# Patient Record
Sex: MALE | Race: WHITE | Employment: FULL TIME | URBAN - METROPOLITAN AREA
[De-identification: names, ages, dates, MRNs, and addresses within clinical notes are randomized per-mention and may not be internally consistent; named-entity substitution may affect disease eponyms.]

---

## 2017-01-04 ENCOUNTER — TELEPHONE (OUTPATIENT)
Dept: FAMILY MEDICINE CLINIC | Age: 52
End: 2017-01-04

## 2017-01-05 RX ORDER — ALOGLIPTIN AND METFORMIN HYDROCHLORIDE 12.5; 1 MG/1; MG/1
TABLET, FILM COATED ORAL
Qty: 60 TABLET | Refills: 11 | Status: SHIPPED | OUTPATIENT
Start: 2017-01-05 | End: 2017-02-06 | Stop reason: SDUPTHER

## 2017-02-01 DIAGNOSIS — E78.5 DYSLIPIDEMIA: ICD-10-CM

## 2017-02-01 RX ORDER — ATORVASTATIN CALCIUM 40 MG/1
40 TABLET, FILM COATED ORAL NIGHTLY
Qty: 30 TABLET | Refills: 11 | Status: SHIPPED | OUTPATIENT
Start: 2017-02-01 | End: 2018-02-12 | Stop reason: SDUPTHER

## 2017-02-02 ENCOUNTER — TELEPHONE (OUTPATIENT)
Dept: FAMILY MEDICINE CLINIC | Age: 52
End: 2017-02-02

## 2017-02-06 ENCOUNTER — TELEPHONE (OUTPATIENT)
Dept: FAMILY MEDICINE CLINIC | Age: 52
End: 2017-02-06

## 2017-02-06 RX ORDER — ALOGLIPTIN AND METFORMIN HYDROCHLORIDE 12.5; 1 MG/1; MG/1
TABLET, FILM COATED ORAL
Qty: 60 TABLET | Refills: 11 | Status: SHIPPED | OUTPATIENT
Start: 2017-02-06 | End: 2018-02-12 | Stop reason: SDUPTHER

## 2017-03-01 DIAGNOSIS — I10 ESSENTIAL HYPERTENSION: ICD-10-CM

## 2017-03-01 RX ORDER — LISINOPRIL AND HYDROCHLOROTHIAZIDE 20; 12.5 MG/1; MG/1
1 TABLET ORAL 2 TIMES DAILY
Qty: 60 TABLET | Refills: 11 | Status: SHIPPED | OUTPATIENT
Start: 2017-03-01 | End: 2018-02-16 | Stop reason: SDUPTHER

## 2017-03-15 ENCOUNTER — TELEPHONE (OUTPATIENT)
Dept: FAMILY MEDICINE CLINIC | Age: 52
End: 2017-03-15

## 2017-03-22 ENCOUNTER — OFFICE VISIT (OUTPATIENT)
Dept: FAMILY MEDICINE CLINIC | Age: 52
End: 2017-03-22

## 2017-03-22 VITALS
BODY MASS INDEX: 42.06 KG/M2 | HEART RATE: 75 BPM | DIASTOLIC BLOOD PRESSURE: 68 MMHG | RESPIRATION RATE: 14 BRPM | SYSTOLIC BLOOD PRESSURE: 130 MMHG | WEIGHT: 293.8 LBS | OXYGEN SATURATION: 97 % | HEIGHT: 70 IN

## 2017-03-22 DIAGNOSIS — E78.5 DYSLIPIDEMIA: ICD-10-CM

## 2017-03-22 DIAGNOSIS — Z72.0 TOBACCO ABUSE: ICD-10-CM

## 2017-03-22 DIAGNOSIS — Z99.89 OBSTRUCTIVE SLEEP APNEA ON CPAP: ICD-10-CM

## 2017-03-22 DIAGNOSIS — E66.01 MORBID OBESITY, UNSPECIFIED OBESITY TYPE (HCC): ICD-10-CM

## 2017-03-22 DIAGNOSIS — E11.29 CONTROLLED TYPE 2 DIABETES MELLITUS WITH MICROALBUMINURIA, UNSPECIFIED LONG TERM INSULIN USE STATUS: Primary | ICD-10-CM

## 2017-03-22 DIAGNOSIS — G47.00 INSOMNIA, UNSPECIFIED TYPE: ICD-10-CM

## 2017-03-22 DIAGNOSIS — Z23 NEED FOR TDAP VACCINATION: ICD-10-CM

## 2017-03-22 DIAGNOSIS — G47.33 OBSTRUCTIVE SLEEP APNEA ON CPAP: ICD-10-CM

## 2017-03-22 DIAGNOSIS — M51.36 DDD (DEGENERATIVE DISC DISEASE), LUMBAR: ICD-10-CM

## 2017-03-22 DIAGNOSIS — G25.81 RESTLESS LEG SYNDROME: ICD-10-CM

## 2017-03-22 DIAGNOSIS — R80.9 CONTROLLED TYPE 2 DIABETES MELLITUS WITH MICROALBUMINURIA, UNSPECIFIED LONG TERM INSULIN USE STATUS: Primary | ICD-10-CM

## 2017-03-22 DIAGNOSIS — Z85.46 HISTORY OF PROSTATE CANCER: ICD-10-CM

## 2017-03-22 DIAGNOSIS — Z23 NEED FOR 23-POLYVALENT PNEUMOCOCCAL POLYSACCHARIDE VACCINE: ICD-10-CM

## 2017-03-22 PROCEDURE — 90715 TDAP VACCINE 7 YRS/> IM: CPT | Performed by: FAMILY MEDICINE

## 2017-03-22 PROCEDURE — 99214 OFFICE O/P EST MOD 30 MIN: CPT | Performed by: FAMILY MEDICINE

## 2017-03-22 PROCEDURE — 90471 IMMUNIZATION ADMIN: CPT | Performed by: FAMILY MEDICINE

## 2017-03-22 PROCEDURE — 90732 PPSV23 VACC 2 YRS+ SUBQ/IM: CPT | Performed by: FAMILY MEDICINE

## 2017-03-22 PROCEDURE — 90472 IMMUNIZATION ADMIN EACH ADD: CPT | Performed by: FAMILY MEDICINE

## 2017-03-22 ASSESSMENT — ENCOUNTER SYMPTOMS
RESPIRATORY NEGATIVE: 1
BACK PAIN: 1
GASTROINTESTINAL NEGATIVE: 1

## 2017-05-30 RX ORDER — BUSPIRONE HYDROCHLORIDE 15 MG/1
TABLET ORAL
Qty: 60 TABLET | Refills: 11 | Status: SHIPPED | OUTPATIENT
Start: 2017-05-30 | End: 2018-05-23 | Stop reason: SDUPTHER

## 2017-09-09 ENCOUNTER — HOSPITAL ENCOUNTER (OUTPATIENT)
Age: 52
Discharge: HOME | End: 2017-09-09
Payer: MEDICAID

## 2017-09-09 DIAGNOSIS — R80.9 CONTROLLED TYPE 2 DIABETES MELLITUS WITH MICROALBUMINURIA, UNSPECIFIED LONG TERM INSULIN USE STATUS: ICD-10-CM

## 2017-09-09 DIAGNOSIS — E11.29 CONTROLLED TYPE 2 DIABETES MELLITUS WITH MICROALBUMINURIA, UNSPECIFIED LONG TERM INSULIN USE STATUS: ICD-10-CM

## 2017-09-09 PROCEDURE — 36415 COLL VENOUS BLD VENIPUNCTURE: CPT

## 2017-09-09 PROCEDURE — 83036 HEMOGLOBIN GLYCOSYLATED A1C: CPT

## 2017-09-10 LAB
AVERAGE GLUCOSE: 165 MG/DL (ref 70–126)
HBA1C MFR BLD: 7.5 % (ref 4.4–6.4)

## 2017-09-11 ENCOUNTER — TELEPHONE (OUTPATIENT)
Dept: UROLOGY | Age: 52
End: 2017-09-11

## 2017-09-11 DIAGNOSIS — C61 MALIGNANT NEOPLASM OF PROSTATE (HCC): Primary | ICD-10-CM

## 2017-09-20 ENCOUNTER — OFFICE VISIT (OUTPATIENT)
Dept: FAMILY MEDICINE CLINIC | Age: 52
End: 2017-09-20
Payer: MEDICAID

## 2017-09-20 VITALS
BODY MASS INDEX: 43.32 KG/M2 | OXYGEN SATURATION: 98 % | SYSTOLIC BLOOD PRESSURE: 130 MMHG | DIASTOLIC BLOOD PRESSURE: 72 MMHG | HEART RATE: 105 BPM | RESPIRATION RATE: 14 BRPM | HEIGHT: 70 IN | WEIGHT: 302.6 LBS

## 2017-09-20 DIAGNOSIS — Z85.46 HISTORY OF PROSTATE CANCER: ICD-10-CM

## 2017-09-20 DIAGNOSIS — Z99.89 OBSTRUCTIVE SLEEP APNEA ON CPAP: ICD-10-CM

## 2017-09-20 DIAGNOSIS — Z11.4 SCREENING FOR HIV (HUMAN IMMUNODEFICIENCY VIRUS): ICD-10-CM

## 2017-09-20 DIAGNOSIS — Z11.59 ENCOUNTER FOR HEPATITIS C SCREENING TEST FOR LOW RISK PATIENT: ICD-10-CM

## 2017-09-20 DIAGNOSIS — E78.5 DYSLIPIDEMIA: ICD-10-CM

## 2017-09-20 DIAGNOSIS — G47.33 OBSTRUCTIVE SLEEP APNEA ON CPAP: ICD-10-CM

## 2017-09-20 DIAGNOSIS — G25.81 RESTLESS LEG SYNDROME: ICD-10-CM

## 2017-09-20 DIAGNOSIS — Z23 NEED FOR INFLUENZA VACCINATION: ICD-10-CM

## 2017-09-20 DIAGNOSIS — G47.00 INSOMNIA, UNSPECIFIED TYPE: ICD-10-CM

## 2017-09-20 DIAGNOSIS — M51.36 DDD (DEGENERATIVE DISC DISEASE), LUMBAR: ICD-10-CM

## 2017-09-20 PROCEDURE — 90471 IMMUNIZATION ADMIN: CPT | Performed by: FAMILY MEDICINE

## 2017-09-20 PROCEDURE — 90688 IIV4 VACCINE SPLT 0.5 ML IM: CPT | Performed by: FAMILY MEDICINE

## 2017-09-20 PROCEDURE — 99214 OFFICE O/P EST MOD 30 MIN: CPT | Performed by: FAMILY MEDICINE

## 2017-09-20 RX ORDER — TAMSULOSIN HYDROCHLORIDE 0.4 MG/1
0.4 CAPSULE ORAL DAILY
Qty: 90 CAPSULE | Refills: 3 | Status: SHIPPED | OUTPATIENT
Start: 2017-09-20 | End: 2018-10-09 | Stop reason: SDUPTHER

## 2017-09-20 RX ORDER — AMITRIPTYLINE HYDROCHLORIDE 100 MG/1
TABLET, FILM COATED ORAL
Qty: 90 TABLET | Refills: 3 | Status: SHIPPED | OUTPATIENT
Start: 2017-09-20 | End: 2018-10-09 | Stop reason: SDUPTHER

## 2017-09-20 RX ORDER — PRAMIPEXOLE DIHYDROCHLORIDE 0.25 MG/1
0.25 TABLET ORAL NIGHTLY
Qty: 90 TABLET | Refills: 3 | Status: SHIPPED | OUTPATIENT
Start: 2017-09-20 | End: 2018-10-09 | Stop reason: SDUPTHER

## 2017-09-20 RX ORDER — PRAMIPEXOLE DIHYDROCHLORIDE 0.25 MG/1
0.5 TABLET ORAL NIGHTLY
Qty: 30 TABLET | Refills: 5 | Status: CANCELLED | OUTPATIENT
Start: 2017-09-20

## 2017-09-20 ASSESSMENT — ENCOUNTER SYMPTOMS
GASTROINTESTINAL NEGATIVE: 1
RESPIRATORY NEGATIVE: 1

## 2017-09-29 ENCOUNTER — HOSPITAL ENCOUNTER (OUTPATIENT)
Age: 52
Discharge: HOME | End: 2017-09-29
Payer: MEDICAID

## 2017-09-29 DIAGNOSIS — C61 MALIGNANT NEOPLASM OF PROSTATE (HCC): ICD-10-CM

## 2017-09-29 LAB — PROSTATE SPECIFIC ANTIGEN: 0.07 NG/ML (ref 0–1)

## 2017-09-29 PROCEDURE — 36415 COLL VENOUS BLD VENIPUNCTURE: CPT

## 2017-09-29 PROCEDURE — 84153 ASSAY OF PSA TOTAL: CPT

## 2017-10-03 ENCOUNTER — OFFICE VISIT (OUTPATIENT)
Dept: UROLOGY | Age: 52
End: 2017-10-03
Payer: MEDICAID

## 2017-10-03 VITALS
WEIGHT: 301 LBS | BODY MASS INDEX: 43.09 KG/M2 | HEIGHT: 70 IN | DIASTOLIC BLOOD PRESSURE: 92 MMHG | SYSTOLIC BLOOD PRESSURE: 172 MMHG

## 2017-10-03 DIAGNOSIS — C61 PROSTATE CANCER (HCC): Primary | ICD-10-CM

## 2017-10-03 LAB
BILIRUBIN URINE: ABNORMAL
BLOOD URINE, POC: ABNORMAL
CHARACTER, URINE: CLEAR
COLOR, URINE: ABNORMAL
GLUCOSE URINE: 250 MG/DL
KETONES, URINE: ABNORMAL
LEUKOCYTE CLUMPS, URINE: NEGATIVE
NITRITE, URINE: NEGATIVE
PH, URINE: 5.5
PROTEIN, URINE: >= 300 MG/DL
SPECIFIC GRAVITY, URINE: >= 1.03 (ref 1–1.03)
UROBILINOGEN, URINE: 1 EU/DL

## 2017-10-03 PROCEDURE — 81003 URINALYSIS AUTO W/O SCOPE: CPT | Performed by: UROLOGY

## 2017-10-03 PROCEDURE — 99213 OFFICE O/P EST LOW 20 MIN: CPT | Performed by: UROLOGY

## 2017-10-03 NOTE — MR AVS SNAPSHOT
Name Address Phone [facility] [address]  [address] [place] [phone]      You Were Seen for:         Comments    Prostate cancer [facility])   [[id number]]         Vital Signs     Blood Pressure Height Weight Body Mass Index Smoking Status       172/92 5' 10\" (1.778 m) 301 lb (136.5 kg) 43.19 kg/m2 Current Every Day Smoker       Additional Information about your Body Mass Index (BMI)           Your BMI as listed above is considered obese (30 or more). BMI is an estimate of body fat, calculated from your height and weight. The higher your BMI, the greater your risk of heart disease, high blood pressure, type 2 diabetes, stroke, gallstones, arthritis, sleep apnea, and certain cancers. BMI is not perfect. It may overestimate body fat in athletes and people who are more muscular. Even a small weight loss (between 5 and 10 percent of your current weight) by decreasing your calorie intake and becoming more physically active will help lower your risk of developing or worsening diseases associated with obesity. Learn more at: [employer]'s[employer]uk          Instructions         Stopping Smoking: Care Instructions  Your Care Instructions  Cigarette smokers crave the nicotine in cigarettes. Giving it up is much harder than simply changing a habit. Your body has to stop craving the nicotine. It is hard to quit, but you can do it. There are many tools that people use to quit smoking. You may find that combining tools works best for you. There are several steps to quitting. First you get ready to quit. Then you get support to help you. After that, you learn new skills and behaviors to become a nonsmoker. For many people, a necessary step is getting and using medicine. Your doctor will help you set up the plan that best meets your needs. You may want to attend a smoking cessation program to help you quit smoking. ¨ Change your daily routine. Take a different route to work or eat a meal in a different place. · Cut down on stress. Calm yourself or release tension by doing an activity you enjoy, such as reading a book, taking a hot bath, or gardening. · Talk to your doctor or pharmacist about nicotine replacement therapy, which replaces the nicotine in your body. You still get nicotine but you do not use tobacco. Nicotine replacement products help you slowly reduce the amount of nicotine you need. These products come in several forms, many of them available over-the-counter:  ¨ Nicotine patches  ¨ Nicotine gum and lozenges  ¨ Nicotine inhaler  · Ask your doctor about bupropion (Wellbutrin) or varenicline (Chantix), which are prescription medicines. They do not contain nicotine. They help you by reducing withdrawal symptoms, such as stress and anxiety. · Some people find hypnosis, acupuncture, and massage helpful for ending the smoking habit. · Eat a healthy diet and get regular exercise. Having healthy habits will help your body move past its craving for nicotine. · Be prepared to keep trying. Most people are not successful the first few times they try to quit. Do not get mad at yourself if you smoke again. Make a list of things you learned and think about when you want to try again, such as next week, next month, or next year. Where can you learn more? Go to https://[email or web address].Playchemy. org and sign in to your Everest account. Enter F561 in the [facility] box to learn more about \"Stopping Smoking: Care Instructions. \"     If you do not have an account, please click on the \"Sign Up Now\" link. Current as of: March 20, 2017  Content Version: 11.3  © 7191-6490 [employer]. Care instructions adapted under license by [facility] ([facility]).  If you have questions about a medical condition or this instruction, always ask your healthcare professional. [employer] disclaims any warranty or liability for your use of this information. Medications and Orders      Your Current Medications Are              amitriptyline (ELAVIL) 100 MG tablet take 1 tablet by mouth NIGHTLY    tamsulosin (FLOMAX) 0.4 MG capsule Take 1 capsule by mouth daily    pramipexole (MIRAPEX) 0.25 MG tablet Take 1 tablet by mouth nightly    busPIRone (BUSPAR) 15 MG tablet take 1 tablet by mouth twice a day    linagliptin-metformin (JENTADUETO) 2.5-1000 MG TABS Take 1 tab BID    lisinopril-hydrochlorothiazide (PRINZIDE;ZESTORETIC) 20-12.5 MG per tablet Take 1 tablet by mouth 2 times daily    atorvastatin (LIPITOR) 40 MG tablet Take 1 tablet by mouth nightly    gabapentin (NEURONTIN) 300 MG capsule Take 800 mg by mouth 3 times daily     cyclobenzaprine (FLEXERIL) 10 MG tablet Take 10 mg by mouth 3 times daily. Multiple Vitamin (MULTIVITAMIN PO) Take  by mouth daily. aspirin 81 MG EC tablet Take 81 mg by mouth daily.       alogliptin-metformin (KAZANO) 12.5-1000 MG TABS Take 1 tab BID      Allergies              Pcn [Penicillins] Hives      We Ordered/Performed the following           POCT Urinalysis No Micro (Auto)          Result Summary for POCT Urinalysis No Micro (Auto)      Result Information       Status          Abnormal Final result (Collected: 10/3/2017  3:30 PM)           10/3/2017  3:25 PM      Component Results     Component Value Ref Range & Units Status    Glucose, Ur 250 (A) NEGATIVE mg/dl Final    Bilirubin Urine Small  Final    Ketones, Urine Trace NEGATIVE Final    Specific Gravity, Urine >= 1.030 1.002 - 1.03 Final    Blood, UA POC Trace-intact NEGATIVE Final    pH, Urine 5.50 5.0 - 9.0 Final    Protein, Urine >= 300 (A) NEGATIVE mg/dl Final    Urobilinogen, Urine 1.00 0.0 - 1.0 eu/dl Final    Nitrite, Urine Negative NEGATIVE Final    Leukocyte Clumps, Urine Negative NEGATIVE Final    Color, Urine Dark yellow YELLOW-STR Final Character, Urine Clear CLR-SL.ERIC Final    Performed at [address] [place] [address],[address] Office under CLIA # [id number]               Additional Information        Basic Information     Date Of Birth Sex Race Ethnicity Preferred Language    1965 Male White Non-/Non  English      Problem List as of 10/3/2017  Date Reviewed: 3/22/2017                Obstructive sleep apnea on CPAP    Carpal tunnel syndrome of right wrist    Controlled type 2 diabetes mellitus with microalbuminuria (HCC)    Restless leg syndrome    Morbid obesity (HCC)    Carpal tunnel syndrome of left wrist    Cubital tunnel syndrome on left    Insomnia    Onychomycosis    DM type 2 (diabetes mellitus, type 2) ([facility] [address]    Dyslipidemia    DDD (degenerative disc disease), lumbar    Tobacco abuse      Your Goals as of 10/3/2017 at 3:37 PM              9/9/17    3/18/17    9/6/16       Exercise    Exercise 3x per week (30 min per time)              Result Component    HEMOGLOBIN A1C < 7.0   7.5  6.8  6.9      Immunizations as of 10/3/2017     Name Date    Influenza, Suellyn Morning, 3 Years and older, IM 9/20/2017    Pneumococcal Polysaccharide ([id number]) 3/22/2017    Tdap (Boostrix, Adacel) 3/22/2017      Preventive Care        Date Due    Hepatitis C screening is recommended for all adults regardless of risk factors born between [facility] at least once (lifetime) who have never been tested. 1965    HIV screening is recommended for all people regardless of risk factors  aged 15-65 years at least once (lifetime) who have never been HIV tested. 2/27/1978    Eye Exam By An Eye Doctor 9/9/2016    Cholesterol Screening 3/18/2018    Hemoglobin A1C (Test For Long-Term Glucose Control) 9/9/2018    Diabetic Foot Exam 9/20/2018    Colonoscopy 4/20/2025    Tetanus Combination Vaccine (2 - Td) 3/22/2027            MyChart Signup           Our records indicate that you have an active Enventumhart account.     You can view your After Visit Summary by going to

## 2017-10-03 NOTE — PATIENT INSTRUCTIONS
smoking. · Consider signing up for a smoking cessation program, such as the American Lung Association's Freedom from Smoking program.  · Set a quit date. Pick your date carefully so that it is not right in the middle of a big deadline or stressful time. Once you quit, do not even take a puff. Get rid of all ashtrays and lighters after your last cigarette. Clean your house and your clothes so that they do not smell of smoke. · Learn how to be a nonsmoker. Think about ways you can avoid those things that make you reach for a cigarette. ¨ Avoid situations that put you at greatest risk for smoking. For some people, it is hard to have a drink with friends without smoking. For others, they might skip a coffee break with coworkers who smoke. ¨ Change your daily routine. Take a different route to work or eat a meal in a different place. · Cut down on stress. Calm yourself or release tension by doing an activity you enjoy, such as reading a book, taking a hot bath, or gardening. · Talk to your doctor or pharmacist about nicotine replacement therapy, which replaces the nicotine in your body. You still get nicotine but you do not use tobacco. Nicotine replacement products help you slowly reduce the amount of nicotine you need. These products come in several forms, many of them available over-the-counter:  ¨ Nicotine patches  ¨ Nicotine gum and lozenges  ¨ Nicotine inhaler  · Ask your doctor about bupropion (Wellbutrin) or varenicline (Chantix), which are prescription medicines. They do not contain nicotine. They help you by reducing withdrawal symptoms, such as stress and anxiety. · Some people find hypnosis, acupuncture, and massage helpful for ending the smoking habit. · Eat a healthy diet and get regular exercise. Having healthy habits will help your body move past its craving for nicotine. · Be prepared to keep trying. Most people are not successful the first few times they try to quit.  Do not get mad at yourself if you smoke again. Make a list of things you learned and think about when you want to try again, such as next week, next month, or next year. Where can you learn more? Go to https://Studio S[employer]peDarudar.BuzzCity. org and sign in to your "[employer] account. Enter H900 in the [facility] box to learn more about \"Stopping Smoking: Care Instructions. \"     If you do not have an account, please click on the \"Sign Up Now\" link. Current as of: March 20, 2017  Content Version: 11.3  © 2926-7182 [employer]. Care instructions adapted under license by [facility] ([facility]). If you have questions about a medical condition or this instruction, always ask your healthcare professional. [address] any warranty or liability for your use of this information.

## 2017-11-29 ENCOUNTER — OFFICE VISIT (OUTPATIENT)
Dept: PULMONOLOGY | Age: 52
End: 2017-11-29
Payer: MEDICAID

## 2017-11-29 VITALS
HEIGHT: 70 IN | DIASTOLIC BLOOD PRESSURE: 82 MMHG | BODY MASS INDEX: 44.09 KG/M2 | WEIGHT: 308 LBS | OXYGEN SATURATION: 95 % | SYSTOLIC BLOOD PRESSURE: 132 MMHG | HEART RATE: 85 BPM

## 2017-11-29 DIAGNOSIS — G47.10 HYPERSOMNIA: ICD-10-CM

## 2017-11-29 DIAGNOSIS — Z99.89 OBSTRUCTIVE SLEEP APNEA ON CPAP: Primary | ICD-10-CM

## 2017-11-29 DIAGNOSIS — G47.09 OTHER INSOMNIA: ICD-10-CM

## 2017-11-29 DIAGNOSIS — G47.26 SHIFT WORK SLEEP DISORDER: ICD-10-CM

## 2017-11-29 DIAGNOSIS — G47.33 OBSTRUCTIVE SLEEP APNEA ON CPAP: Primary | ICD-10-CM

## 2017-11-29 PROCEDURE — 99213 OFFICE O/P EST LOW 20 MIN: CPT | Performed by: PHYSICIAN ASSISTANT

## 2017-11-29 PROCEDURE — 4004F PT TOBACCO SCREEN RCVD TLK: CPT | Performed by: PHYSICIAN ASSISTANT

## 2017-11-29 PROCEDURE — 3017F COLORECTAL CA SCREEN DOC REV: CPT | Performed by: PHYSICIAN ASSISTANT

## 2017-11-29 PROCEDURE — G8417 CALC BMI ABV UP PARAM F/U: HCPCS | Performed by: PHYSICIAN ASSISTANT

## 2017-11-29 PROCEDURE — G8427 DOCREV CUR MEDS BY ELIG CLIN: HCPCS | Performed by: PHYSICIAN ASSISTANT

## 2017-11-29 PROCEDURE — G8484 FLU IMMUNIZE NO ADMIN: HCPCS | Performed by: PHYSICIAN ASSISTANT

## 2017-11-29 NOTE — PATIENT INSTRUCTIONS
Tylenol Pm and Melatonin for sleep       Patient Education        Stopping Smoking: Care Instructions  Your Care Instructions  Cigarette smokers crave the nicotine in cigarettes. Giving it up is much harder than simply changing a habit. Your body has to stop craving the nicotine. It is hard to quit, but you can do it. There are many tools that people use to quit smoking. You may find that combining tools works best for you. There are several steps to quitting. First you get ready to quit. Then you get support to help you. After that, you learn new skills and behaviors to become a nonsmoker. For many people, a necessary step is getting and using medicine. Your doctor will help you set up the plan that best meets your needs. You may want to attend a smoking cessation program to help you quit smoking. When you choose a program, look for one that has proven success. Ask your doctor for ideas. You will greatly increase your chances of success if you take medicine as well as get counseling or join a cessation program.  Some of the changes you feel when you first quit tobacco are uncomfortable. Your body will miss the nicotine at first, and you may feel short-tempered and grumpy. You may have trouble sleeping or concentrating. Medicine can help you deal with these symptoms. You may struggle with changing your smoking habits and rituals. The last step is the tricky one: Be prepared for the smoking urge to continue for a time. This is a lot to deal with, but keep at it. You will feel better. Follow-up care is a key part of your treatment and safety. Be sure to make and go to all appointments, and call your doctor if you are having problems. Its also a good idea to know your test results and keep a list of the medicines you take. How can you care for yourself at home? · Ask your family, friends, and coworkers for support. You have a better chance of quitting if you have help and support.   · Join a support group, such as Nicotine Anonymous, for people who are trying to quit smoking. · Consider signing up for a smoking cessation program, such as the American Lung Association's Freedom from Smoking program.  · Set a quit date. Pick your date carefully so that it is not right in the middle of a big deadline or stressful time. Once you quit, do not even take a puff. Get rid of all ashtrays and lighters after your last cigarette. Clean your house and your clothes so that they do not smell of smoke. · Learn how to be a nonsmoker. Think about ways you can avoid those things that make you reach for a cigarette. ¨ Avoid situations that put you at greatest risk for smoking. For some people, it is hard to have a drink with friends without smoking. For others, they might skip a coffee break with coworkers who smoke. ¨ Change your daily routine. Take a different route to work or eat a meal in a different place. · Cut down on stress. Calm yourself or release tension by doing an activity you enjoy, such as reading a book, taking a hot bath, or gardening. · Talk to your doctor or pharmacist about nicotine replacement therapy, which replaces the nicotine in your body. You still get nicotine but you do not use tobacco. Nicotine replacement products help you slowly reduce the amount of nicotine you need. These products come in several forms, many of them available over-the-counter:  ¨ Nicotine patches  ¨ Nicotine gum and lozenges  ¨ Nicotine inhaler  · Ask your doctor about bupropion (Wellbutrin) or varenicline (Chantix), which are prescription medicines. They do not contain nicotine. They help you by reducing withdrawal symptoms, such as stress and anxiety. · Some people find hypnosis, acupuncture, and massage helpful for ending the smoking habit. · Eat a healthy diet and get regular exercise. Having healthy habits will help your body move past its craving for nicotine. · Be prepared to keep trying.  Most people are not successful the first

## 2017-11-29 NOTE — PROGRESS NOTES
Multiple Vitamin (MULTIVITAMIN PO) Take  by mouth daily.  aspirin 81 MG EC tablet Take 81 mg by mouth daily. No current facility-administered medications for this visit. Family History   Problem Relation Age of Onset    Thyroid Disease Mother     Heart Disease Father     Diabetes Father     Cancer Maternal Grandmother      what kind    Heart Disease Brother         Review of Systems -   General ROS: gained 6 lbs over 3 months  ENT ROS: negative for - nasal congestion, oral lesions or sore throat  Hematological and Lymphatic ROS: negative  Endocrine ROS: negative  Respiratory ROS: no cough, shortness of breath, or wheezing  Cardiovascular ROS: no chest pain   Gastrointestinal ROS: no abdominal pain, change in bowel habits, or black or bloody stools  Musculoskeletal ROS: negative  Neurological ROS: negative    Physical Exam:    BMI:  Body mass index is 44.19 kg/m². Wt Readings from Last 3 Encounters:   11/29/17 (!) 308 lb (139.7 kg)   10/03/17 (!) 301 lb (136.5 kg)   09/20/17 (!) 302 lb 9.6 oz (137.3 kg)     Vitals: /82   Pulse 85   Ht 5' 10\" (1.778 m)   Wt (!) 308 lb (139.7 kg)   SpO2 95% Comment: room air at rest  BMI 44.19 kg/m²       General appearance: alert and oriented to person, place and time, well-developed and well-nourished, in no acute distress  Nose: Nares normal. Septum midline. Mucosa normal. No drainage or sinus tenderness. Oropharynx:  negative  Lungs: clear to auscultation bilaterally  Heart: regular rate and rhythm, S1, S2 normal, no murmur, click, rub or gallop  Extremities: extremities normal, atraumatic, no cyanosis or edema  Neurologic: Mental status: Alert, oriented, thought content appropriate      ASSESSMENT/DIAGNOSIS    1. Obstructive sleep apnea on CPAP     2. Shift work sleep disorder     3. Hypersomnia     4. Other insomnia              Plan   Do you need any equipment today?  Yes update supplies  - Will try Benadryl and Melatonin for sleep  - if

## 2018-02-12 DIAGNOSIS — E78.5 DYSLIPIDEMIA: ICD-10-CM

## 2018-02-12 RX ORDER — ALOGLIPTIN AND METFORMIN HYDROCHLORIDE 12.5; 1 MG/1; MG/1
TABLET, FILM COATED ORAL
Qty: 60 TABLET | Refills: 11 | Status: SHIPPED | OUTPATIENT
Start: 2018-02-12 | End: 2022-01-17

## 2018-02-12 RX ORDER — ATORVASTATIN CALCIUM 40 MG/1
40 TABLET, FILM COATED ORAL NIGHTLY
Qty: 30 TABLET | Refills: 11 | Status: SHIPPED | OUTPATIENT
Start: 2018-02-12 | End: 2022-01-17

## 2018-02-16 DIAGNOSIS — I10 ESSENTIAL HYPERTENSION: ICD-10-CM

## 2018-02-19 RX ORDER — LISINOPRIL AND HYDROCHLOROTHIAZIDE 20; 12.5 MG/1; MG/1
TABLET ORAL
Qty: 60 TABLET | Refills: 11 | Status: SHIPPED | OUTPATIENT
Start: 2018-02-19 | End: 2022-01-17

## 2018-02-20 ENCOUNTER — HOSPITAL ENCOUNTER (OUTPATIENT)
Age: 53
Discharge: HOME | End: 2018-02-20
Attending: INTERNAL MEDICINE
Payer: MEDICAID

## 2018-02-20 PROCEDURE — 87507 IADNA-DNA/RNA PROBE TQ 12-25: CPT

## 2018-02-21 LAB
ADENOVIRUS F 40 41 PCR: NOT DETECTED
ASTROVIRUS PCR: NOT DETECTED
CAMPYLOBACTER PCR: NOT DETECTED
CLOSTRIDIUM DIFFICILE, PCR: NOT DETECTED
CRYPTOSPORIDIUM PCR: NOT DETECTED
CYCLOSPORA CAYETANENSIS PCR: NOT DETECTED
E COLI 0157 PCR: NORMAL
E COLI ENTEROAGGREGATIVE PCR: NOT DETECTED
E COLI ENTEROPATHOGENIC PCR: NOT DETECTED
E COLI ENTEROTOXIGENIC PCR: NOT DETECTED
E COLI SHIGA LIKE TOXIN PCR: NOT DETECTED
E COLI SHIGELLA/ENTEROINVASIVE PCR: NOT DETECTED
E HISTOLYTICA GI FILM ARRAY: NOT DETECTED
GIARDIA LAMBLIA PCR: NOT DETECTED
NOROVIRUS GI GII PCR: NOT DETECTED
PLESIOMONAS SHIGELLOIDES PCR: NOT DETECTED
ROTAVIRUS A PCR: NOT DETECTED
SALMONELLA PCR: NOT DETECTED
SAPOVIRUS PCR: NOT DETECTED
VIBRIO CHOLERAE PCR: NOT DETECTED
VIBRIO PCR: NOT DETECTED
YERSINIA ENTEROCOLITICA PCR: NOT DETECTED

## 2018-02-28 ENCOUNTER — OFFICE VISIT (OUTPATIENT)
Dept: PULMONOLOGY | Age: 53
End: 2018-02-28
Payer: MEDICAID

## 2018-02-28 VITALS
SYSTOLIC BLOOD PRESSURE: 118 MMHG | OXYGEN SATURATION: 94 % | HEART RATE: 75 BPM | BODY MASS INDEX: 41.95 KG/M2 | WEIGHT: 293 LBS | DIASTOLIC BLOOD PRESSURE: 76 MMHG | HEIGHT: 70 IN

## 2018-02-28 DIAGNOSIS — G25.81 RESTLESS LEG SYNDROME: ICD-10-CM

## 2018-02-28 DIAGNOSIS — Z72.0 TOBACCO ABUSE: ICD-10-CM

## 2018-02-28 DIAGNOSIS — G47.09 OTHER INSOMNIA: ICD-10-CM

## 2018-02-28 DIAGNOSIS — G47.33 OBSTRUCTIVE SLEEP APNEA ON CPAP: Primary | ICD-10-CM

## 2018-02-28 DIAGNOSIS — Z99.89 OBSTRUCTIVE SLEEP APNEA ON CPAP: Primary | ICD-10-CM

## 2018-02-28 PROCEDURE — G8417 CALC BMI ABV UP PARAM F/U: HCPCS | Performed by: PHYSICIAN ASSISTANT

## 2018-02-28 PROCEDURE — 3017F COLORECTAL CA SCREEN DOC REV: CPT | Performed by: PHYSICIAN ASSISTANT

## 2018-02-28 PROCEDURE — 4004F PT TOBACCO SCREEN RCVD TLK: CPT | Performed by: PHYSICIAN ASSISTANT

## 2018-02-28 PROCEDURE — G8484 FLU IMMUNIZE NO ADMIN: HCPCS | Performed by: PHYSICIAN ASSISTANT

## 2018-02-28 PROCEDURE — G8427 DOCREV CUR MEDS BY ELIG CLIN: HCPCS | Performed by: PHYSICIAN ASSISTANT

## 2018-02-28 PROCEDURE — 99213 OFFICE O/P EST LOW 20 MIN: CPT | Performed by: PHYSICIAN ASSISTANT

## 2018-02-28 PROCEDURE — 99999 PR TOBACCO USE CESSATION INTENSIVE >10 MINUTES: CPT | Performed by: PHYSICIAN ASSISTANT

## 2018-02-28 ASSESSMENT — ENCOUNTER SYMPTOMS
GASTROINTESTINAL NEGATIVE: 1
NAUSEA: 0
COUGH: 0
EYES NEGATIVE: 1
SPUTUM PRODUCTION: 0
SINUS PAIN: 0
SORE THROAT: 0
HEARTBURN: 0
ORTHOPNEA: 0
RESPIRATORY NEGATIVE: 1
SHORTNESS OF BREATH: 0
WHEEZING: 0

## 2018-02-28 NOTE — PROGRESS NOTES
complication, not stated as uncontrolled     Urgency of urination        Past Surgical History:   Procedure Laterality Date    CARPAL TUNNEL RELEASE  10/22/12    right    CARPAL TUNNEL RELEASE  11/26/2012    left    COLONOSCOPY  2/1/2015    CYST REMOVAL  T    RT SIDE UPPER BACK,     CYST REMOVAL  7-5-12    excision of right shoulder mass/cyst and of left axillary skin lesion-Dr. Nii Mallory    HAND SURGERY  1987    partial finger amputee rt hand    KNEE SURGERY  ? left knee scope    LEG SURGERY      LEG SURGERY  6/13/12    I&D rt leg abscess- Dr. Khanh Palacios  12-    TRUS/BX    PROSTATE SURGERY  06-    BRACHYTHERAPY OF THE PROSTATE    SKIN TAG REMOVAL  T    LT ARMPIT       Social History   Substance Use Topics    Smoking status: Current Every Day Smoker     Packs/day: 1.00     Years: 40.00     Types: Cigarettes     Start date: 6/29/1975    Smokeless tobacco: Never Used    Alcohol use No       Allergies   Allergen Reactions    Pcn [Penicillins] Hives       Current Outpatient Prescriptions   Medication Sig Dispense Refill    lisinopril-hydrochlorothiazide (PRINZIDE;ZESTORETIC) 20-12.5 MG per tablet take 1 tablet by mouth twice a day 60 tablet 11    atorvastatin (LIPITOR) 40 MG tablet take 1 tablet by mouth NIGHTLY 30 tablet 11    amitriptyline (ELAVIL) 100 MG tablet take 1 tablet by mouth NIGHTLY 90 tablet 3    tamsulosin (FLOMAX) 0.4 MG capsule Take 1 capsule by mouth daily 90 capsule 3    pramipexole (MIRAPEX) 0.25 MG tablet Take 1 tablet by mouth nightly 90 tablet 3    busPIRone (BUSPAR) 15 MG tablet take 1 tablet by mouth twice a day 60 tablet 11    linagliptin-metformin (JENTADUETO) 2.5-1000 MG TABS Take 1 tab BID 60 tablet 11    gabapentin (NEURONTIN) 300 MG capsule Take 800 mg by mouth 3 times daily       cyclobenzaprine (FLEXERIL) 10 MG tablet Take 10 mg by mouth 3 times daily.  Multiple Vitamin (MULTIVITAMIN PO) Take  by mouth daily.        

## 2018-03-16 ENCOUNTER — HOSPITAL ENCOUNTER (OUTPATIENT)
Age: 53
Discharge: HOME | End: 2018-03-16
Payer: MEDICAID

## 2018-03-16 DIAGNOSIS — Z11.4 SCREENING FOR HIV (HUMAN IMMUNODEFICIENCY VIRUS): ICD-10-CM

## 2018-03-16 DIAGNOSIS — Z11.59 ENCOUNTER FOR HEPATITIS C SCREENING TEST FOR LOW RISK PATIENT: ICD-10-CM

## 2018-03-16 DIAGNOSIS — E78.5 DYSLIPIDEMIA: ICD-10-CM

## 2018-03-16 DIAGNOSIS — C61 PROSTATE CANCER (HCC): ICD-10-CM

## 2018-03-16 LAB
ALBUMIN SERPL-MCNC: 4.4 G/DL (ref 3.5–5.1)
ALP BLD-CCNC: 67 U/L (ref 38–126)
ALT SERPL-CCNC: 25 U/L (ref 11–66)
ANION GAP SERPL CALCULATED.3IONS-SCNC: 16 MEQ/L (ref 8–16)
AST SERPL-CCNC: 21 U/L (ref 5–40)
AVERAGE GLUCOSE: 171 MG/DL (ref 70–126)
BILIRUB SERPL-MCNC: 0.5 MG/DL (ref 0.3–1.2)
BUN BLDV-MCNC: 11 MG/DL (ref 7–22)
CALCIUM SERPL-MCNC: 9.7 MG/DL (ref 8.5–10.5)
CHLORIDE BLD-SCNC: 94 MEQ/L (ref 98–111)
CHOLESTEROL, TOTAL: 147 MG/DL (ref 100–199)
CO2: 24 MEQ/L (ref 23–33)
CREAT SERPL-MCNC: 0.8 MG/DL (ref 0.4–1.2)
CREATININE, URINE: 67.5 MG/DL
GFR SERPL CREATININE-BSD FRML MDRD: > 90 ML/MIN/1.73M2
GLUCOSE BLD-MCNC: 133 MG/DL (ref 70–108)
HBA1C MFR BLD: 7.7 % (ref 4.4–6.4)
HDLC SERPL-MCNC: 30 MG/DL
LDL CHOLESTEROL CALCULATED: 80 MG/DL
MICROALBUMIN UR-MCNC: 54.57 MG/DL
MICROALBUMIN/CREAT UR-RTO: 808 MG/G (ref 0–30)
POTASSIUM SERPL-SCNC: 3.5 MEQ/L (ref 3.5–5.2)
SODIUM BLD-SCNC: 134 MEQ/L (ref 135–145)
TOTAL PROTEIN: 7.3 G/DL (ref 6.1–8)
TRIGL SERPL-MCNC: 187 MG/DL (ref 0–199)

## 2018-03-16 PROCEDURE — 82043 UR ALBUMIN QUANTITATIVE: CPT

## 2018-03-16 PROCEDURE — 80061 LIPID PANEL: CPT

## 2018-03-16 PROCEDURE — 86803 HEPATITIS C AB TEST: CPT

## 2018-03-16 PROCEDURE — 83036 HEMOGLOBIN GLYCOSYLATED A1C: CPT

## 2018-03-16 PROCEDURE — 87389 HIV-1 AG W/HIV-1&-2 AB AG IA: CPT

## 2018-03-16 PROCEDURE — 36415 COLL VENOUS BLD VENIPUNCTURE: CPT

## 2018-03-16 PROCEDURE — 80053 COMPREHEN METABOLIC PANEL: CPT

## 2018-03-16 PROCEDURE — 84153 ASSAY OF PSA TOTAL: CPT

## 2018-03-17 LAB
HEPATITIS C ANTIBODY: NEGATIVE
PROSTATE SPECIFIC ANTIGEN: 0.09 NG/ML (ref 0–1)

## 2018-03-18 LAB — HIV-2 AB: NEGATIVE

## 2018-03-21 ENCOUNTER — OFFICE VISIT (OUTPATIENT)
Dept: FAMILY MEDICINE CLINIC | Age: 53
End: 2018-03-21
Payer: MEDICAID

## 2018-03-21 VITALS
DIASTOLIC BLOOD PRESSURE: 86 MMHG | WEIGHT: 291.8 LBS | BODY MASS INDEX: 41.78 KG/M2 | RESPIRATION RATE: 16 BRPM | OXYGEN SATURATION: 98 % | SYSTOLIC BLOOD PRESSURE: 128 MMHG | HEIGHT: 70 IN | HEART RATE: 76 BPM

## 2018-03-21 DIAGNOSIS — Z99.89 OBSTRUCTIVE SLEEP APNEA ON CPAP: ICD-10-CM

## 2018-03-21 DIAGNOSIS — Z72.0 TOBACCO ABUSE: ICD-10-CM

## 2018-03-21 DIAGNOSIS — E66.01 MORBID OBESITY (HCC): ICD-10-CM

## 2018-03-21 DIAGNOSIS — D17.0 LIPOMA OF NECK: ICD-10-CM

## 2018-03-21 DIAGNOSIS — F32.A DEPRESSION, UNSPECIFIED DEPRESSION TYPE: ICD-10-CM

## 2018-03-21 DIAGNOSIS — G47.33 OBSTRUCTIVE SLEEP APNEA ON CPAP: ICD-10-CM

## 2018-03-21 DIAGNOSIS — H93.19 TINNITUS, UNSPECIFIED LATERALITY: ICD-10-CM

## 2018-03-21 DIAGNOSIS — H91.93 BILATERAL HEARING LOSS, UNSPECIFIED HEARING LOSS TYPE: ICD-10-CM

## 2018-03-21 DIAGNOSIS — E78.5 DYSLIPIDEMIA: ICD-10-CM

## 2018-03-21 DIAGNOSIS — G25.81 RESTLESS LEG SYNDROME: ICD-10-CM

## 2018-03-21 DIAGNOSIS — Z13.31 POSITIVE DEPRESSION SCREENING: ICD-10-CM

## 2018-03-21 DIAGNOSIS — G47.00 INSOMNIA, UNSPECIFIED TYPE: ICD-10-CM

## 2018-03-21 DIAGNOSIS — Z85.46 HISTORY OF PROSTATE CANCER: ICD-10-CM

## 2018-03-21 PROCEDURE — G8431 POS CLIN DEPRES SCRN F/U DOC: HCPCS | Performed by: FAMILY MEDICINE

## 2018-03-21 PROCEDURE — G8417 CALC BMI ABV UP PARAM F/U: HCPCS | Performed by: FAMILY MEDICINE

## 2018-03-21 PROCEDURE — 3045F PR MOST RECENT HEMOGLOBIN A1C LEVEL 7.0-9.0%: CPT | Performed by: FAMILY MEDICINE

## 2018-03-21 PROCEDURE — 4004F PT TOBACCO SCREEN RCVD TLK: CPT | Performed by: FAMILY MEDICINE

## 2018-03-21 PROCEDURE — G8482 FLU IMMUNIZE ORDER/ADMIN: HCPCS | Performed by: FAMILY MEDICINE

## 2018-03-21 PROCEDURE — G8427 DOCREV CUR MEDS BY ELIG CLIN: HCPCS | Performed by: FAMILY MEDICINE

## 2018-03-21 PROCEDURE — 3017F COLORECTAL CA SCREEN DOC REV: CPT | Performed by: FAMILY MEDICINE

## 2018-03-21 PROCEDURE — G0444 DEPRESSION SCREEN ANNUAL: HCPCS | Performed by: FAMILY MEDICINE

## 2018-03-21 PROCEDURE — 99215 OFFICE O/P EST HI 40 MIN: CPT | Performed by: FAMILY MEDICINE

## 2018-03-21 RX ORDER — BUPROPION HYDROCHLORIDE 150 MG/1
150 TABLET ORAL EVERY MORNING
Qty: 30 TABLET | Refills: 1 | Status: SHIPPED | OUTPATIENT
Start: 2018-03-21 | End: 2018-04-18 | Stop reason: SDUPTHER

## 2018-03-21 ASSESSMENT — PATIENT HEALTH QUESTIONNAIRE - PHQ9
7. TROUBLE CONCENTRATING ON THINGS, SUCH AS READING THE NEWSPAPER OR WATCHING TELEVISION: 0
2. FEELING DOWN, DEPRESSED OR HOPELESS: 3
9. THOUGHTS THAT YOU WOULD BE BETTER OFF DEAD, OR OF HURTING YOURSELF: 1
8. MOVING OR SPEAKING SO SLOWLY THAT OTHER PEOPLE COULD HAVE NOTICED. OR THE OPPOSITE, BEING SO FIGETY OR RESTLESS THAT YOU HAVE BEEN MOVING AROUND A LOT MORE THAN USUAL: 0
3. TROUBLE FALLING OR STAYING ASLEEP: 0
SUM OF ALL RESPONSES TO PHQ9 QUESTIONS 1 & 2: 6
SUM OF ALL RESPONSES TO PHQ QUESTIONS 1-9: 16
5. POOR APPETITE OR OVEREATING: 3
6. FEELING BAD ABOUT YOURSELF - OR THAT YOU ARE A FAILURE OR HAVE LET YOURSELF OR YOUR FAMILY DOWN: 3
10. IF YOU CHECKED OFF ANY PROBLEMS, HOW DIFFICULT HAVE THESE PROBLEMS MADE IT FOR YOU TO DO YOUR WORK, TAKE CARE OF THINGS AT HOME, OR GET ALONG WITH OTHER PEOPLE: 1
4. FEELING TIRED OR HAVING LITTLE ENERGY: 3
1. LITTLE INTEREST OR PLEASURE IN DOING THINGS: 3

## 2018-03-21 NOTE — PROGRESS NOTES
Subjective:      Patient ID: Riana Chan is a 52 y.o. male. HPI:    Chief Complaint   Patient presents with    6 Month Follow-Up     DM2    Mass     throat    Tinnitus     bilateral     6 month eval.  Doing ok overall. Weight is down from previous visit, down 17 lbs from November. Eating much better. Wt Readings from Last 3 Encounters:   03/21/18 291 lb 12.8 oz (132.4 kg)   02/28/18 293 lb (132.9 kg)   11/29/17 (!) 308 lb (139.7 kg)     BPs well controlled. BP Readings from Last 3 Encounters:   03/21/18 128/86   02/28/18 118/76   11/29/17 132/82     Sugars continue to climb. 6.9 to 7.5 to now 7.7. Pt attributes this to overeating and only eating once daily. Lab Results   Component Value Date    LABA1C 7.7 (H) 03/16/2018     Pt continues to fight depression. Buspar is helping with anxiety but not helping with the anxiety. Denies suicidal thoughts. Hx of ASHLEE, compliant with CPAP. Multiple acute concerns also today. First is a mass above his sternum and lower neck region. Soft, non-tender. Has been there for years. No change. Also c/o hearing loss bilateral, r>l.  asociated tinnitus.       Patient Active Problem List   Diagnosis    Adenocarcinoma of Prostate, GS 7, stage T1c.    Onychomycosis    DM type 2 (diabetes mellitus, type 2) ([address]    Dyslipidemia    DDD (degenerative disc disease), lumbar    Tobacco abuse    Insomnia    Carpal tunnel syndrome of right wrist    Carpal tunnel syndrome of left wrist    Cubital tunnel syndrome on left    Morbid obesity ([address]    Obstructive sleep apnea on CPAP    Restless leg syndrome    Controlled type 2 diabetes mellitus with microalbuminuria ([address]     Past Surgical History:   Procedure Laterality Date    CARPAL TUNNEL RELEASE  10/22/12    right    CARPAL TUNNEL RELEASE  11/26/2012    left    COLONOSCOPY  2/1/2015    CYST REMOVAL  T    RT SIDE UPPER BACK,     CYST REMOVAL  7-5-12    excision of right shoulder mass/cyst and of with a Documented Follow-up Plan            Plan:      -  Chronic medical problems stable  -  Continue current medications  -  Follow up with specialists as scheduled  -  Labs reviewed, A1C elevated. Declines additional meds at this time, wishes to try weight loss first  -  Additional orders above  -  RTO 1 month for re-eval depression        On the basis of positive PHQ-9 screening (PHQ-9 Total Score: 16), the following plan was implemented: medication prescribed: Wellbutrin- 150 mg daily- patient will call for any significant medication side effects or worsening symptoms of depression. Patient will follow-up in 1 month(s) with PCP.

## 2018-03-22 RX ORDER — BUPROPION HYDROCHLORIDE 150 MG/1
TABLET ORAL
Qty: 60 TABLET | Refills: 0 | Status: CANCELLED | OUTPATIENT
Start: 2018-03-22 | End: 2018-09-18

## 2018-03-22 ASSESSMENT — ENCOUNTER SYMPTOMS
RESPIRATORY NEGATIVE: 1
GASTROINTESTINAL NEGATIVE: 1

## 2018-04-05 ENCOUNTER — HOSPITAL ENCOUNTER (OUTPATIENT)
Dept: AUDIOLOGY | Age: 53
Discharge: HOME | End: 2018-04-05
Payer: MEDICAID

## 2018-04-05 PROCEDURE — 92567 TYMPANOMETRY: CPT | Performed by: AUDIOLOGIST

## 2018-04-05 PROCEDURE — 92557 COMPREHENSIVE HEARING TEST: CPT | Performed by: AUDIOLOGIST

## 2018-04-18 ENCOUNTER — OFFICE VISIT (OUTPATIENT)
Dept: FAMILY MEDICINE CLINIC | Age: 53
End: 2018-04-18
Payer: MEDICAID

## 2018-04-18 VITALS
HEIGHT: 70 IN | WEIGHT: 300.4 LBS | SYSTOLIC BLOOD PRESSURE: 138 MMHG | DIASTOLIC BLOOD PRESSURE: 78 MMHG | BODY MASS INDEX: 43.01 KG/M2 | RESPIRATION RATE: 14 BRPM | OXYGEN SATURATION: 96 % | HEART RATE: 76 BPM

## 2018-04-18 DIAGNOSIS — F32.A DEPRESSION, UNSPECIFIED DEPRESSION TYPE: ICD-10-CM

## 2018-04-18 DIAGNOSIS — H69.81 ETD (EUSTACHIAN TUBE DYSFUNCTION), RIGHT: ICD-10-CM

## 2018-04-18 DIAGNOSIS — I10 ESSENTIAL HYPERTENSION: ICD-10-CM

## 2018-04-18 DIAGNOSIS — F41.9 ANXIETY: ICD-10-CM

## 2018-04-18 DIAGNOSIS — J34.2 DNS (DEVIATED NASAL SEPTUM): ICD-10-CM

## 2018-04-18 PROCEDURE — G8427 DOCREV CUR MEDS BY ELIG CLIN: HCPCS | Performed by: FAMILY MEDICINE

## 2018-04-18 PROCEDURE — 4004F PT TOBACCO SCREEN RCVD TLK: CPT | Performed by: FAMILY MEDICINE

## 2018-04-18 PROCEDURE — G8417 CALC BMI ABV UP PARAM F/U: HCPCS | Performed by: FAMILY MEDICINE

## 2018-04-18 PROCEDURE — 99213 OFFICE O/P EST LOW 20 MIN: CPT | Performed by: FAMILY MEDICINE

## 2018-04-18 PROCEDURE — 3017F COLORECTAL CA SCREEN DOC REV: CPT | Performed by: FAMILY MEDICINE

## 2018-04-18 RX ORDER — BUPROPION HYDROCHLORIDE 150 MG/1
150 TABLET ORAL EVERY MORNING
Qty: 30 TABLET | Refills: 11 | Status: SHIPPED | OUTPATIENT
Start: 2018-04-18 | End: 2022-01-17

## 2018-04-18 ASSESSMENT — ENCOUNTER SYMPTOMS
GASTROINTESTINAL NEGATIVE: 1
RESPIRATORY NEGATIVE: 1

## 2018-05-24 RX ORDER — BUSPIRONE HYDROCHLORIDE 15 MG/1
TABLET ORAL
Qty: 60 TABLET | Refills: 11 | Status: SHIPPED | OUTPATIENT
Start: 2018-05-24 | End: 2022-01-17

## 2018-09-04 ENCOUNTER — TELEPHONE (OUTPATIENT)
Dept: FAMILY MEDICINE CLINIC | Age: 53
End: 2018-09-04

## 2018-09-28 ENCOUNTER — TELEPHONE (OUTPATIENT)
Dept: UROLOGY | Age: 53
End: 2018-09-28

## 2018-09-28 NOTE — TELEPHONE ENCOUNTER
Patient is scheduled on 10/03/18 for a one year follow up. He was to have his psa rechecked in one year but he had it done 03/16/18. Does he need another psa before the appt. Please advise,. Thank you.

## 2018-10-09 DIAGNOSIS — G25.81 RESTLESS LEG SYNDROME: ICD-10-CM

## 2018-10-09 DIAGNOSIS — G47.00 INSOMNIA, UNSPECIFIED TYPE: ICD-10-CM

## 2018-10-09 RX ORDER — PRAMIPEXOLE DIHYDROCHLORIDE 0.25 MG/1
0.25 TABLET ORAL NIGHTLY
Qty: 90 TABLET | Refills: 3 | Status: SHIPPED | OUTPATIENT
Start: 2018-10-09 | End: 2022-01-17

## 2018-10-09 RX ORDER — AMITRIPTYLINE HYDROCHLORIDE 100 MG/1
TABLET, FILM COATED ORAL
Qty: 90 TABLET | Refills: 3 | Status: SHIPPED | OUTPATIENT
Start: 2018-10-09 | End: 2022-01-17

## 2018-10-09 RX ORDER — TAMSULOSIN HYDROCHLORIDE 0.4 MG/1
CAPSULE ORAL
Qty: 90 CAPSULE | Refills: 3 | Status: SHIPPED | OUTPATIENT
Start: 2018-10-09 | End: 2022-01-17

## 2022-01-01 ENCOUNTER — HOSPITAL ENCOUNTER (INPATIENT)
Age: 57
LOS: 12 days | Discharge: HOME | DRG: 617 | End: 2022-01-13
Attending: NURSE PRACTITIONER | Admitting: HOSPITALIST
Payer: COMMERCIAL

## 2022-01-01 ENCOUNTER — ANESTHESIA (OUTPATIENT)
Dept: OPERATING ROOM | Age: 57
DRG: 617 | End: 2022-01-01
Payer: COMMERCIAL

## 2022-01-01 ENCOUNTER — APPOINTMENT (OUTPATIENT)
Dept: GENERAL RADIOLOGY | Age: 57
DRG: 617 | End: 2022-01-01
Payer: COMMERCIAL

## 2022-01-01 ENCOUNTER — APPOINTMENT (OUTPATIENT)
Dept: INTERVENTIONAL RADIOLOGY/VASCULAR | Age: 57
DRG: 617 | End: 2022-01-01
Payer: COMMERCIAL

## 2022-01-01 ENCOUNTER — ANESTHESIA EVENT (OUTPATIENT)
Dept: OPERATING ROOM | Age: 57
DRG: 617 | End: 2022-01-01
Payer: COMMERCIAL

## 2022-01-01 VITALS — SYSTOLIC BLOOD PRESSURE: 141 MMHG | OXYGEN SATURATION: 95 % | DIASTOLIC BLOOD PRESSURE: 65 MMHG

## 2022-01-01 DIAGNOSIS — L97.529 DIABETIC ULCER OF TOE OF LEFT FOOT ASSOCIATED WITH TYPE 2 DIABETES MELLITUS, UNSPECIFIED ULCER STAGE (HCC): Primary | ICD-10-CM

## 2022-01-01 DIAGNOSIS — E11.621 DIABETIC ULCER OF TOE OF LEFT FOOT ASSOCIATED WITH TYPE 2 DIABETES MELLITUS, UNSPECIFIED ULCER STAGE (HCC): Primary | ICD-10-CM

## 2022-01-01 LAB
ALBUMIN SERPL-MCNC: 3.8 G/DL (ref 3.5–5.1)
ALP BLD-CCNC: 97 U/L (ref 38–126)
ALT SERPL-CCNC: 22 U/L (ref 11–66)
ANION GAP SERPL CALCULATED.3IONS-SCNC: 13 MEQ/L (ref 8–16)
AST SERPL-CCNC: 20 U/L (ref 5–40)
AVERAGE GLUCOSE: 264 MG/DL (ref 70–126)
BASOPHILS # BLD: 0.4 %
BASOPHILS ABSOLUTE: 0 THOU/MM3 (ref 0–0.1)
BILIRUB SERPL-MCNC: 0.7 MG/DL (ref 0.3–1.2)
BUN BLDV-MCNC: 15 MG/DL (ref 7–22)
CALCIUM SERPL-MCNC: 9 MG/DL (ref 8.5–10.5)
CHLORIDE BLD-SCNC: 98 MEQ/L (ref 98–111)
CO2: 24 MEQ/L (ref 23–33)
CREAT SERPL-MCNC: 0.9 MG/DL (ref 0.4–1.2)
EOSINOPHIL # BLD: 1.6 %
EOSINOPHILS ABSOLUTE: 0.2 THOU/MM3 (ref 0–0.4)
ERYTHROCYTE [DISTWIDTH] IN BLOOD BY AUTOMATED COUNT: 13.5 % (ref 11.5–14.5)
ERYTHROCYTE [DISTWIDTH] IN BLOOD BY AUTOMATED COUNT: 43.4 FL (ref 35–45)
GFR SERPL CREATININE-BSD FRML MDRD: 86 ML/MIN/1.73M2
GLUCOSE BLD-MCNC: 244 MG/DL (ref 70–108)
GLUCOSE BLD-MCNC: 297 MG/DL (ref 70–108)
GLUCOSE BLD-MCNC: 333 MG/DL (ref 70–108)
HBA1C MFR BLD: 10.8 % (ref 4.4–6.4)
HCT VFR BLD CALC: 45.2 % (ref 42–52)
HEMOGLOBIN: 15.5 GM/DL (ref 14–18)
IMMATURE GRANS (ABS): 0.05 THOU/MM3 (ref 0–0.07)
IMMATURE GRANULOCYTES: 0.4 %
LACTIC ACID, SEPSIS: 1 MMOL/L (ref 0.5–1.9)
LYMPHOCYTES # BLD: 16.1 %
LYMPHOCYTES ABSOLUTE: 1.8 THOU/MM3 (ref 1–4.8)
MCH RBC QN AUTO: 30.1 PG (ref 26–33)
MCHC RBC AUTO-ENTMCNC: 34.3 GM/DL (ref 32.2–35.5)
MCV RBC AUTO: 87.8 FL (ref 80–94)
MONOCYTES # BLD: 7.6 %
MONOCYTES ABSOLUTE: 0.9 THOU/MM3 (ref 0.4–1.3)
NUCLEATED RED BLOOD CELLS: 0 /100 WBC
OSMOLALITY CALCULATION: 284 MOSMOL/KG (ref 275–300)
PLATELET # BLD: 166 THOU/MM3 (ref 130–400)
PMV BLD AUTO: 11.1 FL (ref 9.4–12.4)
POTASSIUM SERPL-SCNC: 3.8 MEQ/L (ref 3.5–5.2)
PROCALCITONIN: 0.06 NG/ML (ref 0.01–0.09)
RBC # BLD: 5.15 MILL/MM3 (ref 4.7–6.1)
SEG NEUTROPHILS: 73.9 %
SEGMENTED NEUTROPHILS ABSOLUTE COUNT: 8.3 THOU/MM3 (ref 1.8–7.7)
SODIUM BLD-SCNC: 135 MEQ/L (ref 135–145)
TOTAL PROTEIN: 6.7 G/DL (ref 6.1–8)
WBC # BLD: 11.2 THOU/MM3 (ref 4.8–10.8)

## 2022-01-01 PROCEDURE — 82948 REAGENT STRIP/BLOOD GLUCOSE: CPT

## 2022-01-01 PROCEDURE — 87801 DETECT AGNT MULT DNA AMPLI: CPT

## 2022-01-01 PROCEDURE — 3700000000 HC ANESTHESIA ATTENDED CARE: Performed by: PODIATRIST

## 2022-01-01 PROCEDURE — 93971 EXTREMITY STUDY: CPT

## 2022-01-01 PROCEDURE — 2580000003 HC RX 258

## 2022-01-01 PROCEDURE — 6360000002 HC RX W HCPCS: Performed by: NURSE PRACTITIONER

## 2022-01-01 PROCEDURE — 6360000002 HC RX W HCPCS

## 2022-01-01 PROCEDURE — 3600000003 HC SURGERY LEVEL 3 BASE: Performed by: PODIATRIST

## 2022-01-01 PROCEDURE — 88305 TISSUE EXAM BY PATHOLOGIST: CPT

## 2022-01-01 PROCEDURE — 83036 HEMOGLOBIN GLYCOSYLATED A1C: CPT

## 2022-01-01 PROCEDURE — 6370000000 HC RX 637 (ALT 250 FOR IP)

## 2022-01-01 PROCEDURE — 6360000002 HC RX W HCPCS: Performed by: NURSE ANESTHETIST, CERTIFIED REGISTERED

## 2022-01-01 PROCEDURE — 84145 PROCALCITONIN (PCT): CPT

## 2022-01-01 PROCEDURE — 88311 DECALCIFY TISSUE: CPT

## 2022-01-01 PROCEDURE — 87075 CULTR BACTERIA EXCEPT BLOOD: CPT

## 2022-01-01 PROCEDURE — 87077 CULTURE AEROBIC IDENTIFY: CPT

## 2022-01-01 PROCEDURE — 93926 LOWER EXTREMITY STUDY: CPT

## 2022-01-01 PROCEDURE — 1200000000 HC SEMI PRIVATE

## 2022-01-01 PROCEDURE — 2580000003 HC RX 258: Performed by: HOSPITALIST

## 2022-01-01 PROCEDURE — 36415 COLL VENOUS BLD VENIPUNCTURE: CPT

## 2022-01-01 PROCEDURE — 73630 X-RAY EXAM OF FOOT: CPT

## 2022-01-01 PROCEDURE — 83605 ASSAY OF LACTIC ACID: CPT

## 2022-01-01 PROCEDURE — 99285 EMERGENCY DEPT VISIT HI MDM: CPT

## 2022-01-01 PROCEDURE — 87070 CULTURE OTHR SPECIMN AEROBIC: CPT

## 2022-01-01 PROCEDURE — 2709999900 HC NON-CHARGEABLE SUPPLY: Performed by: PODIATRIST

## 2022-01-01 PROCEDURE — 047L3ZZ DILATION OF LEFT FEMORAL ARTERY, PERCUTANEOUS APPROACH: ICD-10-PCS | Performed by: RADIOLOGY

## 2022-01-01 PROCEDURE — 87205 SMEAR GRAM STAIN: CPT

## 2022-01-01 PROCEDURE — 85025 COMPLETE CBC W/AUTO DIFF WBC: CPT

## 2022-01-01 PROCEDURE — 0Y6Q0Z0 DETACHMENT AT LEFT 1ST TOE, COMPLETE, OPEN APPROACH: ICD-10-PCS | Performed by: PODIATRIST

## 2022-01-01 PROCEDURE — 2500000003 HC RX 250 WO HCPCS: Performed by: PODIATRIST

## 2022-01-01 PROCEDURE — 96374 THER/PROPH/DIAG INJ IV PUSH: CPT

## 2022-01-01 PROCEDURE — 99223 1ST HOSP IP/OBS HIGH 75: CPT

## 2022-01-01 PROCEDURE — 6360000002 HC RX W HCPCS: Performed by: PHYSICIAN ASSISTANT

## 2022-01-01 PROCEDURE — 3600000013 HC SURGERY LEVEL 3 ADDTL 15MIN: Performed by: PODIATRIST

## 2022-01-01 PROCEDURE — 87040 BLOOD CULTURE FOR BACTERIA: CPT

## 2022-01-01 PROCEDURE — 87147 CULTURE TYPE IMMUNOLOGIC: CPT

## 2022-01-01 PROCEDURE — 80053 COMPREHEN METABOLIC PANEL: CPT

## 2022-01-01 PROCEDURE — 3700000001 HC ADD 15 MINUTES (ANESTHESIA): Performed by: PODIATRIST

## 2022-01-01 RX ORDER — NICOTINE POLACRILEX 4 MG
15 LOZENGE BUCCAL PRN
Status: DISCONTINUED | OUTPATIENT
Start: 2022-01-01 | End: 2022-01-13 | Stop reason: HOSPADM

## 2022-01-01 RX ORDER — ONDANSETRON 4 MG/1
4 TABLET, ORALLY DISINTEGRATING ORAL EVERY 8 HOURS PRN
Status: DISCONTINUED | OUTPATIENT
Start: 2022-01-01 | End: 2022-01-01

## 2022-01-01 RX ORDER — ONDANSETRON 2 MG/ML
4 INJECTION INTRAMUSCULAR; INTRAVENOUS EVERY 6 HOURS PRN
Status: DISCONTINUED | OUTPATIENT
Start: 2022-01-01 | End: 2022-01-01

## 2022-01-01 RX ORDER — SODIUM CHLORIDE 0.9 % (FLUSH) 0.9 %
5-40 SYRINGE (ML) INJECTION EVERY 12 HOURS SCHEDULED
Status: DISCONTINUED | OUTPATIENT
Start: 2022-01-01 | End: 2022-01-01 | Stop reason: SDUPTHER

## 2022-01-01 RX ORDER — GABAPENTIN 100 MG/1
100 CAPSULE ORAL 3 TIMES DAILY
Status: DISCONTINUED | OUTPATIENT
Start: 2022-01-01 | End: 2022-01-08 | Stop reason: DRUGHIGH

## 2022-01-01 RX ORDER — MORPHINE SULFATE 2 MG/ML
2 INJECTION, SOLUTION INTRAMUSCULAR; INTRAVENOUS
Status: DISCONTINUED | OUTPATIENT
Start: 2022-01-01 | End: 2022-01-01 | Stop reason: SDUPTHER

## 2022-01-01 RX ORDER — OXYCODONE HYDROCHLORIDE 5 MG/1
5 TABLET ORAL EVERY 4 HOURS PRN
Status: DISCONTINUED | OUTPATIENT
Start: 2022-01-01 | End: 2022-01-07

## 2022-01-01 RX ORDER — AMITRIPTYLINE HYDROCHLORIDE 100 MG/1
100 TABLET, FILM COATED ORAL NIGHTLY
Status: DISCONTINUED | OUTPATIENT
Start: 2022-01-01 | End: 2022-01-13 | Stop reason: HOSPADM

## 2022-01-01 RX ORDER — PRAMIPEXOLE DIHYDROCHLORIDE 0.25 MG/1
0.25 TABLET ORAL NIGHTLY
Status: DISCONTINUED | OUTPATIENT
Start: 2022-01-01 | End: 2022-01-13 | Stop reason: HOSPADM

## 2022-01-01 RX ORDER — POLYETHYLENE GLYCOL 3350 17 G/17G
17 POWDER, FOR SOLUTION ORAL DAILY PRN
Status: DISCONTINUED | OUTPATIENT
Start: 2022-01-01 | End: 2022-01-01

## 2022-01-01 RX ORDER — HYDROCHLOROTHIAZIDE 25 MG/1
12.5 TABLET ORAL DAILY
Status: DISCONTINUED | OUTPATIENT
Start: 2022-01-01 | End: 2022-01-01

## 2022-01-01 RX ORDER — POLYETHYLENE GLYCOL 3350 17 G/17G
17 POWDER, FOR SOLUTION ORAL DAILY PRN
Status: DISCONTINUED | OUTPATIENT
Start: 2022-01-01 | End: 2022-01-13 | Stop reason: HOSPADM

## 2022-01-01 RX ORDER — OXYCODONE HYDROCHLORIDE AND ACETAMINOPHEN 5; 325 MG/1; MG/1
1 TABLET ORAL EVERY 4 HOURS PRN
Status: DISCONTINUED | OUTPATIENT
Start: 2022-01-01 | End: 2022-01-01

## 2022-01-01 RX ORDER — SODIUM CHLORIDE 9 MG/ML
INJECTION, SOLUTION INTRAVENOUS CONTINUOUS
Status: DISCONTINUED | OUTPATIENT
Start: 2022-01-01 | End: 2022-01-02

## 2022-01-01 RX ORDER — POTASSIUM CHLORIDE 7.45 MG/ML
10 INJECTION INTRAVENOUS PRN
Status: DISCONTINUED | OUTPATIENT
Start: 2022-01-01 | End: 2022-01-13 | Stop reason: HOSPADM

## 2022-01-01 RX ORDER — ONDANSETRON 2 MG/ML
4 INJECTION INTRAMUSCULAR; INTRAVENOUS EVERY 6 HOURS PRN
Status: DISCONTINUED | OUTPATIENT
Start: 2022-01-01 | End: 2022-01-13 | Stop reason: HOSPADM

## 2022-01-01 RX ORDER — SODIUM CHLORIDE 0.9 % (FLUSH) 0.9 %
5-40 SYRINGE (ML) INJECTION EVERY 12 HOURS SCHEDULED
Status: DISCONTINUED | OUTPATIENT
Start: 2022-01-01 | End: 2022-01-01

## 2022-01-01 RX ORDER — SODIUM CHLORIDE 0.9 % (FLUSH) 0.9 %
5-40 SYRINGE (ML) INJECTION PRN
Status: DISCONTINUED | OUTPATIENT
Start: 2022-01-01 | End: 2022-01-07

## 2022-01-01 RX ORDER — ONDANSETRON 4 MG/1
4 TABLET, ORALLY DISINTEGRATING ORAL EVERY 8 HOURS PRN
Status: DISCONTINUED | OUTPATIENT
Start: 2022-01-01 | End: 2022-01-01 | Stop reason: SDUPTHER

## 2022-01-01 RX ORDER — BUSPIRONE HYDROCHLORIDE 7.5 MG/1
15 TABLET ORAL 2 TIMES DAILY
Status: DISCONTINUED | OUTPATIENT
Start: 2022-01-01 | End: 2022-01-01

## 2022-01-01 RX ORDER — ONDANSETRON 2 MG/ML
4 INJECTION INTRAMUSCULAR; INTRAVENOUS EVERY 6 HOURS PRN
Status: DISCONTINUED | OUTPATIENT
Start: 2022-01-01 | End: 2022-01-01 | Stop reason: SDUPTHER

## 2022-01-01 RX ORDER — MAGNESIUM SULFATE IN WATER 40 MG/ML
2000 INJECTION, SOLUTION INTRAVENOUS PRN
Status: DISCONTINUED | OUTPATIENT
Start: 2022-01-01 | End: 2022-01-13 | Stop reason: HOSPADM

## 2022-01-01 RX ORDER — LISINOPRIL 20 MG/1
20 TABLET ORAL DAILY
Status: DISCONTINUED | OUTPATIENT
Start: 2022-01-01 | End: 2022-01-03

## 2022-01-01 RX ORDER — POTASSIUM CHLORIDE 20 MEQ/1
40 TABLET, EXTENDED RELEASE ORAL PRN
Status: DISCONTINUED | OUTPATIENT
Start: 2022-01-01 | End: 2022-01-13 | Stop reason: HOSPADM

## 2022-01-01 RX ORDER — SODIUM CHLORIDE 9 MG/ML
INJECTION, SOLUTION INTRAVENOUS CONTINUOUS
Status: ACTIVE | OUTPATIENT
Start: 2022-01-01 | End: 2022-01-01

## 2022-01-01 RX ORDER — NICOTINE 21 MG/24HR
1 PATCH, TRANSDERMAL 24 HOURS TRANSDERMAL DAILY
Status: DISCONTINUED | OUTPATIENT
Start: 2022-01-01 | End: 2022-01-13 | Stop reason: HOSPADM

## 2022-01-01 RX ORDER — ACETAMINOPHEN 650 MG/1
650 SUPPOSITORY RECTAL EVERY 6 HOURS PRN
Status: DISCONTINUED | OUTPATIENT
Start: 2022-01-01 | End: 2022-01-01

## 2022-01-01 RX ORDER — BUPROPION HYDROCHLORIDE 150 MG/1
150 TABLET ORAL EVERY MORNING
Status: DISCONTINUED | OUTPATIENT
Start: 2022-01-01 | End: 2022-01-01

## 2022-01-01 RX ORDER — SODIUM CHLORIDE 0.9 % (FLUSH) 0.9 %
5-40 SYRINGE (ML) INJECTION EVERY 12 HOURS SCHEDULED
Status: DISCONTINUED | OUTPATIENT
Start: 2022-01-01 | End: 2022-01-07

## 2022-01-01 RX ORDER — SODIUM CHLORIDE 0.9 % (FLUSH) 0.9 %
5-40 SYRINGE (ML) INJECTION PRN
Status: DISCONTINUED | OUTPATIENT
Start: 2022-01-01 | End: 2022-01-01

## 2022-01-01 RX ORDER — PROPOFOL 10 MG/ML
INJECTION, EMULSION INTRAVENOUS PRN
Status: DISCONTINUED | OUTPATIENT
Start: 2022-01-01 | End: 2022-01-01 | Stop reason: SDUPTHER

## 2022-01-01 RX ORDER — SODIUM CHLORIDE 9 MG/ML
25 INJECTION, SOLUTION INTRAVENOUS PRN
Status: DISCONTINUED | OUTPATIENT
Start: 2022-01-01 | End: 2022-01-07

## 2022-01-01 RX ORDER — GABAPENTIN 400 MG/1
800 CAPSULE ORAL 3 TIMES DAILY
Status: DISCONTINUED | OUTPATIENT
Start: 2022-01-01 | End: 2022-01-01

## 2022-01-01 RX ORDER — OXYCODONE HYDROCHLORIDE 5 MG/1
10 TABLET ORAL EVERY 4 HOURS PRN
Status: DISCONTINUED | OUTPATIENT
Start: 2022-01-01 | End: 2022-01-07

## 2022-01-01 RX ORDER — ONDANSETRON 4 MG/1
4 TABLET, ORALLY DISINTEGRATING ORAL EVERY 8 HOURS PRN
Status: DISCONTINUED | OUTPATIENT
Start: 2022-01-01 | End: 2022-01-13 | Stop reason: HOSPADM

## 2022-01-01 RX ORDER — ACETAMINOPHEN 325 MG/1
650 TABLET ORAL EVERY 6 HOURS PRN
Status: DISCONTINUED | OUTPATIENT
Start: 2022-01-01 | End: 2022-01-01

## 2022-01-01 RX ORDER — MORPHINE SULFATE 4 MG/ML
4 INJECTION, SOLUTION INTRAMUSCULAR; INTRAVENOUS
Status: DISCONTINUED | OUTPATIENT
Start: 2022-01-01 | End: 2022-01-03

## 2022-01-01 RX ORDER — BUPIVACAINE HYDROCHLORIDE 5 MG/ML
INJECTION, SOLUTION EPIDURAL; INTRACAUDAL PRN
Status: DISCONTINUED | OUTPATIENT
Start: 2022-01-01 | End: 2022-01-01 | Stop reason: ALTCHOICE

## 2022-01-01 RX ORDER — DEXTROSE MONOHYDRATE 50 MG/ML
100 INJECTION, SOLUTION INTRAVENOUS PRN
Status: DISCONTINUED | OUTPATIENT
Start: 2022-01-01 | End: 2022-01-13 | Stop reason: HOSPADM

## 2022-01-01 RX ORDER — MORPHINE SULFATE 2 MG/ML
2 INJECTION, SOLUTION INTRAMUSCULAR; INTRAVENOUS
Status: DISCONTINUED | OUTPATIENT
Start: 2022-01-01 | End: 2022-01-03

## 2022-01-01 RX ORDER — LISINOPRIL AND HYDROCHLOROTHIAZIDE 20; 12.5 MG/1; MG/1
1 TABLET ORAL 2 TIMES DAILY
Status: DISCONTINUED | OUTPATIENT
Start: 2022-01-01 | End: 2022-01-01 | Stop reason: CLARIF

## 2022-01-01 RX ORDER — TAMSULOSIN HYDROCHLORIDE 0.4 MG/1
0.4 CAPSULE ORAL DAILY
Status: DISCONTINUED | OUTPATIENT
Start: 2022-01-01 | End: 2022-01-01

## 2022-01-01 RX ORDER — CYCLOBENZAPRINE HCL 10 MG
10 TABLET ORAL 3 TIMES DAILY PRN
Status: DISCONTINUED | OUTPATIENT
Start: 2022-01-01 | End: 2022-01-13 | Stop reason: HOSPADM

## 2022-01-01 RX ORDER — SODIUM CHLORIDE 0.9 % (FLUSH) 0.9 %
5-40 SYRINGE (ML) INJECTION PRN
Status: DISCONTINUED | OUTPATIENT
Start: 2022-01-01 | End: 2022-01-01 | Stop reason: SDUPTHER

## 2022-01-01 RX ORDER — DEXTROSE MONOHYDRATE 25 G/50ML
12.5 INJECTION, SOLUTION INTRAVENOUS PRN
Status: DISCONTINUED | OUTPATIENT
Start: 2022-01-01 | End: 2022-01-13 | Stop reason: HOSPADM

## 2022-01-01 RX ORDER — LIDOCAINE HYDROCHLORIDE 10 MG/ML
INJECTION, SOLUTION INFILTRATION; PERINEURAL PRN
Status: DISCONTINUED | OUTPATIENT
Start: 2022-01-01 | End: 2022-01-01 | Stop reason: ALTCHOICE

## 2022-01-01 RX ORDER — ACETAMINOPHEN 325 MG/1
650 TABLET ORAL EVERY 6 HOURS PRN
Status: DISCONTINUED | OUTPATIENT
Start: 2022-01-01 | End: 2022-01-13 | Stop reason: HOSPADM

## 2022-01-01 RX ORDER — ATORVASTATIN CALCIUM 40 MG/1
40 TABLET, FILM COATED ORAL NIGHTLY
Status: DISCONTINUED | OUTPATIENT
Start: 2022-01-01 | End: 2022-01-13 | Stop reason: HOSPADM

## 2022-01-01 RX ORDER — ACETAMINOPHEN 650 MG/1
650 SUPPOSITORY RECTAL EVERY 6 HOURS PRN
Status: DISCONTINUED | OUTPATIENT
Start: 2022-01-01 | End: 2022-01-13 | Stop reason: HOSPADM

## 2022-01-01 RX ORDER — MORPHINE SULFATE 4 MG/ML
4 INJECTION, SOLUTION INTRAMUSCULAR; INTRAVENOUS
Status: DISCONTINUED | OUTPATIENT
Start: 2022-01-01 | End: 2022-01-01 | Stop reason: SDUPTHER

## 2022-01-01 RX ORDER — MORPHINE SULFATE 4 MG/ML
4 INJECTION, SOLUTION INTRAMUSCULAR; INTRAVENOUS EVERY 30 MIN PRN
Status: COMPLETED | OUTPATIENT
Start: 2022-01-01 | End: 2022-01-01

## 2022-01-01 RX ORDER — SODIUM CHLORIDE 9 MG/ML
25 INJECTION, SOLUTION INTRAVENOUS PRN
Status: DISCONTINUED | OUTPATIENT
Start: 2022-01-01 | End: 2022-01-01 | Stop reason: SDUPTHER

## 2022-01-01 RX ORDER — FENTANYL CITRATE 50 UG/ML
INJECTION, SOLUTION INTRAMUSCULAR; INTRAVENOUS PRN
Status: DISCONTINUED | OUTPATIENT
Start: 2022-01-01 | End: 2022-01-01 | Stop reason: SDUPTHER

## 2022-01-01 RX ORDER — SODIUM CHLORIDE 9 MG/ML
25 INJECTION, SOLUTION INTRAVENOUS PRN
Status: DISCONTINUED | OUTPATIENT
Start: 2022-01-01 | End: 2022-01-01

## 2022-01-01 RX ORDER — MIDAZOLAM HYDROCHLORIDE 1 MG/ML
INJECTION INTRAMUSCULAR; INTRAVENOUS PRN
Status: DISCONTINUED | OUTPATIENT
Start: 2022-01-01 | End: 2022-01-01 | Stop reason: SDUPTHER

## 2022-01-01 RX ADMIN — ATORVASTATIN CALCIUM 40 MG: 40 TABLET, FILM COATED ORAL at 22:03

## 2022-01-01 RX ADMIN — SODIUM CHLORIDE, PRESERVATIVE FREE 10 ML: 5 INJECTION INTRAVENOUS at 10:17

## 2022-01-01 RX ADMIN — INSULIN LISPRO 4 UNITS: 100 INJECTION, SOLUTION INTRAVENOUS; SUBCUTANEOUS at 16:56

## 2022-01-01 RX ADMIN — MORPHINE SULFATE 4 MG: 4 INJECTION INTRAVENOUS at 03:28

## 2022-01-01 RX ADMIN — PROPOFOL 40 MG: 10 INJECTION, EMULSION INTRAVENOUS at 12:35

## 2022-01-01 RX ADMIN — MORPHINE SULFATE 4 MG: 4 INJECTION, SOLUTION INTRAMUSCULAR; INTRAVENOUS at 11:23

## 2022-01-01 RX ADMIN — SODIUM CHLORIDE: 9 INJECTION, SOLUTION INTRAVENOUS at 11:33

## 2022-01-01 RX ADMIN — GABAPENTIN 100 MG: 100 CAPSULE ORAL at 22:03

## 2022-01-01 RX ADMIN — FENTANYL CITRATE 50 MCG: 50 INJECTION, SOLUTION INTRAMUSCULAR; INTRAVENOUS at 12:33

## 2022-01-01 RX ADMIN — MORPHINE SULFATE 4 MG: 4 INJECTION, SOLUTION INTRAMUSCULAR; INTRAVENOUS at 23:16

## 2022-01-01 RX ADMIN — SODIUM CHLORIDE: 9 INJECTION, SOLUTION INTRAVENOUS at 20:40

## 2022-01-01 RX ADMIN — MIDAZOLAM 2 MG: 1 INJECTION INTRAMUSCULAR; INTRAVENOUS at 12:31

## 2022-01-01 RX ADMIN — AMITRIPTYLINE HYDROCHLORIDE 100 MG: 100 TABLET, FILM COATED ORAL at 22:03

## 2022-01-01 RX ADMIN — HYDROCHLOROTHIAZIDE 12.5 MG: 25 TABLET ORAL at 11:23

## 2022-01-01 RX ADMIN — FENTANYL CITRATE 50 MCG: 50 INJECTION, SOLUTION INTRAMUSCULAR; INTRAVENOUS at 12:34

## 2022-01-01 RX ADMIN — VANCOMYCIN HYDROCHLORIDE 1250 MG: 5 INJECTION, POWDER, LYOPHILIZED, FOR SOLUTION INTRAVENOUS at 20:44

## 2022-01-01 RX ADMIN — LISINOPRIL 20 MG: 20 TABLET ORAL at 11:23

## 2022-01-01 RX ADMIN — VANCOMYCIN HYDROCHLORIDE 2000 MG: 1 INJECTION, POWDER, LYOPHILIZED, FOR SOLUTION INTRAVENOUS at 11:32

## 2022-01-01 RX ADMIN — HYDROMORPHONE HYDROCHLORIDE 0.5 MG: 1 INJECTION, SOLUTION INTRAMUSCULAR; INTRAVENOUS; SUBCUTANEOUS at 06:33

## 2022-01-01 RX ADMIN — PROPOFOL 40 MG: 10 INJECTION, EMULSION INTRAVENOUS at 12:37

## 2022-01-01 RX ADMIN — CEFEPIME 1000 MG: 1 INJECTION, POWDER, FOR SOLUTION INTRAMUSCULAR; INTRAVENOUS at 10:21

## 2022-01-01 RX ADMIN — CEFEPIME 1000 MG: 1 INJECTION, POWDER, FOR SOLUTION INTRAMUSCULAR; INTRAVENOUS at 18:02

## 2022-01-01 RX ADMIN — PRAMIPEXOLE DIHYDROCHLORIDE 0.25 MG: 0.25 TABLET ORAL at 22:03

## 2022-01-01 ASSESSMENT — PULMONARY FUNCTION TESTS
PIF_VALUE: 0
PIF_VALUE: 1
PIF_VALUE: 0
PIF_VALUE: 0

## 2022-01-01 ASSESSMENT — ENCOUNTER SYMPTOMS
VOMITING: 0
SORE THROAT: 0
SHORTNESS OF BREATH: 0
COLOR CHANGE: 1
BACK PAIN: 0
SHORTNESS OF BREATH: 1
EYES NEGATIVE: 1
COUGH: 0
NAUSEA: 0
DIARRHEA: 0

## 2022-01-01 ASSESSMENT — PAIN DESCRIPTION - DESCRIPTORS: DESCRIPTORS: ACHING;CONSTANT

## 2022-01-01 ASSESSMENT — PAIN DESCRIPTION - PROGRESSION: CLINICAL_PROGRESSION: GRADUALLY WORSENING

## 2022-01-01 ASSESSMENT — PAIN SCALES - GENERAL
PAINLEVEL_OUTOF10: 8
PAINLEVEL_OUTOF10: 9
PAINLEVEL_OUTOF10: 8
PAINLEVEL_OUTOF10: 4
PAINLEVEL_OUTOF10: 6
PAINLEVEL_OUTOF10: 9
PAINLEVEL_OUTOF10: 9

## 2022-01-01 ASSESSMENT — PAIN DESCRIPTION - FREQUENCY: FREQUENCY: CONTINUOUS

## 2022-01-01 ASSESSMENT — PAIN - FUNCTIONAL ASSESSMENT: PAIN_FUNCTIONAL_ASSESSMENT: PREVENTS OR INTERFERES SOME ACTIVE ACTIVITIES AND ADLS

## 2022-01-01 ASSESSMENT — PAIN DESCRIPTION - ONSET: ONSET: AWAKENED FROM SLEEP

## 2022-01-01 ASSESSMENT — PAIN DESCRIPTION - PAIN TYPE
TYPE: SURGICAL PAIN
TYPE: ACUTE PAIN

## 2022-01-01 ASSESSMENT — LIFESTYLE VARIABLES: SMOKING_STATUS: 1

## 2022-01-01 ASSESSMENT — PAIN DESCRIPTION - ORIENTATION
ORIENTATION: LEFT
ORIENTATION: LEFT

## 2022-01-01 ASSESSMENT — PAIN DESCRIPTION - LOCATION
LOCATION: FOOT
LOCATION: TOE (COMMENT WHICH ONE)

## 2022-01-01 NOTE — BRIEF OP NOTE
Brief Postoperative Note      Patient: Bebe Campo  YOB: 1965  MRN: [id number]    Date of Procedure: 1/1/2022    Pre-Op Diagnosis: Toe wound; osteomyelitis     Post-Op Diagnosis: Same       Procedure(s):  LEFT 1ST TOE AMPUTATION    Surgeon(s):  Parish Don DPM    Assistant:  Resident: Ebenezer Guidry DPM    Anesthesia: General    Estimated Blood Loss (mL): Minimal    Complications: None    Hemostasis: ankle tourniquet at 200mmHg    Injectables: 30 cc of 1:1 ratio of 1% Lidocaine with epi and 0.5% Marcaine plain    Materials: 2-0 Prolene, 3-0 Prolene    Specimens:   ID Type Source Tests Collected by Time Destination   1 : Left Toe A&A culture  Swab Toe CULTURE, ANAEROBIC AND AEROBIC Jenn Godwin DPM 1/1/2022 1259    A : Left Great Toe  Tissue Toe SURGICAL PATHOLOGY Jenn Godwin DPM 1/1/2022 1256        Implants:  * No implants in log *      Drains: * No LDAs found *    Findings: NONE    Electronically signed by Ebenezer Guidry DPM on 1/1/2022 at 1:32 PM

## 2022-01-01 NOTE — ED NOTES
ED nurse-to-nurse bedside report    Chief Complaint   Patient presents with    Foot Pain      LOC: alert and orientated to name, place, date  Vital signs   Vitals:    01/01/22 0424 01/01/22 0425 01/01/22 0528 01/01/22 0631   BP:  (!) 155/75 (!) 145/73 (!) 173/72   Pulse:  67 65 68   Resp: 17 18 17 17   Temp:       TempSrc:       SpO2: 98% 98% 95% 97%   Weight:       Height:          Pain:    Pain Interventions: see MAR  Pain Goal: 0  Oxygen: No    Current needs required room air    Telemetry: No  LDAs:   Peripheral IV 01/01/22 Right Forearm (Active)     Continuous Infusions:   Mobility: Requires assistance * 1  Carrillo Fall Risk Score: No flowsheet data found.   Fall Interventions: side rails raised x2, call light is within reach  Report given to: Migue Gold RN  01/01/22 [id number]

## 2022-01-01 NOTE — ED NOTES
Pt appears to be resting comfortably on cot. Pt medicated at this time. Wife at bedside. No distress noted. Call light in reach.       Rodrick Ramos RN  01/01/22 [id number]

## 2022-01-01 NOTE — ED TRIAGE NOTES
Pt to er. Pt states 2 months ago he dropped a can on his left great toe. States he now has a hole in the bottom of it and the toe is turning black. Pt states also has had some drainage from it.

## 2022-01-01 NOTE — ED NOTES
RN medicated pt per [facility]. Pt is resting in cot with wife at bedside. Pt states he would like a mask, RN provided pt with a mask. Pt voices no concern or need at this time. Call light is within reach. Will continue to monitor.       Cyndi Vaughan RN  01/01/22 [id number]

## 2022-01-01 NOTE — ED NOTES
Pt is resting in cot with wife at bedside. Pt would like some ice chips at this time. RN provided pt with some ice chips. Pt voices no other concern or need at this time. Call light is within reach. Will continue to monitor.       Boston Rossi RN  01/01/22 1636

## 2022-01-01 NOTE — H&P
[address] [address]  History and Physical Update    Pt Name: Chelsy Villafuerte  MRN: [id number]  YOB: 1965  Date of evaluation: 1/1/2022    [x] I have examined the patient and reviewed the H&P/Consult and there are no changes to the patient or plans.     [] I have examined the patient and reviewed the H&P/Consult and have noted the following changes:        Radha Woodall DPM DPM  Electronically signed 1/1/2022 at 12:14 PM

## 2022-01-01 NOTE — ED NOTES
Pharmacy contacted at this time for medication verification of medications due at [address], RN  01/01/22 9398

## 2022-01-01 NOTE — H&P
History & Physical        Patient:  Nanda Pantoja  YOB: 1965    MRN: [id number]     Acct: [de-identified]    PCP: Mateo Vaca DO    Date of Admission: 1/1/2022    Date of Service: Pt seen/examined on 01/01/22  and Admitted to Inpatient with expected LOS greater than two midnights due to medical therapy. ASSESSMENT/PLAN:    1. Left great toe diabetic ulcer secondary to T2DM: uncontrolled diabetic. Reports not taking home medications in over a year. Glucose 333. HbgA1C results pending. Will start medium dose SSI. Podiatry consulted and on case; plan for OR today for amputation of left great toe.   2. Obstructive sleep apnea: Patient wears CPAP every night and has brought home CPAP to hospital to use. Order to wear at night with home settings. 3. Hyperlipidemia: Will start Lipitor 40 mg  4. Essential HTN;: uncontrolled. Patient has had multiple hypertensive episodes while in the ED. Reports he has not taken his home BP medications in over a year d/t not having PCP and lack of insurance. Will start lisinopril. Will reevaluate need for HTZ tomorrow. 5. Tobacco abuse: discussed risks of smoking, educated on resources available for cessation. Reports he does not wish to quit at this time. Nicotine patch ordered. 6. Restless leg syndrome: Mirapex  7. Morbid obesity: BMI 40.18; discussed and educated on  lifestyle modifications. Pre-Op Evaluation: Per the Revised Cardiac Index Score, the patient has 0 points, placing him at 3.9% 30 day risk of death, MI, or cardiac arrest. Patient is low risk for surgery. Chief Complaint:  Foot pain      History Of Present Illness:    60 y.o. male with PMH of  HTN, hyperlipidemia, T2DM, morbid obesity, tobacco abuse, ASHLEE, RLS, prostate cancer (in remission) who presented to [address] [place] [address] with with a chief complaint of foot pain. Patient reports that approximately 2 months ago he dropped a can of sausage gravy on his left great toe.  States left great toe collected and pending. Podiatry was consulted and evaluated patient while in the ER. Per podiatry notes, plan for OR today 1/1/22 for amputation of left great toe. At this time patient will be admitted to hospitalist services for further medical management. Past Medical History:          Diagnosis Date    Back pain     HERNIATED DISKS    Carpal tunnel syndrome of right wrist 10/22/2012    Hyperlipidemia     Hypertension     Hypogonadism male     Nocturia     Obesity     Osteoarthritis     Prostate cancer ([facility] [address] 12-    GLEASONS 3+4=7 ON RT/3+3=6 ON LT/T1C    Restless leg syndrome 8/2/2016    Shortness of breath     Sleep apnea 10-    HAS C-PAP MACHINE    Tobacco abuse     Tobacco user     Type II or unspecified type diabetes mellitus without mention of complication, not stated as uncontrolled     Urgency of urination        Past Surgical History:          Procedure Laterality Date    CARPAL TUNNEL RELEASE  10/22/12    right    CARPAL TUNNEL RELEASE  11/26/2012    left    COLONOSCOPY  2/1/2015    CYST REMOVAL  T    RT SIDE UPPER BACK,     CYST REMOVAL  7-5-12    excision of right shoulder mass/cyst and of left axillary skin lesion-Dr. Mariam Hnedricks    HAND SURGERY  1987    partial finger amputee rt hand    KNEE SURGERY  ? left knee scope    LEG SURGERY      LEG SURGERY  6/13/12    I&D rt leg abscess- Dr. Nhung Ray  12-    TRUS/BX    PROSTATE SURGERY  06-    BRACHYTHERAPY OF THE PROSTATE    SKIN TAG REMOVAL  T    LT ARMPIT       Medications Prior to Admission:      Prior to Admission medications    Medication Sig Start Date End Date Taking? Authorizing Provider   tamsulosin (FLOMAX) 0.4 MG capsule take 1 capsule by mouth once daily 10/9/18   Suad Lockett DO   pramipexole (MIRAPEX) 0.25 MG tablet take 1 tablet by mouth NIGHTLY.  10/9/18   Suad Lockett DO   amitriptyline (ELAVIL) 100 MG tablet take 1 tablet by mouth NIGHTLY. 10/9/18   Sonu Northwood, DO   busPIRone (BUSPAR) 15 MG tablet take 1 tablet by mouth twice a day 5/24/18   Sonu Northwood, DO   buPROPion (WELLBUTRIN XL) 150 MG extended release tablet Take 1 tablet by mouth every morning 4/18/18   Sonu Northwood, DO   lisinopril-hydrochlorothiazide AWAD [facility]) 20-12.5 MG per tablet take 1 tablet by mouth twice a day 2/19/18   Sonu Northwood, DO   atorvastatin (LIPITOR) 40 MG tablet take 1 tablet by mouth NIGHTLY 2/12/18   Sonu Northwood, DO   alogliptin-metformin 12.5-1000 MG TABS TAKE 1 TABLET BY MOUTH TWICE A DAY 2/12/18   Sonu Northwood, DO   gabapentin (NEURONTIN) 300 MG capsule Take 800 mg by mouth 3 times daily     Spencer Taylor MD   cyclobenzaprine (FLEXERIL) 10 MG tablet Take 10 mg by mouth 3 times daily. Historical Provider, MD   Multiple Vitamin (MULTIVITAMIN PO) Take  by mouth daily. Historical Provider, MD   aspirin 81 MG EC tablet Take 81 mg by mouth daily. Historical Provider, MD       Allergies:  Pcn [penicillins]    Social History:   reports that he has been smoking cigarettes. He started smoking about 46 years ago. He has a 40.00 pack-year smoking history. He has never used smokeless tobacco. He reports that he does not drink alcohol and does not use drugs. Family History:      Positive as follows:        Problem Relation Age of Onset    Thyroid Disease Mother     Heart Disease Father     Diabetes Father     Cancer Maternal Grandmother         what kind    Heart Disease Brother        REVIEW OF SYSTEMS:     Constitutional: ROS: negative for fever, chills, fatigue. Positive for weakness d/t toe pain.   Head: no headache, no head injury, no migraine   Eyes ROS: denies blurred/double vision  Ears ROS: no hearing difficulty, no tinnitus  Mouth and Throat ROS: no ulceration, dysphagia, dental caries  Psychological ROS: no depression, no anxiety, no panic attacks, denies suicide/homicide ideation  Endocrine ROS: denies polyuria, polydypsia, no heat or cold intolerance  Respiratory ROS: no cough, shortness of breath, or wheezing  Cardiovascular ROS: no chest pain or dyspnea on exertion  Gastrointestinal ROS: no abdominal pain, change in bowel habits, or black or bloody stools  Genito-Urinary ROS: denies dysuria, frequency, urgency; denies hematuria  Musculoskeletal ROS: positive for - gait disturbance, pain in left 1st toe and swelling in left leg. Neurological ROS: no syncope, no seizures, no numbness or tingling of hands, + numbness and tingling of feet, no paresis  Dermatology: no skin rash, no eczema  Endocrine: no polyuria, polydypsia, no heat/cold intolerance  Hematology: denies bruising easily, denies bleeding problems, denies clotting disorders    PHYSICAL EXAM:    BP (!) 173/72   Pulse 68   Temp 99.1 °F (37.3 °C) (Oral)   Resp 17   Ht 5' 10\" (1.778 m)   Wt 280 lb (127 kg)   SpO2 97%   BMI 40.18 kg/m²     General appearance:  No apparent distress, appears stated age and cooperative. HEENT:  Normal cephalic, atraumatic without obvious deformity. Pupils equal, round, and reactive to light. Conjunctivae/corneas clear. Neck: Supple, with full range of motion. No jugular venous distention. Trachea midline. Respiratory:  Normal respiratory effort. Clear to auscultation, bilaterally without Rales/Wheezes/Rhonchi. Cardiovascular:  Regular rate and rhythm with normal S1/S2 without murmurs, rubs or gallops. Abdomen: Soft, non-tender, non-distended with normal bowel sounds. Musculoskeletal:  No clubbing, cyanosis. Mild leg swelling of LLE with associated erythema. Left great toe ulcer was wrapped in gauze dressing per podiatry. KENNETH wound itself. Full range of motion without deformity. Skin: Skin color, texture, turgor normal.    Neurologic:  Neurovascularly intact without any focal sensory/motor deficits.  Cranial nerves: II-XII intact, grossly non-focal.  Psychiatric:  Alert and oriented, thought content appropriate  Capillary Refill: Brisk,< 3 seconds   Peripheral Pulses: +2 radial pulses, equal bilaterally. Unable to feel pedal pulses bilaterally. Labs:     Recent Labs     01/01/22 0250   WBC 11.2*   HGB 15.5   HCT 45.2        Recent Labs     01/01/22 0250      K 3.8   CL 98   CO2 24   BUN 15   CREATININE 0.9   CALCIUM 9.0     Recent Labs     01/01/22 0250   AST 20   ALT 22   BILITOT 0.7   ALKPHOS 97     Procalcitonin:  Recent Labs     01/01/22 0250   PROCAL 0.06     Results as of 1/1/2022 09:31   1/1/2022 03:25   Lactic Acid, Sepsis 1.0       Radiology:     CXR: I have reviewed the CXR with the following interpretation:     XR FOOT LEFT (MIN 3 VIEWS)    Result Date: 1/1/2022  4 views left foot. Comparison: None. Findings: There is soft tissue swelling and ulceration involving the first digit. There is associated cortical destruction of the medial aspect of the first distal phalanx, concerning for osteomyelitis. No radiopaque foreign body is identified. No acute fracture or dislocation is seen. There are large superior and inferior calcaneal spurs. Mild degenerative changes are present at the first metatarsal phalangeal joint. Impression: 1. Soft tissue swelling with evidence of soft tissue ulceration involving the first digit. There is associated cortical destruction of the first distal phalanx, most consistent with osteomyelitis. This document has been electronically signed by: Vicenta Pruett MD on 01/01/2022 06:31 AM        Thank you Mateo Vaca DO for the opportunity to be involved in this patient's care.     Electronically signed by GARCIA Guerra CNP on 1/1/2022 at 7:10 AM

## 2022-01-01 NOTE — ED PROVIDER NOTES
[facility]  eMERGENCY dEPARTMENT eNCOUnter     Pt Name: Brendolyn Councilman  MRN: [id number]  Erik 1965  Date of evaluation: 1/1/22        Mid-level provider Note:    I have personally performed and/or participated in the history, exam and medical decision making and agree with all pertinent clinical information as noted by the previous provider. I have also reviewed and agree with the past medical, family and social history unless otherwise noted. I have personally performed a face to face diagnostic evaluation on this patient. I have reviewed the previous provider's findings and agree. Evaluation: Patient signed out to me via [facility], awaiting podiatry recommendations. Podiatry came and evaluated the patient, recommend admission, will take to surgery at 10 AM.  This is discussed with the patient he is agreeable with this plan of care. He request additional pain medicine was given half milligram of Dilaudid. Discussed the case with Dr. Christin Ramirez who accepts patient for admission. 1. Diabetic ulcer of toe of left foot associated with type 2 diabetes mellitus, unspecified ulcer stage ([facility] [address]          DISPOSITION/PLAN  PATIENT REFERRED TO:  No follow-up provider specified.   DISCHARGE MEDICATIONS:  New Prescriptions    No medications on file         [place] [address], APRN - CNP  01/01/22 2641

## 2022-01-01 NOTE — H&P
Department of Podiatric Surgery  History and Physical        Patient Starla Munoz RECORD NUMBER: [id number]  AGE: 60 y.o. GENDER: male  : 1965  EPISODE DATE:  2022    CHIEF COMPLAINT:  <principal problem not specified>    Reason for Admission: Toe wound    History Obtained From:  patient      HISTORY OF PRESENT ILLNESS:                The patient is a 60 y.o. male with significant past medical history of hyperlipidemia, hypertension, diabetes and obesity  who is being seen at bedside on behalf of Dr. Stefania Reed. Patient states that 2 months ago he dropped a can on his toe. He relates that it turned the toe purple. Within the last 2 weeks, the patient noticed a wound to his left great toe. The patient relates pain to his left great toe and has noticed a more difficult time walking. The patient states that he walks a lot for his job and wears rubber boots that are a size 12, which are too big for him since he normally wears a 10. The patient also states that his tennis shoes are old and he has noticed his toe rubbing on the piece of plastic. The patient has used triple antibiotic cream to his toe. The patient is diabetic, but unaware of his blood glucose since he doesn't go to the doctor regularly. Patient states that the last time he ate was at 7pm last night. The patient denies any N/V/F/C, chest pain or SOB. The patient denies any other pedal complaints.       Past Medical History:    Past Medical History:   Diagnosis Date    Back pain     HERNIATED DISKS    Carpal tunnel syndrome of right wrist 10/22/2012    Hyperlipidemia     Hypertension     Hypogonadism male     Nocturia     Obesity     Osteoarthritis     [facility]    GLEASONS 3+4=7 ON RT/3+3=6 ON LT/T1C    Restless leg syndrome 2016    Shortness of breath     Sleep apnea 10-    HAS C-PAP MACHINE    Tobacco abuse     Tobacco user     Type II or unspecified type diabetes mellitus without mention of complication, not stated as uncontrolled     Urgency of urination         Past Surgical History:    Past Surgical History:   Procedure Laterality Date    CARPAL TUNNEL RELEASE  10/22/12    right    CARPAL TUNNEL RELEASE  11/26/2012    left    COLONOSCOPY  2/1/2015    CYST REMOVAL  T    RT SIDE UPPER BACK,     CYST REMOVAL  7-5-12    excision of right shoulder mass/cyst and of left axillary skin lesion-Dr. Sheila Alas    HAND SURGERY  1987    partial finger amputee rt hand    KNEE SURGERY  ? left knee scope    LEG SURGERY      LEG SURGERY  6/13/12    I&D rt leg abscess- Dr. Antelmo Steele  12-    TRUS/BX    PROSTATE SURGERY  06-    BRACHYTHERAPY OF THE PROSTATE    SKIN TAG REMOVAL  T    LT ARMPIT        Current Medications:    No current facility-administered medications for this encounter. Allergies:  Pcn [penicillins]    Social History:    Social History     Socioeconomic History    Marital status: Single     Spouse name: None    Number of children: None    Years of education: None    Highest education level: None   Occupational History    Occupation: unemployed at present time     Employer: [employer]   Tobacco Use    Smoking status: Current Every Day Smoker     Packs/day: 1.00     Years: 40.00     Pack years: 40.00     Types: Cigarettes     Start date: 6/29/1975    Smokeless tobacco: Never Used   Substance and Sexual Activity    Alcohol use: No     Alcohol/week: 0.0 standard drinks    Drug use: No    Sexual activity: Yes   Other Topics Concern    None   Social History Narrative    None     Social Determinants of Health     Financial Resource Strain:     Difficulty of Paying Living Expenses: Not on file   Food Insecurity:     Worried About Running Out of Food in the Last Year: Not on file    Farhad of Food in the Last Year: Not on file   Transportation Needs:     Lack of Transportation (Medical):  Not on file    Lack of Transportation tracks proximally and there is undermining. There is a fibrous base and hyperkeratotic rim. There is purulence draining from wound. There is erythema noted to the sharona-wound that extends just proximal to the left 1st metatarsal head. There is edema and erythema noted to LLE with increased warmth to left great toe. Neurovascular: Light touch sensation diminished bilaterally. Musculoskeletal: Muscle strength 4/5 for all plantarflexors, dorsiflexors, inverters and everters examined. Decreased ROM noted left AJ. Pain with palpation of left great toe. IMAGING    No results found. LABS:   Recent Labs     01/01/22  0250   WBC 11.2*   HGB 15.5   HCT 45.2           Recent Labs     01/01/22  0250      K 3.8   CL 98   CO2 24   BUN 15   CREATININE 0.9        Recent Labs     01/01/22  0250   PROT 6.7      No results for input(s): CKTOTAL, CKMB, CKMBINDEX, TROPONINI in the last 72 hours.     Treatment:   Orders Placed This Encounter   Procedures    Culture, Blood 1     Standing Status:   Standing     Number of Occurrences:   1    Culture, Blood 2     Standing Status:   Standing     Number of Occurrences:   1    Culture, Anaerobic and Aerobic     Standing Status:   Standing     Number of Occurrences:   1    XR FOOT LEFT (MIN 3 VIEWS)     Standing Status:   Standing     Number of Occurrences:   1     Order Specific Question:   Reason for exam:     Answer:   pain, swelling    VL DUP LOWER EXTREMITY VENOUS LEFT     Standing Status:   Standing     Number of Occurrences:   1     Order Specific Question:   Reason for exam:     Answer:   pain, swelling    Comprehensive Metabolic Panel     Standing Status:   Standing     Number of Occurrences:   1    CBC Auto Differential     Standing Status:   Standing     Number of Occurrences:   1    Procalcitonin     Standing Status:   Standing     Number of Occurrences:   1    Lactate, Sepsis     Standing Status:   Standing     Number of Occurrences:   2    Anion Gap     Standing Status:   Standing     Number of Occurrences:   1    Osmolality     Standing Status:   Standing     Number of Occurrences:   1    Glomerular Filtration Rate, Estimated     Standing Status:   Standing     Number of Occurrences:   1       Assessment and Plan  Principle  1. Osteomyelitis; left great toe    -Patient initially examined and evaluated  -WBC 11.2; Afebrile  - Culture taken and order placed.   -Lactate 1.0  -X-rays taken and reviewed; impression above  -Pharmacy to dose IV Vancomycin and Cefepime   -Applied Iodoform packing, betadine wet to dry, gauze, kerlix, ACE dressings  -NWB to LLE. Able to heel WB for transfers and bathroom privileges.    -Patient to have L great toe amputation at 10:30am today, 1/1/22  -All pre-op orders placed. - Podiatry will continue to follow patient      DISPO: Patient to have L great toe amputation at 10:30am today, 1/1/22. Pending WBC trend and antibiotic response.        Vidal Sexton [id number]  Foot and Ankle Surgical Resident  1/1/2022 6:21 AM

## 2022-01-01 NOTE — ED PROVIDER NOTES
[address] [facility] [address]    EMERGENCY MEDICINE     Pt Name: Dixie Velázquez  MRN: [id number]  [place] 1965  Date of evaluation: 1/1/2022  Provider: Nik Beyer PA-C    CHIEF COMPLAINT       Chief Complaint   Patient presents with    Foot Pain       HISTORY OF PRESENT ILLNESS    Dixie Velázquez is a pleasant 60 y.o. male who presents to the emergency department for left foot pain. Patient states 2 months ago he dropped a can with left big toe which caused pain at the time but never got it evaluated. He states the pain improved but then 1 week ago he noticed a sore on the bottom of his left big toe. Throughout the past week the big toe has gotten progressively more painful, more swollen. He states the wound on the bottom of the big toe has gotten bigger and has started to seep in the past few days. He is type II diabetic and states that it is typically controlled with his medication, he is not on insulin. He has no history of numbness and tingling to the bilateral feet but has noticed recently has been toe feels numb. He states he has discoloration to the big toe. He states his entire left foot and lower leg is now swollen as well. He has no complaints of fevers or chills. No complaints of chest pain, tachycardia, abdominal pain. Triage notes and Nursing notes were reviewed by myself. Any discrepancies are addressed above.     PAST MEDICAL HISTORY     Past Medical History:   Diagnosis Date    Back pain     HERNIATED DISKS    Carpal tunnel syndrome of right wrist 10/22/2012    Hyperlipidemia     Hypertension     Hypogonadism male     Nocturia     Obesity     Osteoarthritis     Prostate [facility] ([facility] 12-    GLEASONS 3+4=7 ON RT/3+3=6 ON LT/T1C    Restless leg syndrome 8/2/2016    Shortness of breath     Sleep apnea 10-    HAS C-PAP MACHINE    Tobacco abuse     Tobacco user     Type II or unspecified type diabetes mellitus without mention of complication, not stated as uncontrolled     Urgency of urination        SURGICAL HISTORY       Past Surgical History:   Procedure Laterality Date    CARPAL TUNNEL RELEASE  10/22/12    right    CARPAL TUNNEL RELEASE  11/26/2012    left    COLONOSCOPY  2/1/2015    CYST REMOVAL  T    RT SIDE UPPER BACK,     CYST REMOVAL  7-5-12    excision of right shoulder mass/cyst and of left axillary skin lesion-Dr. Ronaldo Potts    HAND SURGERY  1987    partial finger amputee rt hand    KNEE SURGERY  ? left knee scope    LEG SURGERY      LEG SURGERY  6/13/12    I&D rt leg abscess- Dr. Everardo Cabrera  12-    TRUS/BX    PROSTATE SURGERY  06-    BRACHYTHERAPY OF THE PROSTATE    SKIN TAG REMOVAL  T    LT ARMPIT       CURRENT MEDICATIONS       Current Discharge Medication List      CONTINUE these medications which have NOT CHANGED    Details   tamsulosin (FLOMAX) 0.4 MG capsule take 1 capsule by mouth once daily  Qty: 90 capsule, Refills: 3      pramipexole (MIRAPEX) 0.25 MG tablet take 1 tablet by mouth NIGHTLY. Qty: 90 tablet, Refills: 3    Associated Diagnoses: Restless leg syndrome      amitriptyline (ELAVIL) 100 MG tablet take 1 tablet by mouth NIGHTLY.   Qty: 90 tablet, Refills: 3    Associated Diagnoses: Insomnia, unspecified type      busPIRone (BUSPAR) 15 MG tablet take 1 tablet by mouth twice a day  Qty: 60 tablet, Refills: 11      buPROPion (WELLBUTRIN XL) 150 MG extended release tablet Take 1 tablet by mouth every morning  Qty: 30 tablet, Refills: 11      lisinopril-hydrochlorothiazide (PRINZIDE;ZESTORETIC) 20-12.5 MG per tablet take 1 tablet by mouth twice a day  Qty: 60 tablet, Refills: 11    Associated Diagnoses: Essential hypertension      atorvastatin (LIPITOR) 40 MG tablet take 1 tablet by mouth NIGHTLY  Qty: 30 tablet, Refills: 11    Associated Diagnoses: Dyslipidemia      alogliptin-metformin 12.5-1000 MG TABS TAKE 1 TABLET BY MOUTH TWICE A DAY  Qty: 60 tablet, Refills: 11      gabapentin (NEURONTIN) 300 MG capsule Take 800 mg by mouth 3 times daily       cyclobenzaprine (FLEXERIL) 10 MG tablet Take 10 mg by mouth 3 times daily. Multiple Vitamin (MULTIVITAMIN PO) Take  by mouth daily. aspirin 81 MG EC tablet Take 81 mg by mouth daily. ALLERGIES     Pcn [penicillins]    FAMILY HISTORY       Family History   Problem Relation Age of Onset    Thyroid Disease Mother     Heart Disease Father     Diabetes Father     Cancer Maternal Grandmother         what kind    Heart Disease Brother         SOCIAL HISTORY       Social History     Socioeconomic History    Marital status: Single     Spouse name: None    Number of children: None    Years of education: None    Highest education level: None   Occupational History    Occupation: unemployed at present time     Employer: [employer]   Tobacco Use    Smoking status: Current Every Day Smoker     Packs/day: 1.00     Years: 40.00     Pack years: 40.00     Types: Cigarettes     Start date: 6/29/1975    Smokeless tobacco: Never Used   Substance and Sexual Activity    Alcohol use: No     Alcohol/week: 0.0 standard drinks    Drug use: No    Sexual activity: Yes   Other Topics Concern    None   Social History Narrative    None     Social Determinants of Health     Financial Resource Strain:     Difficulty of Paying Living Expenses: Not on file   Food Insecurity:     Worried About Running Out of Food in the Last Year: Not on file    Farhad of Food in the Last Year: Not on file   Transportation Needs:     Lack of Transportation (Medical): Not on file    Lack of Transportation (Non-Medical):  Not on file   Physical Activity:     Days of Exercise per Week: Not on file    Minutes of Exercise per Session: Not on file   Stress:     Feeling of Stress : Not on file   Social Connections:     Frequency of Communication with Friends and Family: Not on file    Frequency of Social Gatherings with Friends and Family: Not on file    Attends Protestant Services: Not on file    Active Member of Clubs or Organizations: Not on file    Attends Club or Organization Meetings: Not on file    Marital Status: Not on file   Intimate Partner Violence:     Fear of Current or Ex-Partner: Not on file    Emotionally Abused: Not on file    Physically Abused: Not on file    Sexually Abused: Not on file   Housing Stability:     Unable to Pay for Housing in the Last Year: Not on file    Number of [place] in the Last Year: Not on file    Unstable Housing in the Last Year: Not on file       REVIEW OF SYSTEMS     Review of Systems   Constitutional: Positive for activity change. Negative for appetite change, chills, diaphoresis, fatigue, fever and unexpected weight change. HENT: Negative for congestion, ear pain and sore throat. Eyes: Negative. Respiratory: Negative for cough and shortness of breath. Cardiovascular: Negative for chest pain and palpitations. Gastrointestinal: Negative for diarrhea, nausea and vomiting. Genitourinary: Negative for dysuria and urgency. Musculoskeletal: Positive for arthralgias, gait problem, joint swelling and myalgias. Negative for back pain, neck pain and neck stiffness. Skin: Positive for color change and wound. Negative for pallor and rash. Neurological: Negative for headaches. Psychiatric/Behavioral: Negative. All other systems reviewed and are negative. Except as noted above the remainder of the review of systems was reviewed and is. PHYSICAL EXAM     INITIAL VITALS:  height is 5' 10\" (1.778 m) and weight is 280 lb (127 kg). His oral temperature is 97.8 °F (36.6 °C). His blood pressure is 164/84 (abnormal) and his pulse is 63. His respiration is 18 and oxygen saturation is 97%. Physical Exam  Vitals and nursing note reviewed. Exam conducted with a chaperone present. Constitutional:       General: He is not in acute distress. Appearance: Normal appearance. He is normal weight.  He is not ill-appearing, toxic-appearing or diaphoretic. HENT:      Head: Normocephalic and atraumatic. Right Ear: External ear normal.      Left Ear: External ear normal.      Nose: Nose normal.      Mouth/Throat:      Mouth: Mucous membranes are moist.   Eyes:      Pupils: Pupils are equal, round, and reactive to light. Cardiovascular:      Rate and Rhythm: Normal rate and regular rhythm. Pulses: Normal pulses. Pulmonary:      Effort: Pulmonary effort is normal. No respiratory distress. Abdominal:      General: There is no distension. Palpations: Abdomen is soft. Tenderness: There is no abdominal tenderness. Musculoskeletal:         General: Normal range of motion. Cervical back: Normal range of motion and neck supple. Right lower leg: Normal.      Left lower leg: Swelling and tenderness present. No deformity, lacerations or bony tenderness. 2+ Pitting Edema present. Right foot: Normal.      Comments: Left foot demonstrates mild to moderate edema from the foot to the lower leg just below the knee. Noticeable erythema to the big toe as well as to the distal foot. Decreased sensation to the big toe from MTP joint to distal aspect. Sensation intact distally to light touch throughout the foot elsewhere. 2+ palpable dorsalis pedis pulse. Brisk capillary refill to toes two through five. Discoloration noted to the big toe to the MTP joint. Ulcerated wound at the plantar surface distal medial big toe quarter sized with serosanguineous drainage noted. Skin:     General: Skin is warm and dry. Neurological:      Mental Status: He is alert and oriented to person, place, and time. Psychiatric:         Mood and Affect: Mood normal.         Behavior: Behavior normal.         Thought Content:  Thought content normal.         DIFFERENTIAL DIAGNOSIS:   Differential diagnoses are discussed    DIAGNOSTIC RESULTS     EKG: All EKG's are interpreted by the Emergency Department Physician who either signs or Co-signsthis chart in the absence of a cardiologist.    None      RADIOLOGY: non-plain film images(s) such as CT, Ultrasound and MRI are read by the radiologist.    VL DUP LOWER EXTREMITY ARTERIES LEFT   Final Result   1. High-grade stenosis in the mid left superficial femoral artery. 2. Abnormal left GINO suggesting a hemodynamically significant stenosis. **This report has been created using voice recognition software. It may contain minor errors which are inherent in voice recognition technology. **      Final report electronically signed by Dr. Mateusz Liu on 1/1/2022 9:40 AM      VL DUP LOWER EXTREMITY VENOUS LEFT   Final Result   No evidence of a DVT. **This report has been created using voice recognition software. It may contain minor errors which are inherent in voice recognition technology. **      Final report electronically signed by Dr. Mateusz Liu on 1/1/2022 9:27 AM      XR FOOT LEFT (MIN 3 VIEWS)   Final Result   Impression:   1. Soft tissue swelling with evidence of soft tissue ulceration involving    the first digit. There is associated cortical destruction of the first    distal phalanx, most consistent with osteomyelitis.       This document has been electronically signed by: Shannan Last MD on    01/01/2022 06:31 AM          LABS:   Labs Reviewed   COMPREHENSIVE METABOLIC PANEL - Abnormal; Notable for the following components:       Result Value    Glucose 333 (*)     All other components within normal limits   CBC WITH AUTO DIFFERENTIAL - Abnormal; Notable for the following components:    WBC 11.2 (*)     Segs Absolute 8.3 (*)     All other components within normal limits   GLOMERULAR FILTRATION RATE, ESTIMATED - Abnormal; Notable for the following components:    Est, Glom Filt Rate 86 (*)     All other components within normal limits   HEMOGLOBIN A1C - Abnormal; Notable for the following components:    Hemoglobin A1C 10.8 (*)     AVERAGE GLUCOSE 264 (*)     All other components within normal limits   POCT GLUCOSE - Abnormal; Notable for the following components:    POC Glucose 244 (*)     All other components within normal limits   POCT GLUCOSE - Abnormal; Notable for the following components:    POC Glucose 297 (*)     All other components within normal limits   CULTURE, BLOOD 1    Narrative:     Source: blood-Adult-suboptimal <5.5oz./set volume       Site: Peripheral Vein            Current Antibiotics: none   CULTURE, BLOOD 2    Narrative:     Source: blood-Adult-suboptimal <5.5oz./set volume       Site: Peripheral Vein            Current Antibiotics: none   CULTURE, ANAEROBIC AND AEROBIC    Narrative:     Source: ulcer       Site: swab left foot          Current Antibiotics: none   CULTURE, ANAEROBIC AND AEROBIC    Narrative:     Source: toe       Site: swab left          Current Antibiotics: not stated   PROCALCITONIN   LACTATE, SEPSIS   ANION GAP   OSMOLALITY   SURGICAL PATHOLOGY   COMPREHENSIVE METABOLIC PANEL W/ REFLEX TO MG FOR LOW K   CBC WITH AUTO DIFFERENTIAL   VANCOMYCIN, RANDOM   POCT GLUCOSE   POCT GLUCOSE       EMERGENCY DEPARTMENT COURSE:   Vitals:    Vitals:    01/01/22 1400 01/01/22 1529 01/01/22 2036 01/01/22 2332   BP: 129/69 135/63 (!) 156/81 (!) 164/84   Pulse: 64 60 62 63   Resp: 18 18 18 18   Temp:  97.9 °F (36.6 °C) 97.9 °F (36.6 °C) 97.8 °F (36.6 °C)   TempSrc:  Oral Oral Oral   SpO2: 95% 95% 97% 97%   Weight:       Height:         4:13 AM EST: The patient was seen and evaluated. MDM:  Patient is 43-year-old male presents with at least 1 week onset of left big toe pain, swelling that is getting progressively worse along with noted big toe ulcer that is started to seep in the past few days. Patient has history of type 2 diabetes. He has no complaints of fevers or chills at this point. Initial blood work of CBC, CMP, lactate, procalcitonin, blood cultures were done due to possible foot infection and osteomyelitis. White blood cell slightly elevated at 11.2. Glucose 333. Podiatry consulted. While awaiting podiatry recommendation transfer of care was to Clemencia Bustos NP.    CRITICAL CARE:   None    CONSULTS:  None    PROCEDURES:  None    FINAL IMPRESSION      1. Diabetic ulcer of toe of left foot associated with type 2 diabetes mellitus, unspecified ulcer stage ([facility] [address]          DISPOSITION/PLAN   Admission    PATIENT REFERRED TO:  No follow-up provider specified.     DISCHARGEMEDICATIONS:  Current Discharge Medication List              (Please note that portions of this note were completed with a voice recognition program.  Efforts were made to edit the dictations but occasionallywords are mis-transcribed.)      Cipriano Herrera PA-C(electronically signed)  Physician Associate, Emergency Department       Cipriano Herrera PA-C  01/02/22 0127

## 2022-01-01 NOTE — ANESTHESIA PRE PROCEDURE
Department of Anesthesiology  Preprocedure Note       Name:  Klaudia Lindo   Age:  60 y.o.  :  1965                                          MRN:  [id number]         Date:  2022      Surgeon: Jackie Medina):  Parish Eisenberg DPM    Procedure: Procedure(s):  LEFT 1ST TOE AMPUTATION    Medications prior to admission:   Prior to Admission medications    Medication Sig Start Date End Date Taking? Authorizing Provider   tamsulosin (FLOMAX) 0.4 MG capsule take 1 capsule by mouth once daily 10/9/18   Luli Etienne, DO   pramipexole (MIRAPEX) 0.25 MG tablet take 1 tablet by mouth NIGHTLY. 10/9/18   Luli Etienne, DO   amitriptyline (ELAVIL) 100 MG tablet take 1 tablet by mouth NIGHTLY. 10/9/18   Luli Etienne, DO   busPIRone (BUSPAR) 15 MG tablet take 1 tablet by mouth twice a day 18   Luli Etienne, DO   buPROPion (WELLBUTRIN XL) 150 MG extended release tablet Take 1 tablet by mouth every morning 18   Luli Etienne, DO   lisinopril-hydrochlorothiazide [facility]) 20-12.5 MG per tablet take 1 tablet by mouth twice a day 18   Luli Etienne, DO   atorvastatin (LIPITOR) 40 MG tablet take 1 tablet by mouth NIGHTLY 18   Luli Etienne, DO   alogliptin-metformin 12.5-1000 MG TABS TAKE 1 TABLET BY MOUTH TWICE A DAY 18   Luli Etienne, DO   gabapentin (NEURONTIN) 300 MG capsule Take 800 mg by mouth 3 times daily     Jose Douglas MD   cyclobenzaprine (FLEXERIL) 10 MG tablet Take 10 mg by mouth 3 times daily. Historical Provider, MD   Multiple Vitamin (MULTIVITAMIN PO) Take  by mouth daily. Historical Provider, MD   aspirin 81 MG EC tablet Take 81 mg by mouth daily.       Historical Provider, MD       Current medications:    Current Facility-Administered Medications   Medication Dose Route Frequency Provider Last Rate Last Admin    amitriptyline (ELAVIL) tablet 100 mg  100 mg Oral Nightly Kay Moran MD        atorvastatin (LIPITOR) tablet 40 mg  40 mg Oral Nightly Dennie Naegeli, MD        [Held by provider] buPROPion (WELLBUTRIN XL) extended release tablet 150 mg  150 mg Oral QAM Dennie Naegeli, MD        [Held by provider] busPIRone (BUSPAR) tablet 15 mg  15 mg Oral BID Dennie Naegeli, MD        cyclobenzaprine (FLEXERIL) tablet 10 mg  10 mg Oral TID PRN Dennie Naegeli, MD        pramipexole (MIRAPEX) tablet 0.25 mg  0.25 mg Oral Nightly Dennie Naegeli, MD        sodium chloride flush 0.9 % injection 5-40 mL  5-40 mL IntraVENous 2 times per day Dennie Naegeli, MD   10 mL at 01/01/22 1017    0.9 % sodium chloride infusion  25 mL IntraVENous PRN Dennie Naegeli, MD        [Held by provider] enoxaparin (LOVENOX) injection 40 mg  40 mg SubCUTAneous Daily Dennie Naegeli, MD        ondansetron (ZOFRAN-ODT) disintegrating tablet 4 mg  4 mg Oral Q8H PRN Dennie Naegeli, MD        Or    ondansetron (ZOFRAN) injection 4 mg  4 mg IntraVENous Q6H PRN Dennie Naegeli, MD        potassium chloride (KLOR-CON M) extended release tablet 40 mEq  40 mEq Oral PRN Dennie Naegeli, MD        Or    potassium bicarb-citric acid (EFFER-K) effervescent tablet 40 mEq  40 mEq Oral PRN Dennie Naegeli, MD        Or    potassium chloride 10 mEq/100 mL IVPB (Peripheral Line)  10 mEq IntraVENous PRN Dennie Naegeli, MD        magnesium sulfate 2000 mg in 50 mL IVPB premix  2,000 mg IntraVENous PRN Dennie Naegeli, MD        0.9 % sodium chloride infusion   IntraVENous Continuous Dennie Naegeli,  mL/hr at 01/01/22 1133 New Bag at 01/01/22 1133    cefepime (MAXIPIME) 1000 mg IVPB minibag  1,000 mg IntraVENous Q8H Vidal Sexton DPM   Stopped at 01/01/22 1051    vancomycin (VANCOCIN) intermittent dosing (placeholder)   Other RX Placeholder Vidal Sexton DPM   Given at 01/01/22 1133    vancomycin (VANCOCIN) 1250 mg in dextrose 5 % 250 mL IVPB  1,250 mg IntraVENous Q12H Vidal Sexton DPM        Followed by  vancomycin (VANCOCIN) 2,000 mg in dextrose 5 % 500 mL IVPB  2,000 mg IntraVENous Once Vidal Sexton  mL/hr at 01/01/22 1132 2,000 mg at 01/01/22 1132    sodium chloride flush 0.9 % injection 5-40 mL  5-40 mL IntraVENous PRN Fransico Cheese, APRN - CNP        polyethylene glycol (GLYCOLAX) packet 17 g  17 g Oral Daily PRN Fransico Cheese, APRN - CNP        acetaminophen (TYLENOL) tablet 650 mg  650 mg Oral Q6H PRN Fransico Cheese, APRN - CNP        Or    acetaminophen (TYLENOL) suppository 650 mg  650 mg Rectal Q6H PRN Fransico Cheese, APRN - CNP        nicotine (NICODERM CQ) 14 MG/24HR 1 patch  1 patch TransDERmal Daily Fransico Cheese, APRN - CNP   1 patch at 01/01/22 1123    lisinopril (PRINIVIL;ZESTRIL) tablet 20 mg  20 mg Oral Daily Fransico Cheese, APRN - CNP   20 mg at 01/01/22 1123    And    hydroCHLOROthiazide (HYDRODIURIL) tablet 12.5 mg  12.5 mg Oral Daily Fransico Cheese, APRN - CNP   12.5 mg at 01/01/22 1123    gabapentin (NEURONTIN) capsule 100 mg  100 mg Oral TID Tracy Akila, APRN - CNP        morphine (PF) injection 2 mg  2 mg IntraVENous Q2H PRN Tracy Akila, APRN - CNP        Or    morphine injection 4 mg  4 mg IntraVENous Q2H PRN Tracy Akila, APRN - CNP   4 mg at 01/01/22 1123     Current Outpatient Medications   Medication Sig Dispense Refill    tamsulosin (FLOMAX) 0.4 MG capsule take 1 capsule by mouth once daily 90 capsule 3    pramipexole (MIRAPEX) 0.25 MG tablet take 1 tablet by mouth NIGHTLY. 90 tablet 3    amitriptyline (ELAVIL) 100 MG tablet take 1 tablet by mouth NIGHTLY.  90 tablet 3    busPIRone (BUSPAR) 15 MG tablet take 1 tablet by mouth twice a day 60 tablet 11    buPROPion (WELLBUTRIN XL) 150 MG extended release tablet Take 1 tablet by mouth every morning 30 tablet 11    lisinopril-hydrochlorothiazide (PRINZIDE;ZESTORETIC) 20-12.5 MG per tablet take 1 tablet by mouth twice a day 60 tablet 11    atorvastatin (LIPITOR) 40 MG tablet take 1 tablet by mouth NIGHTLY 30 tablet 11    alogliptin-metformin 12.5-1000 MG TABS TAKE 1 TABLET BY MOUTH TWICE A DAY 60 tablet 11    gabapentin (NEURONTIN) 300 MG capsule Take 800 mg by mouth 3 times daily       cyclobenzaprine (FLEXERIL) 10 MG tablet Take 10 mg by mouth 3 times daily.  Multiple Vitamin (MULTIVITAMIN PO) Take  by mouth daily.  aspirin 81 MG EC tablet Take 81 mg by mouth daily. Allergies:     Allergies   Allergen Reactions    Pcn [Penicillins] Hives       Problem List:    Patient Active Problem List   Diagnosis Code    Adenocarcinoma of Prostate, GS 7, stage T1c. C61    Onychomycosis B35.1    DM type 2 (diabetes mellitus, type 2) ([facility] [address] E11.9    Dyslipidemia E78.5    DDD (degenerative disc disease), lumbar M51.36    Tobacco abuse Z72.0    Insomnia G47.00    Carpal tunnel syndrome of right wrist G56.01    Carpal tunnel syndrome of left wrist G56.02    Cubital tunnel syndrome on left G56.22    Morbid obesity (HCC) E66.01    Obstructive sleep apnea on CPAP G47.33, Z99.89    Restless leg syndrome G25.81    Controlled type 2 diabetes mellitus with microalbuminuria ([facility]) E11.29, R80.9    Diabetic ulcer of left foot due to type 2 diabetes mellitus ([facility] [address] E11.621, L97.529       Past Medical History:        Diagnosis Date    Back pain     HERNIATED DISKS    Carpal tunnel syndrome of right wrist 10/22/2012    Hyperlipidemia     Hypertension     Hypogonadism male     Nocturia     Obesity     Osteoarthritis     Prostate cancer ([facility] [address] 12-    GLEASONS 3+4=7 ON RT/3+3=6 ON LT/T1C    Restless leg syndrome 8/2/2016    Shortness of breath     Sleep apnea 10-    HAS C-PAP MACHINE    Tobacco abuse     Tobacco user     Type II or unspecified type diabetes mellitus without mention of complication, not stated as uncontrolled     Urgency of urination        Past Surgical History:        Procedure Laterality Date    CARPAL TUNNEL RELEASE  10/22/12    right  CARPAL TUNNEL RELEASE  11/26/2012    left    COLONOSCOPY  2/1/2015    CYST REMOVAL  T    RT SIDE UPPER BACK,     CYST REMOVAL  7-5-12    excision of right shoulder mass/cyst and of left axillary skin lesion-Dr. Deborah Bae    HAND SURGERY  1987    partial finger amputee rt hand    KNEE SURGERY  ? left knee scope    LEG SURGERY      LEG SURGERY  6/13/12    I&D rt leg abscess- Dr. Aidan Barrera  12-    TRUS/BX    PROSTATE SURGERY  06-    BRACHYTHERAPY OF THE PROSTATE    SKIN TAG REMOVAL  T    LT ARMPIT       Social History:    Social History     Tobacco Use    Smoking status: Current Every Day Smoker     Packs/day: 1.00     Years: 40.00     Pack years: 40.00     Types: Cigarettes     Start date: 6/29/1975    Smokeless tobacco: Never Used   Substance Use Topics    Alcohol use: No     Alcohol/week: 0.0 standard drinks                                Ready to quit: Not Answered  Counseling given: Not Answered      Vital Signs (Current):   Vitals:    01/01/22 0528 01/01/22 0631 01/01/22 1123 01/01/22 1130   BP: (!) 145/73 (!) 173/72 (!) 158/86 (!) 158/86   Pulse: 65 68 65    Resp: 17 17 18    Temp:       TempSrc:       SpO2: 95% 97% 98% 97%   Weight:       Height:                                                  BP Readings from Last 3 Encounters:   01/01/22 (!) 158/86   04/18/18 138/78   03/21/18 128/86       NPO Status:                                                                                 BMI:   Wt Readings from Last 3 Encounters:   01/01/22 280 lb (127 kg)   04/18/18 (!) 300 lb 6.4 oz (136.3 kg)   03/21/18 291 lb 12.8 oz (132.4 kg)     Body mass index is 40.18 kg/m².     CBC:   Lab Results   Component Value Date    WBC 11.2 01/01/2022    RBC 5.15 01/01/2022    RBC 4.50 08/29/2011    HGB 15.5 01/01/2022    HCT 45.2 01/01/2022    HCT 41.0 08/29/2011    MCV 87.8 01/01/2022    RDW 15.4 09/23/2014     01/01/2022       CMP:   Lab Results   Component Value Date  01/01/2022    K 3.8 01/01/2022    CL 98 01/01/2022    CO2 24 01/01/2022    BUN 15 01/01/2022    CREATININE 0.9 01/01/2022    LABGLOM 86 01/01/2022    GLUCOSE 333 01/01/2022    GLUCOSE 91 05/02/2012    PROT 6.7 01/01/2022    CALCIUM 9.0 01/01/2022    BILITOT 0.7 01/01/2022    ALKPHOS 97 01/01/2022    AST 20 01/01/2022    ALT 22 01/01/2022       POC Tests: No results for input(s): POCGLU, POCNA, POCK, POCCL, POCBUN, POCHEMO, POCHCT in the last 72 hours. Coags: No results found for: PROTIME, INR, APTT    HCG (If Applicable): No results found for: PREGTESTUR, PREGSERUM, HCG, HCGQUANT     ABGs: No results found for: PHART, PO2ART, DRM0JBK, KJN7PJT, BEART, V2CCMECJ     Type & Screen (If Applicable):  No results found for: LABABO, LABRH    Drug/Infectious Status (If Applicable):  Lab Results   Component Value Date    HEPCAB Negative 03/16/2018       COVID-19 Screening (If Applicable): No results found for: COVID19        Anesthesia Evaluation  Patient summary reviewed and Nursing notes reviewed no history of anesthetic complications:   Airway: Mallampati: III  TM distance: >3 FB   Neck ROM: full  Mouth opening: > = 3 FB Dental:          Pulmonary:   (+) shortness of breath: chronic and no interval change,  sleep apnea: on CPAP,  decreased breath sounds,  current smoker          Patient did not smoke on day of surgery. Cardiovascular:  Exercise tolerance: good (>4 METS),   (+) hypertension:,                   Neuro/Psych:   (+) neuromuscular disease:,             GI/Hepatic/Renal:   (+) morbid obesity          Endo/Other:    (+) DiabetesType II DM, poorly controlled, , .          Pt had no PAT visit       Abdominal:   (+) obese,     Abdomen: soft. Vascular: negative vascular ROS. Other Findings:             Anesthesia Plan      MAC     ASA 3       Induction: intravenous. Anesthetic plan and risks discussed with patient and spouse.       Plan discussed with Ysitie 30, DO   1/1/2022

## 2022-01-01 NOTE — PROGRESS NOTES
Pt admitted to 6e57 from  accompanied by girlfriend.   Pt oriented to room, unit and plan ofcare  Left foot dressing dry/intact

## 2022-01-01 NOTE — OP NOTE
Operative Note      Patient: Adry Finley  YOB: 1965  MRN: [id number]    Date of Procedure: 1/1/2022    Pre-Op Diagnosis: Toe wound; osteomyelitis     Post-Op Diagnosis: Same       Procedure(s):  LEFT 1ST TOE AMPUTATION    Surgeon(s):  Parish Chang DPM    Assistant:   Resident: Seb Thibodeaux DPM    Anesthesia: General    Estimated Blood Loss (mL): Minimal    Complications: None    Hemostasis: ankle tourniquet at 200mmHg     Injectables: 30 cc of 1:1 ratio of 1% Lidocaine with epi and 0.5% Marcaine plain     Materials: 2-0 Prolene, 3-0 Prolene    Specimens:   ID Type Source Tests Collected by Time Destination   1 : Left Toe A&A culture  Swab Toe CULTURE, ANAEROBIC AND AEROBIC Rohitpaulette Wang DPM 1/1/2022 1259    A : Left Great Toe  Tissue Toe SURGICAL PATHOLOGY RohitCARLOS Maradiaga 1/1/2022 1256        Implants:  * No implants in log *      Drains: * No LDAs found *    Findings: None     Detailed Description of Procedure: Indications: Patient is a 60 y.o. male with a chief complaint of toe wound who is being seen by Dr. Isidoro Guerrero and being treated for toe wound, osteomyelitis. At this time all conservative options have been exhausted and surgical intervention is necessary. The procedure has been explained to the patient and they understand the risks, benefits and possible complications including recurrence, need for further surgery, loss of toe, loss of life. No promises have been made as to surgical outcome. Procedure: The patient was transported from the pre-op holding to the operating room and placed in a supine position. A pneumatic ankle tourniquet was applied to the left ankle. A pre-operative injection of 30cc of 1:1 ratio of 1% Lidocaine with epi and 0.5% Marcaine plain was injected into the left foot in a pozo block block. The left foot was then prepped and draped in the normal aspetic manner.   The lelft foot was then exsanguinated with an esmark and the tourniquet was inflated to 200 mmHg. Attention was then directed to the left first digit. A skin marker was used to create a elliptical type incision line. Forceps were then applied to the digit to check for proper anesthesia. A towel clamp was then used to grasp the distal aspect of the toe. A full thickness incision was made down to the level of bone. The metatarso-phalangeal joint was visualized, and all ligamentous and capsular structures were released. The dis-articulated digit was then sent to Pathology and Microbiology for Aerobic and Anaerobic culture and sensitivity. The incision site was then inspected for viable healthy tissue. All non-viable soft tissue was removed using a rongeur. The surgical site was then thoroughly irrigated using Irresept. The pneumatic ankle tourniquet was then deflated and an immediate hyperemic response was noted to all 4 digits of the left foot. All bleeders were cauterized using electrocautery. The incision was flushed with copious amounts of Irresept. The skin was closed using 2-0 Prolene and 3-0 Prolene. The incision was dressed with adaptic, 4x4's, kerlix, stockingette and ACE wrap. The patient was transported to the PACU with VSS and NVS intact to all 4 digits of the left foot. No complications were noted throughout the procedure. The patient is to be discharged per PACU protocol. The patient is to follow-up with Dr. Daniel Lunsford in the office.     PATRICIA Gan DPM  Foot and Ankle Surgical Resident  1/1/2022  1:43 PM                Electronically signed by Vidal Sexton DPM on 1/1/2022 at 1:39 PM

## 2022-01-01 NOTE — ED NOTES
RN medicated pt per [facility]. Pt is resting in cot with respirations even and unlabored. RN updated pt and family on POC. Pt voices no concern or need at this time. Call light is within reach. Will continue to monitor.       [address]  01/01/22 8262

## 2022-01-01 NOTE — PROGRESS NOTES
[address] [facility] Pharmacokinetic Monitoring Service - Vancomycin     Deborah Mariee is a 60 y.o. male starting on vancomycin therapy for OM of left foot. Pharmacy consulted by Dr. Harris Epley DPARCELIA for monitoring and adjustment. Target Concentration: Goal AUC/MAGNOLIA 400-600 mg*hr/L    Additional Antimicrobials: cefepime    Pertinent Laboratory Values: Wt Readings from Last 1 Encounters:   01/01/22 280 lb (127 kg)     Temp Readings from Last 1 Encounters:   01/01/22 99.1 °F (37.3 °C) (Oral)     Estimated Creatinine Clearance: 120 mL/min (based on SCr of 0.9 mg/dL). Recent Labs     01/01/22  0250   CREATININE 0.9   WBC 11.2*       Pertinent Cultures:  Culture Date Source Results   1/1/22 BC1    1/1/22 BC2    1/1/22 Left foot ulcer    MRSA Nasal Swab: N/A. Non-respiratory infection. Plan:  1. Dosing recommendations based on Bayesian software  2. Start vancomycin 2000mg x 1, then vancomycin 1250 mg q12h  a. Anticipated AUC of ~ 540 and trough concentration of 15.8 at steady state  3. Renal labs as indicated   4. Vancomycin concentration ordered for 1/2/22 @ 0600   5.  Pharmacy will continue to monitor patient and adjust therapy as indicated    Thank you for the consult,  Brie Smith [facility]  1/1/2022 7:07 AM

## 2022-01-01 NOTE — ED NOTES
ED to inpatient nurses report    Chief Complaint   Patient presents with    Foot Pain      Present to ED from home  LOC: alert and orientated to name, place, date  Vital signs   Vitals:    01/01/22 0528 01/01/22 0631 01/01/22 1123 01/01/22 1130   BP: (!) 145/73 (!) 173/72 (!) 158/86 (!) 158/86   Pulse: 65 68 65    Resp: 17 17 18    Temp:       TempSrc:       SpO2: 95% 97% 98% 97%   Weight:       Height:          Oxygen Baseline 97%    Current needs required RA Bipap/Cpap No  LDAs:   Peripheral IV 01/01/22 Right Forearm (Active)     Mobility: Independent  Pending ED orders: NA  Present condition: stable      Electronically signed by Hamida Hi RN on 1/1/2022 at 1:15 PM       Hamida Hi RN  01/01/22 6005

## 2022-01-02 LAB
ACINETOBACTER BAUMANNII FILM ARRAY: NOT DETECTED
ALBUMIN SERPL-MCNC: 3.5 G/DL (ref 3.5–5.1)
ALP BLD-CCNC: 79 U/L (ref 38–126)
ALT SERPL-CCNC: 17 U/L (ref 11–66)
ANION GAP SERPL CALCULATED.3IONS-SCNC: 9 MEQ/L (ref 8–16)
AST SERPL-CCNC: 13 U/L (ref 5–40)
BASOPHILS # BLD: 0.5 %
BASOPHILS ABSOLUTE: 0 THOU/MM3 (ref 0–0.1)
BILIRUB SERPL-MCNC: 0.8 MG/DL (ref 0.3–1.2)
BOTTLE TYPE: ABNORMAL
BUN BLDV-MCNC: 12 MG/DL (ref 7–22)
CALCIUM SERPL-MCNC: 8.5 MG/DL (ref 8.5–10.5)
CANDIDA ALBICANS FILM ARRAY: NOT DETECTED
CANDIDA GLABRATA FILM ARRAY: NOT DETECTED
CANDIDA KRUSEI FILM ARRAY: NOT DETECTED
CANDIDA PARAPSILOSIS FILM ARRAY: NOT DETECTED
CANDIDA TROPICALIS FILM ARRAY: NOT DETECTED
CARBAPENEM RESITANT FILM ARRAY: ABNORMAL
CHLORIDE BLD-SCNC: 99 MEQ/L (ref 98–111)
CO2: 25 MEQ/L (ref 23–33)
CREAT SERPL-MCNC: 0.8 MG/DL (ref 0.4–1.2)
ENTERBACTER CLOACAE FILM ARRAY: NOT DETECTED
ENTERBACTERIACEAE FILM ARRAY: NOT DETECTED
ENTEROCOCCUS FILM ARRAY: NOT DETECTED
EOSINOPHIL # BLD: 1.9 %
EOSINOPHILS ABSOLUTE: 0.2 THOU/MM3 (ref 0–0.4)
ERYTHROCYTE [DISTWIDTH] IN BLOOD BY AUTOMATED COUNT: 13.6 % (ref 11.5–14.5)
ERYTHROCYTE [DISTWIDTH] IN BLOOD BY AUTOMATED COUNT: 44.4 FL (ref 35–45)
ESCHERICHIA COLI FILM ARRAY: NOT DETECTED
GFR SERPL CREATININE-BSD FRML MDRD: > 90 ML/MIN/1.73M2
GLUCOSE BLD-MCNC: 183 MG/DL (ref 70–108)
GLUCOSE BLD-MCNC: 206 MG/DL (ref 70–108)
GLUCOSE BLD-MCNC: 211 MG/DL (ref 70–108)
GLUCOSE BLD-MCNC: 220 MG/DL (ref 70–108)
GLUCOSE BLD-MCNC: 238 MG/DL (ref 70–108)
HAEMOPHILUS INFLUENZA FILM ARRAY: NOT DETECTED
HCT VFR BLD CALC: 44.2 % (ref 42–52)
HEMOGLOBIN: 14.5 GM/DL (ref 14–18)
IMMATURE GRANS (ABS): 0.03 THOU/MM3 (ref 0–0.07)
IMMATURE GRANULOCYTES: 0.3 %
KLEBSIELLA OXYTOCA FILM ARRAY: NOT DETECTED
KLEBSIELLA PNEUMONIAE FILM ARRAY: NOT DETECTED
LISTERIA MONOCYTOGENES FILM ARRAY: NOT DETECTED
LYMPHOCYTES # BLD: 20.9 %
LYMPHOCYTES ABSOLUTE: 1.9 THOU/MM3 (ref 1–4.8)
MCH RBC QN AUTO: 29.4 PG (ref 26–33)
MCHC RBC AUTO-ENTMCNC: 32.8 GM/DL (ref 32.2–35.5)
MCV RBC AUTO: 89.7 FL (ref 80–94)
METHICILLIN RESISTANT FILM ARRAY: NOT DETECTED
MONOCYTES # BLD: 8 %
MONOCYTES ABSOLUTE: 0.7 THOU/MM3 (ref 0.4–1.3)
NEISSERIA MENIGITIDIS FILM ARRAY: NOT DETECTED
NUCLEATED RED BLOOD CELLS: 0 /100 WBC
PLATELET # BLD: 153 THOU/MM3 (ref 130–400)
PMV BLD AUTO: 11.2 FL (ref 9.4–12.4)
POTASSIUM REFLEX MAGNESIUM: 3.7 MEQ/L (ref 3.5–5.2)
PROTEUS FILM ARRAY: NOT DETECTED
PSEUDOMONAS AERUGINOSA FILM ARRAY: NOT DETECTED
RBC # BLD: 4.93 MILL/MM3 (ref 4.7–6.1)
SEG NEUTROPHILS: 68.4 %
SEGMENTED NEUTROPHILS ABSOLUTE COUNT: 6.1 THOU/MM3 (ref 1.8–7.7)
SERRATIA MARCESCENS FILM ARRAY: NOT DETECTED
SODIUM BLD-SCNC: 133 MEQ/L (ref 135–145)
SOURCE OF BLOOD CULTURE: ABNORMAL
STAPH AUREUS FILM ARRAY: NOT DETECTED
STAPHYLOCOCCUS FILM ARRAY: DETECTED
STREP AGALACTIAE FILM ARRAY: NOT DETECTED
STREP PNEUMONIAE FILM ARRAY: NOT DETECTED
STREP PYOCGENES FILM ARRAY: NOT DETECTED
STREPTOCOCCUS FILM ARRAY: NOT DETECTED
TOTAL PROTEIN: 5.7 G/DL (ref 6.1–8)
VANCOMYCIN RANDOM: 7.9 UG/ML (ref 0.1–39.9)
VANCOMYCIN RESISTANT FILM ARRAY: ABNORMAL
WBC # BLD: 8.9 THOU/MM3 (ref 4.8–10.8)

## 2022-01-02 PROCEDURE — 6370000000 HC RX 637 (ALT 250 FOR IP)

## 2022-01-02 PROCEDURE — 6360000002 HC RX W HCPCS: Performed by: PHARMACIST

## 2022-01-02 PROCEDURE — 6360000002 HC RX W HCPCS

## 2022-01-02 PROCEDURE — 2580000003 HC RX 258

## 2022-01-02 PROCEDURE — 85025 COMPLETE CBC W/AUTO DIFF WBC: CPT

## 2022-01-02 PROCEDURE — 2580000003 HC RX 258: Performed by: PHARMACIST

## 2022-01-02 PROCEDURE — 36415 COLL VENOUS BLD VENIPUNCTURE: CPT

## 2022-01-02 PROCEDURE — 99232 SBSQ HOSP IP/OBS MODERATE 35: CPT

## 2022-01-02 PROCEDURE — 80202 ASSAY OF VANCOMYCIN: CPT

## 2022-01-02 PROCEDURE — 1200000000 HC SEMI PRIVATE

## 2022-01-02 PROCEDURE — 6370000000 HC RX 637 (ALT 250 FOR IP): Performed by: NURSE PRACTITIONER

## 2022-01-02 PROCEDURE — 82948 REAGENT STRIP/BLOOD GLUCOSE: CPT

## 2022-01-02 PROCEDURE — 80053 COMPREHEN METABOLIC PANEL: CPT

## 2022-01-02 RX ORDER — HYDRALAZINE HYDROCHLORIDE 25 MG/1
25 TABLET, FILM COATED ORAL EVERY 8 HOURS PRN
Status: DISCONTINUED | OUTPATIENT
Start: 2022-01-02 | End: 2022-01-12

## 2022-01-02 RX ORDER — HYDROCHLOROTHIAZIDE 25 MG/1
12.5 TABLET ORAL DAILY
Status: DISCONTINUED | OUTPATIENT
Start: 2022-01-02 | End: 2022-01-04

## 2022-01-02 RX ADMIN — CYCLOBENZAPRINE 10 MG: 10 TABLET, FILM COATED ORAL at 03:30

## 2022-01-02 RX ADMIN — OXYCODONE 10 MG: 5 TABLET ORAL at 01:01

## 2022-01-02 RX ADMIN — SODIUM CHLORIDE: 9 INJECTION, SOLUTION INTRAVENOUS at 07:55

## 2022-01-02 RX ADMIN — GABAPENTIN 100 MG: 100 CAPSULE ORAL at 07:58

## 2022-01-02 RX ADMIN — INSULIN LISPRO 4 UNITS: 100 INJECTION, SOLUTION INTRAVENOUS; SUBCUTANEOUS at 16:38

## 2022-01-02 RX ADMIN — VANCOMYCIN HYDROCHLORIDE 1750 MG: 5 INJECTION, POWDER, LYOPHILIZED, FOR SOLUTION INTRAVENOUS at 20:32

## 2022-01-02 RX ADMIN — ATORVASTATIN CALCIUM 40 MG: 40 TABLET, FILM COATED ORAL at 21:28

## 2022-01-02 RX ADMIN — INSULIN LISPRO 2 UNITS: 100 INJECTION, SOLUTION INTRAVENOUS; SUBCUTANEOUS at 08:03

## 2022-01-02 RX ADMIN — GABAPENTIN 100 MG: 100 CAPSULE ORAL at 21:28

## 2022-01-02 RX ADMIN — HYDROCHLOROTHIAZIDE 12.5 MG: 25 TABLET ORAL at 13:21

## 2022-01-02 RX ADMIN — VANCOMYCIN HYDROCHLORIDE 1250 MG: 5 INJECTION, POWDER, LYOPHILIZED, FOR SOLUTION INTRAVENOUS at 08:03

## 2022-01-02 RX ADMIN — CEFEPIME 1000 MG: 1 INJECTION, POWDER, FOR SOLUTION INTRAMUSCULAR; INTRAVENOUS at 00:58

## 2022-01-02 RX ADMIN — OXYCODONE 10 MG: 5 TABLET ORAL at 16:33

## 2022-01-02 RX ADMIN — MORPHINE SULFATE 4 MG: 4 INJECTION, SOLUTION INTRAMUSCULAR; INTRAVENOUS at 03:30

## 2022-01-02 RX ADMIN — AMITRIPTYLINE HYDROCHLORIDE 100 MG: 100 TABLET, FILM COATED ORAL at 21:28

## 2022-01-02 RX ADMIN — HYDRALAZINE HYDROCHLORIDE 25 MG: 25 TABLET, FILM COATED ORAL at 16:33

## 2022-01-02 RX ADMIN — CEFEPIME 1000 MG: 1 INJECTION, POWDER, FOR SOLUTION INTRAMUSCULAR; INTRAVENOUS at 09:50

## 2022-01-02 RX ADMIN — OXYCODONE 10 MG: 5 TABLET ORAL at 20:27

## 2022-01-02 RX ADMIN — OXYCODONE 10 MG: 5 TABLET ORAL at 07:55

## 2022-01-02 RX ADMIN — PRAMIPEXOLE DIHYDROCHLORIDE 0.25 MG: 0.25 TABLET ORAL at 21:28

## 2022-01-02 RX ADMIN — LISINOPRIL 20 MG: 20 TABLET ORAL at 07:57

## 2022-01-02 RX ADMIN — INSULIN LISPRO 4 UNITS: 100 INJECTION, SOLUTION INTRAVENOUS; SUBCUTANEOUS at 11:27

## 2022-01-02 RX ADMIN — OXYCODONE 10 MG: 5 TABLET ORAL at 12:04

## 2022-01-02 RX ADMIN — GABAPENTIN 100 MG: 100 CAPSULE ORAL at 13:22

## 2022-01-02 RX ADMIN — CEFEPIME 1000 MG: 1 INJECTION, POWDER, FOR SOLUTION INTRAMUSCULAR; INTRAVENOUS at 16:37

## 2022-01-02 ASSESSMENT — PAIN SCALES - GENERAL
PAINLEVEL_OUTOF10: 7
PAINLEVEL_OUTOF10: 5
PAINLEVEL_OUTOF10: 7
PAINLEVEL_OUTOF10: 5
PAINLEVEL_OUTOF10: 7
PAINLEVEL_OUTOF10: 4
PAINLEVEL_OUTOF10: 8
PAINLEVEL_OUTOF10: 8

## 2022-01-02 ASSESSMENT — PAIN DESCRIPTION - PROGRESSION: CLINICAL_PROGRESSION: NOT CHANGED

## 2022-01-02 ASSESSMENT — PAIN DESCRIPTION - LOCATION: LOCATION: FOOT

## 2022-01-02 ASSESSMENT — PAIN DESCRIPTION - PAIN TYPE: TYPE: SURGICAL PAIN

## 2022-01-02 ASSESSMENT — PAIN DESCRIPTION - FREQUENCY: FREQUENCY: CONTINUOUS

## 2022-01-02 ASSESSMENT — PAIN DESCRIPTION - ONSET: ONSET: ON-GOING

## 2022-01-02 ASSESSMENT — PAIN DESCRIPTION - DESCRIPTORS: DESCRIPTORS: ACHING

## 2022-01-02 ASSESSMENT — PAIN DESCRIPTION - ORIENTATION: ORIENTATION: LEFT

## 2022-01-02 ASSESSMENT — PAIN - FUNCTIONAL ASSESSMENT: PAIN_FUNCTIONAL_ASSESSMENT: ACTIVITIES ARE NOT PREVENTED

## 2022-01-02 NOTE — FLOWSHEET NOTE
01/02/22 4617   Provider Notification   Reason for Communication Evaluate   Provider Name Alan [place]   Provider Notification Advance Practice Clinician (CNS/NP/CNM/CRNA/PA)   Method of Communication Secure Message   Response No new orders   Notification Time 21       pt says he used to take lisinopril 20mg BID before his insurance changed and he couldn't afford it, but he said he runs high. He hasn't taken anything in a year.

## 2022-01-02 NOTE — PROGRESS NOTES
[address] [facility] Pharmacokinetic Monitoring Service - Vancomycin    Consulting Provider: Terell Salazar DPM   Indication: OM of left foot  Target Concentration: Goal AUC/MAGNOLIA 400-600 mg*hr/L  Day of Therapy: 2  Additional Antimicrobials: cefepime    Pertinent Laboratory Values: Wt Readings from Last 1 Encounters:   01/01/22 280 lb (127 kg)     Temp Readings from Last 1 Encounters:   01/02/22 97.5 °F (36.4 °C) (Oral)     Estimated Creatinine Clearance: 135 mL/min (based on SCr of 0.8 mg/dL). Recent Labs     01/01/22  0250 01/02/22  0718   CREATININE 0.9 0.8   WBC 11.2* 8.9       PPertinent Cultures:  Culture Date Source Results   1/1/22 BC1 ngtd    1/1/22 BC2 GPC clusters, biofire pending    1/1/22 Left foot ulcer  GS - GPC prs/chains  -GNB   1/1/22 Left toe swab GS - GPC singly/prs  GNB  GPB       Recent vancomycin administrations                     vancomycin (VANCOCIN) 1250 mg in dextrose 5 % 250 mL IVPB (mg) 1,250 mg New Bag 01/02/22 0803     1,250 mg New Bag 01/01/22 2044    vancomycin (VANCOCIN) intermittent dosing (placeholder) ()  Given 01/01/22 1133    vancomycin (VANCOCIN) 2,000 mg in dextrose 5 % 500 mL IVPB (mg) 2,000 mg New Bag 01/01/22 1132                    Assessment:  Date/Time Current Dose Concentration Timing of Concentration (h) AUC   1/2/22 @ 0719 1250mg q12h 7.9 10h 34 min.  Post dose 352   Note: Serum concentrations collected for AUC dosing may appear elevated if collected in close proximity to the dose administered, this is not necessarily an indication of toxicity    Plan:  Current dosing regimen is sub-therapeutic  Increase dose to 1750 mg q12h  with estimated AUC =493  Pharmacy will continue to monitor patient and adjust therapy as indicated    Thank you for the consult,  RITESH Mejias [facility]  1/2/2022 11:54 AM

## 2022-01-02 NOTE — PROGRESS NOTES
Hospitalist Progress Note    Patient:  Klaudia Lindo      Unit/Bed:6E-57/057-A    YOB: 1965    MRN: [id number]       Acct: [de-identified]     PCP: Luli Etienne DO    Date of Admission: 1/1/2022    Assessment/Plan:    1. Left great toe diabetic ulcer secondary to T2DM: uncontrolled diabetic. POD 1 S/p 1st toe amputation. Glucose 183 this morning. HbgA1C 10.8. Continue medium dose SSI. Carb controlled diet. Podiatry consulted and following; appreciate recs. Day 2 IV ABX; continue. 2. Obstructive sleep apnea: Continue to wear home CPAP at night with home settings. 3. Hyperlipidemia: Continue Lipitor 40 mg  4. Essential HTN: uncontrolled. Patient has had multiple hypertensive episodes; with intermittent normotensive pressures. Continue Lisinopril. Start HTZ 12.5.  5. Tobacco abuse: discussed risks of smoking, educated on resources available for cessation. Reports he does not wish to quit at this time. Nicotine patch. 6. Restless leg syndrome: Mirapex  7. Morbid obesity: BMI 40.18; discussed and educated on  lifestyle modifications. Disposition:    [x] Home       [] TCU       [] Rehab       [] Psych       [] SNF       [] [facility]       [] Other-    Chief Complaint: Foot pain    Hospital Course:     Patient POD 1 s/p left 1st toe amputation. on 1/1/2022. He is on day 2 of IV antibiotic treatment for toe wound, osteomyelitis. Through chart review, had minimal blood loss and no apparent complications while in OR yesterday. Patient is very well appearing upon presentation. He is afebrile and hemodynamically stable. Reports he has a good appetite and is eating and drinking good. Reports he is passing gas and has no difficulty with urination. He has had multiple hypertensive episodes not controlled by lisinopril. Will implement home dose HTZ. Patient reports pain is being well controlled with oral oxy and IV morphine. He has no other complaints at this time. Subjective (past 24 hours):     Patient reports that he has 4/10 pain in his left foot where is toe was amputated. States that he feels well at this time. Denies CP, SOB, cough, fever, abd pain/N/V/D, numbness/tingling in LE bilaterally. Medications:  Reviewed    Infusion Medications    sodium chloride      dextrose      sodium chloride 125 mL/hr at 01/01/22 2040     Scheduled Medications    amitriptyline  100 mg Oral Nightly    atorvastatin  40 mg Oral Nightly    pramipexole  0.25 mg Oral Nightly    sodium chloride flush  5-40 mL IntraVENous 2 times per day    [Held by provider] enoxaparin  40 mg SubCUTAneous Daily    cefepime  1,000 mg IntraVENous Q8H    vancomycin (VANCOCIN) intermittent dosing (placeholder)   Other RX Placeholder    vancomycin  1,250 mg IntraVENous Q12H    nicotine  1 patch TransDERmal Daily    lisinopril  20 mg Oral Daily    gabapentin  100 mg Oral TID    insulin lispro  0-12 Units SubCUTAneous TID WC    insulin lispro  0-6 Units SubCUTAneous Nightly     PRN Meds: cyclobenzaprine, sodium chloride, ondansetron **OR** ondansetron, potassium chloride **OR** potassium alternative oral replacement **OR** potassium chloride, magnesium sulfate, sodium chloride flush, polyethylene glycol, acetaminophen **OR** acetaminophen, morphine **OR** morphine, glucose, dextrose, glucagon (rDNA), dextrose, oxyCODONE **OR** oxyCODONE      Intake/Output Summary (Last 24 hours) at 1/2/2022 0025  Last data filed at 1/2/2022 0341  Gross per 24 hour   Intake 960 ml   Output 800 ml   Net 160 ml       Diet:  ADULT DIET; Regular    Exam:  BP (!) 177/90   Pulse 66   Temp 97.9 °F (36.6 °C) (Oral)   Resp 18   Ht 5' 10\" (1.778 m)   Wt 280 lb (127 kg)   SpO2 98%   BMI 40.18 kg/m²     General appearance: No apparent distress, appears stated age and cooperative. HEENT: Pupils equal, round, and reactive to light. Conjunctivae/corneas clear. Neck: Supple, with full range of motion.  No jugular venous distention. Trachea midline. Respiratory:  Normal respiratory effort. Clear to auscultation, bilaterally without Rales/Wheezes/Rhonchi. Cardiovascular: Regular rate and rhythm with normal S1/S2 without murmurs, rubs or gallops. Abdomen: Soft, non-tender, non-distended with normal bowel sounds. Musculoskeletal: passive and active ROM x 4 extremities. Post-op dressing to left foot, no blood or drainage noted on outside of dressing. Left leg slightly swollen compared to left. Mild erythema of LLE noted. Skin: Skin color, texture, turgor normal.  No rashes or lesions. Neurologic:  Neurovascularly intact without any focal sensory/motor deficits. Cranial nerves: II-XII intact, grossly non-focal.  Psychiatric: Alert and oriented, thought content appropriate, normal insight  Capillary Refill: Brisk,< 3 seconds   Peripheral Pulses: +2 palpable, equal bilaterally       Labs:   Recent Labs     01/01/22  0250   WBC 11.2*   HGB 15.5   HCT 45.2        Recent Labs     01/01/22  0250      K 3.8   CL 98   CO2 24   BUN 15   CREATININE 0.9   CALCIUM 9.0     Recent Labs     01/01/22  0250   AST 20   ALT 22   BILITOT 0.7   ALKPHOS 97     No results for input(s): INR in the last 72 hours. No results for input(s): Daniela Castor in the last 72 hours. Microbiology:      -Blood cultures x's 2 showing gram positive cocci in clusters.  -Wound culture showing few segmented neutrophils. No epithelial cells observed. Moderate gram positive cocci in pairs and chains. Moderate gram negative bacilli. -Continue IV ABX. Urinalysis:      Lab Results   Component Value Date    NITRU Negative 10/03/2017    WBCUA 0 09/23/2014    BACTERIA NONE 09/23/2014    RBCUA 0 09/23/2014    BLOODU Trace-intact 10/03/2017    BLOODU SMALL 09/23/2014    SPECGRAV 1.015 08/29/2011    GLUCOSEU 250 10/03/2017       Radiology:  XR FOOT LEFT (MIN 3 VIEWS)    Result Date: 1/1/2022  4 views left foot. Comparison: None. Findings:  There is soft tissue swelling and ulceration involving the first digit. There is associated cortical destruction of the medial aspect of the first distal phalanx, concerning for osteomyelitis. No radiopaque foreign body is identified. No acute fracture or dislocation is seen. There are large superior and inferior calcaneal spurs. Mild degenerative changes are present at the first metatarsal phalangeal joint. Impression: 1. Soft tissue swelling with evidence of soft tissue ulceration involving the first digit. There is associated cortical destruction of the first distal phalanx, most consistent with osteomyelitis. This document has been electronically signed by: Blake Brenner MD on 01/01/2022 06:31 AM     DUP LOWER EXTREMITY ARTERIES LEFT    Result Date: 1/1/2022  PROCEDURE:  DUP LOWER EXTREMITY ARTERIES LEFT CLINICAL INFORMATION: non-palpable pulses . Nonhealing wound left great toe. COMPARISON: No prior study. TECHNIQUE: Arterial doppler ultrasound was performed of the left lower extremity using gray scale, color flow and spectral doppler imaging. Velocities were also recorded. FINDINGS: LEFT ARTERY (PSV cm/sec) ? ??????????????????????? CFA ---------------> 121 PSV cm/sec PROF -------------> 100 PSV cm/sec SFA PROX ------->77 PSV cm/sec SFA MID ---------->>454 PSV cm/sec SFA DIST -------->58 PSV cm/sec POP A PROX --->62 PSV cm/sec POP A DIST ---->114 PSV cm/sec PTA --------------->42 PSV cm/sec PERONEAL ----->40 PSV cm/sec SEAN ----------------> 42 PSV cm/sec GINO RIGHT SEAN----->1.07 PTA----->1.07 GINO LEFT SEAN----->0.65 PTA----->0.56 There is triphasic waveform in the left common femoral artery. In the proximal left superficial femoral artery, the waveform becomes dampened. The velocity decreases. In the mid left superficial femoral artery, the velocity is markedly elevated. There is  significant spectral broadening. This is consistent with a high-grade stenosis in the mid superficial femoral artery.  Distal to this, there are dampened waveforms throughout. The waveform becomes biphasic. There is a significant reduction the peak systolic velocity in the distal superficial femoral artery. The popliteal, anterior tibial, posterior tibial and peroneal arteries are patent. All have dampened waveforms. These are biphasic. There are reduced ABIs on the left compared to the right. 1. High-grade stenosis in the mid left superficial femoral artery. 2. Abnormal left GINO suggesting a hemodynamically significant stenosis. **This report has been created using voice recognition software. It may contain minor errors which are inherent in voice recognition technology. ** Final report electronically signed by Dr. Cecelia Angel on 1/1/2022 9:40 AM    VL DUP LOWER EXTREMITY VENOUS LEFT    Result Date: 1/1/2022  PROCEDURE: VL DUP LOWER EXTREMITY VENOUS LEFT CLINICAL INFORMATION: pain, swelling. Nonhealing ulcer left great toe. COMPARISON: No prior study. TECHNIQUE: Venous doppler ultrasound was performed of the left lower extremity using gray scale, color flow and spectral doppler imaging. FINDINGS: There is normal color flow, spectral analysis and compressibility of the common femoral vein, femoral vein and popliteal vein on the left . There is mild thickening of the walls of popliteal vein. There is normal color flow and compressibility in the posterior tibial veins, anterior tibial veins and peroneal veins. There is normal color flow, spectral analysis and compressibility in the contralateral common femoral vein. The greater saphenous vein is patent. The gastrocnemius vein is patent. No evidence of a DVT. **This report has been created using voice recognition software. It may contain minor errors which are inherent in voice recognition technology. ** Final report electronically signed by Dr. Cecelia Angel on 1/1/2022 9:27 AM      DVT prophylaxis: [] Lovenox                                 [x] SCDs                                 [] SQ Heparin                                 [x] Encourage ambulation           [] Already on Anticoagulation   Holding anticoagulation per podiatry orders.      Code Status: Full Code      Tele:   [] yes             [x] no     Active Hospital Problems    Diagnosis Date Noted    Diabetic ulcer of left foot due to type 2 diabetes mellitus ([facility] [P05.386, L97.529] 01/01/2022       Electronically signed by GARCIA Miller CNP on 1/2/2022 at 7:02 AM

## 2022-01-02 NOTE — PROGRESS NOTES
Pharmacy Note  [employer] Result    Lorna Causey is a 60 y.o. male, with a positive blood culture result    [employer] message received from Mami, laboratory employee on 1/2/2022 at 12:24 PM    First Gram stain result: gram positive cocci in clusters    [employer] BCID result: Staphylococcus (NOT aureus) mecA NOT detected    [employer] BCID and gram stain congruent? Yes    Suspected source? Left foot ulcer    Patient chart has been reviewed for signs/symptoms of infection: Yes  BP (!) 165/81   Pulse 62   Temp 97.5 °F (36.4 °C) (Oral)   Resp 18   Ht 5' 10\" (1.778 m)   Wt 280 lb (127 kg)   SpO2 98%   BMI 40.18 kg/m²   Lab Results   Component Value Date    WBC 8.9 01/02/2022     Allergies reviewed  Pcn [penicillins]    Renal function reviewed  Estimated Creatinine Clearance: 135 mL/min (based on SCr of 0.8 mg/dL). Current antibiotic regimen: vancomycin, cefepime    Intervention needed: No    Individual contacted: Provider Hugo Desir    Recommendations: none - would continue vancomycin at this time as patient has OM    Recommendations accepted?  Yes    Ashley Schofield [facility]  1/2/2022 12:24 PM

## 2022-01-02 NOTE — FLOWSHEET NOTE
01/02/22 0636   Provider Notification   Reason for Communication Evaluate   Provider Name [facility]   Provider Notification Advance Practice Clinician (CNS/NP/CNM/CRNA/PA)   Method of Communication Secure Message   Response No new orders   Notification Time 0636      Pt /90

## 2022-01-03 LAB
BLOOD CULTURE, ROUTINE: ABNORMAL
BLOOD CULTURE, ROUTINE: ABNORMAL
GLUCOSE BLD-MCNC: 184 MG/DL (ref 70–108)
GLUCOSE BLD-MCNC: 189 MG/DL (ref 70–108)
GLUCOSE BLD-MCNC: 205 MG/DL (ref 70–108)
GLUCOSE BLD-MCNC: 238 MG/DL (ref 70–108)
GLUCOSE BLD-MCNC: 259 MG/DL (ref 70–108)
ORGANISM: ABNORMAL

## 2022-01-03 PROCEDURE — 99232 SBSQ HOSP IP/OBS MODERATE 35: CPT

## 2022-01-03 PROCEDURE — 6360000002 HC RX W HCPCS: Performed by: PHARMACIST

## 2022-01-03 PROCEDURE — 97535 SELF CARE MNGMENT TRAINING: CPT

## 2022-01-03 PROCEDURE — 6370000000 HC RX 637 (ALT 250 FOR IP)

## 2022-01-03 PROCEDURE — 1200000000 HC SEMI PRIVATE

## 2022-01-03 PROCEDURE — 6370000000 HC RX 637 (ALT 250 FOR IP): Performed by: NURSE PRACTITIONER

## 2022-01-03 PROCEDURE — 2580000003 HC RX 258

## 2022-01-03 PROCEDURE — 2580000003 HC RX 258: Performed by: PHARMACIST

## 2022-01-03 PROCEDURE — 6360000002 HC RX W HCPCS

## 2022-01-03 PROCEDURE — 6360000002 HC RX W HCPCS: Performed by: HOSPITALIST

## 2022-01-03 PROCEDURE — 97110 THERAPEUTIC EXERCISES: CPT

## 2022-01-03 PROCEDURE — 82948 REAGENT STRIP/BLOOD GLUCOSE: CPT

## 2022-01-03 PROCEDURE — 97166 OT EVAL MOD COMPLEX 45 MIN: CPT

## 2022-01-03 RX ORDER — LISINOPRIL 40 MG/1
40 TABLET ORAL DAILY
Status: DISCONTINUED | OUTPATIENT
Start: 2022-01-03 | End: 2022-01-13 | Stop reason: HOSPADM

## 2022-01-03 RX ADMIN — CEFEPIME 1000 MG: 1 INJECTION, POWDER, FOR SOLUTION INTRAMUSCULAR; INTRAVENOUS at 18:31

## 2022-01-03 RX ADMIN — SODIUM CHLORIDE, PRESERVATIVE FREE 10 ML: 5 INJECTION INTRAVENOUS at 19:47

## 2022-01-03 RX ADMIN — OXYCODONE 10 MG: 5 TABLET ORAL at 07:46

## 2022-01-03 RX ADMIN — OXYCODONE 5 MG: 5 TABLET ORAL at 12:14

## 2022-01-03 RX ADMIN — SODIUM CHLORIDE, PRESERVATIVE FREE 10 ML: 5 INJECTION INTRAVENOUS at 08:26

## 2022-01-03 RX ADMIN — LISINOPRIL 40 MG: 40 TABLET ORAL at 08:12

## 2022-01-03 RX ADMIN — PRAMIPEXOLE DIHYDROCHLORIDE 0.25 MG: 0.25 TABLET ORAL at 19:40

## 2022-01-03 RX ADMIN — CEFEPIME 1000 MG: 1 INJECTION, POWDER, FOR SOLUTION INTRAMUSCULAR; INTRAVENOUS at 01:14

## 2022-01-03 RX ADMIN — CEFEPIME 1000 MG: 1 INJECTION, POWDER, FOR SOLUTION INTRAMUSCULAR; INTRAVENOUS at 11:23

## 2022-01-03 RX ADMIN — AMITRIPTYLINE HYDROCHLORIDE 100 MG: 100 TABLET, FILM COATED ORAL at 19:40

## 2022-01-03 RX ADMIN — ENOXAPARIN SODIUM 40 MG: 100 INJECTION SUBCUTANEOUS at 08:12

## 2022-01-03 RX ADMIN — HYDROCHLOROTHIAZIDE 12.5 MG: 25 TABLET ORAL at 08:12

## 2022-01-03 RX ADMIN — GABAPENTIN 100 MG: 100 CAPSULE ORAL at 19:40

## 2022-01-03 RX ADMIN — GABAPENTIN 100 MG: 100 CAPSULE ORAL at 14:54

## 2022-01-03 RX ADMIN — GABAPENTIN 100 MG: 100 CAPSULE ORAL at 08:12

## 2022-01-03 RX ADMIN — OXYCODONE 10 MG: 5 TABLET ORAL at 18:24

## 2022-01-03 RX ADMIN — VANCOMYCIN HYDROCHLORIDE 1750 MG: 5 INJECTION, POWDER, LYOPHILIZED, FOR SOLUTION INTRAVENOUS at 08:19

## 2022-01-03 RX ADMIN — ATORVASTATIN CALCIUM 40 MG: 40 TABLET, FILM COATED ORAL at 19:40

## 2022-01-03 RX ADMIN — HYDRALAZINE HYDROCHLORIDE 25 MG: 25 TABLET, FILM COATED ORAL at 16:02

## 2022-01-03 RX ADMIN — VANCOMYCIN HYDROCHLORIDE 1750 MG: 5 INJECTION, POWDER, LYOPHILIZED, FOR SOLUTION INTRAVENOUS at 20:49

## 2022-01-03 ASSESSMENT — PAIN DESCRIPTION - PAIN TYPE
TYPE: SURGICAL PAIN

## 2022-01-03 ASSESSMENT — PAIN DESCRIPTION - ORIENTATION
ORIENTATION: LEFT

## 2022-01-03 ASSESSMENT — PAIN SCALES - GENERAL
PAINLEVEL_OUTOF10: 8
PAINLEVEL_OUTOF10: 4
PAINLEVEL_OUTOF10: 4
PAINLEVEL_OUTOF10: 8
PAINLEVEL_OUTOF10: 7
PAINLEVEL_OUTOF10: 6

## 2022-01-03 ASSESSMENT — PAIN DESCRIPTION - DESCRIPTORS
DESCRIPTORS: SHARP;SHOOTING

## 2022-01-03 ASSESSMENT — PAIN DESCRIPTION - LOCATION
LOCATION: FOOT

## 2022-01-03 NOTE — CARE COORDINATION
1/3/22, 7:10 AM EST  DISCHARGE PLANNING EVALUATION:    Leatha Monaco       Admitted: 1/1/2022/ MARC/John Fulton Sharonmarilou day: 2   Location: [address] [place] [address]A Reason for admit: Diabetic ulcer of left foot due to type 2 diabetes mellitus ([facility] [E11.621, L97.529]  Diabetic ulcer of toe of left foot associated with type 2 diabetes mellitus, unspecified ulcer stage ([facility] [V45.561, L97.529]   PMH:  has a past medical history of Back pain, Carpal tunnel syndrome of right wrist, Hyperlipidemia, Hypertension, Hypogonadism male, Nocturia, Obesity, Osteoarthritis, Prostate cancer ([facility] Restless leg syndrome, Shortness of breath, Sleep apnea, Tobacco abuse, Tobacco user, Type II or unspecified type diabetes mellitus without mention of complication, not stated as uncontrolled, and Urgency of urination. Procedure: 01/01/22  LEFT 1ST TOE AMPUTATION by podiatry  Barriers to Discharge:  POD #2. Podiatry, diabetic management, PT for mobility. Vancomycin IV, Cefepime, follow cultures. Afebrile, dressing changes. PCP: Dalia Gambino DO  Readmission Risk Score: 9.2 ( )%    Patient Goals/Plan/Treatment Preferences: Met with Gabe Toscano; he resides home with Naomi Jaime. [facility]; declined [facility] and feels podiatry will complete dressing changes. He prefers crutches to get around with and has PCP, verified insurance, and is unsure of date of discharge. Will follow. Transportation/Food Security/Housekeeping Addressed:  No issues identified.

## 2022-01-03 NOTE — PROGRESS NOTES
Hospitalist Progress Note    Patient:  Nima Moseley      Unit/Bed:6E-57/057-A    YOB: 1965    MRN: [id number]       Acct: [de-identified]     PCP: Ritika Pinzon DO    Date of Admission: 1/1/2022    Assessment/Plan:    1. Left great toe diabetic ulcer secondary to T2DM:    - uncontrolled diabetic. Glucose 205 this morning. HbgA1C 10.8. Switch to high dose SSI. Carb controlled diet. - POD 2 S/p 1st toe amputation. Podiatry consulted and following. NWB to LLE; able to heel WB for transfers and bathroom priveleges. Dressing change, Betadine moistened Adaptic to incision, gauze, Kerlix, Ace bandage. Pt to follow up with Dr. Marylene Laurence upon discharge. - See Microbiology for critical results: Day 3 IV ABX; continue. ID consulted. 2. Obstructive sleep apnea:    - Continue to wear home CPAP at night with home settings. 3. Hyperlipidemia:    - Continue Lipitor 40 mg    4. Essential HTN:   - uncontrolled. Patient has remained hypertensive with systolic BP range 018-025 mmHg. - Increase Lisinopril to 40 mg. Continue HTZ 12.5. Hydralazine PRN    5. Tobacco abuse:    - discussed risks of smoking   - educated on resources available for cessation. Reports he does not wish to quit at this time.    - Nicotine patch. 6. Restless leg syndrome:    - Continue Mirapex    7. Gait disturbance secondary to left great toe amputation:   - Heel WB per podiatry recs   - Patient reported difficulty with pivoting and balance   - PT/OT consulted   - SW consulted    8. Morbid obesity:    - BMI 40.18; discussed and educated on  lifestyle modifications. Disposition:    [x] Home       [] TCU       [] Rehab       [] Psych       [] SNF       [] [place]       [] Other-    Chief Complaint: Foot pain      Hospital Course:       1/2/2022: POD 1 s/p left 1st toe amputation. Day 2 of IV ABX tx.  Per podiatry notes, had minimal blood loss and no apparent complications while in OR yesterday. Patient is very well appearing upon presentation. He is afebrile and hemodynamically stable. Reports he has a good appetite and is eating and drinking good. Reports he is passing gas and has no difficulty with urination. He has had multiple hypertensive episodes not controlled by lisinopril. Will implement home dose HTZ. Patient reports pain is being well controlled with oral oxy and IV morphine. He has no other complaints at this time. 1/3/2022: Patient  Well appearing and resting comfortably in recliner. He's remained afebrile and is hemodynamically stable. No acute changes over night. Blood cultures and wound cultures noted below- see Micro. Podiatry recs: pt able to heel WB for transfers and bathroom privileges. PT/OT consulted d/t pt complaining of difficulty with transfers. Pt stable for discharge by podiatry with orders to follow up with Dr. Chantel Alcala as outpatient. Subjective (past 24 hours):     Reports that he has 3/10 pain in his left foot where is toe was amputated. Reported feeling unstable with walking and c/o balance issues when pivoting. Requesting crutches. States that he feels well at this time and feels urge to have BM, but has not had one yet. Denies CP, SOB, cough, fever, abd pain/N/V/D, numbness/tingling in LE bilaterally. Initial H&P:    60 y.o. male with PMH of  HTN, hyperlipidemia, T2DM, morbid obesity, tobacco abuse, ASHLEE, RLS, prostate cancer (in remission) who presented to [address] [place] [address] with a chief complaint of foot pain. Patient reports that approximately 2 months ago he dropped a can of sa[employer]den & Anais on his left great toe. States that following the incident he developed \"purple bruising\" of the left great toe. Stating approximately 2 weeks after the incident the purple bruising had gotten worse and started to experience an increased amount of pain.  States he also noticed a \"heavy calus\" formation on his left great toe in which he decided to pull the skin off and noticed and open wound on his toe. States  \"bloody-clear\" drainage started coming from the wound but states the drainage coming from his toe is now \"thick yellow. \" Endorses he believes wound on his toes was resulted from wearing size 12 rubber boots at work, which is not his normal shoe size. Also endorsed his tennis shoes are not well fitting and that a piece of plastic from his shoe has been rubbing his left toe. Currently rates pain 10/10 in the left great toe, that radiates up left leg and is aggravated by touch and movement. No alleviating factors. States he has had difficulty with walking d/t the pain. Reports associated left leg swelling and redness. Denies CP, SOB, cough, fever, chills, weakness.      While in the ED, x ray of foot performed and notable for soft tissue swelling with evidence of soft tissue ulceration involving the first digit. Associated cortical destruction of the first distal phalanx concerning for osteomyelitis. Arterial doppler notable for high-grade stenosis in  the mid left superficial femoral artery and abnormal left GINO concerning for significant stenosis. No DVT noted venous doppler. Lab work in the ED revealed leukocytosis of 11.2, PCT 0.06, lactic acid 1.0. Patient afebrile with stable vital signs. No concern for bacterial sepsis at this time. Cefepime and Vancomycin ordered. Glucose elevated at 333. Patient reports that he has not taken his home medications in over a year d/t lack of insurance and not having a PCP. HbgA1C ordered and pending. Creatinine 0.9 with GFR 86. Monitor. 2 peripheral blood cultures collected and pending. Wound culture of left great toe collected and pending. Podiatry was consulted and evaluated patient while in the ER. Per podiatry notes, plan for OR today 1/1/22 for amputation of left great toe. At this time patient will be admitted to hospitalist services for further medical management.           Medications:  Reviewed    Infusion Medications    sodium chloride      dextrose       Scheduled Medications    hydroCHLOROthiazide  12.5 mg Oral Daily    vancomycin  1,750 mg IntraVENous Q12H    amitriptyline  100 mg Oral Nightly    atorvastatin  40 mg Oral Nightly    pramipexole  0.25 mg Oral Nightly    sodium chloride flush  5-40 mL IntraVENous 2 times per day    enoxaparin  40 mg SubCUTAneous Daily    cefepime  1,000 mg IntraVENous Q8H    vancomycin (VANCOCIN) intermittent dosing (placeholder)   Other RX Placeholder    nicotine  1 patch TransDERmal Daily    lisinopril  20 mg Oral Daily    gabapentin  100 mg Oral TID    insulin lispro  0-12 Units SubCUTAneous TID WC    insulin lispro  0-6 Units SubCUTAneous Nightly     PRN Meds: hydrALAZINE, cyclobenzaprine, sodium chloride, ondansetron **OR** ondansetron, potassium chloride **OR** potassium alternative oral replacement **OR** potassium chloride, magnesium sulfate, sodium chloride flush, polyethylene glycol, acetaminophen **OR** acetaminophen, morphine **OR** morphine, glucose, dextrose, glucagon (rDNA), dextrose, oxyCODONE **OR** oxyCODONE      Intake/Output Summary (Last 24 hours) at 1/3/2022 0659  Last data filed at 1/3/2022 0606  Gross per 24 hour   Intake 840 ml   Output 2100 ml   Net -1260 ml       Diet:  ADULT DIET; Regular; 4 carb choices (60 gm/meal)    Exam:  BP (!) 166/84   Pulse 68   Temp 97.8 °F (36.6 °C) (Oral)   Resp 18   Ht 5' 10\" (1.778 m)   Wt 280 lb (127 kg)   SpO2 98%   BMI 40.18 kg/m²     General appearance: No apparent distress, appears stated age and cooperative. HEENT: Pupils equal, round, and reactive to light. Conjunctivae/corneas clear. Neck: Supple, with full range of motion. No jugular venous distention. Trachea midline. Respiratory:  Normal respiratory effort. Clear to auscultation, bilaterally without Rales/Wheezes/Rhonchi. Cardiovascular: Regular rate and rhythm with normal S1/S2 without murmurs, rubs or gallops.   Abdomen: Soft, non-tender, non-distended with normal bowel sounds. Musculoskeletal: passive and active ROM x 4 extremities. Dressing to left foot, no blood or drainage noted on outside of dressing. No swelling noted in LLE. Mild erythema of LLE noted. Skin: Skin color, texture, turgor normal.  No rashes or lesions. Neurologic:  Neurovascularly intact without any focal sensory/motor deficits. Cranial nerves: II-XII intact, grossly non-focal.  Psychiatric: Alert and oriented, thought content appropriate, normal insight  Capillary Refill: Brisk,< 3 seconds   Peripheral Pulses: +2 palpable radially pulses equal bilaterally, +2 right pedal pulse. KENNETH left pedal pulse d/t dressing. Labs:   Recent Labs     01/01/22 0250 01/02/22 0718   WBC 11.2* 8.9   HGB 15.5 14.5   HCT 45.2 44.2    153     Recent Labs     01/01/22 0250 01/02/22 0718    133*   K 3.8 3.7   CL 98 99   CO2 24 25   BUN 15 12   CREATININE 0.9 0.8   CALCIUM 9.0 8.5     Recent Labs     01/01/22 0250 01/02/22  0718   AST 20 13   ALT 22 17   BILITOT 0.7 0.8   ALKPHOS 97 79       Microbiology:      -Blood cultures x's 2.   - showing gram positive cocci in clusters - organism staphylococcus; likely contaminated specimen. - Left toe Wound culture showing few segmented neutrophils. No epithelial cells observed. Moderate gram positive cocci in pairs and chains. Moderate gram negative bacilli. --- organism streptococcus anginosus; I&D consulted. - Critical finds as noted: Culture yielded moderate growth of Enterococcus species. The gram negative bacilli observed on the direct smear were not recovered. This may be due to antimicrobial suppression, a fastidious organism, or an anaerobic organism. Culture also yielded light growth of gram positive bacilli most consistent with Corynebacterium species. Direct gram stain indicated rare gram negative bacilli which did not grow on culture.  This could be inhibited growth due to antibiotic therapy, a fastidious organism or FINDINGS: LEFT ARTERY (PSV cm/sec) ? ??????????????????????? CFA ---------------> 121 PSV cm/sec PROF -------------> 100 PSV cm/sec SFA PROX ------->77 PSV cm/sec SFA MID ---------->>454 PSV cm/sec SFA DIST -------->58 PSV cm/sec POP A PROX --->62 PSV cm/sec POP A DIST ---->114 PSV cm/sec PTA --------------->42 PSV cm/sec PERONEAL ----->40 PSV cm/sec SEAN ----------------> 42 PSV cm/sec GINO RIGHT SAEN----->1.07 PTA----->1.07 GINO LEFT SEAN----->0.65 PTA----->0.56 There is triphasic waveform in the left common femoral artery. In the proximal left superficial femoral artery, the waveform becomes dampened. The velocity decreases. In the mid left superficial femoral artery, the velocity is markedly elevated. There is  significant spectral broadening. This is consistent with a high-grade stenosis in the mid superficial femoral artery. Distal to this, there are dampened waveforms throughout. The waveform becomes biphasic. There is a significant reduction the peak systolic velocity in the distal superficial femoral artery. The popliteal, anterior tibial, posterior tibial and peroneal arteries are patent. All have dampened waveforms. These are biphasic. There are reduced ABIs on the left compared to the right. 1. High-grade stenosis in the mid left superficial femoral artery. 2. Abnormal left GINO suggesting a hemodynamically significant stenosis. **This report has been created using voice recognition software. It may contain minor errors which are inherent in voice recognition technology. ** Final report electronically signed by Dr. Mateusz Liu on 1/1/2022 9:40 AM    VL DUP LOWER EXTREMITY VENOUS LEFT    Result Date: 1/1/2022  PROCEDURE: VL DUP LOWER EXTREMITY VENOUS LEFT CLINICAL INFORMATION: pain, swelling. Nonhealing ulcer left great toe. COMPARISON: No prior study. TECHNIQUE: Venous doppler ultrasound was performed of the left lower extremity using gray scale, color flow and spectral doppler imaging.  FINDINGS: There is normal color flow, spectral analysis and compressibility of the common femoral vein, femoral vein and popliteal vein on the left . There is mild thickening of the walls of popliteal vein. There is normal color flow and compressibility in the posterior tibial veins, anterior tibial veins and peroneal veins. There is normal color flow, spectral analysis and compressibility in the contralateral common femoral vein. The greater saphenous vein is patent. The gastrocnemius vein is patent. No evidence of a DVT. **This report has been created using voice recognition software. It may contain minor errors which are inherent in voice recognition technology. ** Final report electronically signed by Dr. Jeovanny Mckinley on 1/1/2022 9:27 AM      DVT prophylaxis: [x] Lovenox                                 [] SCDs                                 [] SQ Heparin                                 [x] Encourage ambulation           [] Already on Anticoagulation        Code Status: Full Code      Tele:   [] yes             [x] no     Active Hospital Problems    Diagnosis Date Noted    Diabetic ulcer of left foot due to type 2 diabetes mellitus ([facility] [X65.833, L97.529] 01/01/2022       Electronically signed by GARCIA Ackerman CNP on 1/3/2022 at 6:59 AM

## 2022-01-03 NOTE — CONSULTS
CONSULTATION NOTE :ID       Patient - Rafia Conley,  Age - 60 y.o.    - 1965      Room Number - 7E-62/200-A   MRN -  [id number]   [facility]  Date of Admission -  2022  2:39 AM  Patient's PCP: Jose F Jim DO     Requesting Physician: GARCIA Jung *    REASON FOR CONSULTATION   Left foot gangrene toe   CHIEF COMPLAINT   Traumatic wound left foot    HISTORY OF PRESENT ILLNESS       This is a very pleasant 60 y.o. male who was admitted to the hospital with a chief complaints of worsening pain and black discoloration of the left big toe following an injury. He is diabetic has long hx of smoking. He had amputation of the toe however the edge is turning dark. He reports of pain on walking on the left leg. he is diabetic, his arterial doppler shows high grade stenosis. He is on iv antibiotic.  He denies any fever or chills    PAST MEDICAL  HISTORY       Past Medical History:   Diagnosis Date    Back pain     HERNIATED DISKS    Carpal tunnel syndrome of right wrist 10/22/2012    Hyperlipidemia     Hypertension     Hypogonadism male     Nocturia     Obesity     Osteoarthritis     Prostate cancer ([facility] [address] 2009    GLEASONS 3+4=7 ON RT/3+3=6 ON LT/T1C    Restless leg syndrome 2016    Shortness of breath     Sleep apnea 10-    HAS C-PAP MACHINE    Tobacco abuse     Tobacco user     Type II or unspecified type diabetes mellitus without mention of complication, not stated as uncontrolled     Urgency of urination        PAST SURGICAL HISTORY     Past Surgical History:   Procedure Laterality Date    CARPAL TUNNEL RELEASE  10/22/12    right    CARPAL TUNNEL RELEASE  2012    left    COLONOSCOPY  2015    CYST REMOVAL  T    RT SIDE UPPER BACK,     CYST REMOVAL  12    excision of right shoulder mass/cyst and of left axillary skin lesion-Dr. Isidro Man    HAND SURGERY      partial finger amputee rt hand    KNEE SURGERY  ? left knee scope    LEG SURGERY      LEG SURGERY  6/13/12    I&D rt leg abscess- Dr. López Cure  12-    TRUS/BX    PROSTATE SURGERY  06-    BRACHYTHERAPY OF THE PROSTATE    SKIN TAG REMOVAL  T    LT ARMPIT    TOE AMPUTATION Left 1/1/2022    LEFT 1ST TOE AMPUTATION performed by Rosa Walsh DPM at [address]:       Scheduled Meds:   lisinopril  40 mg Oral Daily    insulin lispro  0-18 Units SubCUTAneous TID WC    insulin lispro  0-9 Units SubCUTAneous Nightly    hydroCHLOROthiazide  12.5 mg Oral Daily    vancomycin  1,750 mg IntraVENous Q12H    amitriptyline  100 mg Oral Nightly    atorvastatin  40 mg Oral Nightly    pramipexole  0.25 mg Oral Nightly    sodium chloride flush  5-40 mL IntraVENous 2 times per day    enoxaparin  40 mg SubCUTAneous Daily    cefepime  1,000 mg IntraVENous Q8H    vancomycin (VANCOCIN) intermittent dosing (placeholder)   Other RX Placeholder    nicotine  1 patch TransDERmal Daily    gabapentin  100 mg Oral TID     Continuous Infusions:   sodium chloride      dextrose       PRN Meds:hydrALAZINE, cyclobenzaprine, sodium chloride, ondansetron **OR** ondansetron, potassium chloride **OR** potassium alternative oral replacement **OR** potassium chloride, magnesium sulfate, sodium chloride flush, polyethylene glycol, acetaminophen **OR** acetaminophen, glucose, dextrose, glucagon (rDNA), dextrose, oxyCODONE **OR** oxyCODONE  Allergies:   ALLERGIES:    Pcn [penicillins]        SOCIAL HISTORY:     TOBACCO:   reports that he has been smoking cigarettes. He started smoking about 46 years ago. He has a 40.00 pack-year smoking history. He has never used smokeless tobacco.     ETOH:   reports no history of alcohol use.   Patient currently lives with family        FAMILY HISTORY:         Problem Relation Age of Onset    Thyroid Disease Mother     Heart Disease Father     Diabetes Father     Cancer Maternal Grandmother         what kind    Heart Disease Brother        REVIEW OF SYSTEMS:     Constitutional: no fever, no night sweats, no fatigue, no weight loss. Head: no head ache , no head injury, no migranes. Eye: no eye discharge, blurring of vision, no double vision,no eye pain. Ears: no hearing difficulty, no tinnitus  Mouth/throat: no ulceration, dental caries , dysphagia, no hoarseness and voice change  Respiratory: no cough no chest pain,no shortness of breath,no wheezing  CVS: no palpitation, no chest pain,   GI: no abdominal pain, no nausea , no vomiting, no constipation,no diarrhea. MELISSA: no dysuria, frequency and urgency, no hematuria, no kidney stones  Musculoskeletal:as noted in HPI. Endocrine: no polyuria, polydipsia, no cold or heat intolerance  Hematology: no anemia, no easy brusing or bleeding, no hx of clotting disorder  Dermatology: no skin rash, no skin lesions, no pruritis,  Neurological:no headaches,no dizziness, no seizure, no numbness. Psychiatry: no depression, no anxiety,no panic attacks, no suicide ideation    PHYSICAL EXAM:     BP (!) 161/78   Pulse 74   Temp 98.2 °F (36.8 °C) (Oral)   Resp 16   Ht 5' 10\" (1.778 m)   Wt 280 lb (127 kg)   SpO2 97%   BMI 40.18 kg/m²   General apperance:  Awake, alert, not in distress. HEENT: pink conjunctiva, unicteric sclera, moist oral mucosa. Chest:  Bilateral air entry  Cardiovascular:  RRR ,S1S2, no murmur or gallop. Abdomen:  Soft, non tender to palpation. Extremities: he has a sutured wound on the left hallux. The edge is ischemic, there is dusky discoloration, weak pulse on palpation. The right foot is normal with good pulse  Skin:  Warm and dry.   CNS:oriented        LABS:     CBC:   Recent Labs     01/01/22  0250 01/02/22  0718   WBC 11.2* 8.9   HGB 15.5 14.5    153     BMP:    Recent Labs     01/01/22  0250 01/02/22  0718    133*   K 3.8 3.7   CL 98 99   CO2 24 25   BUN 15 12   CREATININE 0.9 0.8   GLUCOSE 333* 211* Calcium:  Recent Labs     01/02/22  0718   CALCIUM 8.5    Glucose:  Recent Labs     01/03/22  0721 01/03/22  1044 01/03/22  1604   POCGLU 238* 259* 189*     HgbA1C:   Recent Labs     01/01/22  0220   LABA1C 10.8*     INR: No results for input(s): INR in the last 72 hours. Hepatic:   Recent Labs     01/02/22  0718   ALKPHOS 79   ALT 17   AST 13   PROT 5.7*   BILITOT 0.8   LABALBU 3.5       Micro:   Lab Results   Component Value Date    BC No growth-preliminary  01/01/2022    BC  01/01/2022     possible contamination; clinical correlation required        Problem list of patient      Patient Active Problem List   Diagnosis Code    Adenocarcinoma of Prostate, GS 7, stage T1c. C61    Onychomycosis B35.1    DM type 2 (diabetes mellitus, type 2) ([facility] [address] E11.9    Dyslipidemia E78.5    DDD (degenerative disc disease), lumbar M51.36    Tobacco abuse Z72.0    Insomnia G47.00    Carpal tunnel syndrome of right wrist G56.01    Carpal tunnel syndrome of left wrist G56.02    Cubital tunnel syndrome on left G56.22    Morbid obesity (HCC) E66.01    Obstructive sleep apnea on CPAP G47.33, Z99.89    Restless leg syndrome G25.81    Controlled type 2 diabetes mellitus with microalbuminuria (HCC) E11.29, R80.9    Diabetic ulcer of left foot due to type 2 diabetes mellitus (HCC) E11.621, L97.529           Impression and Recommendation:   Left hallux traumatic wound with necrosis: had amputation. The stump is ischemic related to PVD  Will ask IR to do arteriogram for further treatment  Continue current antibiotic     Thank you GARCIA Mitchell * for allowing me to participate in this patient's care.     Max Padron MD, MD,FACP 1/3/2022 5:26 PM

## 2022-01-03 NOTE — PROGRESS NOTES
Melyssa Valadez 58  INPATIENT OCCUPATIONAL THERAPY  [facility] 6E  EVALUATION    Time:   Time In:   Time Out: 0748  Timed Code Treatment Minutes: 25 Minutes  Minutes: 55          Date: 1/3/2022  Patient Name: Jodee King,   Gender: male      MRN: [id number]  : 1965  (60 y.o.)  Referring Practitioner: GARCIA Cruz- CNP  Diagnosis: diabetic ulcer of te left foot due to DM II  Additional Pertinent Hx: 60 y.o. male with PMH of  HTN, hyperlipidemia, T2DM, morbid obesity, tobacco abuse, ASHLEE, RLS, prostate cancer (in remission) who presented to [address] [place] [address] with a chief complaint of foot pain. Patient reports that approximately 2 months ago he dropped a can of sausage gravy on his left great toe. States that following the incident he developed \"purple bruising\" of the left great toe. PT underwent a left great toe amputation on 22    Restrictions/Precautions:  Restrictions/Precautions: Weight Bearing  Left Lower Extremity Weight Bearing: Non Weight Bearing  Partial Weight Bearing Percentage Or Pounds: may heel wt bear for transfers and bathroom privilages, post op shoe on for comfort. Subjective  Chart Reviewed: Yes,History and Physical,Orders  Patient assessed for rehabilitation services?: Yes  Family / Caregiver Present: No    Subjective: cooperative. reports hoping his girlfriend can assist at home. Pt reports he hopes to use crutches. Reviewed safety with RW vs crutches.     Pain:  Pain Assessment  Patient Currently in Pain: Yes  Pain Level: 8    Vitals: Vitals not assessed per clinical judgement, see nursing flowsheet    Social/Functional History:  Lives With: Significant other  Type of Home: House  Home Layout: One level  Home Access: Level entry   Bathroom Shower/Tub: Walk-in shower  Bathroom Toilet: Standard  Bathroom Accessibility: Accessible    Receives Help From: Family  ADL Assistance: Independent  Homemaking Assistance: Independent  Homemaking Responsibilities: Yes  Ambulation Assistance: Independent  Transfer Assistance: Independent    Active : Yes  Occupation: Full time employment  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 She may be able to assist if needed. Pt reports his home it too small for a w/c for mobility. VISION:WFL    HEARING:  WFL    COGNITION: WFL    RANGE OF MOTION:  Bilateral Upper Extremity:  WNL    STRENGTH:  Bilateral Upper Extremity:  min decreased for push up to standing    SENSATION:   WFL    ADL:   Feeding: Independent. \   Grooming: with set-up.  seated on the commode  Lower Extremity Dressing: Moderate Assistance. assist for L post op shoe. min A for shorts. CGA for standing balance to pull  Toileting: Contact Guard Assistance. Toilet Transfer: Contact Guard Assistance. STS. recommend a BSC for safer transfers to ease for NWB Status. BALANCE:  Sitting Balance:  Independent. \     BED MOBILITY:  Not Tested    TRANSFERS:  Sit to Stand:  Contact Guard Assistance. from recliner   Stand to Sit: [address] min cues for safe hand placement to guide down to sit    FUNCTIONAL MOBILITY:  Assistive Device: Rolling Walker  Assist Level:  Contact Guard Assistance. Distance: To and from bathroom  Demonstrated safe tech for  Mobility with the RW. Extensive instruction on options for mobility devices, Std walker , RW, crutches. Discusses safety concerns with the crutches with pt's decreased balance . WIll monitor for additional needs. Pt requires min cues for safe tech and maintaining heel down wt bearing 2    Exercise:  NA    Activity Tolerance:  Patient tolerance of  treatment: good. Assessment:  Assessment: Pt presents with a decline in functioning due to recent hospitalizatio and toe amputation.  He would benefit from additional skilled OT services to address increasing safety with ADLs and functional mobiltiy within WT bearing perimeters. Performance deficits / Impairments: Decreased functional mobility ,Decreased safe awareness,Decreased balance,Decreased ADL status,Decreased endurance,Decreased strength  Prognosis: Good  REQUIRES OT FOLLOW UP: Yes  Decision Making: Medium Complexity    Treatment Initiated: Treatment and education initiated within context of evaluation. Evaluation time included review of current medical information, gathering information related to past medical, social and functional history, completion of standardized testing, formal and informal observation of tasks, assessment of data and development of plan of care and goals. Treatment time included skilled education and facilitation of tasks to increase safety and independence with ADL's for improved functional independence and quality of life. Discharge Recommendations:  Continue to assess pending progress,Home with Home health OT    Patient Education:       Equipment Recommendations:  Equipment Needed: Yes  Mobility Devices: [employer]  Other: reacher and Bedside commode    Plan:  Times per week: 5xs  Current Treatment Recommendations: Strengthening,Endurance Training,Patient/Caregiver Education & Training,Equipment Evaluation, Education, & procurement,Self-Care / Gricelda Diazin Management Training,Functional Mobility Training,Safety Education & Training. See long-term goal time frame for expected duration of plan of care. If no long-term goals established, a short length of stay is anticipated.     Goals:  Patient goals : I want to be able to go home with my girlfriend's help  Short term goals  Time Frame for Short term goals: by discharge  Short term goal 1: Pt will complete ADL routine with no > min A and min cues for NWB L LE status, (Heel wt bearing for BRP)  Short term goal 2: Pt will ambulate to / from the BR with S and RW with no > 1 cue for PWB/ heel wt bearing only L LE to increase safety for toileting routine  Short term goal 3: Pt will complete B UE medium resistence strenthening ex x 12 reps to increase UE strength needed for transfers  Short term goal 4: Pt will tolerate standing x 1 min with S and no cues for NWB/ Heel wt bearing status for ease of toileting tasks  Long term goals  Time Frame for Long term goals : not set due to est short LOS         Following session, patient left in safe position with all fall risk precautions in place.

## 2022-01-03 NOTE — PROGRESS NOTES
Podiatric Progress Note  Ronel Prajapati  Subjective :   1/3/2022  Patient seen bedside today on behalf of Dr. Arvind Thakkar. Patient appeared pleasant, was oriented to person, place and time and in no acute distress. Patient is 2 days  S/p left 1st toe amputation. Patient states that he has no questions right now but that he typically gets questions after the visit. Patient denies any N/V/F/C/SOB or CP. Patient has no further pedal concerns at this point in time.      Current Medications:    Current Facility-Administered Medications: lisinopril (PRINIVIL;ZESTRIL) tablet 40 mg, 40 mg, Oral, Daily **AND** [DISCONTINUED] hydroCHLOROthiazide (HYDRODIURIL) tablet 12.5 mg, 12.5 mg, Oral, Daily  insulin lispro (HUMALOG) injection vial 0-18 Units, 0-18 Units, SubCUTAneous, TID WC  insulin lispro (HUMALOG) injection vial 0-9 Units, 0-9 Units, SubCUTAneous, Nightly  hydroCHLOROthiazide (HYDRODIURIL) tablet 12.5 mg, 12.5 mg, Oral, Daily  [COMPLETED] vancomycin (VANCOCIN) 2,000 mg in dextrose 5 % 500 mL IVPB, 2,000 mg, IntraVENous, Once **FOLLOWED BY** vancomycin (VANCOCIN) 1,750 mg in dextrose 5 % 500 mL IVPB, 1,750 mg, IntraVENous, Q12H  hydrALAZINE (APRESOLINE) tablet 25 mg, 25 mg, Oral, Q8H PRN  amitriptyline (ELAVIL) tablet 100 mg, 100 mg, Oral, Nightly  atorvastatin (LIPITOR) tablet 40 mg, 40 mg, Oral, Nightly  cyclobenzaprine (FLEXERIL) tablet 10 mg, 10 mg, Oral, TID PRN  pramipexole (MIRAPEX) tablet 0.25 mg, 0.25 mg, Oral, Nightly  sodium chloride flush 0.9 % injection 5-40 mL, 5-40 mL, IntraVENous, 2 times per day  0.9 % sodium chloride infusion, 25 mL, IntraVENous, PRN  enoxaparin (LOVENOX) injection 40 mg, 40 mg, SubCUTAneous, Daily  ondansetron (ZOFRAN-ODT) disintegrating tablet 4 mg, 4 mg, Oral, Q8H PRN **OR** ondansetron (ZOFRAN) injection 4 mg, 4 mg, IntraVENous, Q6H PRN  potassium chloride (KLOR-CON M) extended release tablet 40 mEq, 40 mEq, Oral, PRN **OR** potassium bicarb-citric acid (EFFER-K) effervescent tablet 40 mEq, 40 mEq, Oral, PRN **OR** potassium chloride 10 mEq/100 mL IVPB (Peripheral Line), 10 mEq, IntraVENous, PRN  magnesium sulfate 2000 mg in 50 mL IVPB premix, 2,000 mg, IntraVENous, PRN  cefepime (MAXIPIME) 1000 mg IVPB minibag, 1,000 mg, IntraVENous, Q8H  vancomycin (VANCOCIN) intermittent dosing (placeholder), , Other, RX Placeholder  sodium chloride flush 0.9 % injection 5-40 mL, 5-40 mL, IntraVENous, PRN  polyethylene glycol (GLYCOLAX) packet 17 g, 17 g, Oral, Daily PRN  acetaminophen (TYLENOL) tablet 650 mg, 650 mg, Oral, Q6H PRN **OR** acetaminophen (TYLENOL) suppository 650 mg, 650 mg, Rectal, Q6H PRN  nicotine (NICODERM CQ) 14 MG/24HR 1 patch, 1 patch, TransDERmal, Daily  gabapentin (NEURONTIN) capsule 100 mg, 100 mg, Oral, TID  morphine (PF) injection 2 mg, 2 mg, IntraVENous, Q2H PRN **OR** morphine injection 4 mg, 4 mg, IntraVENous, Q2H PRN  glucose (GLUTOSE) 40 % oral gel 15 g, 15 g, Oral, PRN  dextrose 50 % IV solution, 12.5 g, IntraVENous, PRN  glucagon (rDNA) injection 1 mg, 1 mg, IntraMUSCular, PRN  dextrose 5 % solution, 100 mL/hr, IntraVENous, PRN  oxyCODONE (ROXICODONE) immediate release tablet 5 mg, 5 mg, Oral, Q4H PRN **OR** oxyCODONE (ROXICODONE) immediate release tablet 10 mg, 10 mg, Oral, Q4H PRN    Objective     BP (!) 166/84   Pulse 68   Temp 97.8 °F (36.6 °C) (Oral)   Resp 18   Ht 5' 10\" (1.778 m)   Wt 280 lb (127 kg)   SpO2 98%   BMI 40.18 kg/m²      I/O:    Intake/Output Summary (Last 24 hours) at 1/3/2022 0703  Last data filed at 1/3/2022 0606  Gross per 24 hour   Intake 840 ml   Output 2100 ml   Net -1260 ml              Wt Readings from Last 3 Encounters:   01/01/22 280 lb (127 kg)   04/18/18 (!) 300 lb 6.4 oz (136.3 kg)   03/21/18 291 lb 12.8 oz (132.4 kg)       LABS:    Recent Labs     01/01/22  0250 01/02/22  0718   WBC 11.2* 8.9   HGB 15.5 14.5   HCT 45.2 44.2    153        Recent Labs     01/02/22  0718   *   K 3.7   CL 99   CO2 25   BUN 12   CREATININE 0.8        Recent Labs     01/01/22  0250 01/02/22  0718   PROT 6.7 5.7*      No results for input(s): CKTOTAL, CKMB, CKMBINDEX, TROPONINI in the last 72 hours. Exam:   Dressing intact and well maintained. No apparent strike through noted. Vascular: Dorsalis pedis and posterior tibial pulses are palpable to the RLE. Dorsalis pedis and posterior tibial pulses are non-palpable to the LLE secondary to edema. Skin temperature is warm to warm from proximal tibial tuberosity to distal digits. Increased warmth to the left great toe. CFT brisk to exposed digits. Edema noted to the LLE.      Dermatologic:  Patient has a surgical incision to the distal aspect of the left foot at stump site of the left hallux. Skin edges are well approximated, stitches are holding position, no areas of dehiscence. Mild amount of sanguinous drainage present. There is some duskiness on the dorsal aspect of the flap. Blanchable erythema present in periincisional area. Edema present consistent with postoperative course.     Neurovascular: Light touch sensation diminished bilaterally.      Musculoskeletal: Muscle strength 4/5 for all plantarflexors, dorsiflexors, inverters and everters examined. Decreased ROM noted left AJ.  S/p amputation left hallux.     Left    Left      ASSESSMENT  Principle  1) Osteomyelitis, left hallux  2) S/p left hallux amputation at metatarsophalangeal joint level    Chronic  Patient Active Problem List   Diagnosis    Adenocarcinoma of Prostate, GS 7, stage T1c.    Onychomycosis    DM type 2 (diabetes mellitus, type 2) ([address]    Dyslipidemia    DDD (degenerative disc disease), lumbar    Tobacco abuse    Insomnia    Carpal tunnel syndrome of right wrist    Carpal tunnel syndrome of left wrist    Cubital tunnel syndrome on left    Morbid obesity ([address]    Obstructive sleep apnea on CPAP    Restless leg syndrome    Controlled type 2 diabetes mellitus with microalbuminuria ([address]    Diabetic ulcer of left foot due to type 2 diabetes mellitus ([facility]       PLAN:   - Pt. is a 60 y.o. male   -Patient examined and evaluated  -WBC 8.9, Afebrile  -Tissue culture results yield streptococcus anginosus and rare gram negative bacilli that did not grow on culture. -Blood cultures positive for gram positive cocci in clusters, molecular panel demonstrates positive findings for staphylococcus   -X-rays taken and reviewed; impression above  -Continue vancomycin and cefepime per primary, pharmacy to dose, would recommend ID consult  -Dressing change, Betadine moistened Adaptic to incision, gauze, Kerlix, Ace bandage  -NWB to LLE. Able to heel WB for transfers and bathroom privileges.    -No further surgical intervention planned  -Follow-up with Dr. Horace Suarez outpatient upon discharge  - Podiatry will continue to follow patient    DISPO: Patient is stable for discharge per podiatry, will continue to follow during his admission. Follow-up outpatient with Dr. Horace Suarez upon discharge    Oskana Bowens D.P.M.   Podiatric Surgical Resident  1/3/2022   7:03 AM

## 2022-01-04 LAB
AEROBIC CULTURE: ABNORMAL
ANAEROBIC CULTURE: ABNORMAL
ANAEROBIC CULTURE: ABNORMAL
GLUCOSE BLD-MCNC: 158 MG/DL (ref 70–108)
GLUCOSE BLD-MCNC: 186 MG/DL (ref 70–108)
GLUCOSE BLD-MCNC: 187 MG/DL (ref 70–108)
GLUCOSE BLD-MCNC: 225 MG/DL (ref 70–108)
GRAM STAIN RESULT: ABNORMAL
GRAM STAIN RESULT: ABNORMAL
ORGANISM: ABNORMAL
VANCOMYCIN RANDOM: 14 UG/ML (ref 0.1–39.9)

## 2022-01-04 PROCEDURE — 2580000003 HC RX 258

## 2022-01-04 PROCEDURE — 97530 THERAPEUTIC ACTIVITIES: CPT

## 2022-01-04 PROCEDURE — 99232 SBSQ HOSP IP/OBS MODERATE 35: CPT

## 2022-01-04 PROCEDURE — 2580000003 HC RX 258: Performed by: PHARMACIST

## 2022-01-04 PROCEDURE — 80202 ASSAY OF VANCOMYCIN: CPT

## 2022-01-04 PROCEDURE — 6370000000 HC RX 637 (ALT 250 FOR IP)

## 2022-01-04 PROCEDURE — 97116 GAIT TRAINING THERAPY: CPT

## 2022-01-04 PROCEDURE — 1200000000 HC SEMI PRIVATE

## 2022-01-04 PROCEDURE — 6360000002 HC RX W HCPCS

## 2022-01-04 PROCEDURE — 82948 REAGENT STRIP/BLOOD GLUCOSE: CPT

## 2022-01-04 PROCEDURE — 36415 COLL VENOUS BLD VENIPUNCTURE: CPT

## 2022-01-04 PROCEDURE — 6370000000 HC RX 637 (ALT 250 FOR IP): Performed by: NURSE PRACTITIONER

## 2022-01-04 PROCEDURE — 6360000002 HC RX W HCPCS: Performed by: PHARMACIST

## 2022-01-04 PROCEDURE — 97163 PT EVAL HIGH COMPLEX 45 MIN: CPT

## 2022-01-04 RX ORDER — HYDROCHLOROTHIAZIDE 25 MG/1
25 TABLET ORAL DAILY
Status: DISCONTINUED | OUTPATIENT
Start: 2022-01-05 | End: 2022-01-13 | Stop reason: HOSPADM

## 2022-01-04 RX ORDER — FENTANYL CITRATE 50 UG/ML
50 INJECTION, SOLUTION INTRAMUSCULAR; INTRAVENOUS ONCE
Status: COMPLETED | OUTPATIENT
Start: 2022-01-05 | End: 2022-01-05

## 2022-01-04 RX ORDER — MIDAZOLAM HYDROCHLORIDE 1 MG/ML
1 INJECTION INTRAMUSCULAR; INTRAVENOUS ONCE
Status: COMPLETED | OUTPATIENT
Start: 2022-01-05 | End: 2022-01-05

## 2022-01-04 RX ORDER — SODIUM CHLORIDE 450 MG/100ML
INJECTION, SOLUTION INTRAVENOUS CONTINUOUS
Status: DISCONTINUED | OUTPATIENT
Start: 2022-01-05 | End: 2022-01-07

## 2022-01-04 RX ADMIN — ATORVASTATIN CALCIUM 40 MG: 40 TABLET, FILM COATED ORAL at 20:54

## 2022-01-04 RX ADMIN — CYCLOBENZAPRINE 10 MG: 10 TABLET, FILM COATED ORAL at 07:57

## 2022-01-04 RX ADMIN — CEFEPIME 1000 MG: 1 INJECTION, POWDER, FOR SOLUTION INTRAMUSCULAR; INTRAVENOUS at 03:26

## 2022-01-04 RX ADMIN — VANCOMYCIN HYDROCHLORIDE 1750 MG: 5 INJECTION, POWDER, LYOPHILIZED, FOR SOLUTION INTRAVENOUS at 21:22

## 2022-01-04 RX ADMIN — HYDRALAZINE HYDROCHLORIDE 25 MG: 25 TABLET, FILM COATED ORAL at 03:32

## 2022-01-04 RX ADMIN — GABAPENTIN 100 MG: 100 CAPSULE ORAL at 13:58

## 2022-01-04 RX ADMIN — HYDROCHLOROTHIAZIDE 12.5 MG: 25 TABLET ORAL at 07:55

## 2022-01-04 RX ADMIN — NITROGLYCERIN 0.5 INCH: 20 OINTMENT TOPICAL at 18:31

## 2022-01-04 RX ADMIN — OXYCODONE 10 MG: 5 TABLET ORAL at 07:53

## 2022-01-04 RX ADMIN — AMITRIPTYLINE HYDROCHLORIDE 100 MG: 100 TABLET, FILM COATED ORAL at 20:54

## 2022-01-04 RX ADMIN — PRAMIPEXOLE DIHYDROCHLORIDE 0.25 MG: 0.25 TABLET ORAL at 20:54

## 2022-01-04 RX ADMIN — CEFEPIME 1000 MG: 1 INJECTION, POWDER, FOR SOLUTION INTRAMUSCULAR; INTRAVENOUS at 20:49

## 2022-01-04 RX ADMIN — VANCOMYCIN HYDROCHLORIDE 1750 MG: 5 INJECTION, POWDER, LYOPHILIZED, FOR SOLUTION INTRAVENOUS at 08:06

## 2022-01-04 RX ADMIN — CEFEPIME 1000 MG: 1 INJECTION, POWDER, FOR SOLUTION INTRAMUSCULAR; INTRAVENOUS at 13:00

## 2022-01-04 RX ADMIN — HYDRALAZINE HYDROCHLORIDE 25 MG: 25 TABLET, FILM COATED ORAL at 16:02

## 2022-01-04 RX ADMIN — LISINOPRIL 40 MG: 40 TABLET ORAL at 07:54

## 2022-01-04 RX ADMIN — GABAPENTIN 100 MG: 100 CAPSULE ORAL at 07:54

## 2022-01-04 RX ADMIN — GABAPENTIN 100 MG: 100 CAPSULE ORAL at 20:54

## 2022-01-04 RX ADMIN — OXYCODONE 10 MG: 5 TABLET ORAL at 20:54

## 2022-01-04 RX ADMIN — SODIUM CHLORIDE, PRESERVATIVE FREE 10 ML: 5 INJECTION INTRAVENOUS at 08:02

## 2022-01-04 RX ADMIN — OXYCODONE 10 MG: 5 TABLET ORAL at 13:58

## 2022-01-04 RX ADMIN — SODIUM CHLORIDE, PRESERVATIVE FREE 10 ML: 5 INJECTION INTRAVENOUS at 20:47

## 2022-01-04 RX ADMIN — SODIUM CHLORIDE 25 ML: 9 INJECTION, SOLUTION INTRAVENOUS at 20:48

## 2022-01-04 RX ADMIN — INSULIN HUMAN 25 UNITS: 100 INJECTION, SUSPENSION SUBCUTANEOUS at 15:59

## 2022-01-04 ASSESSMENT — PAIN DESCRIPTION - ORIENTATION
ORIENTATION: LEFT
ORIENTATION: LEFT

## 2022-01-04 ASSESSMENT — PAIN DESCRIPTION - LOCATION
LOCATION: LEG
LOCATION: LEG

## 2022-01-04 ASSESSMENT — PAIN DESCRIPTION - ONSET: ONSET: ON-GOING

## 2022-01-04 ASSESSMENT — PAIN SCALES - GENERAL
PAINLEVEL_OUTOF10: 9
PAINLEVEL_OUTOF10: 3
PAINLEVEL_OUTOF10: 8
PAINLEVEL_OUTOF10: 8
PAINLEVEL_OUTOF10: 7
PAINLEVEL_OUTOF10: 2

## 2022-01-04 ASSESSMENT — PAIN DESCRIPTION - PAIN TYPE
TYPE: SURGICAL PAIN
TYPE: SURGICAL PAIN

## 2022-01-04 ASSESSMENT — PAIN DESCRIPTION - DESCRIPTORS: DESCRIPTORS: SHARP;SHOOTING

## 2022-01-04 ASSESSMENT — PAIN DESCRIPTION - FREQUENCY: FREQUENCY: CONTINUOUS

## 2022-01-04 NOTE — PROGRESS NOTES
Progress note: Infectious diseases    Patient - Mohan Oliver,  Age - 60 y.o.    - 1965      Room Number - 6E-57/057-A   MRN -  [id number]   Acct # - [de-identified]  Date of Admission -  2022  2:39 AM    SUBJECTIVE:   No new issues  IR to do procedure at am  OBJECTIVE   VITALS    height is 5' 10\" (1.778 m) and weight is 280 lb (127 kg). His oral temperature is 97.9 °F (36.6 °C). His blood pressure is 135/64 and his pulse is 67. His respiration is 20 and oxygen saturation is 97%.        Wt Readings from Last 3 Encounters:   22 280 lb (127 kg)   18 (!) 300 lb 6.4 oz (136.3 kg)   18 291 lb 12.8 oz (132.4 kg)       I/O (24 Hours)    Intake/Output Summary (Last 24 hours) at 2022 0931  Last data filed at 2022 0741  Gross per 24 hour   Intake 7336.77 ml   Output 3100 ml   Net 4236.77 ml       General Appearance  Awake, alert, oriented,  not  In acute distress  HEENT - normocephalic, atraumatic, pink conjunctiva,  anicteric sclera  Neck - Supple, no mass  Lungs -  Bilateral   air entry, no rhonchi, no wheeze  Cardiovascular - Heart sounds are normal.    Abdomen - soft, not distended, nontender,   Neurologic -oriented  Skin - No bruising or bleeding  Extremities - dressed left foot wound    MEDICATIONS:      lisinopril  40 mg Oral Daily    insulin lispro  0-18 Units SubCUTAneous TID     insulin lispro  0-9 Units SubCUTAneous Nightly    hydroCHLOROthiazide  12.5 mg Oral Daily    vancomycin  1,750 mg IntraVENous Q12H    amitriptyline  100 mg Oral Nightly    atorvastatin  40 mg Oral Nightly    pramipexole  0.25 mg Oral Nightly    sodium chloride flush  5-40 mL IntraVENous 2 times per day    enoxaparin  40 mg SubCUTAneous Daily    cefepime  1,000 mg IntraVENous Q8H    vancomycin (VANCOCIN) intermittent dosing (placeholder)   Other RX Placeholder    nicotine  1 patch TransDERmal Daily    gabapentin  100 mg Oral TID      sodium chloride      dextrose       hydrALAZINE, cyclobenzaprine, sodium chloride, ondansetron **OR** ondansetron, potassium chloride **OR** potassium alternative oral replacement **OR** potassium chloride, magnesium sulfate, sodium chloride flush, polyethylene glycol, acetaminophen **OR** acetaminophen, glucose, dextrose, glucagon (rDNA), dextrose, oxyCODONE **OR** oxyCODONE      LABS:     CBC:   Recent Labs     01/02/22  0718   WBC 8.9   HGB 14.5        BMP:    Recent Labs     01/02/22  0718   *   K 3.7   CL 99   CO2 25   BUN 12   CREATININE 0.8   GLUCOSE 211*     Calcium:  Recent Labs     01/02/22  0718   CALCIUM 8.5      Recent Labs     01/03/22  1604 01/03/22  1939 01/04/22  0738   POCGLU 189* 184* 186*     HgbA1C: No results for input(s): LABA1C in the last 72 hours. INR: No results for input(s): INR in the last 72 hours. Hepatic:   Recent Labs     01/02/22  0718   ALKPHOS 79   ALT 17   AST 13   PROT 5.7*   BILITOT 0.8   LABALBU 3.5           Problem list of patient:     Patient Active Problem List   Diagnosis Code    Adenocarcinoma of Prostate, GS 7, stage T1c. C61    Onychomycosis B35.1    DM type 2 (diabetes mellitus, type 2) (HCC) E11.9    Dyslipidemia E78.5    DDD (degenerative disc disease), lumbar M51.36    Tobacco abuse Z72.0    Insomnia G47.00    Carpal tunnel syndrome of right wrist G56.01    Carpal tunnel syndrome of left wrist G56.02    Cubital tunnel syndrome on left G56.22    Morbid obesity (HCC) E66.01    Obstructive sleep apnea on CPAP G47.33, Z99.89    Restless leg syndrome G25.81    Controlled type 2 diabetes mellitus with microalbuminuria (HCC) E11.29, R80.9    Diabetic ulcer of left foot due to type 2 diabetes mellitus (HCC) E11.621, B37.326         ASSESSMENT/PLAN   Left foot necrotic wound s/p amputation. PVD: will have intervention tomorrow  DM with neuropathy  Continue current treatment.         Norm Suarez MD, MD, FACP 1/4/2022 9:31 AM

## 2022-01-04 NOTE — PROGRESS NOTES
[address] [facility] Pharmacokinetic Monitoring Service - Vancomycin    Consulting Provider: Flora Devlin DPM   Indication: OM L- foot  Target Concentration: Goal AUC/MAGNOLIA 400-600 mg*hr/L  Day of Therapy: 4  Additional Antimicrobials: cefepime    Pertinent Laboratory Values: Wt Readings from Last 1 Encounters:   01/01/22 280 lb (127 kg)     Temp Readings from Last 1 Encounters:   01/04/22 98.1 °F (36.7 °C) (Oral)     Estimated Creatinine Clearance: 135 mL/min (based on SCr of 0.8 mg/dL). Recent Labs     01/02/22  0718   CREATININE 0.8   WBC 8.9     Procalcitonin: 0.06 (01/01/22)    Cultures/Sensitivites  Date Source Result   1/1/22 Blood 1 NGTD   1/1/22 Blood 2 Coagulase negative Staphylococcus (possible contaminant)   1/1/22 Left foot ulcer Enterococcus, GNB, Streptococcus anginosus   1/1/22 Left toe Corynebacterium, GNB, GPC, Step agalactiae   MRSA Nasal Swab: N/A. Non-respiratory infection.     Recent vancomycin administrations                     vancomycin (VANCOCIN) 1,750 mg in dextrose 5 % 500 mL IVPB ()  Restarted 01/03/22 2225     1,750 mg New Bag  2049     1,750 mg New Bag  0819     1,750 mg New Bag 01/02/22 2032    vancomycin (VANCOCIN) 1250 mg in dextrose 5 % 250 mL IVPB (mg) 1,250 mg New Bag 01/02/22 0803     1,250 mg New Bag 01/01/22 2044    vancomycin (VANCOCIN) intermittent dosing (placeholder) ()  Given 01/01/22 1133    vancomycin (VANCOCIN) 2,000 mg in dextrose 5 % 500 mL IVPB (mg) 2,000 mg New Bag 01/01/22 1132                    Assessment:  Date/Time Current Dose Concentration Timing of Concentration (h) AUC   01/04/22 0517 1750 mg IV q 12 h 14.0 ~9h post dose 535   Note: Serum concentrations collected for AUC dosing may appear elevated if collected in close proximity to the dose administered, this is not necessarily an indication of toxicity    Plan:  Current dosing regimen is therapeutic  Continue current dose  Repeat vancomycin concentration will be ordered for any change in renal function and per protocol.    Pharmacy will continue to monitor patient and adjust therapy as indicated    Thank you for the consult,  Steve Cragi, [address] [facility]  1/4/2022 7:13 AM

## 2022-01-04 NOTE — PLAN OF CARE
Problem: Discharge Planning:  Goal: Discharged to appropriate level of care  Description: Discharged to appropriate level of care  Outcome: Ongoing   See SW note 1/4/22

## 2022-01-04 NOTE — PROGRESS NOTES
Hospitalist Progress Note    Patient:  India Fall      Unit/Bed:6E-57/057-A    YOB: 1965    MRN: [id number]       Acct: [de-identified]     PCP: Bria Hill DO    Date of Admission: 1/1/2022    Assessment/Plan:    1. Left great toe diabetic ulcer secondary to T2DM:    - uncontrolled diabetic. Glucose 186  this morning. HbgA1C 10.8. Start NPH BID. Continue high dose SSI. Carb controlled diet. - S/p 1st left toe amputation on 1/1/2022. Podiatry consulted and following. NWB to LLE; able to heel WB for transfers and bathroom priveleges. Dressing change, Betadine moistened Adaptic to incision, gauze, Kerlix, Ace bandage. Pt to follow up with Dr. Stephanie Acosta upon discharge. - See Microbiology for critical results: IV Cefepime/Vancomycin (started 1/1/2022); continue per ID   - Arterial doppler notable for high grade stenosis in the mid left superficial femoral artery. Per ID: Plan for arteriogram with IR tomorrow morning. 2. Obstructive sleep apnea:    - Continue to wear home CPAP at night with home settings. 3. Hyperlipidemia:    - Continue Lipitor 40 mg    4. Essential HTN:   - uncontrolled. Patient has remained mostly hypertensive with some normotensive pressures. - Continue Lisinopril. Increase to HTZ 25. Hydralazine PRN    5. Tobacco abuse:    - discussed risks of smoking   - educated on resources available for cessation. Reports he does not wish to quit at this time.    - Nicotine patch. 6. Restless leg syndrome:    - Continue Mirapex    7. Gait disturbance secondary to left great toe amputation:   - Heel WB per podiatry recs   - Patient reported difficulty with pivoting and balance   - PT/OT consulted   - SW consulted    8. Morbid obesity:    - BMI 40.18; discussed and educated on  lifestyle modifications.          Disposition:    [x] Home       [] TCU       [] Rehab       [] Psych       [] SNF       [] [place]       [] Other-    Chief Complaint: Foot pain      Hospital Course:       1/2/2022: POD 1 s/p left 1st toe amputation. Day 2 of IV ABX tx. Per podiatry notes, had minimal blood loss and no apparent complications while in OR yesterday. Patient is very well appearing upon presentation. He is afebrile and hemodynamically stable. Reports he has a good appetite and is eating and drinking good. Reports he is passing gas and has no difficulty with urination. He has had multiple hypertensive episodes not controlled by lisinopril. Will implement home dose HTZ. Patient reports pain is being well controlled with oral oxy and IV morphine. He has no other complaints at this time. 1/3/2022: Patient  Well appearing and resting comfortably in recliner. He's remained afebrile and is hemodynamically stable. No acute changes over night. Blood cultures and wound cultures noted below- see Micro. Podiatry recs: pt able to heel WB for transfers and bathroom privileges. PT/OT consulted d/t pt complaining of difficulty with transfers. Pt stable for discharge by podiatry with orders to follow up with Dr. Dejuan Perrin as outpatient. 1/4/2022: Patient well appearing resting comfortably in recliner. He is afebrile and hemodynamically stable. Pt looks well and has no complaints other than pain in his left foot at the incision site. ID consulted d/t critical results on wound culture as noted below. Continue current IV ABX per ID. Vanc trough therapeutic per pharmacy. Arterial doppler notable for high grade stenosis in the mid left superficial femoral artery. Per ID: Plan for arteriogram with IR tomorrow morning. Per podiatry, change noted in dorsal skin of left foot during dressing change this morning; further surgical intervention may be needed. Subjective (past 24 hours):     Reports that he has 4/10, nonradiating, \"stinging\" pain in his left foot where is toe was amputated. Pain relieved by oral oxycodone.  Patient worked with PT/OT and is agreeable to home health with recommended equipment. Reports having BM last night. Denies CP, SOB, cough, fever, abd pain/N/V/D, numbness/tingling in LE bilaterally. Initial H&P:    60 y.o. male with PMH of  HTN, hyperlipidemia, T2DM, morbid obesity, tobacco abuse, ASHLEE, RLS, prostate cancer (in remission) who presented to [facility] with with a chief complaint of foot pain. Patient reports that approximately 2 months ago he dropped a can of sausage gravy on his left great toe. States that following the incident he developed \"purple bruising\" of the left great toe. Stating approximately 2 weeks after the incident the purple bruising had gotten worse and started to experience an increased amount of pain. States he also noticed a \"heavy calus\" formation on his left great toe in which he decided to pull the skin off and noticed and open wound on his toe. States  \"bloody-clear\" drainage started coming from the wound but states the drainage coming from his toe is now \"thick yellow. \" Endorses he believes wound on his toes was resulted from wearing size 12 rubber boots at work, which is not his normal shoe size. Also endorsed his tennis shoes are not well fitting and that a piece of plastic from his shoe has been rubbing his left toe. Currently rates pain 10/10 in the left great toe, that radiates up left leg and is aggravated by touch and movement. No alleviating factors. States he has had difficulty with walking d/t the pain. Reports associated left leg swelling and redness. Denies CP, SOB, cough, fever, chills, weakness.      While in the ED, x ray of foot performed and notable for soft tissue swelling with evidence of soft tissue ulceration involving the first digit. Associated cortical destruction of the first distal phalanx concerning for osteomyelitis. Arterial doppler notable for high-grade stenosis in  the mid left superficial femoral artery and abnormal left GINO concerning for significant stenosis.  No DVT noted acetaminophen, glucose, dextrose, glucagon (rDNA), dextrose, oxyCODONE **OR** oxyCODONE      Intake/Output Summary (Last 24 hours) at 1/4/2022 1031  Last data filed at 1/4/2022 0741  Gross per 24 hour   Intake 7336.77 ml   Output 3100 ml   Net 4236.77 ml       Diet:  ADULT DIET; Regular; 4 carb choices (60 gm/meal)    Exam:  /64   Pulse 67   Temp 97.9 °F (36.6 °C) (Oral)   Resp 20   Ht 5' 10\" (1.778 m)   Wt 280 lb (127 kg)   SpO2 97%   BMI 40.18 kg/m²     General appearance: No apparent distress, appears stated age and cooperative. HEENT: Pupils equal, round, and reactive to light. Conjunctivae/corneas clear. Neck: Supple, with full range of motion. No jugular venous distention. Trachea midline. Respiratory:  Normal respiratory effort. Clear to auscultation, bilaterally without Rales/Wheezes/Rhonchi. Cardiovascular: Regular rate and rhythm with normal S1/S2 without murmurs, rubs or gallops. Abdomen: Soft, non-tender, non-distended with normal bowel sounds. Musculoskeletal: passive and active ROM x 4 extremities. Gauze dressing to left foot, no blood or drainage noted on outside of dressing. No swelling noted in LLE. No erythema noted in proximal LLE. Skin: Skin color, texture, turgor normal.  No rashes or lesions. Surgical incision to left foot. Neurologic:  Neurovascularly intact without any focal sensory/motor deficits. Cranial nerves: II-XII intact, grossly non-focal.  Psychiatric: Alert and oriented, thought content appropriate, normal insight  Capillary Refill: Brisk,< 3 seconds   Peripheral Pulses: +2 palpable radially pulses equal bilaterally, +2 right pedal pulse. KENNETH left pedal pulse d/t dressing.        Labs:   Recent Labs     01/02/22 0718   WBC 8.9   HGB 14.5   HCT 44.2        Recent Labs     01/02/22 0718   *   K 3.7   CL 99   CO2 25   BUN 12   CREATININE 0.8   CALCIUM 8.5     Recent Labs     01/02/22 0718   AST 13   ALT 17   BILITOT 0.8   ALKPHOS 79 Microbiology:      -Blood cultures x's 2.   - showing gram positive cocci in clusters - organism staphylococcus; likely contaminated specimen. - Left toe Wound culture showing few segmented neutrophils. No epithelial cells observed. Moderate gram positive cocci in pairs and chains. Moderate gram negative bacilli. --- organism streptococcus anginosus; I&D consulted. - Critical finds as noted: Culture yielded moderate growth of Enterococcus species. The gram negative bacilli observed on the direct smear were not recovered. This may be due to antimicrobial suppression, a fastidious organism, or an anaerobic organism. Culture also yielded light growth of gram positive bacilli most consistent with Corynebacterium species. Direct gram stain indicated rare gram negative bacilli which did not grow on culture. This could be inhibited growth due to antibiotic therapy, a fastidious organism or an anaerobic organism. If a true mixed aerobic and anaerobic infection is suspected, then broad spectrum empiric antibiotic therapy is indicated and should include coverage for anaerobic organisms. Urinalysis:      Lab Results   Component Value Date    NITRU Negative 10/03/2017    WBCUA 0 09/23/2014    BACTERIA NONE 09/23/2014    RBCUA 0 09/23/2014    BLOODU Trace-intact 10/03/2017    BLOODU SMALL 09/23/2014    SPECGRAV 1.015 08/29/2011    GLUCOSEU 250 10/03/2017       Radiology:  XR FOOT LEFT (MIN 3 VIEWS)    Result Date: 1/1/2022  4 views left foot. Comparison: None. Findings: There is soft tissue swelling and ulceration involving the first digit. There is associated cortical destruction of the medial aspect of the first distal phalanx, concerning for osteomyelitis. No radiopaque foreign body is identified. No acute fracture or dislocation is seen. There are large superior and inferior calcaneal spurs. Mild degenerative changes are present at the first metatarsal phalangeal joint. Impression: 1.  Soft tissue swelling with evidence of soft tissue ulceration involving the first digit. There is associated cortical destruction of the first distal phalanx, most consistent with osteomyelitis. This document has been electronically signed by: Gretel Vitale MD on 01/01/2022 06:31 AM    RYAN GREGORY LOWER EXTREMITY ARTERIES LEFT    Result Date: 1/1/2022  PROCEDURE:  DUP LOWER EXTREMITY ARTERIES LEFT CLINICAL INFORMATION: non-palpable pulses . Nonhealing wound left great toe. COMPARISON: No prior study. TECHNIQUE: Arterial doppler ultrasound was performed of the left lower extremity using gray scale, color flow and spectral doppler imaging. Velocities were also recorded. FINDINGS: LEFT ARTERY (PSV cm/sec) ? ??????????????????????? CFA ---------------> 121 PSV cm/sec PROF -------------> 100 PSV cm/sec SFA PROX ------->77 PSV cm/sec SFA MID ---------->>454 PSV cm/sec SFA DIST -------->58 PSV cm/sec POP A PROX --->62 PSV cm/sec POP A DIST ---->114 PSV cm/sec PTA --------------->42 PSV cm/sec PERONEAL ----->40 PSV cm/sec SEAN ----------------> 42 PSV cm/sec GINO RIGHT SEAN----->1.07 PTA----->1.07 GINO LEFT SEAN----->0.65 PTA----->0.56 There is triphasic waveform in the left common femoral artery. In the proximal left superficial femoral artery, the waveform becomes dampened. The velocity decreases. In the mid left superficial femoral artery, the velocity is markedly elevated. There is  significant spectral broadening. This is consistent with a high-grade stenosis in the mid superficial femoral artery. Distal to this, there are dampened waveforms throughout. The waveform becomes biphasic. There is a significant reduction the peak systolic velocity in the distal superficial femoral artery. The popliteal, anterior tibial, posterior tibial and peroneal arteries are patent. All have dampened waveforms. These are biphasic. There are reduced ABIs on the left compared to the right.      1. High-grade stenosis in the mid left superficial femoral artery. 2. Abnormal left GINO suggesting a hemodynamically significant stenosis. **This report has been created using voice recognition software. It may contain minor errors which are inherent in voice recognition technology. ** Final report electronically signed by Dr. Sterling Myles on 1/1/2022 9:40 AM    VL DUP LOWER EXTREMITY VENOUS LEFT    Result Date: 1/1/2022  PROCEDURE: VL DUP LOWER EXTREMITY VENOUS LEFT CLINICAL INFORMATION: pain, swelling. Nonhealing ulcer left great toe. COMPARISON: No prior study. TECHNIQUE: Venous doppler ultrasound was performed of the left lower extremity using gray scale, color flow and spectral doppler imaging. FINDINGS: There is normal color flow, spectral analysis and compressibility of the common femoral vein, femoral vein and popliteal vein on the left . There is mild thickening of the walls of popliteal vein. There is normal color flow and compressibility in the posterior tibial veins, anterior tibial veins and peroneal veins. There is normal color flow, spectral analysis and compressibility in the contralateral common femoral vein. The greater saphenous vein is patent. The gastrocnemius vein is patent. No evidence of a DVT. **This report has been created using voice recognition software. It may contain minor errors which are inherent in voice recognition technology. ** Final report electronically signed by Dr. Sterling Myles on 1/1/2022 9:27 AM      DVT prophylaxis: [x] Lovenox                                 [] SCDs                                 [] SQ Heparin                                 [x] Encourage ambulation           [] Already on Anticoagulation        Code Status: Full Code        PT/OT Status:      Per OT note as documented 1/3/2022: \"Discharge Recommendations:  Continue to assess pending progress,Home with Home health OT.  Equipment Recommendations:  Equipment Needed: Yes  Mobility Devices: [employer]  Other: reacher and Bedside commode\"     Per PT note as documented 1/3/2022: \"Discharge Recommendations:  Discharge Recommendations: Continue to assess pending progress (anticipate  return home at discharge). Equipment Recommendations:   Other: cont to assess, pt may benefit from RW at discharge\"    Tele:   [] yes             [x] no     Active Hospital Problems    Diagnosis Date Noted    Diabetic ulcer of left foot due to type 2 [facility] [P42.923, L97.529] 01/01/2022       Electronically signed by GARCIA Olivas CNP on 1/4/2022 at 10:31 AM

## 2022-01-04 NOTE — PROGRESS NOTES
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 She may be able to assist if needed. Pt reports his home it too small for a w/c for mobility. OBJECTIVE:  Range of Motion:  Bilateral Lower Extremity: WFL    Strength:  Bilateral Lower Extremity: WFL    Balance:  Static Sitting Balance:  Modified Independent  Static Standing Balance: Stand By Assistance, to CGA with RW, initially stding balance pt unsteady  Dyn std balance with pt reaching to adjust shorts with LOB and Giselle to correct, maintaining heel to NWB LLE  Bed Mobility:  Not Tested, per pt has been sleeping in the chair    Transfers:  Sit to Stand: Contact Guard Assistance  Stand to Sit:Stand By Assistance    Ambulation:  Contact Guard Assistance  Distance: 2'x1, 16'x1  Surface: Level Tile  Device:Rolling Walker  Gait Deviations:  Cues for dec step length RLE for pt to maintain heel WB LLE, initially pt unsteady with 1LOB and Giselle to recover, with inc distance pt was steadier    Exercise:  Patient was guided in 1 set(s) 2-3 reps of exercise to both lower extremities. Ankle pumps, Glut sets, Quad sets, Heelslides and Hip abduction/adduction. Issued for HEP for pt to cont throughout the day. Exercises were completed for increased independence with functional mobility. Functional Outcome Measures: Completed  AM-PAC Inpatient Mobility without Stair Climbing Raw Score : 17  AM-PAC Inpatient without Stair Climbing T-Scale Score : 48.47    ASSESSMENT:  Activity Tolerance:  Patient tolerance of  treatment: fair. Treatment Initiated: Treatment and education initiated within context of evaluation.   Evaluation time included review of current medical information, gathering information related to past medical, social and functional history, completion of standardized testing, formal and informal observation of tasks, assessment of data and development of plan of care and goals. Treatment time included skilled education and facilitation of tasks to increase safety and independence with functional mobility for improved independence and quality of life. Assessment: Body structures, Functions, Activity limitations: Decreased functional mobility ,Decreased endurance,Decreased balance  Assessment: pt s/p surgery left foot with dec WB status, dec balance, use of walker and inc assist for safe mobility, min pain left foot, recommend cont PT to inc pt I with functional mobility  Prognosis: Good    REQUIRES PT FOLLOW UP: Yes    Discharge Recommendations:  Discharge Recommendations: Continue to assess pending progress (anticipate  return home at discharge)    Patient Education:  PT Education: [facility],Functional Mobility [facility] [employer]    Equipment Recommendations: Other: cont to assess, pt may benefit from RW at discharge    Plan:  Times per week: 6X O  Times per day: Daily  Specific instructions for Next Treatment: therex and mobility with limting distance amb with heel WB LLE    Goals:  Patient goals : go home  Short term goals  Time Frame for Short term goals: by discharge  Short term goal 1: bed mobility with MOD I to get in/out of bed  Short term goal 2: transfer with MOD I, maintaining heel WB, to get in/out of chairs  Short term goal 3: amb 25'x1 with LRAD and MOD I, maintaining heel WB, to walk safely in home  Long term goals  Time Frame for Long term goals : no LTGs set secondary to short ELOS    Following session, patient left in safe position with all fall risk precautions in place.

## 2022-01-04 NOTE — PROGRESS NOTES
Antonio [facility]  Occupational Therapy  Daily Note  Time:   Time In: 7861  Time Out: 1315  Timed Code Treatment Minutes: 17 Minutes  Minutes: 17          Date: 2022  Patient Name: Dixie Velázquez,   Gender: male      Room: 6E-57/057-A  MRN: [id number]  : 1965  (60 y.o.)  Referring Practitioner: EMILY Mar CNP  Diagnosis: diabetic ulcer of te left foot due to DM II  Additional Pertinent Hx: 60 y.o. male with PMH of  HTN, hyperlipidemia, T2DM, morbid obesity, tobacco abuse, ASHLEE, RLS, prostate cancer (in remission) who presented to [facility] with a chief complaint of foot pain. Patient reports that approximately 2 months ago he dropped a can of sausage gravy on his left great toe. States that following the incident he developed \"purple bruising\" of the left great toe. PT underwent a left great toe amputation on 22    Restrictions/Precautions:  Restrictions/Precautions: Weight Bearing  Left Lower Extremity Weight Bearing: Non Weight Bearing  Partial Weight Bearing Percentage Or Pounds: may heel wt bear for transfers and bathroom privilages, post op shoe on for comfort. SUBJECTIVE: RN approved OT session. Upon entering room pt seated up in recliner chair, agreeable to OT with encouragement. PAIN 5/10 LLE pain     Vitals: Vitals not assessed per clinical judgement, see nursing flowsheet    COGNITION: Decreased Insight    ADL:   LB dressing: MAX A to bryan off loading shoe. BALANCE:  Standing Balance: Contact Guard Assistance, Minimal Assistance. MIN A inital stand at RW, CGA after inital stand     BED MOBILITY:  Not Tested    TRANSFERS:  Sit to Stand:  Contact Guard Assistance. Standing from [address] chair  Stand to Sit: Stand By Assistance. Sitting into recliner chair     FUNCTIONAL MOBILITY:  Assistive Device: Rolling Walker  Assist Level:  Contact Guard Assistance and Minimal Assistance. Distance:  To and from bathroom  MIN A to initiate functional mobility due to decreased balance, wearing offloading shoe      ADDITIONAL ACTIVITIES:  Pt educated throughout session regarding importance of therapy to improve activity tolerance due to decreased endurance. ASSESSMENT:     Activity Tolerance:  Patient tolerance of  treatment: fair. Pt requiring encouragement for participation       Discharge Recommendations: Continue to assess pending progress  Equipment Recommendations: Equipment Needed: Yes  Mobility Devices: [employer]  Other: reacher and Bedside commode  Plan: Times per week: 5xs  Current Treatment Recommendations: Strengthening,Endurance Training,Patient/Caregiver Education & Training,Equipment Evaluation, Education, & procurement,Self-Care / ADL,Balance Training,Home Management Training,Functional Mobility Training,Safety Education & Training    Patient Education  Patient Education: Role of OT, Plan of Care and Energy Conservation    Goals  Short term goals  Time Frame for Short term goals: by discharge  Short term goal 1: Pt will complete ADL routine with no > min A and min cues for NWB L LE status, (Heel wt bearing for BRP)  Short term goal 2: Pt will ambulate to / from the BR with S and RW with no > 1 cue for PWB/ heel wt bearing only L LE to increase safety for toileting routine  Short term goal 3: Pt will complete B UE medium resistence strenthening ex x 12 reps to increase UE strength needed for transfers  Short term goal 4: Pt will tolerate standing x 1 min with S and no cues for NWB/ Heel wt bearing status for ease of toileting tasks  Long term goals  Time Frame for Long term goals : not set due to est short LOS    Following session, patient left in safe position with all fall risk precautions in place.

## 2022-01-04 NOTE — PROGRESS NOTES
Podiatric Progress Note  Dixie Velázquez  Subjective :   1/4/2022  Patient seen at bedside today on behalf of Dr. Dejuan Perrin. Patient was pleasant, was oriented to person, place, and time and in no acute distress. Patient is 3 days status post left first toe amputation. Patient states that he is having pain in his left foot at the amputation site, any sort of stinging pain. He is curious what time we will get his angiogram today. Patient also complains of some soreness in his right arm at the site of his previous IV line that was switched to his left hand. Patient denies any nausea, vomiting, fever, chills, chest pain, shortness of breath. No other pedal complaints. 1/3/2022  Patient seen bedside today on behalf of Dr. Dejuan Perrin. Patient appeared pleasant, was oriented to person, place and time and in no acute distress. Patient is 2 days  S/p left 1st toe amputation. Patient states that he has no questions right now but that he typically gets questions after the visit. Patient denies any N/V/F/C/SOB or CP. Patient has no further pedal concerns at this point in time.      Current Medications:    Current Facility-Administered Medications: lisinopril (PRINIVIL;ZESTRIL) tablet 40 mg, 40 mg, Oral, Daily **AND** [DISCONTINUED] hydroCHLOROthiazide (HYDRODIURIL) tablet 12.5 mg, 12.5 mg, Oral, Daily  insulin lispro (HUMALOG) injection vial 0-18 Units, 0-18 Units, SubCUTAneous, TID WC  insulin lispro (HUMALOG) injection vial 0-9 Units, 0-9 Units, SubCUTAneous, Nightly  hydroCHLOROthiazide (HYDRODIURIL) tablet 12.5 mg, 12.5 mg, Oral, Daily  [COMPLETED] vancomycin (VANCOCIN) 2,000 mg in dextrose 5 % 500 mL IVPB, 2,000 mg, IntraVENous, Once **FOLLOWED BY** vancomycin (VANCOCIN) 1,750 mg in dextrose 5 % 500 mL IVPB, 1,750 mg, IntraVENous, Q12H  hydrALAZINE (APRESOLINE) tablet 25 mg, 25 mg, Oral, Q8H PRN  amitriptyline (ELAVIL) tablet 100 mg, 100 mg, Oral, Nightly  atorvastatin (LIPITOR) tablet 40 mg, 40 mg, Oral, Nightly  cyclobenzaprine (FLEXERIL) tablet 10 mg, 10 mg, Oral, TID PRN  pramipexole (MIRAPEX) tablet 0.25 mg, 0.25 mg, Oral, Nightly  sodium chloride flush 0.9 % injection 5-40 mL, 5-40 mL, IntraVENous, 2 times per day  0.9 % sodium chloride infusion, 25 mL, IntraVENous, PRN  enoxaparin (LOVENOX) injection 40 mg, 40 mg, SubCUTAneous, Daily  ondansetron (ZOFRAN-ODT) disintegrating tablet 4 mg, 4 mg, Oral, Q8H PRN **OR** ondansetron (ZOFRAN) injection 4 mg, 4 mg, IntraVENous, Q6H PRN  potassium chloride (KLOR-CON M) extended release tablet 40 mEq, 40 mEq, Oral, PRN **OR** potassium bicarb-citric acid (EFFER-K) effervescent tablet 40 mEq, 40 mEq, Oral, PRN **OR** potassium chloride 10 mEq/100 mL IVPB (Peripheral Line), 10 mEq, IntraVENous, PRN  magnesium sulfate 2000 mg in 50 mL IVPB premix, 2,000 mg, IntraVENous, PRN  cefepime (MAXIPIME) 1000 mg IVPB minibag, 1,000 mg, IntraVENous, Q8H  vancomycin (VANCOCIN) intermittent dosing (placeholder), , Other, RX Placeholder  sodium chloride flush 0.9 % injection 5-40 mL, 5-40 mL, IntraVENous, PRN  polyethylene glycol (GLYCOLAX) packet 17 g, 17 g, Oral, Daily PRN  acetaminophen (TYLENOL) tablet 650 mg, 650 mg, Oral, Q6H PRN **OR** acetaminophen (TYLENOL) suppository 650 mg, 650 mg, Rectal, Q6H PRN  nicotine (NICODERM CQ) 14 MG/24HR 1 patch, 1 patch, TransDERmal, Daily  gabapentin (NEURONTIN) capsule 100 mg, 100 mg, Oral, TID  glucose (GLUTOSE) 40 % oral gel 15 g, 15 g, Oral, PRN  dextrose 50 % IV solution, 12.5 g, IntraVENous, PRN  glucagon (rDNA) injection 1 mg, 1 mg, IntraMUSCular, PRN  dextrose 5 % solution, 100 mL/hr, IntraVENous, PRN  oxyCODONE (ROXICODONE) immediate release tablet 5 mg, 5 mg, Oral, Q4H PRN **OR** oxyCODONE (ROXICODONE) immediate release tablet 10 mg, 10 mg, Oral, Q4H PRN    Objective     BP (!) 142/92   Pulse 66   Temp 98.1 °F (36.7 °C) (Oral)   Resp 16   Ht 5' 10\" (1.778 m)   Wt 280 lb (127 kg)   SpO2 98%   BMI 40.18 kg/m² I/O:    Intake/Output Summary (Last 24 hours) at 1/4/2022 0709  Last data filed at 1/4/2022 0430  Gross per 24 hour   Intake 7816.77 ml   Output 2450 ml   Net 5366.77 ml              Wt Readings from Last 3 Encounters:   01/01/22 280 lb (127 kg)   04/18/18 (!) 300 lb 6.4 oz (136.3 kg)   03/21/18 291 lb 12.8 oz (132.4 kg)       LABS:    Recent Labs     01/02/22  0718   WBC 8.9   HGB 14.5   HCT 44.2           Recent Labs     01/02/22  0718   *   K 3.7   CL 99   CO2 25   BUN 12   CREATININE 0.8        Recent Labs     01/02/22 0718   PROT 5.7*      No results for input(s): CKTOTAL, CKMB, CKMBINDEX, TROPONINI in the last 72 hours. Exam:   Dressing intact and well maintained. No apparent strike through noted. Vascular: Dorsalis pedis and posterior tibial pulses are palpable to the RLE. Dorsalis pedis and posterior tibial pulses are non-palpable to the LLE secondary to edema. Skin temperature is warm to warm from proximal tibial tuberosity to distal digits. Increased warmth to the left great toe. CFT brisk to exposed digits. Edema noted to the LLE.      Dermatologic:  Patient has a surgical incision to the distal aspect of the left foot at stump site of the left hallux. Skin edges are well approximated, stitches are holding position, no areas of dehiscence. Mild amount of sanguinous drainage present. Increasing duskiness and deep tissue injury to the dorsal aspect of the left foot extending proximally from the amputation site.     Neurovascular: Light touch sensation diminished bilaterally.      Musculoskeletal: Muscle strength 4/5 for all plantarflexors, dorsiflexors, inverters and everters examined. Decreased ROM noted left AJ. S/p amputation left hallux. Pain on palpation of amputation site.     Left    Left          Imaging  Narrative   PROCEDURE: VL DUP LOWER EXTREMITY ARTERIES LEFT     Impression   1. High-grade stenosis in the mid left superficial femoral artery.    2. Abnormal left GINO suggesting a hemodynamically significant stenosis.         Narrative   4 views left foot. Impression   Impression:   1. Soft tissue swelling with evidence of soft tissue ulceration involving    the first digit. There is associated cortical destruction of the first    distal phalanx, most consistent with osteomyelitis. ASSESSMENT  Principle  1) Osteomyelitis, left hallux  2) S/p left hallux amputation at metatarsophalangeal joint level    Chronic  Patient Active Problem List   Diagnosis    Adenocarcinoma of Prostate, GS 7, stage T1c.    Onychomycosis    DM type 2 (diabetes mellitus, type 2) ([facility] [address]    Dyslipidemia    DDD (degenerative disc disease), lumbar    Tobacco abuse    Insomnia    Carpal tunnel syndrome of right wrist    Carpal tunnel syndrome of left wrist    Cubital tunnel syndrome on left    Morbid obesity (HCC)    Obstructive sleep apnea on CPAP    Restless leg syndrome    Controlled type 2 diabetes mellitus with microalbuminuria ([facility] [address]    Diabetic ulcer of left foot due to type 2 diabetes mellitus (HCC)       PLAN:   - Pt. is a 60 y.o. male   -Patient examined and evaluated  -WBC 8.9, Afebrile  -Tissue culture results yield streptococcus anginosus, streptococcus agalactiae, and rare gram negative bacilli that did not grow on culture. -Blood cultures positive for gram positive cocci in clusters, molecular panel demonstrates positive findings for staphylococcus   -X-rays taken and reviewed; impression above  -Continue vancomycin and cefepime per infectious disease  -Arterial doppler reviewed, evidence of high grade stenosis in the mid left superficial femoral artery.   -Patient to undergo angiogram with interventional radiology hopefully later today  -Dressing changed, nitroglycerin cream to dorsal flap, gauze, Kerlix, Ace bandage  -Order placed for nitroglycerin cream to be applied BID  -NWB to LLE.  Able to heel WB for transfers and bathroom privileges.    -Discussed with patient that given the amount of change of the dorsal skin, he might be a need for further procedure involving removal of bone for adequate soft tissue closure.  -Will trend duskiness of flap in response to angiogram and nitroglycerin applications  -Follow-up with Dr. Chantel Alcala outpatient upon discharge  - Podiatry will continue to follow patient    DISPO: Pending angiogram with interventional radiology, clinical appearance of wound, resolution of duskiness on dorsal tissue. Gail Redmond D.P.M.   Podiatric Surgical Resident  1/4/2022   7:09 AM

## 2022-01-05 ENCOUNTER — APPOINTMENT (OUTPATIENT)
Dept: INTERVENTIONAL RADIOLOGY/VASCULAR | Age: 57
DRG: 617 | End: 2022-01-05
Payer: COMMERCIAL

## 2022-01-05 LAB
APTT: 29.7 SECONDS (ref 22–38)
CREAT SERPL-MCNC: 1 MG/DL (ref 0.4–1.2)
ERYTHROCYTE [DISTWIDTH] IN BLOOD BY AUTOMATED COUNT: 13.4 % (ref 11.5–14.5)
ERYTHROCYTE [DISTWIDTH] IN BLOOD BY AUTOMATED COUNT: 43.3 FL (ref 35–45)
GFR SERPL CREATININE-BSD FRML MDRD: 77 ML/MIN/1.73M2
GLUCOSE BLD-MCNC: 101 MG/DL (ref 70–108)
GLUCOSE BLD-MCNC: 154 MG/DL (ref 70–108)
GLUCOSE BLD-MCNC: 165 MG/DL (ref 70–108)
GLUCOSE BLD-MCNC: 187 MG/DL (ref 70–108)
HCT VFR BLD CALC: 44.3 % (ref 42–52)
HEMOGLOBIN: 14.6 GM/DL (ref 14–18)
INR BLD: 1.08 (ref 0.85–1.13)
MCH RBC QN AUTO: 29.1 PG (ref 26–33)
MCHC RBC AUTO-ENTMCNC: 33 GM/DL (ref 32.2–35.5)
MCV RBC AUTO: 88.2 FL (ref 80–94)
PLATELET # BLD: 187 THOU/MM3 (ref 130–400)
PMV BLD AUTO: 11.5 FL (ref 9.4–12.4)
RBC # BLD: 5.02 MILL/MM3 (ref 4.7–6.1)
WBC # BLD: 10.5 THOU/MM3 (ref 4.8–10.8)

## 2022-01-05 PROCEDURE — 97116 GAIT TRAINING THERAPY: CPT

## 2022-01-05 PROCEDURE — 85027 COMPLETE CBC AUTOMATED: CPT

## 2022-01-05 PROCEDURE — 82565 ASSAY OF CREATININE: CPT

## 2022-01-05 PROCEDURE — 6370000000 HC RX 637 (ALT 250 FOR IP)

## 2022-01-05 PROCEDURE — 6360000002 HC RX W HCPCS

## 2022-01-05 PROCEDURE — 2500000003 HC RX 250 WO HCPCS: Performed by: INTERNAL MEDICINE

## 2022-01-05 PROCEDURE — 2580000003 HC RX 258

## 2022-01-05 PROCEDURE — 75710 ARTERY X-RAYS ARM/LEG: CPT

## 2022-01-05 PROCEDURE — 85730 THROMBOPLASTIN TIME PARTIAL: CPT

## 2022-01-05 PROCEDURE — 99223 1ST HOSP IP/OBS HIGH 75: CPT | Performed by: PHYSICIAN ASSISTANT

## 2022-01-05 PROCEDURE — C1894 INTRO/SHEATH, NON-LASER: HCPCS

## 2022-01-05 PROCEDURE — 85610 PROTHROMBIN TIME: CPT

## 2022-01-05 PROCEDURE — 6370000000 HC RX 637 (ALT 250 FOR IP): Performed by: STUDENT IN AN ORGANIZED HEALTH CARE EDUCATION/TRAINING PROGRAM

## 2022-01-05 PROCEDURE — 36415 COLL VENOUS BLD VENIPUNCTURE: CPT

## 2022-01-05 PROCEDURE — 82948 REAGENT STRIP/BLOOD GLUCOSE: CPT

## 2022-01-05 PROCEDURE — 2580000003 HC RX 258: Performed by: PHARMACIST

## 2022-01-05 PROCEDURE — 75625 CONTRAST EXAM ABDOMINL AORTA: CPT

## 2022-01-05 PROCEDURE — 37224 HC PLASTY UNI FEMPOP: CPT

## 2022-01-05 PROCEDURE — 6360000004 HC RX CONTRAST MEDICATION: Performed by: RADIOLOGY

## 2022-01-05 PROCEDURE — 6370000000 HC RX 637 (ALT 250 FOR IP): Performed by: RADIOLOGY

## 2022-01-05 PROCEDURE — 6360000002 HC RX W HCPCS: Performed by: RADIOLOGY

## 2022-01-05 PROCEDURE — 6360000002 HC RX W HCPCS: Performed by: PHARMACIST

## 2022-01-05 PROCEDURE — 1200000000 HC SEMI PRIVATE

## 2022-01-05 PROCEDURE — 2580000003 HC RX 258: Performed by: RADIOLOGY

## 2022-01-05 RX ORDER — CLINDAMYCIN PHOSPHATE 300 MG/50ML
300 INJECTION INTRAVENOUS EVERY 8 HOURS
Status: DISCONTINUED | OUTPATIENT
Start: 2022-01-05 | End: 2022-01-13 | Stop reason: HOSPADM

## 2022-01-05 RX ORDER — FENTANYL CITRATE 50 UG/ML
50 INJECTION, SOLUTION INTRAMUSCULAR; INTRAVENOUS ONCE
Status: DISCONTINUED | OUTPATIENT
Start: 2022-01-05 | End: 2022-01-13 | Stop reason: HOSPADM

## 2022-01-05 RX ORDER — HEPARIN SODIUM 1000 [USP'U]/ML
2000 INJECTION, SOLUTION INTRAVENOUS; SUBCUTANEOUS ONCE
Status: COMPLETED | OUTPATIENT
Start: 2022-01-05 | End: 2022-01-05

## 2022-01-05 RX ORDER — IBUPROFEN 200 MG
TABLET ORAL ONCE
Status: COMPLETED | OUTPATIENT
Start: 2022-01-05 | End: 2022-01-05

## 2022-01-05 RX ORDER — MIDAZOLAM HYDROCHLORIDE 1 MG/ML
1 INJECTION INTRAMUSCULAR; INTRAVENOUS ONCE
Status: DISCONTINUED | OUTPATIENT
Start: 2022-01-05 | End: 2022-01-13 | Stop reason: HOSPADM

## 2022-01-05 RX ORDER — CLOPIDOGREL BISULFATE 75 MG/1
75 TABLET ORAL DAILY
Status: DISCONTINUED | OUTPATIENT
Start: 2022-01-06 | End: 2022-01-13 | Stop reason: HOSPADM

## 2022-01-05 RX ORDER — CLOPIDOGREL BISULFATE 75 MG/1
300 TABLET ORAL ONCE
Status: COMPLETED | OUTPATIENT
Start: 2022-01-05 | End: 2022-01-05

## 2022-01-05 RX ADMIN — IOPAMIDOL 80 ML: 612 INJECTION, SOLUTION INTRAVENOUS at 12:30

## 2022-01-05 RX ADMIN — BACITRACIN, NEOMYCIN, POLYMYXIN B 1 G: 400; 3.5; 5 OINTMENT TOPICAL at 12:27

## 2022-01-05 RX ADMIN — GABAPENTIN 100 MG: 100 CAPSULE ORAL at 21:39

## 2022-01-05 RX ADMIN — HEPARIN SODIUM 2000 UNITS: 1000 INJECTION, SOLUTION INTRAVENOUS; SUBCUTANEOUS at 11:58

## 2022-01-05 RX ADMIN — CEFEPIME 1000 MG: 1 INJECTION, POWDER, FOR SOLUTION INTRAMUSCULAR; INTRAVENOUS at 03:40

## 2022-01-05 RX ADMIN — CEFEPIME 1000 MG: 1 INJECTION, POWDER, FOR SOLUTION INTRAMUSCULAR; INTRAVENOUS at 18:31

## 2022-01-05 RX ADMIN — CLOPIDOGREL BISULFATE 300 MG: 300 TABLET, FILM COATED ORAL at 12:27

## 2022-01-05 RX ADMIN — PRAMIPEXOLE DIHYDROCHLORIDE 0.25 MG: 0.25 TABLET ORAL at 20:11

## 2022-01-05 RX ADMIN — ATORVASTATIN CALCIUM 40 MG: 40 TABLET, FILM COATED ORAL at 20:10

## 2022-01-05 RX ADMIN — OXYCODONE 5 MG: 5 TABLET ORAL at 15:28

## 2022-01-05 RX ADMIN — INSULIN HUMAN 25 UNITS: 100 INJECTION, SUSPENSION SUBCUTANEOUS at 14:12

## 2022-01-05 RX ADMIN — LISINOPRIL 40 MG: 40 TABLET ORAL at 08:23

## 2022-01-05 RX ADMIN — GABAPENTIN 100 MG: 100 CAPSULE ORAL at 14:12

## 2022-01-05 RX ADMIN — OXYCODONE 10 MG: 5 TABLET ORAL at 04:36

## 2022-01-05 RX ADMIN — HYDROCHLOROTHIAZIDE 25 MG: 25 TABLET ORAL at 08:23

## 2022-01-05 RX ADMIN — FENTANYL CITRATE 50 MCG: 50 INJECTION INTRAMUSCULAR; INTRAVENOUS at 11:47

## 2022-01-05 RX ADMIN — NITROGLYCERIN 0.5 INCH: 20 OINTMENT TOPICAL at 14:23

## 2022-01-05 RX ADMIN — MIDAZOLAM 1 MG: 1 INJECTION INTRAMUSCULAR; INTRAVENOUS at 11:47

## 2022-01-05 RX ADMIN — VANCOMYCIN HYDROCHLORIDE 1750 MG: 5 INJECTION, POWDER, LYOPHILIZED, FOR SOLUTION INTRAVENOUS at 09:11

## 2022-01-05 RX ADMIN — CLINDAMYCIN IN 5 PERCENT DEXTROSE 300 MG: 6 INJECTION, SOLUTION INTRAVENOUS at 18:35

## 2022-01-05 RX ADMIN — GABAPENTIN 100 MG: 100 CAPSULE ORAL at 08:22

## 2022-01-05 RX ADMIN — CEFEPIME 1000 MG: 1 INJECTION, POWDER, FOR SOLUTION INTRAMUSCULAR; INTRAVENOUS at 11:59

## 2022-01-05 RX ADMIN — AMITRIPTYLINE HYDROCHLORIDE 100 MG: 100 TABLET, FILM COATED ORAL at 20:10

## 2022-01-05 RX ADMIN — SODIUM CHLORIDE: 4.5 INJECTION, SOLUTION INTRAVENOUS at 05:01

## 2022-01-05 RX ADMIN — SODIUM CHLORIDE 25 ML: 9 INJECTION, SOLUTION INTRAVENOUS at 03:40

## 2022-01-05 RX ADMIN — OXYCODONE 5 MG: 5 TABLET ORAL at 20:20

## 2022-01-05 RX ADMIN — INSULIN HUMAN 25 UNITS: 100 INJECTION, SUSPENSION SUBCUTANEOUS at 08:16

## 2022-01-05 RX ADMIN — OXYCODONE 10 MG: 5 TABLET ORAL at 09:57

## 2022-01-05 ASSESSMENT — PAIN DESCRIPTION - ORIENTATION
ORIENTATION: LEFT
ORIENTATION: LEFT

## 2022-01-05 ASSESSMENT — PAIN SCALES - GENERAL
PAINLEVEL_OUTOF10: 5
PAINLEVEL_OUTOF10: 7
PAINLEVEL_OUTOF10: 4
PAINLEVEL_OUTOF10: 7
PAINLEVEL_OUTOF10: 2

## 2022-01-05 ASSESSMENT — PAIN DESCRIPTION - PAIN TYPE
TYPE: SURGICAL PAIN
TYPE: SURGICAL PAIN

## 2022-01-05 ASSESSMENT — PAIN DESCRIPTION - DESCRIPTORS
DESCRIPTORS: ACHING
DESCRIPTORS: ACHING

## 2022-01-05 ASSESSMENT — PAIN DESCRIPTION - FREQUENCY
FREQUENCY: CONTINUOUS
FREQUENCY: CONTINUOUS

## 2022-01-05 ASSESSMENT — PAIN DESCRIPTION - PROGRESSION: CLINICAL_PROGRESSION: GRADUALLY IMPROVING

## 2022-01-05 ASSESSMENT — PAIN DESCRIPTION - LOCATION
LOCATION: FOOT
LOCATION: FOOT

## 2022-01-05 ASSESSMENT — PAIN DESCRIPTION - ONSET: ONSET: ON-GOING

## 2022-01-05 ASSESSMENT — PAIN - FUNCTIONAL ASSESSMENT: PAIN_FUNCTIONAL_ASSESSMENT: PREVENTS OR INTERFERES SOME ACTIVE ACTIVITIES AND ADLS

## 2022-01-05 NOTE — CARE COORDINATION
[facility] day: 4     Procedure:  01/01/22  LEFT 1ST TOE AMPUTATION by podiatry   01/05 Run-off study with angioplasty left SFA by IR  Barriers to Discharge: NPO, consent for angioplasty. IV antibiotics, dressing care       Patient Goals/Plan/Treatment Preferences: plans home with girlfriend and new [facility].

## 2022-01-05 NOTE — PROGRESS NOTES
1112 Patient received in IR for procedure. .  1120 Dr. Nicola Chapa in; spoke to patient and assessment obtained. 1123 This procedure has been fully reviewed with the patient and written informed consent has been obtained. 1144 Patient prepped for procedure. 1149 Procedure started with Dr. Nicola Chapa. 1151 Access with use of sonosite. 1201 Angioplasty of the mid SFA with IN. PACT 6x40.   1217 Procedure completed; patient tolerated well. Angio seal device deployed to Right femoral artery. 1219 Bacitracin oint, gauze and op site to right femoral site; area soft to touch with no bleeding noted. 1224 Patient on bed; comfort ensured. Right femoral dressing remains dry and intact with area soft. 1233 Report called to [facility] on 6E.  1236 Patient taken to 6E via bed with family at bedside. Right femoral dressing remains dry and intact with area soft.

## 2022-01-05 NOTE — PROGRESS NOTES
Podiatric Progress Note  Jorge Rashaad  Subjective :   1/5/2022  Patient seen at bedside today alongside of Dr. Tyrell Lombardi. Patient was pleasant, oriented to person, place, and time and in no acute distress. Patient is 4 days status post left first toe amputation. Patient had his angiogram today that was successful. Discussed with patient that he is having interval worsening of the amputation site and that he is at high risk for more proximal amputation, whether partial first ray amputation or transmetatarsal amputation. Patient states that he is wanting to save as much of his foot as possible and would prefer to move forward with partial first ray amputation if it is deemed necessary. No other pedal complaints. 1/4/2022  Patient seen at bedside today on behalf of Dr. Tyrell Lombarid. Patient was pleasant, was oriented to person, place, and time and in no acute distress. Patient is 3 days status post left first toe amputation. Patient states that he is having pain in his left foot at the amputation site, any sort of stinging pain. He is curious what time we will get his angiogram today. Patient also complains of some soreness in his right arm at the site of his previous IV line that was switched to his left hand. Patient denies any nausea, vomiting, fever, chills, chest pain, shortness of breath. No other pedal complaints. 1/3/2022  Patient seen bedside today on behalf of Dr. Tyrell Lombardi. Patient appeared pleasant, was oriented to person, place and time and in no acute distress. Patient is 2 days  S/p left 1st toe amputation. Patient states that he has no questions right now but that he typically gets questions after the visit. Patient denies any N/V/F/C/SOB or CP. Patient has no further pedal concerns at this point in time.      Current Medications:    Current Facility-Administered Medications: midazolam (VERSED) injection 1 mg, 1 mg, IntraVENous, Once  fentaNYL (SUBLIMAZE) injection 50 mcg, 50 mcg, IntraVENous, Once  [START ON 1/6/2022] clopidogrel (PLAVIX) tablet 75 mg, 75 mg, Oral, Daily  0.45 % sodium chloride infusion, , IntraVENous, Continuous  hydroCHLOROthiazide (HYDRODIURIL) tablet 25 mg, 25 mg, Oral, Daily  insulin NPH (HUMULIN N;NOVOLIN N) injection vial 25 Units, 0.2 Units/kg, SubCUTAneous, BID- 8&2  nitroglycerin (NITRO-BID) 2 % ointment 0.5 inch, 0.5 inch, Topical, BID  lisinopril (PRINIVIL;ZESTRIL) tablet 40 mg, 40 mg, Oral, Daily **AND** [DISCONTINUED] hydroCHLOROthiazide (HYDRODIURIL) tablet 12.5 mg, 12.5 mg, Oral, Daily  insulin lispro (HUMALOG) injection vial 0-18 Units, 0-18 Units, SubCUTAneous, TID WC  insulin lispro (HUMALOG) injection vial 0-9 Units, 0-9 Units, SubCUTAneous, Nightly  [COMPLETED] vancomycin (VANCOCIN) 2,000 mg in dextrose 5 % 500 mL IVPB, 2,000 mg, IntraVENous, Once **FOLLOWED BY** vancomycin (VANCOCIN) 1,750 mg in dextrose 5 % 500 mL IVPB, 1,750 mg, IntraVENous, Q12H  hydrALAZINE (APRESOLINE) tablet 25 mg, 25 mg, Oral, Q8H PRN  amitriptyline (ELAVIL) tablet 100 mg, 100 mg, Oral, Nightly  atorvastatin (LIPITOR) tablet 40 mg, 40 mg, Oral, Nightly  cyclobenzaprine (FLEXERIL) tablet 10 mg, 10 mg, Oral, TID PRN  pramipexole (MIRAPEX) tablet 0.25 mg, 0.25 mg, Oral, Nightly  sodium chloride flush 0.9 % injection 5-40 mL, 5-40 mL, IntraVENous, 2 times per day  0.9 % sodium chloride infusion, 25 mL, IntraVENous, PRN  enoxaparin (LOVENOX) injection 40 mg, 40 mg, SubCUTAneous, Daily  ondansetron (ZOFRAN-ODT) disintegrating tablet 4 mg, 4 mg, Oral, Q8H PRN **OR** ondansetron (ZOFRAN) injection 4 mg, 4 mg, IntraVENous, Q6H PRN  potassium chloride (KLOR-CON M) extended release tablet 40 mEq, 40 mEq, Oral, PRN **OR** potassium bicarb-citric acid (EFFER-K) effervescent tablet 40 mEq, 40 mEq, Oral, PRN **OR** potassium chloride 10 mEq/100 mL IVPB (Peripheral Line), 10 mEq, IntraVENous, PRN  magnesium sulfate 2000 mg in 50 mL IVPB premix, 2,000 mg, IntraVENous, PRN  cefepime (MAXIPIME) 1000 mg IVPB minibag, 1,000 mg, IntraVENous, Q8H  vancomycin (VANCOCIN) intermittent dosing (placeholder), , Other, RX Placeholder  sodium chloride flush 0.9 % injection 5-40 mL, 5-40 mL, IntraVENous, PRN  polyethylene glycol (GLYCOLAX) packet 17 g, 17 g, Oral, Daily PRN  acetaminophen (TYLENOL) tablet 650 mg, 650 mg, Oral, Q6H PRN **OR** acetaminophen (TYLENOL) suppository 650 mg, 650 mg, Rectal, Q6H PRN  nicotine (NICODERM CQ) 14 MG/24HR 1 patch, 1 patch, TransDERmal, Daily  gabapentin (NEURONTIN) capsule 100 mg, 100 mg, Oral, TID  glucose (GLUTOSE) 40 % oral gel 15 g, 15 g, Oral, PRN  dextrose 50 % IV solution, 12.5 g, IntraVENous, PRN  glucagon (rDNA) injection 1 mg, 1 mg, IntraMUSCular, PRN  dextrose 5 % solution, 100 mL/hr, IntraVENous, PRN  oxyCODONE (ROXICODONE) immediate release tablet 5 mg, 5 mg, Oral, Q4H PRN **OR** oxyCODONE (ROXICODONE) immediate release tablet 10 mg, 10 mg, Oral, Q4H PRN    Objective     /76   Pulse 67   Temp 98.7 °F (37.1 °C) (Oral)   Resp 18   Ht 5' 10\" (1.778 m)   Wt 280 lb (127 kg)   SpO2 96%   BMI 40.18 kg/m²      I/O:    Intake/Output Summary (Last 24 hours) at 1/5/2022 1750  Last data filed at 1/5/2022 1526  Gross per 24 hour   Intake 2477.34 ml   Output 2050 ml   Net 427.34 ml              Wt Readings from Last 3 Encounters:   01/01/22 280 lb (127 kg)   04/18/18 (!) 300 lb 6.4 oz (136.3 kg)   03/21/18 291 lb 12.8 oz (132.4 kg)       LABS:    Recent Labs     01/05/22 0527   WBC 10.5   HGB 14.6   HCT 44.3           Recent Labs     01/05/22 0527   CREATININE 1.0        Recent Labs     01/05/22 0527   INR 1.08   APTT 29.7      No results for input(s): CKTOTAL, CKMB, CKMBINDEX, TROPONINI in the last 72 hours. Exam:   Dressing intact and well maintained. No apparent strike through noted. Vascular: Dorsalis pedis and posterior tibial pulses are palpable to the RLE.  Dorsalis pedis and posterior tibial pulses are non-palpable to the LLE secondary to edema. Skin temperature is warm to warm from proximal tibial tuberosity to distal digits. Increased warmth to the left great toe. CFT brisk to exposed digits. Edema noted to the LLE.      Dermatologic:  Patient has a surgical incision to the distal aspect of the left foot at stump site of the left hallux. Skin edges are well approximated, stitches are holding position, no areas of dehiscence. The dorsal tissues of the amputation site continue to become more dusky and appear nonviable, with increasing areas of nonviable tissue proximal on medial, proximal to the second metatarsophalangeal joint.     Neurovascular: Light touch sensation diminished bilaterally.      Musculoskeletal: Muscle strength 4/5 for all plantarflexors, dorsiflexors, inverters and everters examined. Decreased ROM noted left AJ. S/p amputation left hallux. Pain on palpation of amputation site.     Left            Imaging  Narrative   PROCEDURE: VL DUP LOWER EXTREMITY ARTERIES LEFT     Impression   1. High-grade stenosis in the mid left superficial femoral artery. 2. Abnormal left GINO suggesting a hemodynamically significant stenosis.         Narrative   4 views left foot. Impression   Impression:   1. Soft tissue swelling with evidence of soft tissue ulceration involving    the first digit. There is associated cortical destruction of the first    distal phalanx, most consistent with osteomyelitis.      ASSESSMENT  Principle  1) Osteomyelitis, left hallux  2) S/p left hallux amputation at metatarsophalangeal joint level    Chronic  Patient Active Problem List   Diagnosis    Adenocarcinoma of Prostate, GS 7, stage T1c.    Onychomycosis    DM type 2 (diabetes mellitus, type 2) (HCC)    Dyslipidemia    DDD (degenerative disc disease), lumbar    Tobacco abuse    Insomnia    Carpal tunnel syndrome of right wrist    Carpal tunnel syndrome of left wrist    Cubital tunnel syndrome on left    Morbid obesity ([address]    Obstructive sleep apnea on CPAP    Restless leg syndrome    Controlled type 2 diabetes mellitus with microalbuminuria ([facility] [address]    Diabetic ulcer of left foot due to type 2 diabetes mellitus (HCC)       PLAN:   - Pt. is a 60 y.o. male   -Patient examined and evaluated  -WBC 10.5, Afebrile  -Tissue culture results yield streptococcus anginosus, streptococcus agalactiae, and rare gram negative bacilli that did not grow on culture. -Blood cultures positive for gram positive cocci in clusters, molecular panel demonstrates positive findings for staphylococcus. Likely contaminant  -X-rays taken and reviewed; impression above  -Continue vancomycin and cefepime per infectious disease  -Arterial doppler reviewed, evidence of high grade stenosis in the mid left superficial femoral artery.   -Patient underwent angiogram by interventional radiology today with successful stenting of the superficial femoral artery, popliteal, trifurcation vessels all widely patent  -Dressing changed, nitroglycerin cream to dorsal flap, gauze, Kerlix, Ace bandage  -Nursing to change dressing once daily, podiatry will change the other time every day. -NWB to LLE. Able to heel WB for transfers and bathroom privileges.    -Discussed with patient that given the amount of change of the dorsal skin, he might be a need for further procedure involving removal of bone for adequate soft tissue closure.  -Will continue to trend duskiness of flap in response to angiogram and nitroglycerin applications  - Podiatry will continue to follow patient    DISPO: Pending response to angiogram and demarcation of tissue on the proximal aspect of the amputation site. Likely planning for surgery, partial first ray amputation versus transmetatarsal amputation, later this week, 1/7/2022    Oksana Bowens D.P.M.   Podiatric Surgical Resident  1/5/2022   5:50 PM

## 2022-01-05 NOTE — OP NOTE
Department of Radiology  Post Procedure Progress Note      Pre-Procedure Diagnosis:  peripheral vascular disease     Procedure Performed:  Run-off study with angioplasty left SFA    Anesthesia: local / versed and fentanyl    Findings: successful    Immediate Complications:  None    Estimated Blood Loss: minimal    SEE DICTATED PROCEDURE NOTE FOR COMPLETE DETAILS.     Candace Chapin MD   1/5/2022 12:19 PM

## 2022-01-05 NOTE — FLOWSHEET NOTE
Pt was in bed and alone at the time of the visit. He was dealing with diabetic ulcer of left foot due to type 2 diabetes mellitus. He said that one of his toes was caught off. He was hopeful but wanted prayer to heal and I prayed asking God to heal him. 01/05/22 1516   Encounter Summary   Services provided to: Patient   Referral/Consult From: [facility] Family members   Continue Visiting Yes  (1/5 )   Complexity of Encounter Moderate   Length of Encounter 15 minutes   Spiritual/M[employer]on   Type Spiritual support   Assessment Approachable;Calm   Intervention Prayer;Nurtured hope; Active listening;Empowerment;Sustaining presence/ Ministry of presence   Outcome Connection/belonging;Expressed gratitude;Encouraged; Hopeful;Receptive

## 2022-01-05 NOTE — H&P
[facility]  Sedation/Analgesia History & Physical    Pt Name: Thomas Clemens  MRN: [id number]  YOB: 1965  Provider Performing Procedure: April Root MD, MD  Primary Care Physician: Laurie Aquino, DO    PRE-PROCEDURE   DNR-CCA/DNR-CC []Yes [x]No  Brief History/Pre-Procedure Diagnosis: peripheral vascular disease           MEDICAL HISTORY  []CAD/Valve  []Liver Disease  []Lung Disease []Diabetes  []Hypertension []Renal Disease  []Additional information:       has a past medical history of Back pain, Carpal tunnel syndrome of right wrist, Hyperlipidemia, Hypertension, Hypogonadism male, Nocturia, Obesity, Osteoarthritis, Prostate cancer ([facility] [address] Restless leg syndrome, Shortness of breath, Sleep apnea, Tobacco abuse, Tobacco user, Type II or unspecified type diabetes mellitus without mention of complication, not stated as uncontrolled, and Urgency of urination. SURGICAL HISTORY   has a past surgical history that includes Hand surgery (1987); Leg Surgery; knee surgery (?); Prostate surgery (12-); Prostate surgery (06-); Leg Surgery (6/13/12); cyst removal (T); Skin tag removal (T); cyst removal (7-5-12); Carpal tunnel release (10/22/12); Carpal tunnel release (11/26/2012); Colonoscopy (2/1/2015); and Toe amputation (Left, 1/1/2022).   Additional information:       ALLERGIES   Allergies as of 01/01/2022 - Fully Reviewed 01/01/2022   Allergen Reaction Noted    Pcn [penicillins] Hives 08/25/2011     Additional information:       MEDICATIONS   Coumadin Use Last 5 Days [x]No []Yes  Antiplatelet drug therapy use last 5 days  [x]No []Yes  Other anticoagulant use last 5 days  [x]No []Yes    Current Facility-Administered Medications:     0.45 % sodium chloride infusion, , IntraVENous, Continuous, Nell Barclay MD, Last Rate: 20 mL/hr at 01/05/22 0501, New Bag at 01/05/22 0501    midazolam (VERSED) injection 1 mg, 1 mg, IntraVENous, Once, Nell Barclay MD    fentaNYL (SUBLIMAZE) injection 50 mcg, 50 mcg, IntraVENous, Once, Shelby Martinez MD    hydroCHLOROthiazide (HYDRODIURIL) tablet 25 mg, 25 mg, Oral, Daily, GARCIA Christian CNP, 25 mg at 01/05/22 0823    insulin NPH (HUMULIN N;NOVOLIN N) injection vial 25 Units, 0.2 Units/kg, SubCUTAneous, BID- 8&2, GARCIA Christian CNP, 25 Units at 01/05/22 0816    nitroglycerin (NITRO-BID) 2 % ointment 0.5 inch, 0.5 inch, Topical, BID, Marine Mcwilliams Sevcik, DPM, 0.5 inch at 01/04/22 1831    lisinopril (PRINIVIL;ZESTRIL) tablet 40 mg, 40 mg, Oral, Daily, 40 mg at 01/05/22 8606 **AND** [DISCONTINUED] hydroCHLOROthiazide (HYDRODIURIL) tablet 12.5 mg, 12.5 mg, Oral, Daily, GARCIA Christian CNP, 12.5 mg at 01/01/22 1123    insulin lispro (HUMALOG) injection vial 0-18 Units, 0-18 Units, SubCUTAneous, TID WC, GARCIA Christian CNP, 3 Units at 01/05/22 0815    insulin lispro (HUMALOG) injection vial 0-9 Units, 0-9 Units, SubCUTAneous, Nightly, GARCIA Christian CNP, 2 Units at 01/04/22 2051    [COMPLETED] vancomycin (VANCOCIN) 2,000 mg in dextrose 5 % 500 mL IVPB, 2,000 mg, IntraVENous, Once, Stopped at 01/01/22 1432 **FOLLOWED BY** vancomycin (VANCOCIN) 1,750 mg in dextrose 5 % 500 mL IVPB, 1,750 mg, IntraVENous, Q12H, RITESH Ivory STACY [facility], Last Rate: 250 mL/hr at 01/05/22 0911, 1,750 mg at 01/05/22 0911    hydrALAZINE (APRESOLINE) tablet 25 mg, 25 mg, Oral, Q8H PRN, GARCIA Regan CNP, 25 mg at 01/04/22 1602    amitriptyline (ELAVIL) tablet 100 mg, 100 mg, Oral, Nightly, Hawa Rosenberg DPM, 100 mg at 01/04/22 2054    atorvastatin (LIPITOR) tablet 40 mg, 40 mg, Oral, Nightly, Hawa Rosenberg DPM, 40 mg at 01/04/22 2054    cyclobenzaprine (FLEXERIL) tablet 10 mg, 10 mg, Oral, TID PRN, Hawa Rosenberg DPM, 10 mg at 01/04/22 0757    pramipexole (MIRAPEX) tablet 0.25 mg, 0.25 mg, Oral, Nightly, CARLOS RoqueM, 0.25 mg at 01/04/22 2054    sodium chloride flush 0.9 % injection 5-40 mL, 5-40 mL, IntraVENous, 2 times per day, Mark Gibbs DPM, 10 mL at 01/04/22 2047    0.9 % sodium chloride infusion, 25 mL, IntraVENous, PRN, Mark Gibbs DPM, Last Rate: 100 mL/hr at 01/05/22 0340, 25 mL at 01/05/22 0340    enoxaparin (LOVENOX) injection 40 mg, 40 mg, SubCUTAneous, Daily, Rosemarie Reeves MD, 40 mg at 01/03/22 0812    ondansetron (ZOFRAN-ODT) disintegrating tablet 4 mg, 4 mg, Oral, Q8H PRN **OR** ondansetron (ZOFRAN) injection 4 mg, 4 mg, IntraVENous, Q6H PRN, Mark Gibbs DPM    potassium chloride (KLOR-CON M) extended release tablet 40 mEq, 40 mEq, Oral, PRN **OR** potassium bicarb-citric acid (EFFER-K) effervescent tablet 40 mEq, 40 mEq, Oral, PRN **OR** potassium chloride 10 mEq/100 mL IVPB (Peripheral Line), 10 mEq, IntraVENous, PRN, Mark Gibbs DPM    magnesium sulfate 2000 mg in 50 mL IVPB premix, 2,000 mg, IntraVENous, PRN, Mark Gibbs DPM    cefepime (MAXIPIME) 1000 mg IVPB minibag, 1,000 mg, IntraVENous, Q8H, Mark Gibbs DPM, Stopped at 01/05/22 0084    vancomycin (VANCOCIN) intermittent dosing (placeholder), , Other, RX Placeholder, Mark Gibbs DPM, Given at 01/01/22 1133    sodium chloride flush 0.9 % injection 5-40 mL, 5-40 mL, IntraVENous, PRN, Mark Gibbs DPM    polyethylene glycol (GLYCOLAX) packet 17 g, 17 g, Oral, Daily PRN, Mark Gibbs DPM    acetaminophen (TYLENOL) tablet 650 mg, 650 mg, Oral, Q6H PRN **OR** acetaminophen (TYLENOL) suppository 650 mg, 650 mg, Rectal, Q6H PRN, Mark Gibbs DPM    nicotine (NICODERM CQ) 14 MG/24HR 1 patch, 1 patch, TransDERmal, Daily, Mark Gibbs, DPM, 1 patch at 01/05/22 0957    gabapentin (NEURONTIN) capsule 100 mg, 100 mg, Oral, TID, Mark Gibbs, DPM, 100 mg at 01/05/22 9978    glucose (GLUTOSE) 40 % oral gel 15 g, 15 g, Oral, PRN, Mark Gibbs, DPM    dextrose 50 % IV solution, 12.5 g, IntraVENous, PRN, Mark Gibbs, DPM    glucagon (rDNA) injection 1 mg, 1 mg, IntraMUSCular, PRN, Mark Gibbs, DPM   dextrose 5 % solution, 100 mL/hr, IntraVENous, PRN, Harvie Du, DPM    oxyCODONE (ROXICODONE) immediate release tablet 5 mg, 5 mg, Oral, Q4H PRN, 5 mg at 01/03/22 1214 **OR** oxyCODONE (ROXICODONE) immediate release tablet 10 mg, 10 mg, Oral, Q4H PRN, Harvie Du, DPM, 10 mg at 01/05/22 0957  Prior to Admission medications    Medication Sig Start Date End Date Taking? Authorizing Provider   tamsulosin (FLOMAX) 0.4 MG capsule take 1 capsule by mouth once daily 10/9/18   Luli Etienne, DO   pramipexole (MIRAPEX) 0.25 MG tablet take 1 tablet by mouth NIGHTLY. 10/9/18   Luli Etienne, DO   amitriptyline (ELAVIL) 100 MG tablet take 1 tablet by mouth NIGHTLY. 10/9/18   Luli Etienne, DO   busPIRone (BUSPAR) 15 MG tablet take 1 tablet by mouth twice a day 5/24/18   Luli Etienne, DO   buPROPion (WELLBUTRIN XL) 150 MG extended release tablet Take 1 tablet by mouth every morning 4/18/18   Luli Etienne, DO   lisinopril-hydrochlorothiazide AW[facility]) 20-12.5 MG per tablet take 1 tablet by mouth twice a day 2/19/18   Luli Etienne, DO   atorvastatin (LIPITOR) 40 MG tablet take 1 tablet by mouth NIGHTLY 2/12/18   Luli Etienne, DO   alogliptin-metformin 12.5-1000 MG TABS TAKE 1 TABLET BY MOUTH TWICE A DAY 2/12/18   Luli Etienne, DO   gabapentin (NEURONTIN) 300 MG capsule Take 800 mg by mouth 3 times daily     Jose Douglas MD   cyclobenzaprine (FLEXERIL) 10 MG tablet Take 10 mg by mouth 3 times daily. Historical Provider, MD   Multiple Vitamin (MULTIVITAMIN PO) Take  by mouth daily. Historical Provider, MD   aspirin 81 MG EC tablet Take 81 mg by mouth daily.       Historical Provider, MD     Additional information:       VITAL SIGNS   Vitals:    01/05/22 0800   BP: (!) 156/72   Pulse: 64   Resp: 18   Temp: 97.9 °F (36.6 °C)   SpO2: 97%       PHYSICAL:   Heart:  [x]Regular rate and rhythm  []Other:    Lungs:  [x]Clear []Other:    Abdomen: [x]Soft    []Other:    Mental Status: [x]Alert & Oriented  []Other:      PLANNED PROCEDURE   []Biospy [x]Arteriogram              []Drainage   []Mediport Insertion  []Fistulogram []IV access       []Vertebroplasty / Augmentation  []IVC filter []Dialysis catheter []Biliary drainage  []Other: []CAPD Catheter []Nephrostomy Tube / Stent  SEDATION  Planned agent:[x]Midazolam []Meperidine [x]Sublimaze []Dilaudid []Morphine     []Diazepam  []Other:     ASA Classification:  []1 [x]2 []3 []4 []5  Class 1: A normal healthy patient  Class 2: Pt with mild to moderate systemic disease  Class 3: Severe systemic disease or disturbance  Class 4: Severe systemic disorders that are already life threatening. Class 5: Moribund pt with little chances of survival, for more than 24 hours. Mallampati I Airway Classification:   []1 [x]2 []3 []4    [x]Pre-procedure diagnostic studies complete and results available. Comment:    [x]Previous sedation/anesthesia experiences assessed. Comment:    [x]The patient is an appropriate candidate to undergo the planned procedure sedation and anesthesia. (Refer to nursing sedation/analgesia documentation record)  [x]Formulation and discussion of sedation/procedure plan, risks, and expectations with patient and/or responsible adult completed. [x]Patient examined immediately prior to the procedure.  (Refer to nursing sedation/analgesia documentation record)    Vanice Moritz, MD, MD  Electronically signed 1/5/2022 at 11:23 AM

## 2022-01-05 NOTE — PROGRESS NOTES
Progress note: Infectious diseases    Patient - Nanda Pantoja,  Age - 60 y.o.    - 1965      Room Number - 7E-62/200-A   MRN -  [id number]   Acct # - [de-identified]  Date of Admission -  2022  2:39 AM    SUBJECTIVE:   He had angioplasty for high grade stenosis  OBJECTIVE   VITALS    height is 5' 10\" (1.778 m) and weight is 280 lb (127 kg). His oral temperature is 98.7 °F (37.1 °C). His blood pressure is 134/76 and his pulse is 67. His respiration is 18 and oxygen saturation is 96%.        Wt Readings from Last 3 Encounters:   22 280 lb (127 kg)   18 (!) 300 lb 6.4 oz (136.3 kg)   18 291 lb 12.8 oz (132.4 kg)       I/O (24 Hours)    Intake/Output Summary (Last 24 hours) at 2022 1809  Last data filed at 2022 1526  Gross per 24 hour   Intake 2477.34 ml   Output 2050 ml   Net 427.34 ml       General Appearance  Awake, alert, oriented,  not  In acute distress  HEENT - normocephalic, atraumatic, pink conjunctiva,  anicteric sclera  Neck - Supple, no mass  Lungs -  Bilateral   air entry, no rhonchi, no wheeze  Cardiovascular - Heart sounds are normal.    Abdomen - soft, not distended, nontender,   Neurologic -oriented  Skin - No bruising or bleeding  Extremities - dressed left foot wound  Ischemic stump  MEDICATIONS:      midazolam  1 mg IntraVENous Once    fentanNYL  50 mcg IntraVENous Once    [START ON 2022] clopidogrel  75 mg Oral Daily    hydroCHLOROthiazide  25 mg Oral Daily    insulin NPH  0.2 Units/kg SubCUTAneous BID- 8&2    nitroglycerin  0.5 inch Topical BID    lisinopril  40 mg Oral Daily    insulin lispro  0-18 Units SubCUTAneous TID     insulin lispro  0-9 Units SubCUTAneous Nightly    vancomycin  1,750 mg IntraVENous Q12H    amitriptyline  100 mg Oral Nightly    atorvastatin  40 mg Oral Nightly    pramipexole  0.25 mg Oral Nightly    sodium chloride flush  5-40 mL IntraVENous 2 times per day    enoxaparin  40 mg SubCUTAneous Daily    cefepime  1,000 mg IntraVENous Q8H    vancomycin (VANCOCIN) intermittent dosing (placeholder)   Other RX Placeholder    nicotine  1 patch TransDERmal Daily    gabapentin  100 mg Oral TID      sodium chloride 20 mL/hr at 01/05/22 0501    sodium chloride 25 mL (01/05/22 0340)    dextrose       hydrALAZINE, cyclobenzaprine, sodium chloride, ondansetron **OR** ondansetron, potassium chloride **OR** potassium alternative oral replacement **OR** potassium chloride, magnesium sulfate, sodium chloride flush, polyethylene glycol, acetaminophen **OR** acetaminophen, glucose, dextrose, glucagon (rDNA), dextrose, oxyCODONE **OR** oxyCODONE      LABS:     CBC:   Recent Labs     01/05/22 0527   WBC 10.5   HGB 14.6        BMP:    Recent Labs     01/05/22 0527   CREATININE 1.0     Calcium:  No results for input(s): CALCIUM in the last 72 hours. Recent Labs     01/05/22  0719 01/05/22  1301 01/05/22  1638   POCGLU 165* 154* 101     HgbA1C: No results for input(s): LABA1C in the last 72 hours. INR:   Recent Labs     01/05/22 0527   INR 1.08     Hepatic:   No results for input(s): ALKPHOS, ALT, AST, PROT, BILITOT, BILIDIR, LABALBU in the last 72 hours.         Problem list of patient:     Patient Active Problem List   Diagnosis Code    Adenocarcinoma of Prostate, GS 7, stage T1c. C61    Onychomycosis B35.1    DM type 2 (diabetes mellitus, type 2) ([facility] [address] E11.9    Dyslipidemia E78.5    DDD (degenerative disc disease), lumbar M51.36    Tobacco abuse Z72.0    Insomnia G47.00    Carpal tunnel syndrome of right wrist G56.01    Carpal tunnel syndrome of left wrist G56.02    Cubital tunnel syndrome on left G56.22    Morbid obesity (HCC) E66.01    Obstructive sleep apnea on CPAP G47.33, Z99.89    Restless leg syndrome G25.81    Controlled type 2 diabetes mellitus with microalbuminuria (HCC) E11.29, R80.9    Diabetic ulcer of left foot due to type 2 diabetes mellitus (HCC) E11.621, L97.529         ASSESSMENT/PLAN   Left foot necrotic wound s/p amputation. the stump became ischemic related to poor flow.   PVD: he had angioplasty by IR  DM with neuropathy  Swab cx report noted: growing mixed yumiko   Will stop vancomycin, add clindamycin       David Correa MD, MD, FACP 1/5/2022 6:09 PM

## 2022-01-05 NOTE — H&P
Formulation and discussion of sedation / procedure plans, risks, benefits, side effects and alternatives with patient and/or responsible adult completed.     Electronically signed by Tian Jones MD on 1/5/2022 at 11:23 AM

## 2022-01-05 NOTE — CARE COORDINATION
1/5/22, 11:38 AM EST    DISCHARGE PLANNING EVALUATION      Planning home at discharge with [facility]. Requesting walker, wheelchair, bedside commode.

## 2022-01-05 NOTE — PROGRESS NOTES
[facility]  INPATIENT PHYSICAL THERAPY  DAILY NOTE  [facility] - 6E-57/057-A      Time In: 9748  Time Out: 8199  Timed Code Treatment Minutes: 12 Minutes  Minutes: 16          Date: 2022  Patient Name: Adry Finley,  Gender:  male        MRN: [id number]  : 1965  (60 y.o.)     Referring Practitioner: Maryan Jett DPM  Diagnosis: diabetic ulcer of left foot due to type 2 DM  Additional Pertinent Hx: 60 y.o. male with PMH of  HTN, hyperlipidemia, T2DM, morbid obesity, tobacco abuse, ASHLEE, RLS, prostate cancer (in remission) who presented to [facility] with with a chief complaint of foot pain. Patient reports that approximately 2 months ago he dropped a can of sausage gravy on his left great toe. States that following the incident he developed \"purple bruising\" of the left great toe. PT underwent a left great toe amputation on 22     Prior Level of Function:  Lives With: Significant other  Type of Home: House  Home Layout: One level  Home Access: Stairs to enter with rails  Entrance Stairs - Number of Steps: 3 steps with 1 rail   Bathroom Shower/Tub: Walk-in shower  Bathroom Toilet: Standard  Bathroom Accessibility: Accessible    Receives Help From: Family  ADL Assistance: [address]: Independent  Homemaking Responsibilities: Yes  Ambulation Assistance: Independent  Transfer Assistance: Independent  Active : Yes  Additional Comments: Pt's girlfriend lives with him. She may be able to assist if needed. Pt reports his home it too small for a w/c for mobility. Restrictions/Precautions:  Restrictions/Precautions: Weight Bearing  Left Lower Extremity Weight Bearing: Non Weight Bearing  Partial Weight Bearing Percentage Or Pounds: may heel wt bear for transfers and bathroom privilages, post op shoe on for comfort.        SUBJECTIVE: nursing ok'd therapy reported that pt is frustrated due to change in his procedure today- pt up in chair he reported that he is waiting for his angiogram today and was willing to get into bed     PAIN: 5/10: at left foot     Vitals: Vitals not assessed per clinical judgement, see nursing flowsheet    OBJECTIVE:  Bed Mobility:  Sit to Supine: Modified Independent     Transfers:  Sit to Stand: Contact Guard Assistance  Stand to Sit:Contact Guard Assistance    Ambulation:  Contact Guard Assistance  Distance: 10x1  Surface: Level Tile  Device:Rolling Walker  Gait Deviations:  Slow no and a little unsteady- pt was able to maintain heel wbing   * we discussed discharge plans and pt reported that he does have steps to get into the house he will have 3 w/ one rail on one side and he uses the brick ledge on the opp side to get into the home- he reported that he will not be able to use of w/c in the home however will need a w/c for Drs appointments as he is to maintain PWB ing and currently only for bathroom privileges and transfers       Functional Outcome Measures: Completed  AM-PAC Inpatient Mobility without Stair Climbing Raw Score : 16  AM-PAC Inpatient without Stair Climbing T-Scale Score : 45.54    ASSESSMENT:  Assessment: Patient progressing toward established goals. and he did maintain his PWB this date however is still only allowed limited distances due to orders. Pt would benefit from cont skilled therapy prior to reutrn home   Activity Tolerance:  Patient tolerance of  treatment: fair. Equipment Recommendations: Other: due to being [address] for transfers and bathroom privileges only he will require RW for in the house and w/c for distances greater than household distance  Discharge Recommendations: Home with Assist as Needed pending clinical course   Plan: Times per week: 6X O  Times per day: Daily  Specific instructions for Next Treatment: therex and mobility with limting distance amb with heel WB LLE    Patient Education  Patient Education: Plan of Care    Goals:  Patient goals : go home  Short term goals  Time Frame for Short term goals: by discharge  Short term goal 1: bed mobility with MOD I to get in/out of bed  Short term goal 2: transfer with MOD I, maintaining heel WB, to get in/out of chairs  Short term goal 3: amb 25'x1 with LRAD and MOD I, maintaining heel WB, to walk safely in home  Long term goals  Time Frame for Long term goals : no LTGs set secondary to short ELOS    Following session, patient left in safe position with all fall risk precautions in place.

## 2022-01-05 NOTE — PROGRESS NOTES
Hospitalist Progress Note      Patient:  [facility]    Unit/Bed:6E-57/057-A  YOB: 1965  MRN: [id number]   Acct: [de-identified]   PCP: Herman Metcalf DO  Date of Admission: 1/1/2022    Assessment/Plan:    1. Left great toe diabetic ulcer secondary to T2DM:               - uncontrolled diabetic. Glucose 186  this morning. HbgA1C 10.8. Start NPH BID. Continue high dose SSI. Carb controlled diet. - S/p 1st left toe amputation on 1/1/2022. Podiatry consulted and following. NWB to LLE; able to heel WB for transfers and bathroom priveleges. Dressing change, Betadine moistened Adaptic to incision, gauze, Kerlix, Ace bandage. Pt to follow up with Dr. Cyndy Skinner upon discharge. - See Microbiology for critical results: IV Cefepime/Vancomycin (started 1/1/2022); continue per ID              - Arterial doppler notable for high grade stenosis in the mid left superficial femoral artery. S/p arteriogram with IR -> continue with Plavix. Will need f/u in 3 months.         2. Obstructive sleep apnea:               - Continue to wear home CPAP at night with home settings.     3. Hyperlipidemia:               - Continue Lipitor 40 mg     4. Essential HTN:   - uncontrolled. Patient has remained mostly hypertensive with some normotensive pressures. - Continue Lisinopril. Increase to HTZ 25. Hydralazine PRN     5. Tobacco abuse:               - discussed risks of smoking              - educated on resources available for cessation. Reports he does not wish to quit at this time.               - Nicotine patch.     6. Restless leg syndrome:               - Continue Mirapex     7. Gait disturbance secondary to left great toe amputation:              - Heel WB per podiatry recs              - Patient reported difficulty with pivoting and balance              - PT/OT consulted              - SW consulted     8.  Morbid obesity: - BMI 40.18; discussed and educated on  lifestyle modifications.              Chief Complaint: Foot pain    Initial H and P:-    \"54 y.o. male with PMH of  HTN, hyperlipidemia, T2DM, morbid obesity, tobacco abuse, ASHLEE, RLS, prostate cancer (in remission) who presented to [facility] with with a chief complaint of foot pain. Patient reports that approximately 2 months ago he dropped a can of sausage gravy on his left great toe. States that following the incident he developed \"purple bruising\" of the left great toe. Stating approximately 2 weeks after the incident the purple bruising had gotten worse and started to experience an increased amount of pain. States he also noticed a \"heavy calus\" formation on his left great toe in which he decided to pull the skin off and noticed and open wound on his toe. States  \"bloody-clear\" drainage started coming from the wound but states the drainage coming from his toe is now \"thick yellow. \" Endorses he believes wound on his toes was resulted from wearing size 12 rubber boots at work, which is not his normal shoe size. Also endorsed his tennis shoes are not well fitting and that a piece of plastic from his shoe has been rubbing his left toe. Currently rates pain 10/10 in the left great toe, that radiates up left leg and is aggravated by touch and movement. No alleviating factors. States he has had difficulty with walking d/t the pain. Reports associated left leg swelling and redness. Denies CP, SOB, cough, fever, chills, weakness.      While in the ED, x ray of foot performed and notable for soft tissue swelling with evidence of soft tissue ulceration involving the first digit. Associated cortical destruction of the first distal phalanx concerning for osteomyelitis. Arterial doppler notable for high-grade stenosis in  the mid left superficial femoral artery and abnormal left GINO concerning for significant stenosis. No DVT noted venous doppler.  Lab work in the ED revealed leukocytosis of 11.2, PCT 0.06, lactic acid 1.0. Patient afebrile with stable vital signs. No concern for bacterial sepsis at this time. Cefepime and Vancomycin ordered. Glucose elevated at 333. Patient reports that he has not taken his home medications in over a year d/t lack of insurance and not having a PCP. HbgA1C ordered and pending. Creatinine 0.9 with GFR 86. Monitor. 2 peripheral blood cultures collected and pending. Wound culture of left great toe collected and pending. Podiatry was consulted and evaluated patient while in the ER. Per podiatry notes, plan for OR today 1/1/22 for amputation of left great toe. At this time patient will be admitted to hospitalist services for further medical management. \"    Subjective (past 24 hours):   1/5: pt doing well, back from angiogram. Denies any fever/chills. Admits to SOB while laying down. No CP. Past medical history, family history, social history and allergies reviewed again and is unchanged since admission. ROS (All review of systems completed. Pertinent positives noted.  Otherwise All other systems reviewed and negative.)     Medications:  Reviewed    Infusion Medications    sodium chloride 20 mL/hr at 01/05/22 0501    sodium chloride 25 mL (01/05/22 0340)    dextrose       Scheduled Medications    midazolam  1 mg IntraVENous Once    fentanNYL  50 mcg IntraVENous Once    [START ON 1/6/2022] clopidogrel  75 mg Oral Daily    hydroCHLOROthiazide  25 mg Oral Daily    insulin NPH  0.2 Units/kg SubCUTAneous BID- 8&2    nitroglycerin  0.5 inch Topical BID    lisinopril  40 mg Oral Daily    insulin lispro  0-18 Units SubCUTAneous TID     insulin lispro  0-9 Units SubCUTAneous Nightly    vancomycin  1,750 mg IntraVENous Q12H    amitriptyline  100 mg Oral Nightly    atorvastatin  40 mg Oral Nightly    pramipexole  0.25 mg Oral Nightly    sodium chloride flush  5-40 mL IntraVENous 2 times per day    enoxaparin  40 mg SubCUTAneous Daily  cefepime  1,000 mg IntraVENous Q8H    vancomycin (VANCOCIN) intermittent dosing (placeholder)   Other RX Placeholder    nicotine  1 patch TransDERmal Daily    gabapentin  100 mg Oral TID     PRN Meds: hydrALAZINE, cyclobenzaprine, sodium chloride, ondansetron **OR** ondansetron, potassium chloride **OR** potassium alternative oral replacement **OR** potassium chloride, magnesium sulfate, sodium chloride flush, polyethylene glycol, acetaminophen **OR** acetaminophen, glucose, dextrose, glucagon (rDNA), dextrose, oxyCODONE **OR** oxyCODONE      Intake/Output Summary (Last 24 hours) at 1/5/2022 1735  Last data filed at 1/5/2022 1526  Gross per 24 hour   Intake 3077.34 ml   Output 2050 ml   Net 1027.34 ml       Diet:  ADULT DIET; Regular; 4 carb choices (60 gm/meal)    Exam:  /76   Pulse 67   Temp 98.7 °F (37.1 °C) (Oral)   Resp 18   Ht 5' 10\" (1.778 m)   Wt 280 lb (127 kg)   SpO2 96%   BMI 40.18 kg/m²   General appearance: obese, no apparent distress, appears stated age and cooperative. HEENT: Pupils equal, round, and reactive to light. Conjunctivae/corneas clear. Neck: Supple, with full range of motion. No jugular venous distention. Trachea midline. Respiratory:  Normal respiratory effort. Clear to auscultation, bilaterally without Rales/Wheezes/Rhonchi. Cardiovascular: Regular rate and rhythm with normal S1/S2 without murmurs, rubs or gallops. Abdomen: Soft, non-tender, non-distended with normal bowel sounds. Musculoskeletal: LLE wrapped not visualized  Skin: Skin color, texture, turgor normal.  No rashes or lesions. Neurologic:  Neurovascularly intact without any focal sensory/motor deficits.  Cranial nerves: II-XII intact, grossly non-focal.  Psychiatric: Alert and oriented, thought content appropriate, normal insight  Capillary Refill: Brisk,< 3 seconds   Peripheral Pulses: +2 palpable, equal bilaterally     Labs:   Recent Labs     01/05/22  0527   WBC 10.5   HGB 14.6   HCT 44.3    Recent Labs     01/05/22 0527   CREATININE 1.0     No results for input(s): AST, ALT, BILIDIR, BILITOT, ALKPHOS in the last 72 hours. Recent Labs     01/05/22 0527   INR 1.08     No results for input(s): Alejandro Sol in the last 72 hours. Microbiology:    Blood culture #1:   Lab Results   Component Value Date    BC No growth-preliminary  01/01/2022    BC  01/01/2022     possible contamination; clinical correlation required        Blood culture #2:No results found for: BLOODCULT2    Organism:  Lab Results   Component Value Date    ORG Streptococcus agalactiae - (Group B) 01/01/2022    ORG Streptococcus anginosus 01/01/2022    ORG mixed anaerobic growth 01/01/2022         Lab Results   Component Value Date    LABGRAM  01/01/2022     Few segmented neutrophils observed. Rare epithelial cells observed. Moderate gram positive cocci occurring singly and in pairs. Rare gram negative bacilli. Rare small gram positive bacilli. MRSA culture only:No results found for: [facility]    Urine culture: No results found for: LABURIN    Respiratory culture: No results found for: CULTRESP    Aerobic and Anaerobic :  Lab Results   Component Value Date    LABAERO  01/01/2022     Culture also yielded light growth of gram positive bacilli most consistent with Corynebacterium species. Direct gram stain indicated rare gram negative bacilli which did not grow on culture. This could be inhibited growth due to antibiotic therapy, a fastidious organism or an anaerobic organism. If a true mixed aerobic and anaerobic infection is suspected, then broad spectrum empiric antibiotic therapy is indicated and should include coverage for anaerobic organisms. LABAERO  01/01/2022     moderate growth Group B streptococci are suspectible to ampicillin, penicillin and cefazolin, but may be erythromycin and/or clindamycin resistant. Contact microbiology if erythromycin and/or clindamycin testing is necessary.      LABAERO  01/01/2022 moderate growth S. anginosus group are largely susceptible to beta-lactam agents; ceftriaxone is the preferred agent. If allergy or resistance precludes use of beta-lactam agents, vancomycin is an appropriate alternative agent. Lab Results   Component Value Date    LABANAE  01/01/2022     Culture yielded heavy mixed anaerobic growth, including multiple colony types of both gram negative bacilli and gram positive cocci. If a true mixed aerobic and anaerobic infection is suspected, then broad spectrum empiric antibiotic therapy is indicated and should include coverage for anaerobic organisms. Urinalysis:      Lab Results   Component Value Date    NITRU Negative 10/03/2017    WBCUA 0 09/23/2014    BACTERIA NONE 09/23/2014    RBCUA 0 09/23/2014    BLOODU Trace-intact 10/03/2017    BLOODU SMALL 09/23/2014    SPECGRAV 1.015 08/29/2011    GLUCOSEU 250 10/03/2017       Radiology:  IR ANGIOGRAM EXTREMITY LEFT   Final Result   1. Status post successful angioplasty of a severely stenotic lesion mid left SFA with an excellent result. .    2. Patient was started on Plavix regimen today and will be scheduled for a follow-up visit with groin check, sometime in the next few days, in addition to a routine follow-up visit in 3 months with arterial Doppler imaging at that time. .            **This report has been created using voice recognition software. It may contain minor errors which are inherent in voice recognition technology. **                        Final report electronically signed by Dr. Claryce Hodgkins on 1/5/2022 2:40 PM      VL DUP LOWER EXTREMITY ARTERIES LEFT   Final Result   1. High-grade stenosis in the mid left superficial femoral artery. 2. Abnormal left GINO suggesting a hemodynamically significant stenosis. **This report has been created using voice recognition software. It may contain minor errors which are inherent in voice recognition technology. **      Final report electronically signed by Dr. Vidal Robins on 1/1/2022 9:40 AM      VL DUP LOWER EXTREMITY VENOUS LEFT   Final Result   No evidence of a DVT. **This report has been created using voice recognition software. It may contain minor errors which are inherent in voice recognition technology. **      Final report electronically signed by Dr. Vidal Robins on 1/1/2022 9:27 AM      XR FOOT LEFT (MIN 3 VIEWS)   Final Result   Impression:   1. Soft tissue swelling with evidence of soft tissue ulceration involving    the first digit. There is associated cortical destruction of the first    distal phalanx, most consistent with osteomyelitis. This document has been electronically signed by: Vicenta Pruett MD on    01/01/2022 06:31 AM        XR FOOT LEFT (MIN 3 VIEWS)    Result Date: 1/1/2022  4 views left foot. Comparison: None. Findings: There is soft tissue swelling and ulceration involving the first digit. There is associated cortical destruction of the medial aspect of the first distal phalanx, concerning for osteomyelitis. No radiopaque foreign body is identified. No acute fracture or dislocation is seen. There are large superior and inferior calcaneal spurs. Mild degenerative changes are present at the first metatarsal phalangeal joint. Impression: 1. Soft tissue swelling with evidence of soft tissue ulceration involving the first digit. There is associated cortical destruction of the first distal phalanx, most consistent with osteomyelitis. This document has been electronically signed by: Vicenta Pruett MD on 01/01/2022 06:31 AM    VL DUP LOWER EXTREMITY ARTERIES LEFT    Result Date: 1/1/2022  PROCEDURE: VL DUP LOWER EXTREMITY ARTERIES LEFT CLINICAL INFORMATION: non-palpable pulses . Nonhealing wound left great toe. COMPARISON: No prior study. TECHNIQUE: Arterial doppler ultrasound was performed of the left lower extremity using gray scale, color flow and spectral doppler imaging. Velocities were also recorded. FINDINGS: LEFT ARTERY (PSV cm/sec) ? ??????????????????????? CFA ---------------> 121 PSV cm/sec PROF -------------> 100 PSV cm/sec SFA PROX ------->77 PSV cm/sec SFA MID ---------->>454 PSV cm/sec SFA DIST -------->58 PSV cm/sec POP A PROX --->62 PSV cm/sec POP A DIST ---->114 PSV cm/sec PTA --------------->42 PSV cm/sec PERONEAL ----->40 PSV cm/sec SEAN ----------------> 42 PSV cm/sec GINO RIGHT SEAN----->1.07 PTA----->1.07 GINO LEFT SEAN----->0.65 PTA----->0.56 There is triphasic waveform in the left common femoral artery. In the proximal left superficial femoral artery, the waveform becomes dampened. The velocity decreases. In the mid left superficial femoral artery, the velocity is markedly elevated. There is  significant spectral broadening. This is consistent with a high-grade stenosis in the mid superficial femoral artery. Distal to this, there are dampened waveforms throughout. The waveform becomes biphasic. There is a significant reduction the peak systolic velocity in the distal superficial femoral artery. The popliteal, anterior tibial, posterior tibial and peroneal arteries are patent. All have dampened waveforms. These are biphasic. There are reduced ABIs on the left compared to the right. 1. High-grade stenosis in the mid left superficial femoral artery. 2. Abnormal left GINO suggesting a hemodynamically significant stenosis. **This report has been created using voice recognition software. It may contain minor errors which are inherent in voice recognition technology. ** Final report electronically signed by Dr. Mateusz Liu on 1/1/2022 9:40 AM    VL DUP LOWER EXTREMITY VENOUS LEFT    Result Date: 1/1/2022  PROCEDURE: VL DUP LOWER EXTREMITY VENOUS LEFT CLINICAL INFORMATION: pain, swelling. Nonhealing ulcer left great toe. COMPARISON: No prior study. TECHNIQUE: Venous doppler ultrasound was performed of the left lower extremity using gray scale, color flow and spectral doppler imaging.  FINDINGS: There is normal color flow, spectral analysis and compressibility of the common femoral vein, femoral vein and popliteal vein on the left . There is mild thickening of the walls of popliteal vein. There is normal color flow and compressibility in the posterior tibial veins, anterior tibial veins and peroneal veins. There is normal color flow, spectral analysis and compressibility in the contralateral common femoral vein. The greater saphenous vein is patent. The gastrocnemius vein is patent. No evidence of a DVT. **This report has been created using voice recognition software. It may contain minor errors which are inherent in voice recognition technology. ** Final report electronically signed by Dr. Laura Puentes on 1/1/2022 9:27 AM      Electronically signed by Kemi Fontenot PA-C on 1/5/2022 at 5:35 PM

## 2022-01-05 NOTE — PROGRESS NOTES
[facility]  OCCUPATIONAL THERAPY MISSED TREATMENT NOTE  [facility] 6E  6E-57/057-A      Date: 2022  Patient Name: Richa Perez        CSN: [id number]   : 1965  (60 y.o.)  Gender: male   Referring Practitioner: EMILY Kwok CNP  Diagnosis: diabetic ulcer of te left foot due to DM II    REASON FOR MISSED TREATMENT: Patient Refused     RN reporting pt no longer on bedrest following angioplasty earlier today - okay'ed getting up to the chair and only minimal activity. Tech in room upon arrival and just assisted pt to chair. Pt declining UB exercises at this time due to feeling very tired and that he stays active/moving while in recliner. Will check back tomorrow  as able.

## 2022-01-06 LAB
ANION GAP SERPL CALCULATED.3IONS-SCNC: 12 MEQ/L (ref 8–16)
BLOOD CULTURE, ROUTINE: NORMAL
BUN BLDV-MCNC: 16 MG/DL (ref 7–22)
CALCIUM SERPL-MCNC: 9.1 MG/DL (ref 8.5–10.5)
CHLORIDE BLD-SCNC: 95 MEQ/L (ref 98–111)
CO2: 23 MEQ/L (ref 23–33)
CREAT SERPL-MCNC: 0.9 MG/DL (ref 0.4–1.2)
ERYTHROCYTE [DISTWIDTH] IN BLOOD BY AUTOMATED COUNT: 13.3 % (ref 11.5–14.5)
ERYTHROCYTE [DISTWIDTH] IN BLOOD BY AUTOMATED COUNT: 42.4 FL (ref 35–45)
GFR SERPL CREATININE-BSD FRML MDRD: 86 ML/MIN/1.73M2
GLUCOSE BLD-MCNC: 126 MG/DL (ref 70–108)
GLUCOSE BLD-MCNC: 144 MG/DL (ref 70–108)
GLUCOSE BLD-MCNC: 170 MG/DL (ref 70–108)
GLUCOSE BLD-MCNC: 187 MG/DL (ref 70–108)
GLUCOSE BLD-MCNC: 187 MG/DL (ref 70–108)
HCT VFR BLD CALC: 46.4 % (ref 42–52)
HEMOGLOBIN: 15.3 GM/DL (ref 14–18)
MCH RBC QN AUTO: 28.8 PG (ref 26–33)
MCHC RBC AUTO-ENTMCNC: 33 GM/DL (ref 32.2–35.5)
MCV RBC AUTO: 87.2 FL (ref 80–94)
PLATELET # BLD: 185 THOU/MM3 (ref 130–400)
PMV BLD AUTO: 11 FL (ref 9.4–12.4)
POTASSIUM SERPL-SCNC: 3.6 MEQ/L (ref 3.5–5.2)
RBC # BLD: 5.32 MILL/MM3 (ref 4.7–6.1)
REASON FOR REJECTION: NORMAL
REJECTED TEST: NORMAL
SODIUM BLD-SCNC: 130 MEQ/L (ref 135–145)
WBC # BLD: 10 THOU/MM3 (ref 4.8–10.8)

## 2022-01-06 PROCEDURE — 6370000000 HC RX 637 (ALT 250 FOR IP)

## 2022-01-06 PROCEDURE — 6360000002 HC RX W HCPCS

## 2022-01-06 PROCEDURE — 6370000000 HC RX 637 (ALT 250 FOR IP): Performed by: STUDENT IN AN ORGANIZED HEALTH CARE EDUCATION/TRAINING PROGRAM

## 2022-01-06 PROCEDURE — 82948 REAGENT STRIP/BLOOD GLUCOSE: CPT

## 2022-01-06 PROCEDURE — 2500000003 HC RX 250 WO HCPCS: Performed by: INTERNAL MEDICINE

## 2022-01-06 PROCEDURE — 97530 THERAPEUTIC ACTIVITIES: CPT

## 2022-01-06 PROCEDURE — 99233 SBSQ HOSP IP/OBS HIGH 50: CPT | Performed by: PHYSICIAN ASSISTANT

## 2022-01-06 PROCEDURE — 36415 COLL VENOUS BLD VENIPUNCTURE: CPT

## 2022-01-06 PROCEDURE — 2580000003 HC RX 258

## 2022-01-06 PROCEDURE — 97110 THERAPEUTIC EXERCISES: CPT

## 2022-01-06 PROCEDURE — 1200000000 HC SEMI PRIVATE

## 2022-01-06 PROCEDURE — 80048 BASIC METABOLIC PNL TOTAL CA: CPT

## 2022-01-06 PROCEDURE — 85027 COMPLETE CBC AUTOMATED: CPT

## 2022-01-06 RX ADMIN — CLINDAMYCIN IN 5 PERCENT DEXTROSE 300 MG: 6 INJECTION, SOLUTION INTRAVENOUS at 04:56

## 2022-01-06 RX ADMIN — CLINDAMYCIN IN 5 PERCENT DEXTROSE 300 MG: 6 INJECTION, SOLUTION INTRAVENOUS at 21:10

## 2022-01-06 RX ADMIN — INSULIN HUMAN 25 UNITS: 100 INJECTION, SUSPENSION SUBCUTANEOUS at 13:13

## 2022-01-06 RX ADMIN — CEFEPIME 1000 MG: 1 INJECTION, POWDER, FOR SOLUTION INTRAMUSCULAR; INTRAVENOUS at 11:04

## 2022-01-06 RX ADMIN — CLINDAMYCIN IN 5 PERCENT DEXTROSE 300 MG: 6 INJECTION, SOLUTION INTRAVENOUS at 13:11

## 2022-01-06 RX ADMIN — PRAMIPEXOLE DIHYDROCHLORIDE 0.25 MG: 0.25 TABLET ORAL at 20:26

## 2022-01-06 RX ADMIN — NITROGLYCERIN 0.5 INCH: 20 OINTMENT TOPICAL at 15:28

## 2022-01-06 RX ADMIN — GABAPENTIN 100 MG: 100 CAPSULE ORAL at 09:07

## 2022-01-06 RX ADMIN — CEFEPIME 1000 MG: 1 INJECTION, POWDER, FOR SOLUTION INTRAMUSCULAR; INTRAVENOUS at 20:33

## 2022-01-06 RX ADMIN — SODIUM CHLORIDE, PRESERVATIVE FREE 10 ML: 5 INJECTION INTRAVENOUS at 09:25

## 2022-01-06 RX ADMIN — OXYCODONE 10 MG: 5 TABLET ORAL at 09:06

## 2022-01-06 RX ADMIN — LISINOPRIL 40 MG: 40 TABLET ORAL at 09:08

## 2022-01-06 RX ADMIN — ATORVASTATIN CALCIUM 40 MG: 40 TABLET, FILM COATED ORAL at 20:26

## 2022-01-06 RX ADMIN — CEFEPIME 1000 MG: 1 INJECTION, POWDER, FOR SOLUTION INTRAMUSCULAR; INTRAVENOUS at 02:42

## 2022-01-06 RX ADMIN — SODIUM CHLORIDE, PRESERVATIVE FREE 10 ML: 5 INJECTION INTRAVENOUS at 20:30

## 2022-01-06 RX ADMIN — OXYCODONE 5 MG: 5 TABLET ORAL at 17:34

## 2022-01-06 RX ADMIN — GABAPENTIN 100 MG: 100 CAPSULE ORAL at 20:26

## 2022-01-06 RX ADMIN — NITROGLYCERIN 0.5 INCH: 20 OINTMENT TOPICAL at 03:03

## 2022-01-06 RX ADMIN — GABAPENTIN 100 MG: 100 CAPSULE ORAL at 14:39

## 2022-01-06 RX ADMIN — AMITRIPTYLINE HYDROCHLORIDE 100 MG: 100 TABLET, FILM COATED ORAL at 20:26

## 2022-01-06 RX ADMIN — HYDROCHLOROTHIAZIDE 25 MG: 25 TABLET ORAL at 09:08

## 2022-01-06 ASSESSMENT — PAIN DESCRIPTION - PAIN TYPE
TYPE: SURGICAL PAIN

## 2022-01-06 ASSESSMENT — PAIN SCALES - GENERAL
PAINLEVEL_OUTOF10: 7
PAINLEVEL_OUTOF10: 4
PAINLEVEL_OUTOF10: 2
PAINLEVEL_OUTOF10: 4
PAINLEVEL_OUTOF10: 4
PAINLEVEL_OUTOF10: 7
PAINLEVEL_OUTOF10: 2

## 2022-01-06 ASSESSMENT — PAIN DESCRIPTION - LOCATION
LOCATION: FOOT

## 2022-01-06 ASSESSMENT — PAIN DESCRIPTION - ORIENTATION
ORIENTATION: LEFT

## 2022-01-06 ASSESSMENT — PAIN DESCRIPTION - DESCRIPTORS
DESCRIPTORS: SHARP;THROBBING
DESCRIPTORS: ACHING;THROBBING
DESCRIPTORS: THROBBING
DESCRIPTORS: THROBBING

## 2022-01-06 NOTE — PROGRESS NOTES
Dressing on L. Greater toe completed. Nitroglycerin ointment applied. Patient tolerated dressing change well, stating pain is at a 4/10 right now.

## 2022-01-06 NOTE — PROGRESS NOTES
Hospitalist Progress Note      Patient:  Freddy Barksdale    Unit/Bed:6E-57/057-A  YOB: 1965  MRN: [id number]   Acct: [de-identified]   PCP: Andrei Reardon DO  Date of Admission: 1/1/2022    Assessment/Plan:    1. Left great toe diabetic ulcer secondary to T2DM:               - S/p 1st left toe amputation on 1/1/2022. Podiatry consulted and following. NWB to LLE; Tissue culture results yield streptococcus anginosus, streptococcus agalactiae, and rare gram negative bacilli that did not grow on culture -> stopped Vanc, add Clinda. Appreciate ID. Likely planning surgery later this week 1/7 for partial first ray amputation versus transmetatarsal amputation. PAD: Arterial doppler notable for high grade stenosis in the mid left superficial femoral artery. S/p arteriogram with IR -> continue with Plavix. Will need f/u in 3 months.         2. Obstructive sleep apnea:               - Continue to wear home CPAP at night with home settings.     3. Hyperlipidemia:               - Continue Lipitor 40 mg     4. Essential HTN:   - uncontrolled. Patient has remained mostly hypertensive with some normotensive pressures. - Continue Lisinopril. Increase to HTZ 25. Hydralazine PRN     5. Tobacco abuse:               - discussed risks of smoking              - educated on resources available for cessation. Reports he does not wish to quit at this time.               - Nicotine patch.     6. Restless leg syndrome:               - Continue Mirapex     7. Gait disturbance secondary to left great toe amputation:              - Heel WB per podiatry recs              - Patient reported difficulty with pivoting and balance              - PT/OT consulted              - SW consulted     8. Morbid obesity:               - BMI 40.18; discussed and educated on  lifestyle modifications.     9. Uncontrolled NIDM II: continue with SSI. Carb control. Monitor blood glucose with ACCU. 10.  Epistaxis: Likely secondary to therapy with Plavix and Lovenox, easily stemmed with pressure per patient. If continues will consult ENT. Hemoglobin stable.             Chief Complaint: Foot pain    Initial H and P:-    \"54 y.o. male with PMH of  HTN, hyperlipidemia, T2DM, morbid obesity, tobacco abuse, ASHLEE, RLS, prostate cancer (in remission) who presented to [facility] with a chief complaint of foot pain. Patient reports that approximately 2 months ago he dropped a can of sausage gravy on his left great toe. States that following the incident he developed \"purple bruising\" of the left great toe. Stating approximately 2 weeks after the incident the purple bruising had gotten worse and started to experience an increased amount of pain. States he also noticed a \"heavy calus\" formation on his left great toe in which he decided to pull the skin off and noticed and open wound on his toe. States  \"bloody-clear\" drainage started coming from the wound but states the drainage coming from his toe is now \"thick yellow. \" Endorses he believes wound on his toes was resulted from wearing size 12 rubber boots at work, which is not his normal shoe size. Also endorsed his tennis shoes are not well fitting and that a piece of plastic from his shoe has been rubbing his left toe. Currently rates pain 10/10 in the left great toe, that radiates up left leg and is aggravated by touch and movement. No alleviating factors. States he has had difficulty with walking d/t the pain. Reports associated left leg swelling and redness. Denies CP, SOB, cough, fever, chills, weakness.      While in the ED, x ray of foot performed and notable for soft tissue swelling with evidence of soft tissue ulceration involving the first digit. Associated cortical destruction of the first distal phalanx concerning for osteomyelitis.  Arterial doppler notable for high-grade stenosis in  the mid left superficial femoral artery and abnormal left GINO concerning for significant stenosis. No DVT noted venous doppler. Lab work in the ED revealed leukocytosis of 11.2, PCT 0.06, lactic acid 1.0. Patient afebrile with stable vital signs. No concern for bacterial sepsis at this time. Cefepime and Vancomycin ordered. Glucose elevated at 333. Patient reports that he has not taken his home medications in over a year d/t lack of insurance and not having a PCP. HbgA1C ordered and pending. Creatinine 0.9 with GFR 86. Monitor. 2 peripheral blood cultures collected and pending. Wound culture of left great toe collected and pending. Podiatry was consulted and evaluated patient while in the ER. Per podiatry notes, plan for OR today 1/1/22 for amputation of left great toe. At this time patient will be admitted to hospitalist services for further medical management. \"    1/5: pt doing well, back from angiogram. Denies any fever/chills. Admits to SOB while laying down. No CP. Subjective (past 24 hours):   1/6: Patient doing okay, sitting up in his chair. Possible amputation planned tomorrow with podiatry versus continue with ABX. Patient denies any fever/chills. He did have epistaxis overnight that lasted about 20 minutes      Past medical history, family history, social history and allergies reviewed again and is unchanged since admission. ROS (All review of systems completed. Pertinent positives noted.  Otherwise All other systems reviewed and negative.)     Medications:  Reviewed    Infusion Medications    sodium chloride 20 mL/hr at 01/05/22 0501    sodium chloride 25 mL (01/05/22 0340)    dextrose       Scheduled Medications    midazolam  1 mg IntraVENous Once    fentanNYL  50 mcg IntraVENous Once    clopidogrel  75 mg Oral Daily    clindamycin (CLEOCIN) IV  300 mg IntraVENous Q8H    hydroCHLOROthiazide  25 mg Oral Daily    insulin NPH  0.2 Units/kg SubCUTAneous BID- 8&2    nitroglycerin  0.5 inch Topical BID    lisinopril  40 mg Oral Daily    insulin lispro  0-18 Units SubCUTAneous TID     insulin lispro  0-9 Units SubCUTAneous Nightly    amitriptyline  100 mg Oral Nightly    atorvastatin  40 mg Oral Nightly    pramipexole  0.25 mg Oral Nightly    sodium chloride flush  5-40 mL IntraVENous 2 times per day    enoxaparin  40 mg SubCUTAneous Daily    cefepime  1,000 mg IntraVENous Q8H    nicotine  1 patch TransDERmal Daily    gabapentin  100 mg Oral TID     PRN Meds: hydrALAZINE, cyclobenzaprine, sodium chloride, ondansetron **OR** ondansetron, potassium chloride **OR** potassium alternative oral replacement **OR** potassium chloride, magnesium sulfate, sodium chloride flush, polyethylene glycol, acetaminophen **OR** acetaminophen, glucose, dextrose, glucagon (rDNA), dextrose, oxyCODONE **OR** oxyCODONE      Intake/Output Summary (Last 24 hours) at 1/6/2022 0901  Last data filed at 1/6/2022 2447  Gross per 24 hour   Intake 2590 ml   Output 2150 ml   Net 440 ml       Diet:  ADULT DIET; Regular; 4 carb choices (60 gm/meal)    Exam:  BP (!) 173/91   Pulse 62   Temp 97.5 °F (36.4 °C) (Oral)   Resp 20   Ht 5' 10\" (1.778 m)   Wt 280 lb (127 kg)   SpO2 98%   BMI 40.18 kg/m²   General appearance: obese, no apparent distress, appears stated age and cooperative. HEENT: Pupils equal, round, and reactive to light. Conjunctivae/corneas clear. Neck: Supple, with full range of motion. No jugular venous distention. Trachea midline. Respiratory:  Normal respiratory effort. Clear to auscultation, bilaterally without Rales/Wheezes/Rhonchi. Cardiovascular: Regular rate and rhythm with normal S1/S2 without murmurs, rubs or gallops. Abdomen: Soft, non-tender, non-distended with normal bowel sounds. Musculoskeletal: LLE wrapped not visualized  Skin: Skin color, texture, turgor normal.  No rashes or lesions. Neurologic:  Neurovascularly intact without any focal sensory/motor deficits.  Cranial nerves: II-XII intact, grossly non-focal.  Psychiatric: Alert and oriented, thought content appropriate, normal insight  Capillary Refill: Brisk,< 3 seconds   Peripheral Pulses: +2 palpable, equal bilaterally     Labs:   Recent Labs     01/05/22 0527 01/06/22  0604   WBC 10.5 10.0   HGB 14.6 15.3   HCT 44.3 46.4    185     Recent Labs     01/05/22 0527   CREATININE 1.0     No results for input(s): AST, ALT, BILIDIR, BILITOT, ALKPHOS in the last 72 hours. Recent Labs     01/05/22 0527   INR 1.08     No results for input(s): Ethelene Soulier in the last 72 hours. Microbiology:    Blood culture #1:   Lab Results   Component Value Date    BC No growth-preliminary  01/01/2022    BC  01/01/2022     possible contamination; clinical correlation required        Blood culture #2:No results found for: BLOODCULT2    Organism:  Lab Results   Component Value Date    ORG Streptococcus agalactiae - (Group B) 01/01/2022    ORG Streptococcus anginosus 01/01/2022    ORG mixed anaerobic growth 01/01/2022         Lab Results   Component Value Date    LABGRAM  01/01/2022     Few segmented neutrophils observed. Rare epithelial cells observed. Moderate gram positive cocci occurring singly and in pairs. Rare gram negative bacilli. Rare small gram positive bacilli. MRSA culture only:No results found for: [facility]    Urine culture: No results found for: LABURIN    Respiratory culture: No results found for: CULTRESP    Aerobic and Anaerobic :  Lab Results   Component Value Date    LABAERO  01/01/2022     Culture also yielded light growth of gram positive bacilli most consistent with Corynebacterium species. Direct gram stain indicated rare gram negative bacilli which did not grow on culture. This could be inhibited growth due to antibiotic therapy, a fastidious organism or an anaerobic organism.  If a true mixed aerobic and anaerobic infection is suspected, then broad spectrum empiric antibiotic therapy is indicated and should include coverage for anaerobic organisms. LABAERO  01/01/2022     moderate growth Group B streptococci are suspectible to ampicillin, penicillin and cefazolin, but may be erythromycin and/or clindamycin resistant. Contact microbiology if erythromycin and/or clindamycin testing is necessary. LABAERO  01/01/2022     moderate growth S. anginosus group are largely susceptible to beta-lactam agents; ceftriaxone is the preferred agent. If allergy or resistance precludes use of beta-lactam agents, vancomycin is an appropriate alternative agent. Lab Results   Component Value Date    LABANAE  01/01/2022     Culture yielded heavy mixed anaerobic growth, including multiple colony types of both gram negative bacilli and gram positive cocci. If a true mixed aerobic and anaerobic infection is suspected, then broad spectrum empiric antibiotic therapy is indicated and should include coverage for anaerobic organisms. Urinalysis:      Lab Results   Component Value Date    NITRU Negative 10/03/2017    WBCUA 0 09/23/2014    BACTERIA NONE 09/23/2014    RBCUA 0 09/23/2014    BLOODU Trace-intact 10/03/2017    BLOODU SMALL 09/23/2014    SPECGRAV 1.015 08/29/2011    GLUCOSEU 250 10/03/2017       Radiology:  IR ANGIOGRAM EXTREMITY LEFT   Final Result   1. Status post successful angioplasty of a severely stenotic lesion mid left SFA with an excellent result. .    2. Patient was started on Plavix regimen today and will be scheduled for a follow-up visit with groin check, sometime in the next few days, in addition to a routine follow-up visit in 3 months with arterial Doppler imaging at that time. .            **This report has been created using voice recognition software. It may contain minor errors which are inherent in voice recognition technology. **                        Final report electronically signed by Dr. Jeni Daugherty on 1/5/2022 2:40 PM      VL DUP LOWER EXTREMITY ARTERIES LEFT   Final Result   1.  High-grade stenosis in the mid EXTREMITY ARTERIES LEFT CLINICAL INFORMATION: non-palpable pulses . Nonhealing wound left great toe. COMPARISON: No prior study. TECHNIQUE: Arterial doppler ultrasound was performed of the left lower extremity using gray scale, color flow and spectral doppler imaging. Velocities were also recorded. FINDINGS: LEFT ARTERY (PSV cm/sec) ? ??????????????????????? CFA ---------------> 121 PSV cm/sec PROF -------------> 100 PSV cm/sec SFA PROX ------->77 PSV cm/sec SFA MID ---------->>454 PSV cm/sec SFA DIST -------->58 PSV cm/sec POP A PROX --->62 PSV cm/sec POP A DIST ---->114 PSV cm/sec PTA --------------->42 PSV cm/sec PERONEAL ----->40 PSV cm/sec SEAN ----------------> 42 PSV cm/sec GINO RIGHT SEAN----->1.07 PTA----->1.07 GINO LEFT SEAN----->0.65 PTA----->0.56 There is triphasic waveform in the left common femoral artery. In the proximal left superficial femoral artery, the waveform becomes dampened. The velocity decreases. In the mid left superficial femoral artery, the velocity is markedly elevated. There is  significant spectral broadening. This is consistent with a high-grade stenosis in the mid superficial femoral artery. Distal to this, there are dampened waveforms throughout. The waveform becomes biphasic. There is a significant reduction the peak systolic velocity in the distal superficial femoral artery. The popliteal, anterior tibial, posterior tibial and peroneal arteries are patent. All have dampened waveforms. These are biphasic. There are reduced ABIs on the left compared to the right. 1. High-grade stenosis in the mid left superficial femoral artery. 2. Abnormal left GINO suggesting a hemodynamically significant stenosis. **This report has been created using voice recognition software. It may contain minor errors which are inherent in voice recognition technology. ** Final report electronically signed by Dr. Li Saleh on 1/1/2022 9:40 AM    VL DUP LOWER EXTREMITY VENOUS LEFT    Result Date: 1/1/2022  PROCEDURE: VL DUP LOWER EXTREMITY VENOUS LEFT CLINICAL INFORMATION: pain, swelling. Nonhealing ulcer left great toe. COMPARISON: No prior study. TECHNIQUE: Venous doppler ultrasound was performed of the left lower extremity using gray scale, color flow and spectral doppler imaging. FINDINGS: There is normal color flow, spectral analysis and compressibility of the common femoral vein, femoral vein and popliteal vein on the left . There is mild thickening of the walls of popliteal vein. There is normal color flow and compressibility in the posterior tibial veins, anterior tibial veins and peroneal veins. There is normal color flow, spectral analysis and compressibility in the contralateral common femoral vein. The greater saphenous vein is patent. The gastrocnemius vein is patent. No evidence of a DVT. **This report has been created using voice recognition software. It may contain minor errors which are inherent in voice recognition technology. ** Final report electronically signed by Dr. Day Lopez on 1/1/2022 9:27 AM      Electronically signed by Eunice Fragoso PA-C on 1/6/2022 at 9:01 AM

## 2022-01-06 NOTE — PROGRESS NOTES
Progress note: Infectious diseases    Patient - Anastasia Caraballo,  Age - 60 y.o.    - 1965      Room Number - 6E-57/057-A   MRN -  [id number]   Acct # - [de-identified]  Date of Admission -  2022  2:39 AM    SUBJECTIVE:   He has no new complaints. OBJECTIVE   VITALS    height is 5' 10\" (1.778 m) and weight is 280 lb (127 kg). His oral temperature is 97.5 °F (36.4 °C). His blood pressure is 173/91 (abnormal) and his pulse is 62. His respiration is 20 and oxygen saturation is 98%. Wt Readings from Last 3 Encounters:   22 280 lb (127 kg)   18 (!) 300 lb 6.4 oz (136.3 kg)   18 291 lb 12.8 oz (132.4 kg)       I/O (24 Hours)    Intake/Output Summary (Last 24 hours) at 2022 0958  Last data filed at 2022 1320  Gross per 24 hour   Intake 2590 ml   Output 2150 ml   Net 440 ml       General Appearance  Awake, alert, oriented,  not  In acute distress  HEENT - normocephalic, atraumatic, pink conjunctiva,  anicteric sclera  Neck - Supple, no mass  Lungs -  Bilateral   air entry, no rhonchi, no wheeze  Cardiovascular - Heart sounds are normal.    Abdomen - soft, not distended, nontender,   Neurologic -oriented  Skin - No bruising or bleeding  Extremities - dressed left foot wound  Ischemic stump, no drainage  +edema, pulse palpable.   MEDICATIONS:      midazolam  1 mg IntraVENous Once    fentanNYL  50 mcg IntraVENous Once    clopidogrel  75 mg Oral Daily    clindamycin (CLEOCIN) IV  300 mg IntraVENous Q8H    hydroCHLOROthiazide  25 mg Oral Daily    insulin NPH  0.2 Units/kg SubCUTAneous BID- 8&2    nitroglycerin  0.5 inch Topical BID    lisinopril  40 mg Oral Daily    insulin lispro  0-18 Units SubCUTAneous TID WC    insulin lispro  0-9 Units SubCUTAneous Nightly    amitriptyline  100 mg Oral Nightly    atorvastatin  40 mg Oral Nightly    pramipexole  0.25 mg Oral Nightly    sodium chloride flush  5-40 mL IntraVENous 2 times per day    enoxaparin  40 mg SubCUTAneous Daily    cefepime  1,000 mg IntraVENous Q8H    nicotine  1 patch TransDERmal Daily    gabapentin  100 mg Oral TID      sodium chloride 20 mL/hr at 01/05/22 0501    sodium chloride 25 mL (01/05/22 0340)    dextrose       hydrALAZINE, cyclobenzaprine, sodium chloride, ondansetron **OR** ondansetron, potassium chloride **OR** potassium alternative oral replacement **OR** potassium chloride, magnesium sulfate, sodium chloride flush, polyethylene glycol, acetaminophen **OR** acetaminophen, glucose, dextrose, glucagon (rDNA), dextrose, oxyCODONE **OR** oxyCODONE      LABS:     CBC:   Recent Labs     01/05/22 0527 01/06/22  0604   WBC 10.5 10.0   HGB 14.6 15.3    185     BMP:    Recent Labs     01/05/22 0527 01/06/22  0759   NA  --  130*   K  --  3.6   CL  --  95*   CO2  --  23   BUN  --  16   CREATININE 1.0 0.9   GLUCOSE  --  126*     Calcium:  Recent Labs     01/06/22  0759   CALCIUM 9.1      Recent Labs     01/05/22  1638 01/05/22  2009 01/06/22  0737   POCGLU 101 187* 144*      Recent Labs     01/05/22  0527   INR 1.08           Problem list of patient:     Patient Active Problem List   Diagnosis Code    Adenocarcinoma of Prostate, GS 7, stage T1c. C61    Onychomycosis B35.1    DM type [facility] E11.9    Dyslipidemia E78.5    DDD (degenerative disc disease), lumbar M51.36    Tobacco abuse Z72.0    Insomnia G47.00    Carpal tunnel syndrome of right wrist G56.01    Carpal tunnel syndrome of left wrist G56.02    Cubital tunnel syndrome on left G56.22    Morbid obesity (HCC) E66.01    Obstructive sleep apnea on CPAP G47.33, Z99.89    Restless leg syndrome G25.81    Controlled type 2 diabetes mellitus with microalbuminuria (HCC) E11.29, R80.9    Diabetic ulcer of left foot due to type 2 diabetes mellitus (HCC) E11.621, E84.853         ASSESSMENT/PLAN   Left foot necrotic wound s/p amputation. the stump became ischemic related to poor flow. PVD: he had angioplasty by IR.  Now palpable pulse  DM with neuropathy  Advised against smoking    Lacie Siu MD, MD, FACP 1/6/2022 9:58 AM

## 2022-01-06 NOTE — PROGRESS NOTES
Podiatric Progress Note  Rafia Conley  Subjective :   1/6/22  Patient seen at bedside on behalf of Dr. Sujata Manrique. Patient was resting in bed with his CPAP on. He responded to questions by nodding yes and no. Patient is status post left first toe amputation. Patient has no complaints and understands the plan. 1/5/2022  Patient seen at bedside today alongside of Dr. Sujata Manrique. Patient was pleasant, oriented to person, place, and time and in no acute distress. Patient is 4 days status post left first toe amputation. Patient had his angiogram today that was successful. Discussed with patient that he is having interval worsening of the amputation site and that he is at high risk for more proximal amputation, whether partial first ray amputation or transmetatarsal amputation. Patient states that he is wanting to save as much of his foot as possible and would prefer to move forward with partial first ray amputation if it is deemed necessary. No other pedal complaints. 1/4/2022  Patient seen at bedside today on behalf of Dr. Sujata Manrique. Patient was pleasant, was oriented to person, place, and time and in no acute distress. Patient is 3 days status post left first toe amputation. Patient states that he is having pain in his left foot at the amputation site, any sort of stinging pain. He is curious what time we will get his angiogram today. Patient also complains of some soreness in his right arm at the site of his previous IV line that was switched to his left hand. Patient denies any nausea, vomiting, fever, chills, chest pain, shortness of breath. No other pedal complaints. 1/3/2022  Patient seen bedside today on behalf of Dr. Sujata Manrique. Patient appeared pleasant, was oriented to person, place and time and in no acute distress. Patient is 2 days  S/p left 1st toe amputation. Patient states that he has no questions right now but that he typically gets questions after the visit.   Patient denies any N/V/F/C/SOB or CP. Patient has no further pedal concerns at this point in time.      Current Medications:    Current Facility-Administered Medications: midazolam (VERSED) injection 1 mg, 1 mg, IntraVENous, Once  fentaNYL (SUBLIMAZE) injection 50 mcg, 50 mcg, IntraVENous, Once  clopidogrel (PLAVIX) tablet 75 mg, 75 mg, Oral, Daily  clindamycin (CLEOCIN) IVPB 300 mg, 300 mg, IntraVENous, Q8H  0.45 % sodium chloride infusion, , IntraVENous, Continuous  hydroCHLOROthiazide (HYDRODIURIL) tablet 25 mg, 25 mg, Oral, Daily  insulin NPH (HUMULIN N;NOVOLIN N) injection vial 25 Units, 0.2 Units/kg, SubCUTAneous, BID- 8&2  nitroglycerin (NITRO-BID) 2 % ointment 0.5 inch, 0.5 inch, Topical, BID  lisinopril (PRINIVIL;ZESTRIL) tablet 40 mg, 40 mg, Oral, Daily **AND** [DISCONTINUED] hydroCHLOROthiazide (HYDRODIURIL) tablet 12.5 mg, 12.5 mg, Oral, Daily  insulin lispro (HUMALOG) injection vial 0-18 Units, 0-18 Units, SubCUTAneous, TID WC  insulin lispro (HUMALOG) injection vial 0-9 Units, 0-9 Units, SubCUTAneous, Nightly  hydrALAZINE (APRESOLINE) tablet 25 mg, 25 mg, Oral, Q8H PRN  amitriptyline (ELAVIL) tablet 100 mg, 100 mg, Oral, Nightly  atorvastatin (LIPITOR) tablet 40 mg, 40 mg, Oral, Nightly  cyclobenzaprine (FLEXERIL) tablet 10 mg, 10 mg, Oral, TID PRN  pramipexole (MIRAPEX) tablet 0.25 mg, 0.25 mg, Oral, Nightly  sodium chloride flush 0.9 % injection 5-40 mL, 5-40 mL, IntraVENous, 2 times per day  0.9 % sodium chloride infusion, 25 mL, IntraVENous, PRN  enoxaparin (LOVENOX) injection 40 mg, 40 mg, SubCUTAneous, Daily  ondansetron (ZOFRAN-ODT) disintegrating tablet 4 mg, 4 mg, Oral, Q8H PRN **OR** ondansetron (ZOFRAN) injection 4 mg, 4 mg, IntraVENous, Q6H PRN  potassium chloride (KLOR-CON M) extended release tablet 40 mEq, 40 mEq, Oral, PRN **OR** potassium bicarb-citric acid (EFFER-K) effervescent tablet 40 mEq, 40 mEq, Oral, PRN **OR** potassium chloride 10 mEq/100 mL IVPB (Peripheral Line), 10 mEq, IntraVENous, are non-palpable to the LLE secondary to edema. Skin temperature is warm to warm from proximal tibial tuberosity to distal digits. Increased warmth to the left great toe. CFT brisk to exposed digits. Edema noted to the LLE.      Dermatologic:  Patient has a surgical incision to the distal aspect of the left foot at stump site of the left hallux. Skin edges are well approximated, stitches are holding position, no areas of dehiscence. The dorsal tissues of the amputation site continue to become more dusky and appear nonviable, with increasing areas of nonviable tissue proximal on medial, proximal to the second metatarsophalangeal joint.     Neurovascular: Light touch sensation diminished bilaterally.      Musculoskeletal: Muscle strength 4/5 for all plantarflexors, dorsiflexors, inverters and everters examined. Decreased ROM noted left AJ. S/p amputation left hallux. Pain on palpation of amputation site.     Left                  Imaging  Narrative   PROCEDURE: VL DUP LOWER EXTREMITY ARTERIES LEFT     Impression   1. High-grade stenosis in the mid left superficial femoral artery. 2. Abnormal left GINO suggesting a hemodynamically significant stenosis.         Narrative   4 views left foot. Impression   Impression:   1. Soft tissue swelling with evidence of soft tissue ulceration involving    the first digit. There is associated cortical destruction of the first    distal phalanx, most consistent with osteomyelitis.      ASSESSMENT  Principle  1) Osteomyelitis, left hallux  2) S/p left hallux amputation at metatarsophalangeal joint level    Chronic  Patient Active Problem List   Diagnosis    Adenocarcinoma of Prostate, GS 7, stage T1c.    Onychomycosis    DM type 2 (diabetes mellitus, type 2) (HCC)    Dyslipidemia    DDD (degenerative disc disease), lumbar    Tobacco abuse    Insomnia    Carpal tunnel syndrome of right wrist    Carpal tunnel syndrome of left wrist    Cubital tunnel syndrome on left    Morbid obesity ([address]    Obstructive sleep apnea on CPAP    Restless leg syndrome    Controlled type 2 diabetes mellitus with microalbuminuria (HCC)    Diabetic ulcer of left foot due to type 2 diabetes mellitus (HCC)       PLAN:   - Pt. is a 60 y.o. male   -Patient examined and evaluated  -WBC 10, Afebrile  -Tissue culture results yield streptococcus anginosus, streptococcus agalactiae, and rare gram negative bacilli that did not grow on culture. -Blood cultures positive for gram positive cocci in clusters, molecular panel demonstrates positive findings for staphylococcus. Likely contaminant  -X-rays taken and reviewed; impression above  -Continue vancomycin and cefepime per infectious disease  -Arterial doppler reviewed, evidence of high grade stenosis in the mid left superficial femoral artery.   -Patient underwent angiogram by interventional radiology with successful stenting of the superficial femoral artery, popliteal, trifurcation vessels all widely patent  -Dressing changed, nitroglycerin cream to dorsal flap, gauze, Kerlix, Ace bandage  -Nursing to change dressing once daily, podiatry will change the other time every day. -NWB to LLE. Able to heel WB for transfers and bathroom privileges.    -Discussed with patient that given the amount of change of the dorsal skin, he might be a need for further procedure involving removal of bone for adequate soft tissue closure.  -Will continue to trend duskiness of flap in response to angiogram and nitroglycerin applications  - Podiatry will continue to follow patient    DISPO: Pending response to angiogram and demarcation of tissue on the proximal aspect of the amputation site. planning for surgery, partial first ray amputation versus transmetatarsal amputation, 1/7/2022    Fidel Acevedo DPM    Podiatric Surgical Resident  1/6/2022   7:37 AM

## 2022-01-06 NOTE — PROGRESS NOTES
Patient with slight nose bleed. Patient and this RN able to control bleeding with tissues. Right groin site from angioplasty is soft and no signs of bleeding. Dressing is clean, dry, and intact.

## 2022-01-06 NOTE — CARE COORDINATION
[facility] day: 5     Procedure:   01/01/22  LEFT 1ST TOE AMPUTATION by podiatry   01/05 Run-off study with angioplasty left SFA by IR    Barriers to Discharge: podiatry and ID follows. IVF, Cefepime and Cleocin IV. Na 130, DM management. No fevers. Dressing care. Patient Goals/Plan/Treatment Preferences: plans home with GF and new HH. SW follows.

## 2022-01-06 NOTE — PROGRESS NOTES
[facility]  INPATIENT PHYSICAL THERAPY  DAILY NOTE  [facility] - [id number]      Time In: 2818  Time Out: 1438  Timed Code Treatment Minutes: 23 Minutes  Minutes: 23          Date: 2022  Patient Name: Sara Patrick,  Gender:  male        MRN: [id number]  : 1965  (60 y.o.)     Referring Practitioner: Krystyna Gramajo DPM  Diagnosis: diabetic ulcer of left foot due to type 2 DM  Additional Pertinent Hx: 60 y.o. male with PMH of  HTN, hyperlipidemia, T2DM, morbid obesity, tobacco abuse, ASHLEE, RLS, prostate cancer (in remission) who presented to [facility] with with a chief complaint of foot pain. Patient reports that approximately 2 months ago he dropped a can of sausage gravy on his left great toe. States that following the incident he developed \"purple bruising\" of the left great toe. PT underwent a left great toe amputation on 22     Prior Level of Function:  Lives With: Significant other  Type of Home: House  Home Layout: One level  Home Access: Stairs to enter with rails  Entrance Stairs - Number of Steps: 3 steps with 1 rail   Bathroom Shower/Tub: Walk-in shower  Bathroom Toilet: Standard  Bathroom Accessibility: Accessible    Receives Help From: Family  ADL Assistance: Antwon: Independent  Homemaking Responsibilities: Yes  Ambulation Assistance: Independent  Transfer Assistance: Independent  Active : Yes  Additional Comments: Pt's girlfriend lives with him. She may be able to assist if needed. Pt reports his home it too small for a w/c for mobility. Restrictions/Precautions:  Restrictions/Precautions: Weight Bearing  Left Lower Extremity Weight Bearing: Non Weight Bearing  Partial Weight Bearing Percentage Or Pounds: may heel wt bear for transfers and bathroom privilages, post op shoe on for comfort.        SUBJECTIVE: pt up in chair he voiced multiple frustrations about not knowing the plan if he is going to have more sx or not- and about other things- he reported that he has been getting up to the bathroom and still feels a little off when getting up we discussed putting a shoe on his right to help balance him out he was agreeable to try this tomorrow - cont to discuss discharge plans and needs at discharge      PAIN: 2/10: at left foot     Vitals: Vitals not assessed per clinical judgement, see nursing flowsheet    OBJECTIVE:  Bed Mobility:  Not Tested    Transfers:  Not Tested    Ambulation:  Pt declined at this time - however we discussed to try a shoe on his right foot next time to help with balance (he reported that he has a slipper here but doesn't have any shoes even at home at this time)     Exercise:  Patient was guided in 1 set(s) 10 reps of exercise to both lower extremities. Ankle pumps, Glut sets, Quad sets, Heelslides, Short arc quads, Hip abduction/adduction, Straight leg raises and encouraged to cont to complete ex during the day for increased circulation he demonstrated good understanding . Exercises were completed for increased independence with functional mobility. Functional Outcome Measures: Completed  -PAC Inpatient Mobility without Stair Climbing Raw Score : 16  -PAC Inpatient without Stair Climbing T-Scale Score : 45.54    ASSESSMENT:  Assessment: Patient progressing toward established goals. Activity Tolerance:  Patient tolerance of  treatment: good. Equipment Recommendations: Other: due to being [facility] for transfers and bathroom privileges only he will require RW for in the house and w/c for distances greater than household distance  Discharge Recommendations: will cont to assess pending clinical course- pt may need aditional sx and this may change his wbing status   Plan: Times per week: 6X O  Times per day: Daily  Specific instructions for Next Treatment: therex and mobility with limting distance amb with heel WB LLE    Patient Education  Patient Education: Plan of Care, Home Exercise Program    Goals:  Patient goals : go home  Short term goals  Time Frame for Short term goals: by discharge  Short term goal 1: bed mobility with MOD I to get in/out of bed  Short term goal 2: transfer with MOD I, maintaining heel WB, to get in/out of chairs  Short term goal 3: amb 25'x1 with LRAD and MOD I, maintaining heel WB, to walk safely in home  Long term goals  Time Frame for Long term goals : no LTGs set secondary to short ELOS    Following session, patient left in safe position with all fall risk precautions in place.

## 2022-01-07 ENCOUNTER — ANESTHESIA EVENT (OUTPATIENT)
Dept: OPERATING ROOM | Age: 57
DRG: 617 | End: 2022-01-07
Payer: COMMERCIAL

## 2022-01-07 ENCOUNTER — ANESTHESIA (OUTPATIENT)
Dept: OPERATING ROOM | Age: 57
DRG: 617 | End: 2022-01-07
Payer: COMMERCIAL

## 2022-01-07 VITALS — OXYGEN SATURATION: 98 % | DIASTOLIC BLOOD PRESSURE: 60 MMHG | SYSTOLIC BLOOD PRESSURE: 116 MMHG

## 2022-01-07 LAB
ANION GAP SERPL CALCULATED.3IONS-SCNC: 10 MEQ/L (ref 8–16)
BUN BLDV-MCNC: 18 MG/DL (ref 7–22)
CALCIUM SERPL-MCNC: 8.8 MG/DL (ref 8.5–10.5)
CHLORIDE BLD-SCNC: 99 MEQ/L (ref 98–111)
CO2: 22 MEQ/L (ref 23–33)
CREAT SERPL-MCNC: 0.9 MG/DL (ref 0.4–1.2)
ERYTHROCYTE [DISTWIDTH] IN BLOOD BY AUTOMATED COUNT: 13.3 % (ref 11.5–14.5)
ERYTHROCYTE [DISTWIDTH] IN BLOOD BY AUTOMATED COUNT: 43.4 FL (ref 35–45)
GFR SERPL CREATININE-BSD FRML MDRD: 86 ML/MIN/1.73M2
GLUCOSE BLD-MCNC: 137 MG/DL (ref 70–108)
GLUCOSE BLD-MCNC: 141 MG/DL (ref 70–108)
GLUCOSE BLD-MCNC: 159 MG/DL (ref 70–108)
GLUCOSE BLD-MCNC: 164 MG/DL (ref 70–108)
GLUCOSE BLD-MCNC: 167 MG/DL (ref 70–108)
HCT VFR BLD CALC: 45.2 % (ref 42–52)
HEMOGLOBIN: 14.9 GM/DL (ref 14–18)
MCH RBC QN AUTO: 29 PG (ref 26–33)
MCHC RBC AUTO-ENTMCNC: 33 GM/DL (ref 32.2–35.5)
MCV RBC AUTO: 88.1 FL (ref 80–94)
PLATELET # BLD: 183 THOU/MM3 (ref 130–400)
PMV BLD AUTO: 11.2 FL (ref 9.4–12.4)
POTASSIUM SERPL-SCNC: 3.9 MEQ/L (ref 3.5–5.2)
RBC # BLD: 5.13 MILL/MM3 (ref 4.7–6.1)
SODIUM BLD-SCNC: 131 MEQ/L (ref 135–145)
WBC # BLD: 9 THOU/MM3 (ref 4.8–10.8)

## 2022-01-07 PROCEDURE — 3700000000 HC ANESTHESIA ATTENDED CARE: Performed by: PODIATRIST

## 2022-01-07 PROCEDURE — 2709999900 HC NON-CHARGEABLE SUPPLY: Performed by: PODIATRIST

## 2022-01-07 PROCEDURE — 7100000000 HC PACU RECOVERY - FIRST 15 MIN: Performed by: PODIATRIST

## 2022-01-07 PROCEDURE — 0Y6N0ZC DETACHMENT AT LEFT FOOT, PARTIAL 3RD RAY, OPEN APPROACH: ICD-10-PCS | Performed by: STUDENT IN AN ORGANIZED HEALTH CARE EDUCATION/TRAINING PROGRAM

## 2022-01-07 PROCEDURE — 6370000000 HC RX 637 (ALT 250 FOR IP)

## 2022-01-07 PROCEDURE — 80048 BASIC METABOLIC PNL TOTAL CA: CPT

## 2022-01-07 PROCEDURE — 6360000002 HC RX W HCPCS: Performed by: NURSE ANESTHETIST, CERTIFIED REGISTERED

## 2022-01-07 PROCEDURE — 0Y6N0ZF DETACHMENT AT LEFT FOOT, PARTIAL 5TH RAY, OPEN APPROACH: ICD-10-PCS | Performed by: STUDENT IN AN ORGANIZED HEALTH CARE EDUCATION/TRAINING PROGRAM

## 2022-01-07 PROCEDURE — 2500000003 HC RX 250 WO HCPCS: Performed by: NURSE ANESTHETIST, CERTIFIED REGISTERED

## 2022-01-07 PROCEDURE — 7100000001 HC PACU RECOVERY - ADDTL 15 MIN: Performed by: PODIATRIST

## 2022-01-07 PROCEDURE — 6360000002 HC RX W HCPCS: Performed by: ANESTHESIOLOGY

## 2022-01-07 PROCEDURE — 3700000001 HC ADD 15 MINUTES (ANESTHESIA): Performed by: PODIATRIST

## 2022-01-07 PROCEDURE — 36415 COLL VENOUS BLD VENIPUNCTURE: CPT

## 2022-01-07 PROCEDURE — 2500000003 HC RX 250 WO HCPCS: Performed by: INTERNAL MEDICINE

## 2022-01-07 PROCEDURE — 0Y6N0ZD DETACHMENT AT LEFT FOOT, PARTIAL 4TH RAY, OPEN APPROACH: ICD-10-PCS | Performed by: STUDENT IN AN ORGANIZED HEALTH CARE EDUCATION/TRAINING PROGRAM

## 2022-01-07 PROCEDURE — 2580000003 HC RX 258: Performed by: NURSE ANESTHETIST, CERTIFIED REGISTERED

## 2022-01-07 PROCEDURE — 85027 COMPLETE CBC AUTOMATED: CPT

## 2022-01-07 PROCEDURE — 1200000000 HC SEMI PRIVATE

## 2022-01-07 PROCEDURE — 6370000000 HC RX 637 (ALT 250 FOR IP): Performed by: STUDENT IN AN ORGANIZED HEALTH CARE EDUCATION/TRAINING PROGRAM

## 2022-01-07 PROCEDURE — 2580000003 HC RX 258

## 2022-01-07 PROCEDURE — 3600000013 HC SURGERY LEVEL 3 ADDTL 15MIN: Performed by: PODIATRIST

## 2022-01-07 PROCEDURE — 0Y6N0ZB DETACHMENT AT LEFT FOOT, PARTIAL 2ND RAY, OPEN APPROACH: ICD-10-PCS | Performed by: STUDENT IN AN ORGANIZED HEALTH CARE EDUCATION/TRAINING PROGRAM

## 2022-01-07 PROCEDURE — 0L8P0ZZ DIVISION OF LEFT LOWER LEG TENDON, OPEN APPROACH: ICD-10-PCS | Performed by: PODIATRIST

## 2022-01-07 PROCEDURE — 99233 SBSQ HOSP IP/OBS HIGH 50: CPT | Performed by: PHYSICIAN ASSISTANT

## 2022-01-07 PROCEDURE — 2500000003 HC RX 250 WO HCPCS: Performed by: PODIATRIST

## 2022-01-07 PROCEDURE — 0Y6N0Z9 DETACHMENT AT LEFT FOOT, PARTIAL 1ST RAY, OPEN APPROACH: ICD-10-PCS | Performed by: STUDENT IN AN ORGANIZED HEALTH CARE EDUCATION/TRAINING PROGRAM

## 2022-01-07 PROCEDURE — 3600000003 HC SURGERY LEVEL 3 BASE: Performed by: PODIATRIST

## 2022-01-07 PROCEDURE — 6360000002 HC RX W HCPCS

## 2022-01-07 PROCEDURE — 82948 REAGENT STRIP/BLOOD GLUCOSE: CPT

## 2022-01-07 RX ORDER — ONDANSETRON 2 MG/ML
INJECTION INTRAMUSCULAR; INTRAVENOUS PRN
Status: DISCONTINUED | OUTPATIENT
Start: 2022-01-07 | End: 2022-01-07 | Stop reason: SDUPTHER

## 2022-01-07 RX ORDER — LABETALOL 20 MG/4 ML (5 MG/ML) INTRAVENOUS SYRINGE
5 4 TIMES DAILY PRN
Status: DISCONTINUED | OUTPATIENT
Start: 2022-01-07 | End: 2022-01-07 | Stop reason: HOSPADM

## 2022-01-07 RX ORDER — MEPERIDINE HYDROCHLORIDE 25 MG/ML
12.5 INJECTION INTRAMUSCULAR; INTRAVENOUS; SUBCUTANEOUS EVERY 5 MIN PRN
Status: DISCONTINUED | OUTPATIENT
Start: 2022-01-07 | End: 2022-01-07 | Stop reason: HOSPADM

## 2022-01-07 RX ORDER — HYDRALAZINE HYDROCHLORIDE 20 MG/ML
5 INJECTION INTRAMUSCULAR; INTRAVENOUS EVERY 10 MIN PRN
Status: DISCONTINUED | OUTPATIENT
Start: 2022-01-07 | End: 2022-01-07 | Stop reason: HOSPADM

## 2022-01-07 RX ORDER — OXYCODONE HYDROCHLORIDE 5 MG/1
5 TABLET ORAL EVERY 4 HOURS PRN
Status: DISCONTINUED | OUTPATIENT
Start: 2022-01-07 | End: 2022-01-13 | Stop reason: HOSPADM

## 2022-01-07 RX ORDER — ONDANSETRON 2 MG/ML
4 INJECTION INTRAMUSCULAR; INTRAVENOUS
Status: DISCONTINUED | OUTPATIENT
Start: 2022-01-07 | End: 2022-01-07 | Stop reason: HOSPADM

## 2022-01-07 RX ORDER — SODIUM CHLORIDE 0.9 % (FLUSH) 0.9 %
10 SYRINGE (ML) INJECTION EVERY 12 HOURS SCHEDULED
Status: DISCONTINUED | OUTPATIENT
Start: 2022-01-07 | End: 2022-01-13 | Stop reason: HOSPADM

## 2022-01-07 RX ORDER — SODIUM CHLORIDE 9 MG/ML
25 INJECTION, SOLUTION INTRAVENOUS PRN
Status: DISCONTINUED | OUTPATIENT
Start: 2022-01-07 | End: 2022-01-13 | Stop reason: HOSPADM

## 2022-01-07 RX ORDER — LIDOCAINE HYDROCHLORIDE 20 MG/ML
INJECTION, SOLUTION INFILTRATION; PERINEURAL PRN
Status: DISCONTINUED | OUTPATIENT
Start: 2022-01-07 | End: 2022-01-07 | Stop reason: SDUPTHER

## 2022-01-07 RX ORDER — OXYCODONE HYDROCHLORIDE 5 MG/1
10 TABLET ORAL EVERY 4 HOURS PRN
Status: DISCONTINUED | OUTPATIENT
Start: 2022-01-07 | End: 2022-01-13 | Stop reason: HOSPADM

## 2022-01-07 RX ORDER — FENTANYL CITRATE 50 UG/ML
INJECTION, SOLUTION INTRAMUSCULAR; INTRAVENOUS PRN
Status: DISCONTINUED | OUTPATIENT
Start: 2022-01-07 | End: 2022-01-07 | Stop reason: SDUPTHER

## 2022-01-07 RX ORDER — PROPOFOL 10 MG/ML
INJECTION, EMULSION INTRAVENOUS PRN
Status: DISCONTINUED | OUTPATIENT
Start: 2022-01-07 | End: 2022-01-07 | Stop reason: SDUPTHER

## 2022-01-07 RX ORDER — MORPHINE SULFATE 2 MG/ML
2 INJECTION, SOLUTION INTRAMUSCULAR; INTRAVENOUS EVERY 5 MIN PRN
Status: DISCONTINUED | OUTPATIENT
Start: 2022-01-07 | End: 2022-01-07 | Stop reason: HOSPADM

## 2022-01-07 RX ORDER — SODIUM CHLORIDE 9 MG/ML
INJECTION, SOLUTION INTRAVENOUS CONTINUOUS PRN
Status: DISCONTINUED | OUTPATIENT
Start: 2022-01-07 | End: 2022-01-07 | Stop reason: SDUPTHER

## 2022-01-07 RX ORDER — DIPHENHYDRAMINE HYDROCHLORIDE 50 MG/ML
12.5 INJECTION INTRAMUSCULAR; INTRAVENOUS
Status: DISCONTINUED | OUTPATIENT
Start: 2022-01-07 | End: 2022-01-07 | Stop reason: HOSPADM

## 2022-01-07 RX ORDER — SODIUM CHLORIDE 0.9 % (FLUSH) 0.9 %
10 SYRINGE (ML) INJECTION PRN
Status: DISCONTINUED | OUTPATIENT
Start: 2022-01-07 | End: 2022-01-13 | Stop reason: HOSPADM

## 2022-01-07 RX ORDER — FENTANYL CITRATE 50 UG/ML
50 INJECTION, SOLUTION INTRAMUSCULAR; INTRAVENOUS EVERY 5 MIN PRN
Status: COMPLETED | OUTPATIENT
Start: 2022-01-07 | End: 2022-01-07

## 2022-01-07 RX ADMIN — CYCLOBENZAPRINE 10 MG: 10 TABLET, FILM COATED ORAL at 09:06

## 2022-01-07 RX ADMIN — GABAPENTIN 100 MG: 100 CAPSULE ORAL at 19:52

## 2022-01-07 RX ADMIN — HYDROMORPHONE HYDROCHLORIDE 0.5 MG: 1 INJECTION, SOLUTION INTRAMUSCULAR; INTRAVENOUS; SUBCUTANEOUS at 18:14

## 2022-01-07 RX ADMIN — SODIUM CHLORIDE: 9 INJECTION, SOLUTION INTRAVENOUS at 15:58

## 2022-01-07 RX ADMIN — SODIUM CHLORIDE, PRESERVATIVE FREE 10 ML: 5 INJECTION INTRAVENOUS at 09:06

## 2022-01-07 RX ADMIN — ATORVASTATIN CALCIUM 40 MG: 40 TABLET, FILM COATED ORAL at 19:52

## 2022-01-07 RX ADMIN — HYDROCHLOROTHIAZIDE 25 MG: 25 TABLET ORAL at 09:06

## 2022-01-07 RX ADMIN — FENTANYL CITRATE 50 MCG: 50 INJECTION INTRAMUSCULAR; INTRAVENOUS at 17:44

## 2022-01-07 RX ADMIN — CEFEPIME 1000 MG: 1 INJECTION, POWDER, FOR SOLUTION INTRAMUSCULAR; INTRAVENOUS at 21:47

## 2022-01-07 RX ADMIN — OXYCODONE 10 MG: 5 TABLET ORAL at 09:06

## 2022-01-07 RX ADMIN — OXYCODONE 10 MG: 5 TABLET ORAL at 19:52

## 2022-01-07 RX ADMIN — HYDROMORPHONE HYDROCHLORIDE 0.5 MG: 1 INJECTION, SOLUTION INTRAMUSCULAR; INTRAVENOUS; SUBCUTANEOUS at 17:59

## 2022-01-07 RX ADMIN — FENTANYL CITRATE 100 MCG: 50 INJECTION, SOLUTION INTRAMUSCULAR; INTRAVENOUS at 15:50

## 2022-01-07 RX ADMIN — FENTANYL CITRATE 50 MCG: 50 INJECTION INTRAMUSCULAR; INTRAVENOUS at 18:09

## 2022-01-07 RX ADMIN — CLINDAMYCIN IN 5 PERCENT DEXTROSE 300 MG: 6 INJECTION, SOLUTION INTRAVENOUS at 20:33

## 2022-01-07 RX ADMIN — CLINDAMYCIN IN 5 PERCENT DEXTROSE 300 MG: 6 INJECTION, SOLUTION INTRAVENOUS at 04:34

## 2022-01-07 RX ADMIN — HYDROMORPHONE HYDROCHLORIDE 0.5 MG: 1 INJECTION, SOLUTION INTRAMUSCULAR; INTRAVENOUS; SUBCUTANEOUS at 17:54

## 2022-01-07 RX ADMIN — NITROGLYCERIN 0.5 INCH: 20 OINTMENT TOPICAL at 09:00

## 2022-01-07 RX ADMIN — ONDANSETRON HYDROCHLORIDE 4 MG: 4 INJECTION, SOLUTION INTRAMUSCULAR; INTRAVENOUS at 16:10

## 2022-01-07 RX ADMIN — CLINDAMYCIN IN 5 PERCENT DEXTROSE 300 MG: 6 INJECTION, SOLUTION INTRAVENOUS at 13:11

## 2022-01-07 RX ADMIN — PROPOFOL 200 MG: 10 INJECTION, EMULSION INTRAVENOUS at 16:01

## 2022-01-07 RX ADMIN — PRAMIPEXOLE DIHYDROCHLORIDE 0.25 MG: 0.25 TABLET ORAL at 19:53

## 2022-01-07 RX ADMIN — CEFEPIME 1000 MG: 1 INJECTION, POWDER, FOR SOLUTION INTRAMUSCULAR; INTRAVENOUS at 14:09

## 2022-01-07 RX ADMIN — FENTANYL CITRATE 50 MCG: 50 INJECTION INTRAMUSCULAR; INTRAVENOUS at 18:04

## 2022-01-07 RX ADMIN — HYDROMORPHONE HYDROCHLORIDE 0.5 MG: 1 INJECTION, SOLUTION INTRAMUSCULAR; INTRAVENOUS; SUBCUTANEOUS at 18:19

## 2022-01-07 RX ADMIN — GABAPENTIN 100 MG: 100 CAPSULE ORAL at 09:06

## 2022-01-07 RX ADMIN — CEFEPIME 1000 MG: 1 INJECTION, POWDER, FOR SOLUTION INTRAMUSCULAR; INTRAVENOUS at 05:07

## 2022-01-07 RX ADMIN — AMITRIPTYLINE HYDROCHLORIDE 100 MG: 100 TABLET, FILM COATED ORAL at 19:52

## 2022-01-07 RX ADMIN — LIDOCAINE HYDROCHLORIDE 100 MG: 20 INJECTION, SOLUTION INFILTRATION; PERINEURAL at 16:01

## 2022-01-07 RX ADMIN — FENTANYL CITRATE 50 MCG: 50 INJECTION INTRAMUSCULAR; INTRAVENOUS at 17:49

## 2022-01-07 RX ADMIN — LISINOPRIL 40 MG: 40 TABLET ORAL at 09:06

## 2022-01-07 ASSESSMENT — PULMONARY FUNCTION TESTS
PIF_VALUE: 13
PIF_VALUE: 15
PIF_VALUE: 13
PIF_VALUE: 14
PIF_VALUE: 13
PIF_VALUE: 13
PIF_VALUE: 14
PIF_VALUE: 15
PIF_VALUE: 13
PIF_VALUE: 15
PIF_VALUE: 13
PIF_VALUE: 1
PIF_VALUE: 2
PIF_VALUE: 13
PIF_VALUE: 20
PIF_VALUE: 13
PIF_VALUE: 13
PIF_VALUE: 14
PIF_VALUE: 13
PIF_VALUE: 14
PIF_VALUE: 13
PIF_VALUE: 15
PIF_VALUE: 13
PIF_VALUE: 6
PIF_VALUE: 13
PIF_VALUE: 2
PIF_VALUE: 13
PIF_VALUE: 13
PIF_VALUE: 14
PIF_VALUE: 13
PIF_VALUE: 13
PIF_VALUE: 14
PIF_VALUE: 13
PIF_VALUE: 16
PIF_VALUE: 13
PIF_VALUE: 1
PIF_VALUE: 13
PIF_VALUE: 1
PIF_VALUE: 13
PIF_VALUE: 0
PIF_VALUE: 14
PIF_VALUE: 14
PIF_VALUE: 13
PIF_VALUE: 14
PIF_VALUE: 13
PIF_VALUE: 0
PIF_VALUE: 13
PIF_VALUE: 15
PIF_VALUE: 14
PIF_VALUE: 13
PIF_VALUE: 14
PIF_VALUE: 13
PIF_VALUE: 14
PIF_VALUE: 13
PIF_VALUE: 0
PIF_VALUE: 13
PIF_VALUE: 13
PIF_VALUE: 14
PIF_VALUE: 13
PIF_VALUE: 15
PIF_VALUE: 13
PIF_VALUE: 14
PIF_VALUE: 14

## 2022-01-07 ASSESSMENT — PAIN SCALES - GENERAL
PAINLEVEL_OUTOF10: 10
PAINLEVEL_OUTOF10: 8
PAINLEVEL_OUTOF10: 10
PAINLEVEL_OUTOF10: 7
PAINLEVEL_OUTOF10: 4
PAINLEVEL_OUTOF10: 8
PAINLEVEL_OUTOF10: 3
PAINLEVEL_OUTOF10: 8
PAINLEVEL_OUTOF10: 10
PAINLEVEL_OUTOF10: 8
PAINLEVEL_OUTOF10: 8
PAINLEVEL_OUTOF10: 10

## 2022-01-07 ASSESSMENT — PAIN DESCRIPTION - LOCATION
LOCATION: FOOT
LOCATION: FOOT

## 2022-01-07 ASSESSMENT — PAIN DESCRIPTION - FREQUENCY: FREQUENCY: CONTINUOUS

## 2022-01-07 ASSESSMENT — PAIN DESCRIPTION - PROGRESSION: CLINICAL_PROGRESSION: NOT CHANGED

## 2022-01-07 ASSESSMENT — PAIN DESCRIPTION - PAIN TYPE
TYPE: SURGICAL PAIN

## 2022-01-07 ASSESSMENT — ENCOUNTER SYMPTOMS: SHORTNESS OF BREATH: 1

## 2022-01-07 ASSESSMENT — PAIN DESCRIPTION - ORIENTATION
ORIENTATION: LEFT
ORIENTATION: LEFT

## 2022-01-07 ASSESSMENT — PAIN DESCRIPTION - DESCRIPTORS: DESCRIPTORS: ACHING;THROBBING

## 2022-01-07 ASSESSMENT — PAIN DESCRIPTION - ONSET: ONSET: ON-GOING

## 2022-01-07 NOTE — PROGRESS NOTES
[facility]  PHYSICAL THERAPY MISSED TREATMENT NOTE  [facility]    Date: 2022  Patient Name: Dixie Velázquez        MRN: [id number]   : 1965  (60 y.o.)  Gender: male   Referring Practitioner: Amber Tapia DPM  Diagnosis: diabetic ulcer of left foot due to type 2 DM         REASON FOR MISSED TREATMENT:  Pt having sx on left foot today, will check orders of wbing status and activity status for next visit .

## 2022-01-07 NOTE — PROGRESS NOTES
Hospitalist Progress Note      Patient:  [facility]    Unit/Bed:[facility] OR (General) POOL R*  YOB: 1965  MRN: [id number]   Acct: [de-identified]   PCP: Herman Metcalf DO  Date of Admission: 1/1/2022    Assessment/Plan:    1. Left great toe diabetic ulcer secondary to T2DM:               - S/p 1st left toe amputation on 1/1/2022. Podiatry consulted and following. NWB to LLE; Tissue culture results yield streptococcus anginosus, streptococcus agalactiae, and rare gram negative bacilli that did not grow on culture -> stopped Vanc, add Clinda. Appreciate ID. To have surgery today 1/7 for partial first ray amputation versus transmetatarsal amputation. PAD: Arterial doppler notable for high grade stenosis in the mid left superficial femoral artery. S/p arteriogram with IR -> continue with Plavix. Will need f/u in 3 months.      2. Obstructive sleep apnea:               - Continue to wear home CPAP at night with home settings.     3. Hyperlipidemia:               - Continue Lipitor 40 mg     4. Essential HTN:   - uncontrolled. Patient has remained mostly hypertensive with some normotensive pressures. - Continue Lisinopril. Increase to HTZ 25. Hydralazine PRN     5. Tobacco abuse:               - discussed risks of smoking              - educated on resources available for cessation. Reports he does not wish to quit at this time.               - Nicotine patch.     6. Restless leg syndrome:               - Continue Mirapex     7. Gait disturbance secondary to left great toe amputation:              - Heel WB per podiatry recs              - Patient reported difficulty with pivoting and balance              - PT/OT consulted              - SW consulted     8. Morbid obesity:               - BMI 40.18; discussed and educated on  lifestyle modifications.     9. Uncontrolled NIDM II: continue with SSI. Carb control. Monitor blood glucose with ACCU. 10.  Epistaxis: Likely secondary to therapy with Plavix and Lovenox, easily stemmed with pressure per patient. If continues will consult ENT. Hemoglobin stable. 11.  Hyponatremia, mild: Repeat BMP                 Chief Complaint: Foot pain    Initial H and P:-    \"54 y.o. male with PMH of  HTN, hyperlipidemia, T2DM, morbid obesity, tobacco abuse, ASHLEE, RLS, prostate cancer (in remission) who presented to [address] [place] [address] with a chief complaint of foot pain. Patient reports that approximately 2 months ago he dropped a can of sausage gravy on his left great toe. States that following the incident he developed \"purple bruising\" of the left great toe. Stating approximately 2 weeks after the incident the purple bruising had gotten worse and started to experience an increased amount of pain. States he also noticed a \"heavy calus\" formation on his left great toe in which he decided to pull the skin off and noticed and open wound on his toe. States  \"bloody-clear\" drainage started coming from the wound but states the drainage coming from his toe is now \"thick yellow. \" Endorses he believes wound on his toes was resulted from wearing size 12 rubber boots at work, which is not his normal shoe size. Also endorsed his tennis shoes are not well fitting and that a piece of plastic from his shoe has been rubbing his left toe. Currently rates pain 10/10 in the left great toe, that radiates up left leg and is aggravated by touch and movement. No alleviating factors. States he has had difficulty with walking d/t the pain. Reports associated left leg swelling and redness. Denies CP, SOB, cough, fever, chills, weakness.      While in the ED, x ray of foot performed and notable for soft tissue swelling with evidence of soft tissue ulceration involving the first digit. Associated cortical destruction of the first distal phalanx concerning for osteomyelitis.  Arterial doppler notable for high-grade stenosis in  the mid left superficial femoral artery and abnormal left GINO concerning for significant stenosis. No DVT noted venous doppler. Lab work in the ED revealed leukocytosis of 11.2, PCT 0.06, lactic acid 1.0. Patient afebrile with stable vital signs. No concern for bacterial sepsis at this time. Cefepime and Vancomycin ordered. Glucose elevated at 333. Patient reports that he has not taken his home medications in over a year d/t lack of insurance and not having a PCP. HbgA1C ordered and pending. Creatinine 0.9 with GFR 86. Monitor. 2 peripheral blood cultures collected and pending. Wound culture of left great toe collected and pending. Podiatry was consulted and evaluated patient while in the ER. Per podiatry notes, plan for OR today 1/1/22 for amputation of left great toe. At this time patient will be admitted to hospitalist services for further medical management. \"    1/5: pt doing well, back from angiogram. Denies any fever/chills. Admits to SOB while laying down. No CP.   1/6: Patient doing okay, sitting up in his chair. Possible amputation planned tomorrow with podiatry versus continue with ABX. Patient denies any fever/chills. He did have epistaxis overnight that lasted about 20 minutes    Subjective (past 24 hours):   1/7: Patient doing well, sitting up in chair waiting to go to surgery. Patient denies any chest pain or shortness of breath. Past medical history, family history, social history and allergies reviewed again and is unchanged since admission. ROS (All review of systems completed. Pertinent positives noted.  Otherwise All other systems reviewed and negative.)     Medications:  Reviewed    Infusion Medications    sodium chloride 20 mL/hr at 01/05/22 0501    sodium chloride 25 mL (01/05/22 0340)    dextrose       Scheduled Medications    midazolam  1 mg IntraVENous Once    fentanNYL  50 mcg IntraVENous Once    clopidogrel  75 mg Oral Daily    clindamycin (CLEOCIN) IV  300 mg IntraVENous Q8H  hydroCHLOROthiazide  25 mg Oral Daily    insulin NPH  0.2 Units/kg SubCUTAneous BID- 8&2    nitroglycerin  0.5 inch Topical BID    lisinopril  40 mg Oral Daily    insulin lispro  0-18 Units SubCUTAneous TID WC    insulin lispro  0-9 Units SubCUTAneous Nightly    amitriptyline  100 mg Oral Nightly    atorvastatin  40 mg Oral Nightly    pramipexole  0.25 mg Oral Nightly    sodium chloride flush  5-40 mL IntraVENous 2 times per day    enoxaparin  40 mg SubCUTAneous Daily    cefepime  1,000 mg IntraVENous Q8H    nicotine  1 patch TransDERmal Daily    gabapentin  100 mg Oral TID     PRN Meds: meperidine, morphine, fentanNYL, HYDROmorphone, ondansetron, diphenhydrAMINE, labetalol, hydrALAZINE, anesthetic and narcotic custom mixture, hydrALAZINE, cyclobenzaprine, sodium chloride, ondansetron **OR** ondansetron, potassium chloride **OR** potassium alternative oral replacement **OR** potassium chloride, magnesium sulfate, sodium chloride flush, polyethylene glycol, acetaminophen **OR** acetaminophen, glucose, dextrose, glucagon (rDNA), dextrose, oxyCODONE **OR** oxyCODONE      Intake/Output Summary (Last 24 hours) at 1/7/2022 1734  Last data filed at 1/7/2022 1409  Gross per 24 hour   Intake 200 ml   Output 1000 ml   Net -800 ml       Diet:  Diet NPO    Exam:  BP (!) 124/93   Pulse 63   Temp 97.9 °F (36.6 °C) (Oral)   Resp 18   Ht 5' 10\" (1.778 m)   Wt 280 lb (127 kg)   SpO2 96%   BMI 40.18 kg/m²   General appearance: obese, no apparent distress, appears stated age and cooperative. HEENT: Pupils equal, round, and reactive to light. Conjunctivae/corneas clear. Neck: Supple, with full range of motion. No jugular venous distention. Trachea midline. Respiratory:  Normal respiratory effort. Clear to auscultation, bilaterally without Rales/Wheezes/Rhonchi. Cardiovascular: Regular rate and rhythm with normal S1/S2 without murmurs, rubs or gallops.   Abdomen: Soft, non-tender, non-distended with normal bowel sounds. Musculoskeletal: LLE wrapped not visualized  Skin: Skin color, texture, turgor normal.  No rashes or lesions. Neurologic:  Neurovascularly intact without any focal sensory/motor deficits. Cranial nerves: II-XII intact, grossly non-focal.  Psychiatric: Alert and oriented, thought content appropriate, normal insight  Capillary Refill: Brisk,< 3 seconds   Peripheral Pulses: +2 palpable, equal bilaterally     Labs:   Recent Labs     01/05/22 0527 01/06/22  0604 01/07/22  0608   WBC 10.5 10.0 9.0   HGB 14.6 15.3 14.9   HCT 44.3 46.4 45.2    185 183     Recent Labs     01/05/22 0527 01/06/22  0759 01/07/22  0608   NA  --  130* 131*   K  --  3.6 3.9   CL  --  95* 99   CO2  --  23 22*   BUN  --  16 18   CREATININE 1.0 0.9 0.9   CALCIUM  --  9.1 8.8     No results for input(s): AST, ALT, BILIDIR, BILITOT, ALKPHOS in the last 72 hours. Recent Labs     01/05/22 0527   INR 1.08     No results for input(s): Fahad Humphrey in the last 72 hours. Microbiology:    Blood culture #1:   Lab Results   Component Value Date    BC No growth-preliminary  01/01/2022    BC  01/01/2022     possible contamination; clinical correlation required        Blood culture #2:No results found for: BLOODCULT2    Organism:  Lab Results   Component Value Date    ORG Streptococcus agalactiae - (Group B) 01/01/2022    ORG Streptococcus anginosus 01/01/2022    ORG mixed anaerobic growth 01/01/2022         Lab Results   Component Value Date    LABGRAM  01/01/2022     Few segmented neutrophils observed. Rare epithelial cells observed. Moderate gram positive cocci occurring singly and in pairs. Rare gram negative bacilli. Rare small gram positive bacilli.         MRSA culture only:No results found for: [facility]    Urine culture: No results found for: LABURIN    Respiratory culture: No results found for: CULTRESP    Aerobic and Anaerobic :  Lab Results   Component Value Date    LABAERO  01/01/2022     Culture also yielded light growth of gram positive bacilli most consistent with Corynebacterium species. Direct gram stain indicated rare gram negative bacilli which did not grow on culture. This could be inhibited growth due to antibiotic therapy, a fastidious organism or an anaerobic organism. If a true mixed aerobic and anaerobic infection is suspected, then broad spectrum empiric antibiotic therapy is indicated and should include coverage for anaerobic organisms. LABAERO  01/01/2022     moderate growth Group B streptococci are suspectible to ampicillin, penicillin and cefazolin, but may be erythromycin and/or clindamycin resistant. Contact microbiology if erythromycin and/or clindamycin testing is necessary. LABAERO  01/01/2022     moderate growth S. anginosus group are largely susceptible to beta-lactam agents; ceftriaxone is the preferred agent. If allergy or resistance precludes use of beta-lactam agents, vancomycin is an appropriate alternative agent. Lab Results   Component Value Date    LABANAE  01/01/2022     Culture yielded heavy mixed anaerobic growth, including multiple colony types of both gram negative bacilli and gram positive cocci. If a true mixed aerobic and anaerobic infection is suspected, then broad spectrum empiric antibiotic therapy is indicated and should include coverage for anaerobic organisms. Urinalysis:      Lab Results   Component Value Date    NITRU Negative 10/03/2017    WBCUA 0 09/23/2014    BACTERIA NONE 09/23/2014    RBCUA 0 09/23/2014    BLOODU Trace-intact 10/03/2017    BLOODU SMALL 09/23/2014    SPECGRAV 1.015 08/29/2011    GLUCOSEU 250 10/03/2017       Radiology:  IR ANGIOGRAM EXTREMITY LEFT   Final Result   1. Status post successful angioplasty of a severely stenotic lesion mid left SFA with an excellent result. .    2. Patient was started on Plavix regimen today and will be scheduled for a follow-up visit with groin check, sometime in the next few days, in addition to a routine follow-up visit in 3 months with arterial Doppler imaging at that time. .            **This report has been created using voice recognition software. It may contain minor errors which are inherent in voice recognition technology. **                        Final report electronically signed by Dr. Gala Du on 1/5/2022 2:40 PM      VL DUP LOWER EXTREMITY ARTERIES LEFT   Final Result   1. High-grade stenosis in the mid left superficial femoral artery. 2. Abnormal left GINO suggesting a hemodynamically significant stenosis. **This report has been created using voice recognition software. It may contain minor errors which are inherent in voice recognition technology. **      Final report electronically signed by Dr. Alonzo Echols on 1/1/2022 9:40 AM      VL DUP LOWER EXTREMITY VENOUS LEFT   Final Result   No evidence of a DVT. **This report has been created using voice recognition software. It may contain minor errors which are inherent in voice recognition technology. **      Final report electronically signed by Dr. Alonzo Echols on 1/1/2022 9:27 AM      XR FOOT LEFT (MIN 3 VIEWS)   Final Result   Impression:   1. Soft tissue swelling with evidence of soft tissue ulceration involving    the first digit. There is associated cortical destruction of the first    distal phalanx, most consistent with osteomyelitis. This document has been electronically signed by: Montserrat Mcdermott MD on    01/01/2022 06:31 AM        XR FOOT LEFT (MIN 3 VIEWS)    Result Date: 1/1/2022  4 views left foot. Comparison: None. Findings: There is soft tissue swelling and ulceration involving the first digit. There is associated cortical destruction of the medial aspect of the first distal phalanx, concerning for osteomyelitis. No radiopaque foreign body is identified. No acute fracture or dislocation is seen. There are large superior and inferior calcaneal spurs.  Mild degenerative changes are present at the first metatarsal phalangeal joint. Impression: 1. Soft tissue swelling with evidence of soft tissue ulceration involving the first digit. There is associated cortical destruction of the first distal phalanx, most consistent with osteomyelitis. This document has been electronically signed by: Ana Lopez MD on 01/01/2022 06:31 AM    VL DUP LOWER EXTREMITY ARTERIES LEFT    Result Date: 1/1/2022  PROCEDURE: VL DUP LOWER EXTREMITY ARTERIES LEFT CLINICAL INFORMATION: non-palpable pulses . Nonhealing wound left great toe. COMPARISON: No prior study. TECHNIQUE: Arterial doppler ultrasound was performed of the left lower extremity using gray scale, color flow and spectral doppler imaging. Velocities were also recorded. FINDINGS: LEFT ARTERY (PSV cm/sec) ? ??????????????????????? CFA ---------------> 121 PSV cm/sec PROF -------------> 100 PSV cm/sec SFA PROX ------->77 PSV cm/sec SFA MID ---------->>454 PSV cm/sec SFA DIST -------->58 PSV cm/sec POP A PROX --->62 PSV cm/sec POP A DIST ---->114 PSV cm/sec PTA --------------->42 PSV cm/sec PERONEAL ----->40 PSV cm/sec SEAN ----------------> 42 PSV cm/sec GINO RIGHT SEAN----->1.07 PTA----->1.07 GINO LEFT SEAN----->0.65 PTA----->0.56 There is triphasic waveform in the left common femoral artery. In the proximal left superficial femoral artery, the waveform becomes dampened. The velocity decreases. In the mid left superficial femoral artery, the velocity is markedly elevated. There is  significant spectral broadening. This is consistent with a high-grade stenosis in the mid superficial femoral artery. Distal to this, there are dampened waveforms throughout. The waveform becomes biphasic. There is a significant reduction the peak systolic velocity in the distal superficial femoral artery. The popliteal, anterior tibial, posterior tibial and peroneal arteries are patent. All have dampened waveforms. These are biphasic. There are reduced ABIs on the left compared to the right.      1. High-grade stenosis in the mid left superficial femoral artery. 2. Abnormal left GINO suggesting a hemodynamically significant stenosis. **This report has been created using voice recognition software. It may contain minor errors which are inherent in voice recognition technology. ** Final report electronically signed by Dr. Yodit Oliver on 1/1/2022 9:40 AM    VL DUP LOWER EXTREMITY VENOUS LEFT    Result Date: 1/1/2022  PROCEDURE: VL DUP LOWER EXTREMITY VENOUS LEFT CLINICAL INFORMATION: pain, swelling. Nonhealing ulcer left great toe. COMPARISON: No prior study. TECHNIQUE: Venous doppler ultrasound was performed of the left lower extremity using gray scale, color flow and spectral doppler imaging. FINDINGS: There is normal color flow, spectral analysis and compressibility of the common femoral vein, femoral vein and popliteal vein on the left . There is mild thickening of the walls of popliteal vein. There is normal color flow and compressibility in the posterior tibial veins, anterior tibial veins and peroneal veins. There is normal color flow, spectral analysis and compressibility in the contralateral common femoral vein. The greater saphenous vein is patent. The gastrocnemius vein is patent. No evidence of a DVT. **This report has been created using voice recognition software. It may contain minor errors which are inherent in voice recognition technology. ** Final report electronically signed by Dr. Yodit Oliver on 1/1/2022 9:27 AM      Electronically signed by Harper Friend PA-C on 1/7/2022 at 5:34 PM

## 2022-01-07 NOTE — OP NOTE
Operative Note      Patient: Glenn Warner  YOB: 1965  MRN: [id number]    Date of Procedure: 1/7/2022    Pre-Op Diagnosis: FOOT PAIN, CRITICAL LIMB ISCHEMIA, DRY GANGRENE    Post-Op Diagnosis: Same       Procedure(s):  LEFT FOOT TRANSMETATARSAL AMPUTATION, GASTROCNEMIUS RECESSION    Surgeon(s):  Parish Treviño DPM    Assistant:  Resident: Meghan Fairchild DPM    Anesthesia: General    Estimated Blood Loss (mL): less than 50     Complications: None    Specimens:   * No specimens in log *    Implants:  * No implants in log *      Drains: * No LDAs found *    Injectables: 40cc 1:1 mix of 0.5% marcaine plain and 1% lidocaine with epi    Suture: 3-0 prolene, 2-0 prolene    Findings: Consistent with diagnosis    Indications: Patient is a 60 y.o. male with a chief complaint of non-healing amputation stump left 1st MPJ, equinus left leg who is being seen by Dr. Bahman Sams and being treated for this condition. At this time all conservative options have been exhausted and surgical intervention is necessary. The procedure has been explained to the patient and they understand the risks, benefits and possible complications including need for further surgery, residual pain, future ulcerations, infection, bony prominence, loss of limb, loss of life, DVT, PE. No promises have been made as to surgical outcome. Procedure: The patient was transported from the pre-op holding to the operating room and placed in a supine position. A pneumatic thigh tourniquet was applied to the left thigh. The left foot was then prepped and draped in the normal aspetic manner. Attention was directed to the necrotic area at the left first metatarsophalangeal joint. It was decided that there would be an attempt to perform a partial first ray amputation before moving to the transmetatarsal amputation. The necrotic area at the left first metatarsophalangeal joint was sharply excised using a 15 blade.   Sagittal saw was used to resect the head of the metatarsal.  An attempt was made to reapproximate the skin edges, however there is still gapping between the skin edges despite the resection of the first metatarsal head. The decision was made to move on to perform a gastrocnemius recession with transmetatarsal amputation. The leg was then exsanguinated using an Esmarch and the tourniquet was raised to 300 mmHg. Attention was then directed to the posterior calf of the left leg. The medial and lateral heads of the gastrocnemius muscle were palpated, and a skin line was drawn just medial to the midline of the gastroc aponeurosis, to ensure visualization of the larger medial gastrocnemius head. A sharp linear incision, approximately 3cm in length was made full thickness with a scalpel blade. Quesada scissors were then used to bluntly dissect through subcutaneous tissue, making sure to retract all vital neurovascular structures. Upon visualization of the paratenon, blunt dissection with a finger was used to clear the gastrocnemius and paratenon of any subcutaneous fat that was adhered. A scalpel blade was then used to linearly incise the paratenon, allowing for clear visualization of the gastrocnemius. The paratenon was then retracted medially and laterally with [employer]'s. The gastrocnemius tendon was then incised with a scalpel blade, cutting all fibers in a transverse fashion. Blunt inspection with a finger was used to ensure release of all gastroc fibers. The surgical area was then flushed with sterile saline. The incision site was then re-approximated using monocryl and dermabond. Attention was directed to the distal aspect of the left foot. A curvilinear incision at the metatarsal necks dorsally connecting to incision on the plantar skin at the level of the metatarsophalangeal joints was planned out. An incision was made straight down to bone utilizing a 10 blade across the dorsal and plantar aspects of the foot.   A key elevator was used to release the periosteal tissues of the first through fifth metatarsals of the left foot. A sagittal saw was then used to make osteotomies across all 5 metatarsals while maintaining metatarsal parabola. The plantar tissues were then sharply removed from the plantar aspect of the bone. Flexor and extensor tendons were tensioned and then transected and allowed to retract. All necrotic and nonviable tissue was resected using a 15 blade. At this point time, the tourniquet was let down, and an immediate hyperemic response was noted at the distal aspect of the left foot. All small bleeding vessels were cauterized using electrocautery. Redundant soft tissue on the plantar aspect of the foot was resected utilizing a 15 blade. The skin edges were seen to be flushed with each other. The incision was flushed with copious amounts of sterile saline and irrisept. The skin was closed using 3-0 and 2-0 prolene. A post-op injection of 40 cc's of 1:1 mix of 0.5% marcaine plain and 1% lidocaine with epi was injected. The incision was dressed with adaptic, 4x4's, kerlix, etc.  The patient was transported to the PACU with VSS and NVS intact to the distal aspect of foot. No complications were noted throughout the procedure. The patient is to be discharged per PACU protocol. Dr. Candice Lyman will continue to follow the patient during the rest of his admission.     Dr. Candice Lyman DPM    Electronically signed by Armani Osorio DPM on 1/7/2022 at 5:47 PM

## 2022-01-07 NOTE — ANESTHESIA PRE PROCEDURE
Department of Anesthesiology  Preprocedure Note       Name:  Chelsy Villafuerte   Age:  60 y.o.  :  1965                                          MRN:  [id number]         Date:  2022      Surgeon: Chuck Christian):  Parish Viera DPM    Procedure: Procedure(s):  LEFT PARTIAL 1ST RAY AMPUTATION VS TRANSMETATARSAL AMPUTATION    Medications prior to admission:   Prior to Admission medications    Medication Sig Start Date End Date Taking? Authorizing Provider   tamsulosin (FLOMAX) 0.4 MG capsule take 1 capsule by mouth once daily 10/9/18   Heloise Socks, DO   pramipexole (MIRAPEX) 0.25 MG tablet take 1 tablet by mouth NIGHTLY. 10/9/18   Heloise Socks, DO   amitriptyline (ELAVIL) 100 MG tablet take 1 tablet by mouth NIGHTLY. 10/9/18   Heloise Socks, DO   busPIRone (BUSPAR) 15 MG tablet take 1 tablet by mouth twice a day 18   Heloise Socks, DO   buPROPion (WELLBUTRIN XL) 150 MG extended release tablet Take 1 tablet by mouth every morning 18   Heloise Socks, DO   lisinopril-hydrochlorothiazide [facility]) 20-12.5 MG per tablet take 1 tablet by mouth twice a day 18   Heloise Socks, DO   atorvastatin (LIPITOR) 40 MG tablet take 1 tablet by mouth NIGHTLY 18   Heloise Socks, DO   alogliptin-metformin 12.5-1000 MG TABS TAKE 1 TABLET BY MOUTH TWICE A DAY 18   Heloise Socks, DO   gabapentin (NEURONTIN) 300 MG capsule Take 800 mg by mouth 3 times daily     Conor Dominique MD   cyclobenzaprine (FLEXERIL) 10 MG tablet Take 10 mg by mouth 3 times daily. Historical Provider, MD   Multiple Vitamin (MULTIVITAMIN PO) Take  by mouth daily. Historical Provider, MD   aspirin 81 MG EC tablet Take 81 mg by mouth daily.       Historical Provider, MD       Current medications:    Current Facility-Administered Medications   Medication Dose Route Frequency Provider Last Rate Last Admin    midazolam (VERSED) injection 1 mg  1 mg IntraVENous Once Claryce Hodgkins, MD        fentaNYL (SUBLIMAZE) injection 50 mcg  50 mcg IntraVENous Once Claryce Hodgkins, MD        clopidogrel (PLAVIX) tablet 75 mg  75 mg Oral Daily Claryce Hodgkins, MD        clindamycin (CLEOCIN) IVPB 300 mg  300 mg IntraVENous Q8H Ramez Thornton MD   Stopped at 01/07/22 1341    0.45 % sodium chloride infusion   IntraVENous Continuous Claryce Hodgkins, MD 20 mL/hr at 01/05/22 0501 New Bag at 01/05/22 0501    hydroCHLOROthiazide (HYDRODIURIL) tablet 25 mg  25 mg Oral Daily Sameera Jump, APRN - CNP   25 mg at 01/07/22 0906    insulin NPH (HUMULIN N;NOVOLIN N) injection vial 25 Units  0.2 Units/kg SubCUTAneous BID- 8&2 Sameera Jump, APRN - CNP   25 Units at 01/06/22 1313    nitroglycerin (NITRO-BID) 2 % ointment 0.5 inch  0.5 inch Topical BID Renroseanne Albertsk, DPM   0.5 inch at 01/07/22 0900    lisinopril (PRINIVIL;ZESTRIL) tablet 40 mg  40 mg Oral Daily Sameera Jump, APRN - CNP   40 mg at 01/07/22 0906    insulin lispro (HUMALOG) injection vial 0-18 Units  0-18 Units SubCUTAneous TID WC Sameera Jump, APRN - CNP   3 Units at 01/06/22 1707    insulin lispro (HUMALOG) injection vial 0-9 Units  0-9 Units SubCUTAneous Nightly Sameera Jump, APRN - CNP   2 Units at 01/06/22 2036    hydrALAZINE (APRESOLINE) tablet 25 mg  25 mg Oral Q8H PRN Kierra Velasco, APRN - CNP   25 mg at 01/04/22 1602    amitriptyline (ELAVIL) tablet 100 mg  100 mg Oral Nightly Emely Lewis, DPM   100 mg at 01/06/22 2026    atorvastatin (LIPITOR) tablet 40 mg  40 mg Oral Nightly Emley Adeles, DPM   40 mg at 01/06/22 2026    cyclobenzaprine (FLEXERIL) tablet 10 mg  10 mg Oral TID PRN Emely Lewis, DPM   10 mg at 01/07/22 0906    pramipexole (MIRAPEX) tablet 0.25 mg  0.25 mg Oral Nightly Emely Lewis DPM   0.25 mg at 01/06/22 2026    sodium chloride flush 0.9 % injection 5-40 mL  5-40 mL IntraVENous 2 times per day Emely Lewis DPM   10 mL at 01/07/22 0906    0.9 % sodium chloride infusion  25 mL IntraVENous PRN Fantasma Moore,  mL/hr at 01/05/22 0340 25 mL at 01/05/22 0340    enoxaparin (LOVENOX) injection 40 mg  40 mg SubCUTAneous Daily Homar Howe MD   40 mg at 01/03/22 7991    ondansetron (ZOFRAN-ODT) disintegrating tablet 4 mg  4 mg Oral Q8H PRN Fantasma Moore, PATRICIA        Or    ondansetron [facility] injection 4 mg  4 mg IntraVENous Q6H PRN Fantasma Moore, PATRICIA        potassium chloride (KLOR-CON M) extended release tablet 40 mEq  40 mEq Oral PRN Fantasma Moore, PATRICIA        Or    potassium bicarb-citric acid (EFFER-K) effervescent tablet 40 mEq  40 mEq Oral PRN Fantasma Moore, PATRICIA        Or    potassium chloride 10 mEq/100 mL IVPB (Peripheral Line)  10 mEq IntraVENous PRN Fantasma Moore, PATRICIA        magnesium sulfate 2000 mg in 50 mL IVPB premix  2,000 mg IntraVENous PRN Fantasma Moore, PATRICIA        cefepime (MAXIPIME) 1000 mg IVPB minibag  1,000 mg IntraVENous Q8H CARLOS LockhartM 100 mL/hr at 01/07/22 1409 1,000 mg at 01/07/22 1409    sodium chloride flush 0.9 % injection 5-40 mL  5-40 mL IntraVENous PRN Fantasma Moore, PATRICIA        polyethylene glycol (GLYCOLAX) packet 17 g  17 g Oral Daily PRN Fantasma Moore, PATRICIA        acetaminophen (TYLENOL) tablet 650 mg  650 mg Oral Q6H PRN Fantasma Moore DPM        Or    acetaminophen (TYLENOL) suppository 650 mg  650 mg Rectal Q6H PRN Fantasma Moore, PATRICIA        nicotine (NICODERM CQ) 14 MG/24HR 1 patch  1 patch TransDERmal Daily Fantasma Moore DPM   1 patch at 01/07/22 1915    gabapentin (NEURONTIN) capsule 100 mg  100 mg Oral TID Fantasma Moore DPM   100 mg at 01/07/22 0906    glucose (GLUTOSE) 40 % oral gel 15 g  15 g Oral PRN Fantasma Moore, PATRICIA        dextrose 50 % IV solution  12.5 g IntraVENous PRN Fantasma Moore, PATRICIA        glucagon (rDNA) injection 1 mg  1 mg IntraMUSCular PRN Fantasma Moore, PATRICIA        dextrose 5 % solution  100 mL/hr IntraVENous PRN Fantasma Nurse, DPM        oxyCODONE (ROXICODONE) immediate release tablet 5 mg  5 mg Oral Q4H PRN Emely Legions, DPM   5 mg at 01/06/22 1734    Or    oxyCODONE (ROXICODONE) immediate release tablet 10 mg  10 mg Oral Q4H PRN Emely Legions, DPM   10 mg at 01/07/22 9254       Allergies:     Allergies   Allergen Reactions    Pcn [Penicillins] Hives       Problem List:    Patient Active Problem List   Diagnosis Code    Adenocarcinoma of Prostate, GS 7, stage T1c. C61    Onychomycosis B35.1    DM type 2 (diabetes mellitus, type 2) ([facility] [address] E11.9    Dyslipidemia E78.5    DDD (degenerative disc disease), lumbar M51.36    Tobacco abuse Z72.0    Insomnia G47.00    Carpal tunnel syndrome of right wrist G56.01    Carpal tunnel syndrome of left wrist G56.02    Cubital tunnel syndrome on left G56.22    Morbid obesity (HCC) E66.01    Obstructive sleep apnea on CPAP G47.33, Z99.89    Restless leg syndrome G25.81    Controlled type 2 diabetes mellitus with microalbuminuria (HCC) E11.29, R80.9    Diabetic ulcer of left foot due to type 2 diabetes mellitus ([facility] [address] E11.621, L97.529       Past Medical History:        Diagnosis Date    Back pain     HERNIATED DISKS    Carpal tunnel syndrome of right wrist 10/22/2012    Hyperlipidemia     Hypertension     Hypogonadism male     Nocturia     Obesity     Osteoarthritis     Prostate cancer ([facility] [address] 12-    GLEASONS 3+4=7 ON RT/3+3=6 ON LT/T1C    Restless leg syndrome 8/2/2016    Shortness of breath     Sleep apnea 10-    HAS C-PAP MACHINE    Tobacco abuse     Tobacco user     Type II or unspecified type diabetes mellitus without mention of complication, not stated as uncontrolled     Urgency of urination        Past Surgical History:        Procedure Laterality Date    CARPAL TUNNEL RELEASE  10/22/12    right    CARPAL TUNNEL RELEASE  11/26/2012    left    COLONOSCOPY  2/1/2015    CYST REMOVAL  T    RT SIDE UPPER BACK,     CYST REMOVAL  7-5-12    excision of right shoulder mass/cyst and of left axillary skin lesion-Dr. Laurie Lowe    HAND SURGERY  1987    partial finger amputee rt hand    KNEE SURGERY  ? left knee scope    LEG SURGERY      LEG SURGERY  6/13/12    I&D rt leg abscess- Dr. Janette Sanchez  12-    TRUS/BX    PROSTATE SURGERY  06-    BRACHYTHERAPY OF THE PROSTATE    SKIN TAG REMOVAL  T    LT ARMPIT    TOE AMPUTATION Left 1/1/2022    LEFT 1ST TOE AMPUTATION performed by Nurys Kendall DPM at [address] History:    Social History     Tobacco Use    Smoking status: Current Every Day Smoker     Packs/day: 1.00     Years: 40.00     Pack years: 40.00     Types: Cigarettes     Start date: 6/29/1975    Smokeless tobacco: Never Used   Substance Use Topics    Alcohol use: No     Alcohol/week: 0.0 standard drinks                                Ready to quit: Not Answered  Counseling given: Not Answered      Vital Signs (Current):   Vitals:    01/06/22 1603 01/06/22 2023 01/07/22 0432 01/07/22 0850   BP: (!) 153/74 (!) 158/67 (!) 160/64 (!) 124/93   Pulse: 66 68 69 63   Resp: 18 18 18 18   Temp: 98.2 °F (36.8 °C) 98.7 °F (37.1 °C) 98.9 °F (37.2 °C) 97.9 °F (36.6 °C)   TempSrc: Oral Oral Oral Oral   SpO2: 95% 94% 95% 96%   Weight:       Height:                                                  BP Readings from Last 3 Encounters:   01/07/22 (!) 124/93   01/01/22 (!) 141/65   04/18/18 138/78       NPO Status:                                                                                 BMI:   Wt Readings from Last 3 Encounters:   01/01/22 280 lb (127 kg)   04/18/18 (!) 300 lb 6.4 oz (136.3 kg)   03/21/18 291 lb 12.8 oz (132.4 kg)     Body mass index is 40.18 kg/m².     CBC:   Lab Results   Component Value Date    WBC 9.0 01/07/2022    RBC 5.13 01/07/2022    RBC 4.50 08/29/2011    HGB 14.9 01/07/2022    HCT 45.2 01/07/2022    HCT 41.0 08/29/2011    MCV 88.1 01/07/2022    RDW 15.4 09/23/2014     01/07/2022       CMP:   Lab Results   Component Value Date     01/07/2022    K 3.9 01/07/2022 K 3.7 01/02/2022    CL 99 01/07/2022    CO2 22 01/07/2022    BUN 18 01/07/2022    CREATININE 0.9 01/07/2022    LABGLOM 86 01/07/2022    GLUCOSE 167 01/07/2022    GLUCOSE 91 05/02/2012    PROT 5.7 01/02/2022    CALCIUM 8.8 01/07/2022    BILITOT 0.8 01/02/2022    ALKPHOS 79 01/02/2022    AST 13 01/02/2022    ALT 17 01/02/2022       POC Tests:   Recent Labs     01/07/22  1217   POCGLU 141*       Coags:   Lab Results   Component Value Date    INR 1.08 01/05/2022    APTT 29.7 01/05/2022       HCG (If Applicable): No results found for: PREGTESTUR, PREGSERUM, HCG, HCGQUANT     ABGs: No results found for: PHART, PO2ART, IGL4XJF, FZM2PSZ, BEART, V1CFVOCS     Type & Screen (If Applicable):  No results found for: LABABO, LABRH    Drug/Infectious Status (If Applicable):  Lab Results   Component Value Date    HEPCAB Negative 03/16/2018       COVID-19 Screening (If Applicable): No results found for: COVID19        Anesthesia Evaluation    Airway: Mallampati: II        Dental:          Pulmonary:   (+) shortness of breath:  sleep apnea:                             Cardiovascular:    (+) hypertension:,                   Neuro/Psych:   (+) neuromuscular disease:,             GI/Hepatic/Renal:             Endo/Other:    (+) Diabetes, . Abdominal:   (+) obese,           Vascular: Other Findings:             Anesthesia Plan      general     ASA 2       Induction: intravenous. Anesthetic plan and risks discussed with patient. Plan discussed with CRNA.                   Nicolasa Vega MD   1/7/2022

## 2022-01-07 NOTE — PROGRESS NOTES
[facility]  OCCUPATIONAL THERAPY MISSED TREATMENT NOTE  [facility] 6E  6E-57/057-A      Date: 2022  Patient Name: Nanda Pantoja        CSN: [id number]   : 1965  (60 y.o.)  Gender: male   Referring Practitioner: EMILY Dior CNP  Diagnosis: diabetic ulcer of te left foot due to DM II      REASON FOR MISSED TREATMENT:       RN stating pt about to leave for surgery at this time. Will check back post-op.

## 2022-01-07 NOTE — ANESTHESIA POSTPROCEDURE EVALUATION
Department of Anesthesiology  Postprocedure Note    Patient: Ernie Kent  MRN: [id number]  YOB: 1965  Date of evaluation: 1/7/2022  Time:  6:00 PM     Procedure Summary     Date: 01/07/22 Room / Location: [facility] Dmitri [facility]ass    Anesthesia Start: 1558 Anesthesia Stop: 8468    Procedure: LEFT FOOT TRANSMETATARSAL AMPUTATION (Left Toes) Diagnosis: (FOOT PAIN, CRITICAL LIMB ISCHEMIA, DRY GANGRENE)    Surgeons: Fermin Wilkins DPM Responsible Provider: Elis Hernandez MD    Anesthesia Type: general ASA Status: 2          Anesthesia Type: general    Jonatan Phase I: Jonatan Score: 8    Jonatan Phase II:      Last vitals: Reviewed and per EMR flowsheets. Anesthesia Post Evaluation    Patient location during evaluation: PACU  Patient participation: complete - patient participated  Level of consciousness: awake and alert  Airway patency: patent  Nausea & Vomiting: no nausea and no vomiting  Complications: no  Cardiovascular status: hemodynamically stable  Respiratory status: acceptable  Hydration status: euvolemic      [facility]  POST-ANESTHESIA NOTE       Name:  Ernie Kent                                         Age:  60 y.o.   MRN:  [id number]      Last Vitals:  BP (!) 139/59   Pulse 64   Temp 97.6 °F (36.4 °C) (Temporal)   Resp 20   Ht 5' 10\" (1.778 m)   Wt 280 lb (127 kg)   SpO2 94%   BMI 40.18 kg/m²   Patient Vitals for the past 4 hrs:   BP Temp Temp src Pulse Resp SpO2   01/07/22 1800    64 20 94 %   01/07/22 1755 (!) 139/59   68 18 95 %   01/07/22 1750 (!) 150/70   69 14 93 %   01/07/22 1745 (!) 156/68   74 20 94 %   01/07/22 1740 (!) 170/82   82 20 95 %   01/07/22 1738 122/76 97.6 °F (36.4 °C) Temporal 73 20 95 %       Level of Consciousness:  Awake    Respiratory:  Stable    Oxygen Saturation:  Stable    Cardiovascular:  Stable    Hydration:  Adequate    PONV:  Stable    Post-op Pain:  Adequate analgesia    Post-op Assessment:  No apparent anesthetic complications    Additional Follow-Up / Treatment / Comment:  Riley Araujo MD  January 7, 2022   6:01 PM

## 2022-01-07 NOTE — PROGRESS NOTES
Progress note: Infectious diseases    Patient - Chelsy Villafuerte,  Age - 60 y.o.    - 1965      Room Number - 6E-57/057-A   MRN -  [id number]   Acct # - [de-identified]  Date of Admission -  2022  2:39 AM    SUBJECTIVE:   He has no issues. Discussed with podiatry. Planning for revision of the flap  OBJECTIVE   VITALS    height is 5' 10\" (1.778 m) and weight is 280 lb (127 kg). His oral temperature is 97.9 °F (36.6 °C). His blood pressure is 160/64 (abnormal) and his pulse is 63. His respiration is 18 and oxygen saturation is 96%.        Wt Readings from Last 3 Encounters:   22 280 lb (127 kg)   18 (!) 300 lb 6.4 oz (136.3 kg)   18 291 lb 12.8 oz (132.4 kg)       I/O (24 Hours)    Intake/Output Summary (Last 24 hours) at 2022 4595  Last data filed at 2022 1936  Gross per 24 hour   Intake 590 ml   Output 1050 ml   Net -460 ml       General Appearance  Awake, alert, oriented,  not  In acute distress  HEENT - normocephalic, atraumatic, pink conjunctiva,  anicteric sclera  Neck - Supple, no mass  Lungs -  Bilateral   air entry, no rhonchi, no wheeze  Cardiovascular - Heart sounds are normal.    Abdomen - soft, not distended, nontender,   Neurologic -oriented  Skin - No bruising or bleeding  Extremities - dressed left foot wound  Ischemic stump,   MEDICATIONS:      midazolam  1 mg IntraVENous Once    fentanNYL  50 mcg IntraVENous Once    clopidogrel  75 mg Oral Daily    clindamycin (CLEOCIN) IV  300 mg IntraVENous Q8H    hydroCHLOROthiazide  25 mg Oral Daily    insulin NPH  0.2 Units/kg SubCUTAneous BID- 8&2    nitroglycerin  0.5 inch Topical BID    lisinopril  40 mg Oral Daily    insulin lispro  0-18 Units SubCUTAneous TID WC    insulin lispro  0-9 Units SubCUTAneous Nightly    amitriptyline  100 mg Oral Nightly    atorvastatin  40 mg Oral Nightly    pramipexole  0.25 mg Oral Nightly    sodium chloride flush  5-40 mL IntraVENous 2 times per day    enoxaparin  40 mg SubCUTAneous Daily    cefepime  1,000 mg IntraVENous Q8H    nicotine  1 patch TransDERmal Daily    gabapentin  100 mg Oral TID      sodium chloride 20 mL/hr at 01/05/22 0501    sodium chloride 25 mL (01/05/22 0340)    dextrose       hydrALAZINE, cyclobenzaprine, sodium chloride, ondansetron **OR** ondansetron, potassium chloride **OR** potassium alternative oral replacement **OR** potassium chloride, magnesium sulfate, sodium chloride flush, polyethylene glycol, acetaminophen **OR** acetaminophen, glucose, dextrose, glucagon (rDNA), dextrose, oxyCODONE **OR** oxyCODONE      LABS:     CBC:   Recent Labs     01/05/22  0527 01/06/22  0604 01/07/22  0608   WBC 10.5 10.0 9.0   HGB 14.6 15.3 14.9    185 183     BMP:    Recent Labs     01/05/22  0527 01/06/22  0759 01/07/22  0608   NA  --  130* 131*   K  --  3.6 3.9   CL  --  95* 99   CO2  --  23 22*   BUN  --  16 18   CREATININE 1.0 0.9 0.9   GLUCOSE  --  126* 167*     Calcium:  Recent Labs     01/07/22  0608   CALCIUM 8.8      Recent Labs     01/06/22  1116 01/06/22  1640 01/06/22  2034   POCGLU 187* 170* 187*      Recent Labs     01/05/22  0527   INR 1.08           Problem list of patient:     Patient Active Problem List   Diagnosis Code    Adenocarcinoma of Prostate, GS 7, stage T1c. C61    Onychomycosis B35.1    DM type 2 (diabetes mellitus, type 2) ([facility] [address] E11.9    Dyslipidemia E78.5    DDD (degenerative disc disease), lumbar M51.36    Tobacco abuse Z72.0    Insomnia G47.00    Carpal tunnel syndrome of right wrist G56.01    Carpal tunnel syndrome of left wrist G56.02    Cubital tunnel syndrome on left G56.22    Morbid obesity (HCC) E66.01    Obstructive sleep apnea on CPAP G47.33, Z99.89    Restless leg syndrome G25.81    Controlled type 2 diabetes mellitus with microalbuminuria (HCC) E11.29, R80.9    Diabetic ulcer of left foot due to type 2 diabetes mellitus ([facility] E11.621, L97.529         ASSESSMENT/PLAN   Left foot necrotic wound s/p amputation. the stump became ischemic related to poor flow. PVD: he had angioplasty by IR.  Now palpable pulse  DM with neuropathy   podiatry planning revision of the stump    Sharif Seo MD, MD, FACP 1/7/2022 8:52 AM

## 2022-01-07 NOTE — PROGRESS NOTES
1738 pt arrived to PACU, awakens to voice but rolling and thrashing around in bed. VSS  1744 c/o pain 8/10, medicated with 50 mcg fentanyl  1749 c/o pain 10/10, medicated with 50 mcg fentanyl  1754 no change in pain status, medicated with 0.5 mg dilaudid  1759 no change in pain status, medicated with 0.5 mg dilaudid  1804 no change in pain status, medicated with 50 mcg fentanyl  1809 no change in pain status, medicated with 50 mcg fentanyl  1814 no change in pain status, medicated with 0.5 mg dilaudid  1819 no change in pain status, medicated with 0.5 mg dilaudid  1824 pt resting, resp easy  1829 pt resting off and on. Wakes up and states pain 10/10 then drifts back to sleep, not medicated at this time. VSS  1839 pt resting off and on.  Meets criteria for discharge from PACU, placed transport to  [id number]

## 2022-01-07 NOTE — CARE COORDINATION
1/7/22, 10:51 AM EST    DISCHARGE ON GOING EVALUATION    [address] day: 6     Procedure:    01/01/22  LEFT 1ST TOE AMPUTATION by podiatry  01/05 Run-off study with angioplasty left [facility] IR  01/07 Partial 1st ray resection, possible transmetatrsal amputation, left foot today 2pm    Barriers to Discharge: NPO, consent for surgery today. Patient Goals/Plan/Treatment Preferences: plans home with GF and new HH.

## 2022-01-07 NOTE — PROGRESS NOTES
Arrived to OR holding with chart and IVF infusing, checked in per Tucker Buttery. Bilateral nares swabbed. Consents obtained.

## 2022-01-07 NOTE — BRIEF OP NOTE
Brief Postoperative Note      Patient: Leatha Monaco  YOB: 1965  MRN: [id number]    Date of Procedure: 1/7/2022    Pre-Op Diagnosis: FOOT PAIN, CRITICAL LIMB ISCHEMIA, DRY GANGRENE    Post-Op Diagnosis: Same       Procedure(s):  LEFT FOOT TRANSMETATARSAL AMPUTATION, GASTROCNEMIUS RECESSION    Surgeon(s):  Parish De La Cruz DPM    Assistant:  Resident: Hector Mary DPM    Anesthesia: General    Estimated Blood Loss (mL): less than 50     Complications: None    Specimens:   * No specimens in log *    Implants:  * No implants in log *      Drains: * No LDAs found *    Injectables: 40cc 1:1 mix of 0.5% marcaine plain and 1% lidocaine with epi    Suture: 3-0 prolene, 2-0 prolene    Findings: Consistent with diagnosis    Electronically signed by Hector Mary DPM on 1/7/2022 at 5:45 PM

## 2022-01-07 NOTE — H&P
[address] [address]  History and Physical Update    Pt Name: Jacob Nunez  MRN: [id number]  YOB: 1965  Date of evaluation: 1/7/2022    [x] I have examined the patient and reviewed the H&P/Consult and there are no changes to the patient or plans.     [] I have examined the patient and reviewed the H&P/Consult and have noted the following changes:        Fabio Sabillon DPM DPARCELIA  Electronically signed 1/7/2022 at 8:47 AM

## 2022-01-08 LAB
ERYTHROCYTE [DISTWIDTH] IN BLOOD BY AUTOMATED COUNT: 13.6 % (ref 11.5–14.5)
ERYTHROCYTE [DISTWIDTH] IN BLOOD BY AUTOMATED COUNT: 44 FL (ref 35–45)
GLUCOSE BLD-MCNC: 118 MG/DL (ref 70–108)
GLUCOSE BLD-MCNC: 119 MG/DL (ref 70–108)
GLUCOSE BLD-MCNC: 152 MG/DL (ref 70–108)
GLUCOSE BLD-MCNC: 168 MG/DL (ref 70–108)
HCT VFR BLD CALC: 41.1 % (ref 42–52)
HEMOGLOBIN: 13.3 GM/DL (ref 14–18)
MCH RBC QN AUTO: 28.7 PG (ref 26–33)
MCHC RBC AUTO-ENTMCNC: 32.4 GM/DL (ref 32.2–35.5)
MCV RBC AUTO: 88.8 FL (ref 80–94)
PLATELET # BLD: 182 THOU/MM3 (ref 130–400)
PMV BLD AUTO: 11.2 FL (ref 9.4–12.4)
RBC # BLD: 4.63 MILL/MM3 (ref 4.7–6.1)
WBC # BLD: 9.1 THOU/MM3 (ref 4.8–10.8)

## 2022-01-08 PROCEDURE — 6370000000 HC RX 637 (ALT 250 FOR IP): Performed by: STUDENT IN AN ORGANIZED HEALTH CARE EDUCATION/TRAINING PROGRAM

## 2022-01-08 PROCEDURE — 2580000003 HC RX 258: Performed by: STUDENT IN AN ORGANIZED HEALTH CARE EDUCATION/TRAINING PROGRAM

## 2022-01-08 PROCEDURE — 2500000003 HC RX 250 WO HCPCS: Performed by: STUDENT IN AN ORGANIZED HEALTH CARE EDUCATION/TRAINING PROGRAM

## 2022-01-08 PROCEDURE — 6360000002 HC RX W HCPCS: Performed by: STUDENT IN AN ORGANIZED HEALTH CARE EDUCATION/TRAINING PROGRAM

## 2022-01-08 PROCEDURE — 6360000002 HC RX W HCPCS: Performed by: HOSPITALIST

## 2022-01-08 PROCEDURE — 36415 COLL VENOUS BLD VENIPUNCTURE: CPT

## 2022-01-08 PROCEDURE — 85027 COMPLETE CBC AUTOMATED: CPT

## 2022-01-08 PROCEDURE — 1200000000 HC SEMI PRIVATE

## 2022-01-08 PROCEDURE — 82948 REAGENT STRIP/BLOOD GLUCOSE: CPT

## 2022-01-08 PROCEDURE — 99233 SBSQ HOSP IP/OBS HIGH 50: CPT | Performed by: PHYSICIAN ASSISTANT

## 2022-01-08 RX ORDER — MORPHINE SULFATE 2 MG/ML
2 INJECTION, SOLUTION INTRAMUSCULAR; INTRAVENOUS
Status: DISCONTINUED | OUTPATIENT
Start: 2022-01-08 | End: 2022-01-13 | Stop reason: HOSPADM

## 2022-01-08 RX ORDER — GABAPENTIN 600 MG/1
300 TABLET ORAL 3 TIMES DAILY
Status: DISCONTINUED | OUTPATIENT
Start: 2022-01-08 | End: 2022-01-13 | Stop reason: HOSPADM

## 2022-01-08 RX ORDER — KETOROLAC TROMETHAMINE 30 MG/ML
30 INJECTION, SOLUTION INTRAMUSCULAR; INTRAVENOUS EVERY 6 HOURS PRN
Status: DISPENSED | OUTPATIENT
Start: 2022-01-08 | End: 2022-01-13

## 2022-01-08 RX ADMIN — CEFEPIME 1000 MG: 1 INJECTION, POWDER, FOR SOLUTION INTRAMUSCULAR; INTRAVENOUS at 21:34

## 2022-01-08 RX ADMIN — OXYCODONE 10 MG: 5 TABLET ORAL at 00:15

## 2022-01-08 RX ADMIN — SODIUM CHLORIDE, PRESERVATIVE FREE 10 ML: 5 INJECTION INTRAVENOUS at 04:28

## 2022-01-08 RX ADMIN — MORPHINE SULFATE 2 MG: 2 INJECTION, SOLUTION INTRAMUSCULAR; INTRAVENOUS at 04:28

## 2022-01-08 RX ADMIN — OXYCODONE 10 MG: 5 TABLET ORAL at 21:40

## 2022-01-08 RX ADMIN — SODIUM CHLORIDE, PRESERVATIVE FREE 10 ML: 5 INJECTION INTRAVENOUS at 20:42

## 2022-01-08 RX ADMIN — OXYCODONE 10 MG: 5 TABLET ORAL at 09:47

## 2022-01-08 RX ADMIN — CEFEPIME 1000 MG: 1 INJECTION, POWDER, FOR SOLUTION INTRAMUSCULAR; INTRAVENOUS at 05:56

## 2022-01-08 RX ADMIN — GABAPENTIN 300 MG: 600 TABLET, FILM COATED ORAL at 20:42

## 2022-01-08 RX ADMIN — INSULIN HUMAN 25 UNITS: 100 INJECTION, SUSPENSION SUBCUTANEOUS at 08:07

## 2022-01-08 RX ADMIN — CYCLOBENZAPRINE 10 MG: 10 TABLET, FILM COATED ORAL at 00:32

## 2022-01-08 RX ADMIN — CLINDAMYCIN IN 5 PERCENT DEXTROSE 300 MG: 6 INJECTION, SOLUTION INTRAVENOUS at 13:33

## 2022-01-08 RX ADMIN — ENOXAPARIN SODIUM 40 MG: 100 INJECTION SUBCUTANEOUS at 08:02

## 2022-01-08 RX ADMIN — HYDROCHLOROTHIAZIDE 25 MG: 25 TABLET ORAL at 08:03

## 2022-01-08 RX ADMIN — KETOROLAC TROMETHAMINE 30 MG: 30 INJECTION, SOLUTION INTRAMUSCULAR; INTRAVENOUS at 21:40

## 2022-01-08 RX ADMIN — ATORVASTATIN CALCIUM 40 MG: 40 TABLET, FILM COATED ORAL at 20:42

## 2022-01-08 RX ADMIN — CYCLOBENZAPRINE 10 MG: 10 TABLET, FILM COATED ORAL at 13:45

## 2022-01-08 RX ADMIN — CLINDAMYCIN IN 5 PERCENT DEXTROSE 300 MG: 6 INJECTION, SOLUTION INTRAVENOUS at 05:23

## 2022-01-08 RX ADMIN — CEFEPIME 1000 MG: 1 INJECTION, POWDER, FOR SOLUTION INTRAMUSCULAR; INTRAVENOUS at 14:42

## 2022-01-08 RX ADMIN — MORPHINE SULFATE 2 MG: 2 INJECTION, SOLUTION INTRAMUSCULAR; INTRAVENOUS at 20:40

## 2022-01-08 RX ADMIN — SODIUM CHLORIDE, PRESERVATIVE FREE 10 ML: 5 INJECTION INTRAVENOUS at 01:14

## 2022-01-08 RX ADMIN — LISINOPRIL 40 MG: 40 TABLET ORAL at 08:02

## 2022-01-08 RX ADMIN — CLOPIDOGREL 75 MG: 75 TABLET, FILM COATED ORAL at 08:03

## 2022-01-08 RX ADMIN — CYCLOBENZAPRINE 10 MG: 10 TABLET, FILM COATED ORAL at 20:42

## 2022-01-08 RX ADMIN — GABAPENTIN 100 MG: 100 CAPSULE ORAL at 08:03

## 2022-01-08 RX ADMIN — MORPHINE SULFATE 2 MG: 2 INJECTION, SOLUTION INTRAMUSCULAR; INTRAVENOUS at 08:12

## 2022-01-08 RX ADMIN — KETOROLAC TROMETHAMINE 30 MG: 30 INJECTION, SOLUTION INTRAMUSCULAR; INTRAVENOUS at 13:40

## 2022-01-08 RX ADMIN — MORPHINE SULFATE 2 MG: 2 INJECTION, SOLUTION INTRAMUSCULAR; INTRAVENOUS at 01:15

## 2022-01-08 RX ADMIN — PRAMIPEXOLE DIHYDROCHLORIDE 0.25 MG: 0.25 TABLET ORAL at 20:42

## 2022-01-08 RX ADMIN — AMITRIPTYLINE HYDROCHLORIDE 100 MG: 100 TABLET, FILM COATED ORAL at 20:42

## 2022-01-08 RX ADMIN — CLINDAMYCIN IN 5 PERCENT DEXTROSE 300 MG: 6 INJECTION, SOLUTION INTRAVENOUS at 20:43

## 2022-01-08 RX ADMIN — GABAPENTIN 300 MG: 600 TABLET, FILM COATED ORAL at 14:42

## 2022-01-08 RX ADMIN — MORPHINE SULFATE 2 MG: 2 INJECTION, SOLUTION INTRAMUSCULAR; INTRAVENOUS at 11:27

## 2022-01-08 RX ADMIN — MORPHINE SULFATE 2 MG: 2 INJECTION, SOLUTION INTRAMUSCULAR; INTRAVENOUS at 14:43

## 2022-01-08 RX ADMIN — INSULIN HUMAN 25 UNITS: 100 INJECTION, SUSPENSION SUBCUTANEOUS at 13:42

## 2022-01-08 ASSESSMENT — PAIN DESCRIPTION - PAIN TYPE
TYPE: SURGICAL PAIN

## 2022-01-08 ASSESSMENT — PAIN DESCRIPTION - PROGRESSION
CLINICAL_PROGRESSION: NOT CHANGED

## 2022-01-08 ASSESSMENT — PAIN SCALES - GENERAL
PAINLEVEL_OUTOF10: 8
PAINLEVEL_OUTOF10: 7
PAINLEVEL_OUTOF10: 8
PAINLEVEL_OUTOF10: 7
PAINLEVEL_OUTOF10: 5
PAINLEVEL_OUTOF10: 10
PAINLEVEL_OUTOF10: 7
PAINLEVEL_OUTOF10: 8
PAINLEVEL_OUTOF10: 5
PAINLEVEL_OUTOF10: 10
PAINLEVEL_OUTOF10: 7
PAINLEVEL_OUTOF10: 9
PAINLEVEL_OUTOF10: 7
PAINLEVEL_OUTOF10: 8
PAINLEVEL_OUTOF10: 9
PAINLEVEL_OUTOF10: 10
PAINLEVEL_OUTOF10: 9

## 2022-01-08 ASSESSMENT — PAIN DESCRIPTION - LOCATION
LOCATION: FOOT

## 2022-01-08 ASSESSMENT — PAIN DESCRIPTION - DESCRIPTORS
DESCRIPTORS: ACHING;SHOOTING;STABBING;THROBBING
DESCRIPTORS: ACHING;STABBING;SHOOTING;THROBBING

## 2022-01-08 ASSESSMENT — PAIN DESCRIPTION - ORIENTATION
ORIENTATION: LEFT

## 2022-01-08 ASSESSMENT — PAIN - FUNCTIONAL ASSESSMENT: PAIN_FUNCTIONAL_ASSESSMENT: ACTIVITIES ARE NOT PREVENTED

## 2022-01-08 ASSESSMENT — PAIN DESCRIPTION - ONSET
ONSET: ON-GOING
ONSET: ON-GOING

## 2022-01-08 ASSESSMENT — PAIN DESCRIPTION - FREQUENCY
FREQUENCY: INTERMITTENT
FREQUENCY: CONTINUOUS

## 2022-01-08 NOTE — PROGRESS NOTES
Podiatric Progress Note  Petr Smith  Subjective :   1/8/2022  Patient seen at bedside on behalf of Dr. Bautista Dalton. Patient is 1 day status post left transmetatarsal amputation with gastrocnemius recession (DOS: 01/07/2022). Patient states that he is having quite a bit of pain in his left foot and the back of his leg. Discussed with patient that this is common postoperatively. He denies any nausea, vomiting, fever, chills, chest pain, shortness of breath. No other pedal complaints. 1/6/22  Patient seen at bedside on behalf of Dr. Bautista Dalton. Patient was resting in bed with his CPAP on. He responded to questions by nodding yes and no. Patient is status post left first toe amputation. Patient has no complaints and understands the plan. 1/5/2022  Patient seen at bedside today alongside of Dr. Bautista Dalton. Patient was pleasant, oriented to person, place, and time and in no acute distress. Patient is 4 days status post left first toe amputation. Patient had his angiogram today that was successful. Discussed with patient that he is having interval worsening of the amputation site and that he is at high risk for more proximal amputation, whether partial first ray amputation or transmetatarsal amputation. Patient states that he is wanting to save as much of his foot as possible and would prefer to move forward with partial first ray amputation if it is deemed necessary. No other pedal complaints. 1/4/2022  Patient seen at bedside today on behalf of Dr. Bautista Dalton. Patient was pleasant, was oriented to person, place, and time and in no acute distress. Patient is 3 days status post left first toe amputation. Patient states that he is having pain in his left foot at the amputation site, any sort of stinging pain. He is curious what time we will get his angiogram today. Patient also complains of some soreness in his right arm at the site of his previous IV line that was switched to his left hand.   Patient denies any nausea, vomiting, fever, chills, chest pain, shortness of breath. No other pedal complaints. 1/3/2022  Patient seen bedside today on behalf of Dr. Isidoro Guerrero. Patient appeared pleasant, was oriented to person, place and time and in no acute distress. Patient is 2 days  S/p left 1st toe amputation. Patient states that he has no questions right now but that he typically gets questions after the visit. Patient denies any N/V/F/C/SOB or CP. Patient has no further pedal concerns at this point in time.      Current Medications:    Current Facility-Administered Medications: morphine (PF) injection 2 mg, 2 mg, IntraVENous, Q3H PRN  sodium chloride flush 0.9 % injection 10 mL, 10 mL, IntraVENous, 2 times per day  sodium chloride flush 0.9 % injection 10 mL, 10 mL, IntraVENous, PRN  0.9 % sodium chloride infusion, 25 mL, IntraVENous, PRN  oxyCODONE (ROXICODONE) immediate release tablet 5 mg, 5 mg, Oral, Q4H PRN **OR** oxyCODONE (ROXICODONE) immediate release tablet 10 mg, 10 mg, Oral, Q4H PRN  midazolam (VERSED) injection 1 mg, 1 mg, IntraVENous, Once  fentaNYL (SUBLIMAZE) injection 50 mcg, 50 mcg, IntraVENous, Once  clopidogrel (PLAVIX) tablet 75 mg, 75 mg, Oral, Daily  clindamycin (CLEOCIN) IVPB 300 mg, 300 mg, IntraVENous, Q8H  hydroCHLOROthiazide (HYDRODIURIL) tablet 25 mg, 25 mg, Oral, Daily  insulin NPH (HUMULIN N;NOVOLIN N) injection vial 25 Units, 0.2 Units/kg, SubCUTAneous, BID- 8&2  lisinopril (PRINIVIL;ZESTRIL) tablet 40 mg, 40 mg, Oral, Daily **AND** [DISCONTINUED] hydroCHLOROthiazide (HYDRODIURIL) tablet 12.5 mg, 12.5 mg, Oral, Daily  insulin lispro (HUMALOG) injection vial 0-18 Units, 0-18 Units, SubCUTAneous, TID WC  insulin lispro (HUMALOG) injection vial 0-9 Units, 0-9 Units, SubCUTAneous, Nightly  hydrALAZINE (APRESOLINE) tablet 25 mg, 25 mg, Oral, Q8H PRN  amitriptyline (ELAVIL) tablet 100 mg, 100 mg, Oral, Nightly  atorvastatin (LIPITOR) tablet 40 mg, 40 mg, Oral, Nightly  cyclobenzaprine (FLEXERIL) tablet 10 mg, 10 mg, Oral, TID PRN  pramipexole (MIRAPEX) tablet 0.25 mg, 0.25 mg, Oral, Nightly  enoxaparin (LOVENOX) injection 40 mg, 40 mg, SubCUTAneous, Daily  ondansetron (ZOFRAN-ODT) disintegrating tablet 4 mg, 4 mg, Oral, Q8H PRN **OR** ondansetron (ZOFRAN) injection 4 mg, 4 mg, IntraVENous, Q6H PRN  potassium chloride (KLOR-CON M) extended release tablet 40 mEq, 40 mEq, Oral, PRN **OR** potassium bicarb-citric acid (EFFER-K) effervescent tablet 40 mEq, 40 mEq, Oral, PRN **OR** potassium chloride 10 mEq/100 mL IVPB (Peripheral Line), 10 mEq, IntraVENous, PRN  magnesium sulfate 2000 mg in 50 mL IVPB premix, 2,000 mg, IntraVENous, PRN  cefepime (MAXIPIME) 1000 mg IVPB minibag, 1,000 mg, IntraVENous, Q8H  polyethylene glycol (GLYCOLAX) packet 17 g, 17 g, Oral, Daily PRN  acetaminophen (TYLENOL) tablet 650 mg, 650 mg, Oral, Q6H PRN **OR** acetaminophen (TYLENOL) suppository 650 mg, 650 mg, Rectal, Q6H PRN  nicotine (NICODERM CQ) 14 MG/24HR 1 patch, 1 patch, TransDERmal, Daily  gabapentin (NEURONTIN) capsule 100 mg, 100 mg, Oral, TID  glucose (GLUTOSE) 40 % oral gel 15 g, 15 g, Oral, PRN  dextrose 50 % IV solution, 12.5 g, IntraVENous, PRN  glucagon (rDNA) injection 1 mg, 1 mg, IntraMUSCular, PRN  dextrose 5 % solution, 100 mL/hr, IntraVENous, PRN    Objective     BP (!) 178/83   Pulse 68   Temp 97.8 °F (36.6 °C) (Oral)   Resp 18   Ht 5' 10\" (1.778 m)   Wt 280 lb (127 kg)   SpO2 99%   BMI 40.18 kg/m²      I/O:    Intake/Output Summary (Last 24 hours) at 1/8/2022 1139  Last data filed at 1/8/2022 0915  Gross per 24 hour   Intake 640 ml   Output 1350 ml   Net -710 ml              Wt Readings from Last 3 Encounters:   01/01/22 280 lb (127 kg)   04/18/18 (!) 300 lb 6.4 oz (136.3 kg)   03/21/18 291 lb 12.8 oz (132.4 kg)       LABS:    Recent Labs     01/06/22  0604 01/07/22  0608   WBC 10.0 9.0   HGB 15.3 14.9   HCT 46.4 45.2    183        Recent Labs     01/07/22  0608   *   K 3.9   CL 99   CO2 22*   BUN 18   CREATININE 0.9        No results for input(s): PROT, INR, APTT in the last 72 hours. No results for input(s): CKTOTAL, CKMB, CKMBINDEX, TROPONINI in the last 72 hours. Exam:   Dressing intact and well maintained. No apparent strike through noted. Physical exam limited to postoperative dressing is in place. Physical exam from below is from 01/06/2022. Vascular: Dorsalis pedis and posterior tibial pulses are palpable to the RLE. Dorsalis pedis and posterior tibial pulses are non-palpable to the LLE secondary to edema. Skin temperature is warm to warm from proximal tibial tuberosity to distal digits. Increased warmth to the left great toe. CFT brisk to exposed digits. Edema noted to the LLE.      Dermatologic:  Patient has a surgical incision to the distal aspect of the left foot at stump site of the left hallux. Skin edges are well approximated, stitches are holding position, no areas of dehiscence. The dorsal tissues of the amputation site continue to become more dusky and appear nonviable, with increasing areas of nonviable tissue proximal on medial, proximal to the second metatarsophalangeal joint.     Neurovascular: Light touch sensation diminished bilaterally.      Musculoskeletal: Muscle strength 4/5 for all plantarflexors, dorsiflexors, inverters and everters examined. Decreased ROM noted left AJ. S/p amputation left hallux. Pain on palpation of amputation site.     Left  Imaging  Narrative   PROCEDURE: VL DUP LOWER EXTREMITY ARTERIES LEFT     Impression   1. High-grade stenosis in the mid left superficial femoral artery. 2. Abnormal left GINO suggesting a hemodynamically significant stenosis.         Narrative   4 views left foot. Impression   Impression:   1. Soft tissue swelling with evidence of soft tissue ulceration involving    the first digit. There is associated cortical destruction of the first    distal phalanx, most consistent with osteomyelitis. ASSESSMENT  Principle  1) Osteomyelitis, left hallux  2) S/p left hallux amputation at metatarsophalangeal joint level    Chronic  Patient Active Problem List   Diagnosis    Adenocarcinoma of Prostate, GS 7, stage T1c.    Onychomycosis    DM type 2 (diabetes mellitus, type 2) ([facility] [address]    Dyslipidemia    DDD (degenerative disc disease), lumbar    Tobacco abuse    Insomnia    Carpal tunnel syndrome of right wrist    Carpal tunnel syndrome of left wrist    Cubital tunnel syndrome on left    Morbid obesity (HCC)    Obstructive sleep apnea on CPAP    Restless leg syndrome    Controlled type 2 diabetes mellitus with microalbuminuria ([facility] [address]    Diabetic ulcer of left foot due to type 2 diabetes mellitus (HCC)       PLAN:   - Pt. is a 60 y.o. male   -Patient examined and evaluated  -WBC 9.0, Hb 14.9, Afebrile  -Tissue culture results yield streptococcus anginosus, streptococcus agalactiae, and mixed anaerobic growth. -X-rays taken and reviewed; impression above  -Continue vancomycin and cefepime per infectious disease  -Arterial doppler reviewed, evidence of high grade stenosis in the mid left superficial femoral artery.   -Patient underwent angiogram by interventional radiology with successful stenting of the superficial femoral artery, popliteal, trifurcation vessels all widely patent  -Postoperative dressing left intact. Podiatry to change dressing tomorrow 01/09/2022  -NWB to LLE.   -Discussed with patient that this amount of pain is common postoperatively. He is to take his pain medications as prescribed, elevate his leg for management of postoperative edema which can also cause pain.   - Podiatry will continue to follow patient    DISPO: Pending clinical appearance of the surgical site on 01/09/2022, medical stability, pain control    Rosa Elena Ann  Podiatric Surgical Resident  1/8/2022   11:39 AM

## 2022-01-08 NOTE — PROGRESS NOTES
[address]  PHYSICAL THERAPY MISSED TREATMENT NOTE  [facility]    Date: 2022  Patient Name: Stephanie De Los Santos        MRN: [id number]   : 1965  (60 y.o.)  Gender: male   Referring Practitioner: Akhil Chaparro DPM  Diagnosis: diabetic ulcer of left foot due to type 2 DM         REASON FOR MISSED TREATMENT:  Missed treat. RN approved session noting she just gave pt pain meds a short while ago. Pt resting in bedside chair moaning in pain stating he can not do therapy right now. Will try back as time allows.

## 2022-01-08 NOTE — PROGRESS NOTES
Hospitalist Progress Note      Patient:  Klaudia Lindo    Unit/Bed:6E-57/057-A  YOB: 1965  MRN: [id number]   Acct: [de-identified]   PCP: Luli Etienne DO  Date of Admission: 1/1/2022    Assessment/Plan:    1. Left great toe diabetic ulcer secondary to T2DM:               - S/p 1st left toe amputation on 1/1/2022. Podiatry consulted and following. NWB to LLE; Tissue culture results yield streptococcus anginosus, streptococcus agalactiae, and rare gram negative bacilli that did not grow on culture -> stopped Vanc, add Clinda. Appreciate ID. To have surgery today 1/7 for partial first ray amputation versus transmetatarsal amputation. PAD: Arterial doppler notable for high grade stenosis in the mid left superficial femoral artery. S/p arteriogram with IR -> continue with Plavix. Will need f/u in 3 months.      2. Obstructive sleep apnea:               - Continue to wear home CPAP at night with home settings.     3. Hyperlipidemia:               - Continue Lipitor 40 mg     4. Essential HTN:   - uncontrolled. Patient has remained mostly hypertensive with some normotensive pressures. - Continue Lisinopril. Increase to HTZ 25. Hydralazine PRN     5. Tobacco abuse:               - discussed risks of smoking              - educated on resources available for cessation. Reports he does not wish to quit at this time.               - Nicotine patch.     6. Restless leg syndrome:               - Continue Mirapex     7. Gait disturbance secondary to left great toe amputation:              - Heel WB per podiatry recs              - Patient reported difficulty with pivoting and balance              - PT/OT consulted              - SW consulted     8. Morbid obesity:               - BMI 40.18; discussed and educated on  lifestyle modifications.     9. Uncontrolled NIDM II: continue with SSI. Carb control. Monitor blood glucose with ACCU.      10.  Epistaxis: Likely secondary to therapy with Plavix and Lovenox, easily stemmed with pressure per patient. If continues will consult ENT. Hemoglobin stable. 11.  Hyponatremia, mild: Repeat BMP    12. Acute postoperative anemia: continue to monitor Hgb, no gross bleeding identified. Continue to monitor and transfuse for Hgb < 7 or hemodynamic instability.                  Chief Complaint: Foot pain    Initial H and P:-    \"54 y.o. male with PMH of  HTN, hyperlipidemia, T2DM, morbid obesity, tobacco abuse, ASHLEE, RLS, prostate cancer (in remission) who presented to [facility] with with a chief complaint of foot pain. Patient reports that approximately 2 months ago he dropped a can of sausage gravy on his left great toe. States that following the incident he developed \"purple bruising\" of the left great toe. Stating approximately 2 weeks after the incident the purple bruising had gotten worse and started to experience an increased amount of pain. States he also noticed a \"heavy calus\" formation on his left great toe in which he decided to pull the skin off and noticed and open wound on his toe. States  \"bloody-clear\" drainage started coming from the wound but states the drainage coming from his toe is now \"thick yellow. \" Endorses he believes wound on his toes was resulted from wearing size 12 rubber boots at work, which is not his normal shoe size. Also endorsed his tennis shoes are not well fitting and that a piece of plastic from his shoe has been rubbing his left toe. Currently rates pain 10/10 in the left great toe, that radiates up left leg and is aggravated by touch and movement. No alleviating factors. States he has had difficulty with walking d/t the pain. Reports associated left leg swelling and redness. Denies CP, SOB, cough, fever, chills, weakness.      While in the ED, x ray of foot performed and notable for soft tissue swelling with evidence of soft tissue ulceration involving the first digit. Associated cortical destruction of the first distal phalanx concerning for osteomyelitis. Arterial doppler notable for high-grade stenosis in  the mid left superficial femoral artery and abnormal left GINO concerning for significant stenosis. No DVT noted venous doppler. Lab work in the ED revealed leukocytosis of 11.2, PCT 0.06, lactic acid 1.0. Patient afebrile with stable vital signs. No concern for bacterial sepsis at this time. Cefepime and Vancomycin ordered. Glucose elevated at 333. Patient reports that he has not taken his home medications in over a year d/t lack of insurance and not having a PCP. HbgA1C ordered and pending. Creatinine 0.9 with GFR 86. Monitor. 2 peripheral blood cultures collected and pending. Wound culture of left great toe collected and pending. Podiatry was consulted and evaluated patient while in the ER. Per podiatry notes, plan for OR today 1/1/22 for amputation of left great toe. At this time patient will be admitted to hospitalist services for further medical management. \"    1/5: pt doing well, back from angiogram. Denies any fever/chills. Admits to SOB while laying down. No CP.   1/6: Patient doing okay, sitting up in his chair. Possible amputation planned tomorrow with podiatry versus continue with ABX. Patient denies any fever/chills. He did have epistaxis overnight that lasted about 20 minutes    1/7: Patient doing well, sitting up in chair waiting to go to surgery. Patient denies any chest pain or shortness of breath. Subjective (past 24 hours):   1/8: pt reports he is having significant pain, 9/10 in severity at this time despite pain medications. Denies fever/chills. Having infiltration at his IV site. Denies CP/SOB. Complains of dry mouth. Past medical history, family history, social history and allergies reviewed again and is unchanged since admission. ROS (All review of systems completed. Pertinent positives noted.  Otherwise All other systems reviewed and negative.)     Medications:  Reviewed    Infusion Medications    sodium chloride      dextrose       Scheduled Medications    gabapentin  300 mg Oral TID    sodium chloride flush  10 mL IntraVENous 2 times per day    midazolam  1 mg IntraVENous Once    fentanNYL  50 mcg IntraVENous Once    clopidogrel  75 mg Oral Daily    clindamycin (CLEOCIN) IV  300 mg IntraVENous Q8H    hydroCHLOROthiazide  25 mg Oral Daily    insulin NPH  0.2 Units/kg SubCUTAneous BID- 8&2    lisinopril  40 mg Oral Daily    insulin lispro  0-18 Units SubCUTAneous TID WC    insulin lispro  0-9 Units SubCUTAneous Nightly    amitriptyline  100 mg Oral Nightly    atorvastatin  40 mg Oral Nightly    pramipexole  0.25 mg Oral Nightly    enoxaparin  40 mg SubCUTAneous Daily    cefepime  1,000 mg IntraVENous Q8H    nicotine  1 patch TransDERmal Daily     PRN Meds: morphine, ketorolac, sodium chloride flush, sodium chloride, oxyCODONE **OR** oxyCODONE, hydrALAZINE, cyclobenzaprine, ondansetron **OR** ondansetron, potassium chloride **OR** potassium alternative oral replacement **OR** potassium chloride, magnesium sulfate, polyethylene glycol, acetaminophen **OR** acetaminophen, glucose, dextrose, glucagon (rDNA), dextrose      Intake/Output Summary (Last 24 hours) at 1/8/2022 1403  Last data filed at 1/8/2022 1148  Gross per 24 hour   Intake 640 ml   Output 1850 ml   Net -1210 ml       Diet:  ADULT DIET; Regular; 3 carb choices (45 gm/meal); Low Fat/Low Chol/High Fiber/ANNA    Exam:  BP (!) 178/83   Pulse 68   Temp 97.8 °F (36.6 °C) (Oral)   Resp 18   Ht 5' 10\" (1.778 m)   Wt 280 lb (127 kg)   SpO2 99%   BMI 40.18 kg/m²   General appearance: obese, no apparent distress, appears stated age and cooperative. HEENT: Pupils equal, round, and reactive to light. Conjunctivae/corneas clear. Neck: Supple, with full range of motion. No jugular venous distention. Trachea midline. Respiratory:  Normal respiratory effort.  Clear to auscultation, bilaterally without Rales/Wheezes/Rhonchi. Cardiovascular: Regular rate and rhythm with normal S1/S2 without murmurs, rubs or gallops. Abdomen: Soft, non-tender, non-distended with normal bowel sounds. Musculoskeletal: LLE wrapped not visualized  Skin: Skin color, texture, turgor normal.  No rashes or lesions. Neurologic:  Neurovascularly intact without any focal sensory/motor deficits. Cranial nerves: II-XII intact, grossly non-focal.  Psychiatric: Alert and oriented, thought content appropriate, normal insight  Capillary Refill: Brisk,< 3 seconds   Peripheral Pulses: +2 palpable, equal bilaterally     Labs:   Recent Labs     01/06/22  0604 01/07/22  0608   WBC 10.0 9.0   HGB 15.3 14.9   HCT 46.4 45.2    183     Recent Labs     01/06/22  0759 01/07/22  0608   * 131*   K 3.6 3.9   CL 95* 99   CO2 23 22*   BUN 16 18   CREATININE 0.9 0.9   CALCIUM 9.1 8.8     No results for input(s): AST, ALT, BILIDIR, BILITOT, ALKPHOS in the last 72 hours. No results for input(s): INR in the last 72 hours. No results for input(s): Janine Anchors in the last 72 hours. Microbiology:    Blood culture #1:   Lab Results   Component Value Date    BC No growth-preliminary  01/01/2022    BC  01/01/2022     possible contamination; clinical correlation required        Blood culture #2:No results found for: BLOODCULT2    Organism:  Lab Results   Component Value Date    ORG Streptococcus agalactiae - (Group B) 01/01/2022    ORG Streptococcus anginosus 01/01/2022    ORG mixed anaerobic growth 01/01/2022         Lab Results   Component Value Date    LABGRAM  01/01/2022     Few segmented neutrophils observed. Rare epithelial cells observed. Moderate gram positive cocci occurring singly and in pairs. Rare gram negative bacilli. Rare small gram positive bacilli.         MRSA culture only:No results found for: [address] [place] [address]    Urine culture: No results found for: LABURIN    Respiratory culture: No results found for: CULTRESP    Aerobic and Anaerobic :  Lab Results   Component Value Date    LABAERO  01/01/2022     Culture also yielded light growth of gram positive bacilli most consistent with Corynebacterium species. Direct gram stain indicated rare gram negative bacilli which did not grow on culture. This could be inhibited growth due to antibiotic therapy, a fastidious organism or an anaerobic organism. If a true mixed aerobic and anaerobic infection is suspected, then broad spectrum empiric antibiotic therapy is indicated and should include coverage for anaerobic organisms. LABAERO  01/01/2022     moderate growth Group B streptococci are suspectible to ampicillin, penicillin and cefazolin, but may be erythromycin and/or clindamycin resistant. Contact microbiology if erythromycin and/or clindamycin testing is necessary. LABAERO  01/01/2022     moderate growth S. anginosus group are largely susceptible to beta-lactam agents; ceftriaxone is the preferred agent. If allergy or resistance precludes use of beta-lactam agents, vancomycin is an appropriate alternative agent. Lab Results   Component Value Date    LABANAE  01/01/2022     Culture yielded heavy mixed anaerobic growth, including multiple colony types of both gram negative bacilli and gram positive cocci. If a true mixed aerobic and anaerobic infection is suspected, then broad spectrum empiric antibiotic therapy is indicated and should include coverage for anaerobic organisms. Urinalysis:      Lab Results   Component Value Date    NITRU Negative 10/03/2017    WBCUA 0 09/23/2014    BACTERIA NONE 09/23/2014    RBCUA 0 09/23/2014    BLOODU Trace-intact 10/03/2017    BLOODU SMALL 09/23/2014    SPECGRAV 1.015 08/29/2011    GLUCOSEU 250 10/03/2017       Radiology:  IR ANGIOGRAM EXTREMITY LEFT   Final Result   1. Status post successful angioplasty of a severely stenotic lesion mid left SFA with an excellent result. .    2. Patient was started on Plavix regimen today and will be scheduled for a follow-up visit with groin check, sometime in the next few days, in addition to a routine follow-up visit in 3 months with arterial Doppler imaging at that time. .            **This report has been created using voice recognition software. It may contain minor errors which are inherent in voice recognition technology. **                        Final report electronically signed by Dr. Khadijah Jane on 1/5/2022 2:40 PM      VL DUP LOWER EXTREMITY ARTERIES LEFT   Final Result   1. High-grade stenosis in the mid left superficial femoral artery. 2. Abnormal left GINO suggesting a hemodynamically significant stenosis. **This report has been created using voice recognition software. It may contain minor errors which are inherent in voice recognition technology. **      Final report electronically signed by Dr. Biju Huang on 1/1/2022 9:40 AM      VL DUP LOWER EXTREMITY VENOUS LEFT   Final Result   No evidence of a DVT. **This report has been created using voice recognition software. It may contain minor errors which are inherent in voice recognition technology. **      Final report electronically signed by Dr. Biju Huang on 1/1/2022 9:27 AM      XR FOOT LEFT (MIN 3 VIEWS)   Final Result   Impression:   1. Soft tissue swelling with evidence of soft tissue ulceration involving    the first digit. There is associated cortical destruction of the first    distal phalanx, most consistent with osteomyelitis. This document has been electronically signed by: Serenity Kaye MD on    01/01/2022 06:31 AM        XR FOOT LEFT (MIN 3 VIEWS)    Result Date: 1/1/2022  4 views left foot. Comparison: None. Findings: There is soft tissue swelling and ulceration involving the first digit. There is associated cortical destruction of the medial aspect of the first distal phalanx, concerning for osteomyelitis. No radiopaque foreign body is identified.  No acute fracture or dislocation is seen. There are large superior and inferior calcaneal spurs. Mild degenerative changes are present at the first metatarsal phalangeal joint. Impression: 1. Soft tissue swelling with evidence of soft tissue ulceration involving the first digit. There is associated cortical destruction of the first distal phalanx, most consistent with osteomyelitis. This document has been electronically signed by: Nelida Hinojosa MD on 01/01/2022 06:31 AM     DUP LOWER EXTREMITY ARTERIES LEFT    Result Date: 1/1/2022  PROCEDURE: VL DUP LOWER EXTREMITY ARTERIES LEFT CLINICAL INFORMATION: non-palpable pulses . Nonhealing wound left great toe. COMPARISON: No prior study. TECHNIQUE: Arterial doppler ultrasound was performed of the left lower extremity using gray scale, color flow and spectral doppler imaging. Velocities were also recorded. FINDINGS: LEFT ARTERY (PSV cm/sec) ? ??????????????????????? CFA ---------------> 121 PSV cm/sec PROF -------------> 100 PSV cm/sec SFA PROX ------->77 PSV cm/sec SFA MID ---------->>454 PSV cm/sec SFA DIST -------->58 PSV cm/sec POP A PROX --->62 PSV cm/sec POP A DIST ---->114 PSV cm/sec PTA --------------->42 PSV cm/sec PERONEAL ----->40 PSV cm/sec SEAN ----------------> 42 PSV cm/sec GINO RIGHT SEAN----->1.07 PTA----->1.07 GINO LEFT SEAN----->0.65 PTA----->0.56 There is triphasic waveform in the left common femoral artery. In the proximal left superficial femoral artery, the waveform becomes dampened. The velocity decreases. In the mid left superficial femoral artery, the velocity is markedly elevated. There is  significant spectral broadening. This is consistent with a high-grade stenosis in the mid superficial femoral artery. Distal to this, there are dampened waveforms throughout. The waveform becomes biphasic. There is a significant reduction the peak systolic velocity in the distal superficial femoral artery.  The popliteal, anterior tibial, posterior tibial and peroneal arteries are patent. All have dampened waveforms. These are biphasic. There are reduced ABIs on the left compared to the right. 1. High-grade stenosis in the mid left superficial femoral artery. 2. Abnormal left GINO suggesting a hemodynamically significant stenosis. **This report has been created using voice recognition software. It may contain minor errors which are inherent in voice recognition technology. ** Final report electronically signed by Dr. Mau Lovell on 1/1/2022 9:40 AM    VL DUP LOWER EXTREMITY VENOUS LEFT    Result Date: 1/1/2022  PROCEDURE: VL DUP LOWER EXTREMITY VENOUS LEFT CLINICAL INFORMATION: pain, swelling. Nonhealing ulcer left great toe. COMPARISON: No prior study. TECHNIQUE: Venous doppler ultrasound was performed of the left lower extremity using gray scale, color flow and spectral doppler imaging. FINDINGS: There is normal color flow, spectral analysis and compressibility of the common femoral vein, femoral vein and popliteal vein on the left . There is mild thickening of the walls of popliteal vein. There is normal color flow and compressibility in the posterior tibial veins, anterior tibial veins and peroneal veins. There is normal color flow, spectral analysis and compressibility in the contralateral common femoral vein. The greater saphenous vein is patent. The gastrocnemius vein is patent. No evidence of a DVT. **This report has been created using voice recognition software. It may contain minor errors which are inherent in voice recognition technology. ** Final report electronically signed by Dr. Mau Lovell on 1/1/2022 9:27 AM      Electronically signed by Anjel Quinones PA-C on 1/8/2022 at 2:03 PM

## 2022-01-09 LAB
ERYTHROCYTE [DISTWIDTH] IN BLOOD BY AUTOMATED COUNT: 13.2 % (ref 11.5–14.5)
ERYTHROCYTE [DISTWIDTH] IN BLOOD BY AUTOMATED COUNT: 41.4 FL (ref 35–45)
GLUCOSE BLD-MCNC: 102 MG/DL (ref 70–108)
GLUCOSE BLD-MCNC: 153 MG/DL (ref 70–108)
GLUCOSE BLD-MCNC: 80 MG/DL (ref 70–108)
GLUCOSE BLD-MCNC: 82 MG/DL (ref 70–108)
HCT VFR BLD CALC: 40.2 % (ref 42–52)
HEMOGLOBIN: 13.6 GM/DL (ref 14–18)
MCH RBC QN AUTO: 28.9 PG (ref 26–33)
MCHC RBC AUTO-ENTMCNC: 33.8 GM/DL (ref 32.2–35.5)
MCV RBC AUTO: 85.4 FL (ref 80–94)
PLATELET # BLD: 189 THOU/MM3 (ref 130–400)
PMV BLD AUTO: 10.5 FL (ref 9.4–12.4)
RBC # BLD: 4.71 MILL/MM3 (ref 4.7–6.1)
WBC # BLD: 8.4 THOU/MM3 (ref 4.8–10.8)

## 2022-01-09 PROCEDURE — 2580000003 HC RX 258: Performed by: STUDENT IN AN ORGANIZED HEALTH CARE EDUCATION/TRAINING PROGRAM

## 2022-01-09 PROCEDURE — 85027 COMPLETE CBC AUTOMATED: CPT

## 2022-01-09 PROCEDURE — 6360000002 HC RX W HCPCS: Performed by: STUDENT IN AN ORGANIZED HEALTH CARE EDUCATION/TRAINING PROGRAM

## 2022-01-09 PROCEDURE — 36415 COLL VENOUS BLD VENIPUNCTURE: CPT

## 2022-01-09 PROCEDURE — 82948 REAGENT STRIP/BLOOD GLUCOSE: CPT

## 2022-01-09 PROCEDURE — 2500000003 HC RX 250 WO HCPCS: Performed by: STUDENT IN AN ORGANIZED HEALTH CARE EDUCATION/TRAINING PROGRAM

## 2022-01-09 PROCEDURE — 6370000000 HC RX 637 (ALT 250 FOR IP): Performed by: STUDENT IN AN ORGANIZED HEALTH CARE EDUCATION/TRAINING PROGRAM

## 2022-01-09 PROCEDURE — 99232 SBSQ HOSP IP/OBS MODERATE 35: CPT | Performed by: PHYSICIAN ASSISTANT

## 2022-01-09 PROCEDURE — 1200000000 HC SEMI PRIVATE

## 2022-01-09 PROCEDURE — 6370000000 HC RX 637 (ALT 250 FOR IP): Performed by: PHYSICIAN ASSISTANT

## 2022-01-09 RX ORDER — AMLODIPINE BESYLATE 5 MG/1
5 TABLET ORAL DAILY
Status: DISCONTINUED | OUTPATIENT
Start: 2022-01-09 | End: 2022-01-12

## 2022-01-09 RX ORDER — ACETAMINOPHEN 325 MG/1
650 TABLET ORAL EVERY 8 HOURS SCHEDULED
Status: COMPLETED | OUTPATIENT
Start: 2022-01-09 | End: 2022-01-11

## 2022-01-09 RX ORDER — DOCUSATE SODIUM 100 MG/1
100 CAPSULE, LIQUID FILLED ORAL DAILY
Status: DISCONTINUED | OUTPATIENT
Start: 2022-01-09 | End: 2022-01-13 | Stop reason: HOSPADM

## 2022-01-09 RX ADMIN — SODIUM CHLORIDE, PRESERVATIVE FREE 10 ML: 5 INJECTION INTRAVENOUS at 20:08

## 2022-01-09 RX ADMIN — ENOXAPARIN SODIUM 40 MG: 100 INJECTION SUBCUTANEOUS at 07:54

## 2022-01-09 RX ADMIN — AMLODIPINE BESYLATE 5 MG: 5 TABLET ORAL at 16:23

## 2022-01-09 RX ADMIN — HYDROCHLOROTHIAZIDE 25 MG: 25 TABLET ORAL at 07:56

## 2022-01-09 RX ADMIN — GABAPENTIN 300 MG: 600 TABLET, FILM COATED ORAL at 07:56

## 2022-01-09 RX ADMIN — SODIUM CHLORIDE, PRESERVATIVE FREE 10 ML: 5 INJECTION INTRAVENOUS at 07:53

## 2022-01-09 RX ADMIN — PRAMIPEXOLE DIHYDROCHLORIDE 0.25 MG: 0.25 TABLET ORAL at 20:07

## 2022-01-09 RX ADMIN — AMITRIPTYLINE HYDROCHLORIDE 100 MG: 100 TABLET, FILM COATED ORAL at 20:07

## 2022-01-09 RX ADMIN — KETOROLAC TROMETHAMINE 30 MG: 30 INJECTION, SOLUTION INTRAMUSCULAR; INTRAVENOUS at 14:24

## 2022-01-09 RX ADMIN — LISINOPRIL 40 MG: 40 TABLET ORAL at 07:56

## 2022-01-09 RX ADMIN — OXYCODONE 5 MG: 5 TABLET ORAL at 13:04

## 2022-01-09 RX ADMIN — CEFEPIME 1000 MG: 1 INJECTION, POWDER, FOR SOLUTION INTRAMUSCULAR; INTRAVENOUS at 14:27

## 2022-01-09 RX ADMIN — HYDRALAZINE HYDROCHLORIDE 25 MG: 25 TABLET, FILM COATED ORAL at 11:44

## 2022-01-09 RX ADMIN — CLINDAMYCIN IN 5 PERCENT DEXTROSE 300 MG: 6 INJECTION, SOLUTION INTRAVENOUS at 12:59

## 2022-01-09 RX ADMIN — ACETAMINOPHEN 650 MG: 325 TABLET ORAL at 21:23

## 2022-01-09 RX ADMIN — INSULIN HUMAN 25 UNITS: 100 INJECTION, SUSPENSION SUBCUTANEOUS at 07:59

## 2022-01-09 RX ADMIN — CYCLOBENZAPRINE 10 MG: 10 TABLET, FILM COATED ORAL at 20:07

## 2022-01-09 RX ADMIN — CLINDAMYCIN IN 5 PERCENT DEXTROSE 300 MG: 6 INJECTION, SOLUTION INTRAVENOUS at 05:09

## 2022-01-09 RX ADMIN — INSULIN HUMAN 25 UNITS: 100 INJECTION, SUSPENSION SUBCUTANEOUS at 14:05

## 2022-01-09 RX ADMIN — ACETAMINOPHEN 650 MG: 325 TABLET ORAL at 15:21

## 2022-01-09 RX ADMIN — GABAPENTIN 300 MG: 600 TABLET, FILM COATED ORAL at 20:07

## 2022-01-09 RX ADMIN — ATORVASTATIN CALCIUM 40 MG: 40 TABLET, FILM COATED ORAL at 20:07

## 2022-01-09 RX ADMIN — OXYCODONE 5 MG: 5 TABLET ORAL at 07:53

## 2022-01-09 RX ADMIN — DOCUSATE SODIUM 100 MG: 100 CAPSULE ORAL at 15:21

## 2022-01-09 RX ADMIN — CLOPIDOGREL 75 MG: 75 TABLET, FILM COATED ORAL at 07:54

## 2022-01-09 RX ADMIN — POLYETHYLENE GLYCOL 3350 17 G: 17 POWDER, FOR SOLUTION ORAL at 07:54

## 2022-01-09 RX ADMIN — CEFEPIME 1000 MG: 1 INJECTION, POWDER, FOR SOLUTION INTRAMUSCULAR; INTRAVENOUS at 21:23

## 2022-01-09 RX ADMIN — GABAPENTIN 300 MG: 600 TABLET, FILM COATED ORAL at 14:05

## 2022-01-09 RX ADMIN — CEFEPIME 1000 MG: 1 INJECTION, POWDER, FOR SOLUTION INTRAMUSCULAR; INTRAVENOUS at 05:42

## 2022-01-09 RX ADMIN — CLINDAMYCIN IN 5 PERCENT DEXTROSE 300 MG: 6 INJECTION, SOLUTION INTRAVENOUS at 20:34

## 2022-01-09 ASSESSMENT — PAIN SCALES - GENERAL
PAINLEVEL_OUTOF10: 1
PAINLEVEL_OUTOF10: 3
PAINLEVEL_OUTOF10: 2
PAINLEVEL_OUTOF10: 5
PAINLEVEL_OUTOF10: 3
PAINLEVEL_OUTOF10: 2
PAINLEVEL_OUTOF10: 8
PAINLEVEL_OUTOF10: 4
PAINLEVEL_OUTOF10: 3
PAINLEVEL_OUTOF10: 4

## 2022-01-09 ASSESSMENT — PAIN DESCRIPTION - PROGRESSION: CLINICAL_PROGRESSION: GRADUALLY IMPROVING

## 2022-01-09 ASSESSMENT — PAIN DESCRIPTION - ORIENTATION
ORIENTATION: LEFT
ORIENTATION: LEFT

## 2022-01-09 ASSESSMENT — PAIN DESCRIPTION - LOCATION
LOCATION: FOOT
LOCATION: FOOT

## 2022-01-09 ASSESSMENT — PAIN DESCRIPTION - ONSET: ONSET: ON-GOING

## 2022-01-09 ASSESSMENT — PAIN DESCRIPTION - PAIN TYPE
TYPE: SURGICAL PAIN
TYPE: SURGICAL PAIN

## 2022-01-09 ASSESSMENT — PAIN DESCRIPTION - DESCRIPTORS: DESCRIPTORS: ACHING

## 2022-01-09 ASSESSMENT — PAIN DESCRIPTION - FREQUENCY: FREQUENCY: CONTINUOUS

## 2022-01-09 NOTE — PROGRESS NOTES
Podiatric Progress Note  Marion Brad  Subjective :   1/9/2022  Patient seen at chair side today on behalf of Dr. Chantel Alcala. Patient is 1 day s/p left foot TMA with gastrocnemius recession (DOS 1/7/2022). Patient appeared pleasant, was oriented to person, place and time and in no acute distress. Patient endorses mild pain to his left foot; he rates it as 3/10. He states that the pain is very well controlled with the current pain medications. Otherwise patient is feeling very well. Patient denies any N/V/F/C/SOB or CP. Patient has no further pedal concerns at this point in time. 1/6/22  Patient seen at bedside on behalf of Dr. Chantel Alcala. Patient was resting in bed with his CPAP on. He responded to questions by nodding yes and no. Patient is status post left first toe amputation. Patient has no complaints and understands the plan. 1/5/2022  Patient seen at bedside today alongside of Dr. Chantel Alcala. Patient was pleasant, oriented to person, place, and time and in no acute distress. Patient is 4 days status post left first toe amputation. Patient had his angiogram today that was successful. Discussed with patient that he is having interval worsening of the amputation site and that he is at high risk for more proximal amputation, whether partial first ray amputation or transmetatarsal amputation. Patient states that he is wanting to save as much of his foot as possible and would prefer to move forward with partial first ray amputation if it is deemed necessary. No other pedal complaints. 1/4/2022  Patient seen at bedside today on behalf of Dr. Chantel Alcala. Patient was pleasant, was oriented to person, place, and time and in no acute distress. Patient is 3 days status post left first toe amputation. Patient states that he is having pain in his left foot at the amputation site, any sort of stinging pain. He is curious what time we will get his angiogram today.   Patient also complains of some soreness in 12.5 mg, 12.5 mg, Oral, Daily  insulin lispro (HUMALOG) injection vial 0-18 Units, 0-18 Units, SubCUTAneous, TID WC  insulin lispro (HUMALOG) injection vial 0-9 Units, 0-9 Units, SubCUTAneous, Nightly  hydrALAZINE (APRESOLINE) tablet 25 mg, 25 mg, Oral, Q8H PRN  amitriptyline (ELAVIL) tablet 100 mg, 100 mg, Oral, Nightly  atorvastatin (LIPITOR) tablet 40 mg, 40 mg, Oral, Nightly  cyclobenzaprine (FLEXERIL) tablet 10 mg, 10 mg, Oral, TID PRN  pramipexole (MIRAPEX) tablet 0.25 mg, 0.25 mg, Oral, Nightly  enoxaparin (LOVENOX) injection 40 mg, 40 mg, SubCUTAneous, Daily  ondansetron (ZOFRAN-ODT) disintegrating tablet 4 mg, 4 mg, Oral, Q8H PRN **OR** ondansetron (ZOFRAN) injection 4 mg, 4 mg, IntraVENous, Q6H PRN  potassium chloride (KLOR-CON M) extended release tablet 40 mEq, 40 mEq, Oral, PRN **OR** potassium bicarb-citric acid (EFFER-K) effervescent tablet 40 mEq, 40 mEq, Oral, PRN **OR** potassium chloride 10 mEq/100 mL IVPB (Peripheral Line), 10 mEq, IntraVENous, PRN  magnesium sulfate 2000 mg in 50 mL IVPB premix, 2,000 mg, IntraVENous, PRN  cefepime (MAXIPIME) 1000 mg IVPB minibag, 1,000 mg, IntraVENous, Q8H  polyethylene glycol (GLYCOLAX) packet 17 g, 17 g, Oral, Daily PRN  acetaminophen (TYLENOL) tablet 650 mg, 650 mg, Oral, Q6H PRN **OR** acetaminophen (TYLENOL) suppository 650 mg, 650 mg, Rectal, Q6H PRN  nicotine (NICODERM CQ) 14 MG/24HR 1 patch, 1 patch, TransDERmal, Daily  glucose (GLUTOSE) 40 % oral gel 15 g, 15 g, Oral, PRN  dextrose 50 % IV solution, 12.5 g, IntraVENous, PRN  glucagon (rDNA) injection 1 mg, 1 mg, IntraMUSCular, PRN  dextrose 5 % solution, 100 mL/hr, IntraVENous, PRN    Objective     BP (!) 167/77   Pulse 60   Temp 98.2 °F (36.8 °C) (Oral)   Resp 18   Ht 5' 10\" (1.778 m)   Wt 280 lb (127 kg)   SpO2 98%   BMI 40.18 kg/m²      I/O:    Intake/Output Summary (Last 24 hours) at 1/9/2022 1830  Last data filed at 1/9/2022 1625  Gross per 24 hour   Intake 4189.56 ml   Output 2100 ml   Net 2089.56 ml              Wt Readings from Last 3 Encounters:   01/01/22 280 lb (127 kg)   04/18/18 (!) 300 lb 6.4 oz (136.3 kg)   03/21/18 291 lb 12.8 oz (132.4 kg)       LABS:    Recent Labs     01/08/22  1508 01/09/22  1117   WBC 9.1 8.4   HGB 13.3* 13.6*   HCT 41.1* 40.2*    189        Recent Labs     01/07/22  0608   *   K 3.9   CL 99   CO2 22*   BUN 18   CREATININE 0.9        No results for input(s): PROT, INR, APTT in the last 72 hours. No results for input(s): CKTOTAL, CKMB, CKMBINDEX, TROPONINI in the last 72 hours. Exam:   Dressing intact and well maintained. No apparent strike through noted. Vascular: Dorsalis pedis and posterior tibial pulses are palpable to the RLE. Dorsalis pedis and posterior tibial pulses are non-palpable to the LLE secondary to postop edema. Skin temperature is warm to warm from proximal tibial tuberosity to distal digits of RLE and left TMA stump. CFT within normal limits to distal left TMA stump. Edema noted to the LLE consistent with postop status.      Dermatologic:  Patient has a surgical incisions to the distal aspect of the left foot at TMA stump and to posterior aspect of left lower leg. Skin edges of both incisions are well approximated. All sutures are intact without any areas of dehiscence. Dorsal and plantar flaps of TMA incision have CFT within normal limits and appear viable. No surrounding erythema noted. Neurovascular: Light touch sensation diminished bilaterally.      Musculoskeletal: Muscle strength 4/5 for all plantarflexors, dorsiflexors, inverters and everters examined. Decreased ROM noted left AJ. S/p left TMA. Mild pain on palpation of amputation site. Foot and ankle in grossly rectus alignment bilaterally.                       Imaging  Narrative   PROCEDURE: VL DUP LOWER EXTREMITY ARTERIES LEFT     Impression   1. High-grade stenosis in the mid left superficial femoral artery.    2. Abnormal left GINO suggesting a hemodynamically significant stenosis.         Narrative   4 views left foot. Impression   Impression:   1. Soft tissue swelling with evidence of soft tissue ulceration involving    the first digit. There is associated cortical destruction of the first    distal phalanx, most consistent with osteomyelitis. ASSESSMENT  Principle  1) Osteomyelitis, left hallux  2) S/p left hallux amputation at metatarsophalangeal joint level    Chronic  Patient Active Problem List   Diagnosis    Adenocarcinoma of Prostate, GS 7, stage T1c.    Onychomycosis    DM type 2 (diabetes mellitus, type 2) ([facility])    Dyslipidemia    DDD (degenerative disc disease), lumbar    Tobacco abuse    Insomnia    Carpal tunnel syndrome of right wrist    Carpal tunnel syndrome of left wrist    Cubital tunnel syndrome on left    Morbid obesity ([facility])    Obstructive sleep apnea on CPAP    Restless leg syndrome    Controlled type 2 diabetes mellitus with microalbuminuria ([facility] [address]    Diabetic ulcer of left foot due to type 2 diabetes mellitus ([facility])       PLAN:   - Pt. is a 60 y.o. male   -Patient examined and evaluated  -WBC 8.4; patient afebrile  -Tissue culture results yield streptococcus anginosus, streptococcus agalactiae, and rare gram negative bacilli that did not grow on culture. -Blood cultures positive for gram positive cocci in clusters, molecular panel demonstrates positive findings for staphylococcus.  Likely contaminant  -X-rays taken and reviewed; impression above  -Continue vancomycin and cefepime per infectious disease  -Arterial doppler reviewed, evidence of high grade stenosis in the mid left superficial femoral artery.   -Patient underwent angiogram by interventional radiology with successful stenting of the superficial femoral artery, popliteal, trifurcation vessels all widely patent  -Dressing changed; applied Adaptic to TMA incision site, and dressed both incisions with 4 x 4 gauze, Kerlix rolls, and an Ace bandage  -Nursing to change dressing once daily, podiatry will change the other time every day. -NWB to LLE. Able to heel WB for transfers and bathroom privileges.     - Podiatry will continue to follow patient    DISPO: Pending TMA incision healing    Radu Fernando DPM PGY-2    Podiatric Surgical Resident  1/9/2022   6:30 PM

## 2022-01-09 NOTE — PROGRESS NOTES
Hospitalist Progress Note      Patient:  Thomas Clemens    Unit/Bed:6E-57/057-A  YOB: 1965  MRN: [id number]   Acct: [de-identified]   PCP: Laurie Aquino,   Date of Admission: 1/1/2022    Assessment/Plan:    1. Left great toe diabetic ulcer secondary to T2DM:               - S/p 1st left toe amputation on 1/1/2022. Podiatry consulted and following. NWB to LLE; Tissue culture results yield streptococcus anginosus, streptococcus agalactiae, and rare gram negative bacilli that did not grow on culture -> stopped Vanc, add Clinda. Appreciate ID. To have surgery today 1/7 for partial first ray amputation versus transmetatarsal amputation. PAD: Arterial doppler notable for high grade stenosis in the mid left superficial femoral artery. S/p arteriogram with IR -> continue with Plavix. Will need f/u in 3 months.      2. Obstructive sleep apnea:               - Continue to wear home CPAP at night with home settings.     3. Hyperlipidemia:               - Continue Lipitor 40 mg     4. Essential HTN:   - uncontrolled. Patient has remained mostly hypertensive with some normotensive pressures. - Continue Lisinopril. Increase to HTZ 25. Hydralazine PRN     5. Tobacco abuse:               - discussed risks of smoking              - educated on resources available for cessation. Reports he does not wish to quit at this time.               - Nicotine patch.     6. Restless leg syndrome:               - Continue Mirapex     7. Gait disturbance secondary to left great toe amputation:              - Heel WB per podiatry recs              - Patient reported difficulty with pivoting and balance              - PT/OT consulted              - SW consulted     8. Morbid obesity:               - BMI 40.18; discussed and educated on  lifestyle modifications.     9. Uncontrolled NIDM II: continue with SSI. Carb control. Monitor blood glucose with ACCU.      10.  Epistaxis: Likely secondary to therapy with Plavix and Lovenox, easily stemmed with pressure per patient. If continues will consult ENT. Hemoglobin stable. 11.  Hyponatremia, mild: Repeat BMP    12. Acute postoperative anemia: continue to monitor Hgb, no gross bleeding identified. Continue to monitor and transfuse for Hgb < 7 or hemodynamic instability.                  Chief Complaint: Foot pain    Initial H and P:-    \"54 y.o. male with PMH of  HTN, hyperlipidemia, T2DM, morbid obesity, tobacco abuse, ASHLEE, RLS, prostate cancer (in remission) who presented to [facility] with with a chief complaint of foot pain. Patient reports that approximately 2 months ago he dropped a can of sausage gravy on his left great toe. States that following the incident he developed \"purple bruising\" of the left great toe. Stating approximately 2 weeks after the incident the purple bruising had gotten worse and started to experience an increased amount of pain. States he also noticed a \"heavy calus\" formation on his left great toe in which he decided to pull the skin off and noticed and open wound on his toe. States  \"bloody-clear\" drainage started coming from the wound but states the drainage coming from his toe is now \"thick yellow. \" Endorses he believes wound on his toes was resulted from wearing size 12 rubber boots at work, which is not his normal shoe size. Also endorsed his tennis shoes are not well fitting and that a piece of plastic from his shoe has been rubbing his left toe. Currently rates pain 10/10 in the left great toe, that radiates up left leg and is aggravated by touch and movement. No alleviating factors. States he has had difficulty with walking d/t the pain. Reports associated left leg swelling and redness. Denies CP, SOB, cough, fever, chills, weakness.      While in the ED, x ray of foot performed and notable for soft tissue swelling with evidence of soft tissue ulceration involving the first digit. Associated cortical destruction of the first distal phalanx concerning for osteomyelitis. Arterial doppler notable for high-grade stenosis in  the mid left superficial femoral artery and abnormal left GINO concerning for significant stenosis. No DVT noted venous doppler. Lab work in the ED revealed leukocytosis of 11.2, PCT 0.06, lactic acid 1.0. Patient afebrile with stable vital signs. No concern for bacterial sepsis at this time. Cefepime and Vancomycin ordered. Glucose elevated at 333. Patient reports that he has not taken his home medications in over a year d/t lack of insurance and not having a PCP. HbgA1C ordered and pending. Creatinine 0.9 with GFR 86. Monitor. 2 peripheral blood cultures collected and pending. Wound culture of left great toe collected and pending. Podiatry was consulted and evaluated patient while in the ER. Per podiatry notes, plan for OR today 1/1/22 for amputation of left great toe. At this time patient will be admitted to hospitalist services for further medical management. \"    1/5: pt doing well, back from angiogram. Denies any fever/chills. Admits to SOB while laying down. No CP.   1/6: Patient doing okay, sitting up in his chair. Possible amputation planned tomorrow with podiatry versus continue with ABX. Patient denies any fever/chills. He did have epistaxis overnight that lasted about 20 minutes    1/7: Patient doing well, sitting up in chair waiting to go to surgery. Patient denies any chest pain or shortness of breath. 1/8: pt reports he is having significant pain, 9/10 in severity at this time despite pain medications. Denies fever/chills. Having infiltration at his IV site. Denies CP/SOB. Complains of dry mouth. Subjective (past 24 hours):   1/9: pt doing okay, sitting in bed. C/o of his pain not being controlled, although RN states this is very hit or miss. Denies CP/SOB. No abd pain, n/v. No BM yet.       Past medical history, family history, social history and allergies reviewed again and is unchanged since admission. ROS (All review of systems completed. Pertinent positives noted. Otherwise All other systems reviewed and negative.)     Medications:  Reviewed    Infusion Medications    sodium chloride      dextrose       Scheduled Medications    gabapentin  300 mg Oral TID    sodium chloride flush  10 mL IntraVENous 2 times per day    midazolam  1 mg IntraVENous Once    fentanNYL  50 mcg IntraVENous Once    clopidogrel  75 mg Oral Daily    clindamycin (CLEOCIN) IV  300 mg IntraVENous Q8H    hydroCHLOROthiazide  25 mg Oral Daily    insulin NPH  0.2 Units/kg SubCUTAneous BID- 8&2    lisinopril  40 mg Oral Daily    insulin lispro  0-18 Units SubCUTAneous TID WC    insulin lispro  0-9 Units SubCUTAneous Nightly    amitriptyline  100 mg Oral Nightly    atorvastatin  40 mg Oral Nightly    pramipexole  0.25 mg Oral Nightly    enoxaparin  40 mg SubCUTAneous Daily    cefepime  1,000 mg IntraVENous Q8H    nicotine  1 patch TransDERmal Daily     PRN Meds: morphine, ketorolac, sodium chloride flush, sodium chloride, oxyCODONE **OR** oxyCODONE, hydrALAZINE, cyclobenzaprine, ondansetron **OR** ondansetron, potassium chloride **OR** potassium alternative oral replacement **OR** potassium chloride, magnesium sulfate, polyethylene glycol, acetaminophen **OR** acetaminophen, glucose, dextrose, glucagon (rDNA), dextrose      Intake/Output Summary (Last 24 hours) at 1/9/2022 0948  Last data filed at 1/9/2022 0647  Gross per 24 hour   Intake 1969.56 ml   Output 900 ml   Net 1069.56 ml       Diet:  ADULT DIET; Regular; 3 carb choices (45 gm/meal); Low Fat/Low Chol/High Fiber/ANNA    Exam:  BP (!) 165/74   Pulse 58   Temp 98 °F (36.7 °C) (Oral)   Resp 18   Ht 5' 10\" (1.778 m)   Wt 280 lb (127 kg)   SpO2 96%   BMI 40.18 kg/m²   General appearance: obese, no apparent distress, appears stated age and cooperative. HEENT: Pupils equal, round, and reactive to light. Conjunctivae/corneas clear. Neck: Supple, with full range of motion. No jugular venous distention. Trachea midline. Respiratory:  Normal respiratory effort. Clear to auscultation, bilaterally without Rales/Wheezes/Rhonchi. Cardiovascular: Regular rate and rhythm with normal S1/S2 without murmurs, rubs or gallops. Abdomen: Soft, non-tender, non-distended with normal bowel sounds. Musculoskeletal: LLE wrapped not visualized  Skin: Skin color, texture, turgor normal.  No rashes or lesions. Neurologic:  Neurovascularly intact without any focal sensory/motor deficits. Cranial nerves: II-XII intact, grossly non-focal.  Psychiatric: Alert and oriented, thought content appropriate, normal insight  Capillary Refill: Brisk,< 3 seconds   Peripheral Pulses: +2 palpable, equal bilaterally     Labs:   Recent Labs     01/07/22  0608 01/08/22  1508   WBC 9.0 9.1   HGB 14.9 13.3*   HCT 45.2 41.1*    182     Recent Labs     01/07/22  0608   *   K 3.9   CL 99   CO2 22*   BUN 18   CREATININE 0.9   CALCIUM 8.8     No results for input(s): AST, ALT, BILIDIR, BILITOT, ALKPHOS in the last 72 hours. No results for input(s): INR in the last 72 hours. No results for input(s): Alejandro Sol in the last 72 hours. Microbiology:    Blood culture #1:   Lab Results   Component Value Date    BC No growth-preliminary  01/01/2022    BC  01/01/2022     possible contamination; clinical correlation required        Blood culture #2:No results found for: BLOODCULT2    Organism:  Lab Results   Component Value Date    ORG Streptococcus agalactiae - (Group B) 01/01/2022    ORG Streptococcus anginosus 01/01/2022    ORG mixed anaerobic growth 01/01/2022         Lab Results   Component Value Date    LABGRAM  01/01/2022     Few segmented neutrophils observed. Rare epithelial cells observed. Moderate gram positive cocci occurring singly and in pairs. Rare gram negative bacilli. Rare small gram positive bacilli.         MRSA culture only:No results found for: [facility]    Urine culture: No results found for: LABURIN    Respiratory culture: No results found for: CULTRESP    Aerobic and Anaerobic :  Lab Results   Component Value Date    LABAERO  01/01/2022     Culture also yielded light growth of gram positive bacilli most consistent with Corynebacterium species. Direct gram stain indicated rare gram negative bacilli which did not grow on culture. This could be inhibited growth due to antibiotic therapy, a fastidious organism or an anaerobic organism. If a true mixed aerobic and anaerobic infection is suspected, then broad spectrum empiric antibiotic therapy is indicated and should include coverage for anaerobic organisms. LABAERO  01/01/2022     moderate growth Group B streptococci are suspectible to ampicillin, penicillin and cefazolin, but may be erythromycin and/or clindamycin resistant. Contact microbiology if erythromycin and/or clindamycin testing is necessary. LABAERO  01/01/2022     moderate growth S. anginosus group are largely susceptible to beta-lactam agents; ceftriaxone is the preferred agent. If allergy or resistance precludes use of beta-lactam agents, vancomycin is an appropriate alternative agent. Lab Results   Component Value Date    LABANAE  01/01/2022     Culture yielded heavy mixed anaerobic growth, including multiple colony types of both gram negative bacilli and gram positive cocci. If a true mixed aerobic and anaerobic infection is suspected, then broad spectrum empiric antibiotic therapy is indicated and should include coverage for anaerobic organisms. Urinalysis:      Lab Results   Component Value Date    NITRU Negative 10/03/2017    WBCUA 0 09/23/2014    BACTERIA NONE 09/23/2014    RBCUA 0 09/23/2014    BLOODU Trace-intact 10/03/2017    BLOODU SMALL 09/23/2014    SPECGRAV 1.015 08/29/2011    GLUCOSEU 250 10/03/2017       Radiology:  IR ANGIOGRAM EXTREMITY LEFT   Final Result   1.  Status post successful angioplasty of a severely stenotic lesion mid left SFA with an excellent result. .    2. Patient was started on Plavix regimen today and will be scheduled for a follow-up visit with groin check, sometime in the next few days, in addition to a routine follow-up visit in 3 months with arterial Doppler imaging at that time. .            **This report has been created using voice recognition software. It may contain minor errors which are inherent in voice recognition technology. **                        Final report electronically signed by Dr. Nicole Chambers on 1/5/2022 2:40 PM      VL DUP LOWER EXTREMITY ARTERIES LEFT   Final Result   1. High-grade stenosis in the mid left superficial femoral artery. 2. Abnormal left GINO suggesting a hemodynamically significant stenosis. **This report has been created using voice recognition software. It may contain minor errors which are inherent in voice recognition technology. **      Final report electronically signed by Dr. George Guardado on 1/1/2022 9:40 AM      VL DUP LOWER EXTREMITY VENOUS LEFT   Final Result   No evidence of a DVT. **This report has been created using voice recognition software. It may contain minor errors which are inherent in voice recognition technology. **      Final report electronically signed by Dr. George Guardado on 1/1/2022 9:27 AM      XR FOOT LEFT (MIN 3 VIEWS)   Final Result   Impression:   1. Soft tissue swelling with evidence of soft tissue ulceration involving    the first digit. There is associated cortical destruction of the first    distal phalanx, most consistent with osteomyelitis. This document has been electronically signed by: Luis Armando Winslow MD on    01/01/2022 06:31 AM        XR FOOT LEFT (MIN 3 VIEWS)    Result Date: 1/1/2022  4 views left foot. Comparison: None. Findings: There is soft tissue swelling and ulceration involving the first digit.  There is associated cortical destruction of the medial aspect of the first distal phalanx, concerning for osteomyelitis. No radiopaque foreign body is identified. No acute fracture or dislocation is seen. There are large superior and inferior calcaneal spurs. Mild degenerative changes are present at the first metatarsal phalangeal joint. Impression: 1. Soft tissue swelling with evidence of soft tissue ulceration involving the first digit. There is associated cortical destruction of the first distal phalanx, most consistent with osteomyelitis. This document has been electronically signed by: Sanaz Coleman MD on 01/01/2022 06:31 AM     DUP LOWER EXTREMITY ARTERIES LEFT    Result Date: 1/1/2022  PROCEDURE:  DUP LOWER EXTREMITY ARTERIES LEFT CLINICAL INFORMATION: non-palpable pulses . Nonhealing wound left great toe. COMPARISON: No prior study. TECHNIQUE: Arterial doppler ultrasound was performed of the left lower extremity using gray scale, color flow and spectral doppler imaging. Velocities were also recorded. FINDINGS: LEFT ARTERY (PSV cm/sec) ? ??????????????????????? CFA ---------------> 121 PSV cm/sec PROF -------------> 100 PSV cm/sec SFA PROX ------->77 PSV cm/sec SFA MID ---------->>454 PSV cm/sec SFA DIST -------->58 PSV cm/sec POP A PROX --->62 PSV cm/sec POP A DIST ---->114 PSV cm/sec PTA --------------->42 PSV cm/sec PERONEAL ----->40 PSV cm/sec SEAN ----------------> 42 PSV cm/sec GINO RIGHT SEAN----->1.07 PTA----->1.07 GINO LEFT SEAN----->0.65 PTA----->0.56 There is triphasic waveform in the left common femoral artery. In the proximal left superficial femoral artery, the waveform becomes dampened. The velocity decreases. In the mid left superficial femoral artery, the velocity is markedly elevated. There is  significant spectral broadening. This is consistent with a high-grade stenosis in the mid superficial femoral artery. Distal to this, there are dampened waveforms throughout. The waveform becomes biphasic.  There is a significant reduction the peak systolic velocity in the distal superficial femoral artery. The popliteal, anterior tibial, posterior tibial and peroneal arteries are patent. All have dampened waveforms. These are biphasic. There are reduced ABIs on the left compared to the right. 1. High-grade stenosis in the mid left superficial femoral artery. 2. Abnormal left GINO suggesting a hemodynamically significant stenosis. **This report has been created using voice recognition software. It may contain minor errors which are inherent in voice recognition technology. ** Final report electronically signed by Dr. Deborah Horan on 1/1/2022 9:40 AM    VL DUP LOWER EXTREMITY VENOUS LEFT    Result Date: 1/1/2022  PROCEDURE: VL DUP LOWER EXTREMITY VENOUS LEFT CLINICAL INFORMATION: pain, swelling. Nonhealing ulcer left great toe. COMPARISON: No prior study. TECHNIQUE: Venous doppler ultrasound was performed of the left lower extremity using gray scale, color flow and spectral doppler imaging. FINDINGS: There is normal color flow, spectral analysis and compressibility of the common femoral vein, femoral vein and popliteal vein on the left . There is mild thickening of the walls of popliteal vein. There is normal color flow and compressibility in the posterior tibial veins, anterior tibial veins and peroneal veins. There is normal color flow, spectral analysis and compressibility in the contralateral common femoral vein. The greater saphenous vein is patent. The gastrocnemius vein is patent. No evidence of a DVT. **This report has been created using voice recognition software. It may contain minor errors which are inherent in voice recognition technology. ** Final report electronically signed by Dr. Deborah Horan on 1/1/2022 9:27 AM      Electronically signed by Nanda Uribe PA-C on 1/9/2022 at 9:48 AM

## 2022-01-10 LAB
ANION GAP SERPL CALCULATED.3IONS-SCNC: 9 MEQ/L (ref 8–16)
BUN BLDV-MCNC: 20 MG/DL (ref 7–22)
CALCIUM SERPL-MCNC: 9.4 MG/DL (ref 8.5–10.5)
CHLORIDE BLD-SCNC: 100 MEQ/L (ref 98–111)
CO2: 26 MEQ/L (ref 23–33)
CREAT SERPL-MCNC: 1 MG/DL (ref 0.4–1.2)
ERYTHROCYTE [DISTWIDTH] IN BLOOD BY AUTOMATED COUNT: 13.2 % (ref 11.5–14.5)
ERYTHROCYTE [DISTWIDTH] IN BLOOD BY AUTOMATED COUNT: 42.5 FL (ref 35–45)
GFR SERPL CREATININE-BSD FRML MDRD: 77 ML/MIN/1.73M2
GLUCOSE BLD-MCNC: 100 MG/DL (ref 70–108)
GLUCOSE BLD-MCNC: 107 MG/DL (ref 70–108)
GLUCOSE BLD-MCNC: 113 MG/DL (ref 70–108)
GLUCOSE BLD-MCNC: 125 MG/DL (ref 70–108)
GLUCOSE BLD-MCNC: 147 MG/DL (ref 70–108)
HCT VFR BLD CALC: 42.9 % (ref 42–52)
HEMOGLOBIN: 14.1 GM/DL (ref 14–18)
MCH RBC QN AUTO: 28.8 PG (ref 26–33)
MCHC RBC AUTO-ENTMCNC: 32.9 GM/DL (ref 32.2–35.5)
MCV RBC AUTO: 87.7 FL (ref 80–94)
PLATELET # BLD: 217 THOU/MM3 (ref 130–400)
PMV BLD AUTO: 11.3 FL (ref 9.4–12.4)
POTASSIUM SERPL-SCNC: 3.9 MEQ/L (ref 3.5–5.2)
RBC # BLD: 4.89 MILL/MM3 (ref 4.7–6.1)
SODIUM BLD-SCNC: 135 MEQ/L (ref 135–145)
WBC # BLD: 8.6 THOU/MM3 (ref 4.8–10.8)

## 2022-01-10 PROCEDURE — 6360000002 HC RX W HCPCS: Performed by: STUDENT IN AN ORGANIZED HEALTH CARE EDUCATION/TRAINING PROGRAM

## 2022-01-10 PROCEDURE — 85027 COMPLETE CBC AUTOMATED: CPT

## 2022-01-10 PROCEDURE — 1200000000 HC SEMI PRIVATE

## 2022-01-10 PROCEDURE — 80048 BASIC METABOLIC PNL TOTAL CA: CPT

## 2022-01-10 PROCEDURE — 36415 COLL VENOUS BLD VENIPUNCTURE: CPT

## 2022-01-10 PROCEDURE — 6370000000 HC RX 637 (ALT 250 FOR IP): Performed by: STUDENT IN AN ORGANIZED HEALTH CARE EDUCATION/TRAINING PROGRAM

## 2022-01-10 PROCEDURE — 99233 SBSQ HOSP IP/OBS HIGH 50: CPT | Performed by: PHYSICIAN ASSISTANT

## 2022-01-10 PROCEDURE — 97110 THERAPEUTIC EXERCISES: CPT

## 2022-01-10 PROCEDURE — 2580000003 HC RX 258: Performed by: STUDENT IN AN ORGANIZED HEALTH CARE EDUCATION/TRAINING PROGRAM

## 2022-01-10 PROCEDURE — 2500000003 HC RX 250 WO HCPCS: Performed by: STUDENT IN AN ORGANIZED HEALTH CARE EDUCATION/TRAINING PROGRAM

## 2022-01-10 PROCEDURE — 6370000000 HC RX 637 (ALT 250 FOR IP): Performed by: PHYSICIAN ASSISTANT

## 2022-01-10 PROCEDURE — 82948 REAGENT STRIP/BLOOD GLUCOSE: CPT

## 2022-01-10 PROCEDURE — 97164 PT RE-EVAL EST PLAN CARE: CPT

## 2022-01-10 RX ADMIN — SODIUM CHLORIDE, PRESERVATIVE FREE 10 ML: 5 INJECTION INTRAVENOUS at 08:12

## 2022-01-10 RX ADMIN — ACETAMINOPHEN 650 MG: 325 TABLET ORAL at 14:13

## 2022-01-10 RX ADMIN — ATORVASTATIN CALCIUM 40 MG: 40 TABLET, FILM COATED ORAL at 21:46

## 2022-01-10 RX ADMIN — CLOPIDOGREL 75 MG: 75 TABLET, FILM COATED ORAL at 08:18

## 2022-01-10 RX ADMIN — AMITRIPTYLINE HYDROCHLORIDE 100 MG: 100 TABLET, FILM COATED ORAL at 21:45

## 2022-01-10 RX ADMIN — GABAPENTIN 300 MG: 600 TABLET, FILM COATED ORAL at 21:45

## 2022-01-10 RX ADMIN — GABAPENTIN 300 MG: 600 TABLET, FILM COATED ORAL at 14:53

## 2022-01-10 RX ADMIN — OXYCODONE 5 MG: 5 TABLET ORAL at 08:18

## 2022-01-10 RX ADMIN — LISINOPRIL 40 MG: 40 TABLET ORAL at 08:18

## 2022-01-10 RX ADMIN — ACETAMINOPHEN 650 MG: 325 TABLET ORAL at 21:58

## 2022-01-10 RX ADMIN — CLINDAMYCIN IN 5 PERCENT DEXTROSE 300 MG: 6 INJECTION, SOLUTION INTRAVENOUS at 04:38

## 2022-01-10 RX ADMIN — CEFEPIME 1000 MG: 1 INJECTION, POWDER, FOR SOLUTION INTRAMUSCULAR; INTRAVENOUS at 05:15

## 2022-01-10 RX ADMIN — INSULIN HUMAN 25 UNITS: 100 INJECTION, SUSPENSION SUBCUTANEOUS at 14:53

## 2022-01-10 RX ADMIN — CEFEPIME 1000 MG: 1 INJECTION, POWDER, FOR SOLUTION INTRAMUSCULAR; INTRAVENOUS at 14:14

## 2022-01-10 RX ADMIN — DOCUSATE SODIUM 100 MG: 100 CAPSULE ORAL at 08:18

## 2022-01-10 RX ADMIN — GABAPENTIN 300 MG: 600 TABLET, FILM COATED ORAL at 08:18

## 2022-01-10 RX ADMIN — AMLODIPINE BESYLATE 5 MG: 5 TABLET ORAL at 08:17

## 2022-01-10 RX ADMIN — INSULIN HUMAN 25 UNITS: 100 INJECTION, SUSPENSION SUBCUTANEOUS at 08:12

## 2022-01-10 RX ADMIN — KETOROLAC TROMETHAMINE 30 MG: 30 INJECTION, SOLUTION INTRAMUSCULAR; INTRAVENOUS at 16:31

## 2022-01-10 RX ADMIN — CEFEPIME 1000 MG: 1 INJECTION, POWDER, FOR SOLUTION INTRAMUSCULAR; INTRAVENOUS at 22:54

## 2022-01-10 RX ADMIN — HYDROCHLOROTHIAZIDE 25 MG: 25 TABLET ORAL at 08:18

## 2022-01-10 RX ADMIN — CLINDAMYCIN IN 5 PERCENT DEXTROSE 300 MG: 6 INJECTION, SOLUTION INTRAVENOUS at 13:18

## 2022-01-10 RX ADMIN — PRAMIPEXOLE DIHYDROCHLORIDE 0.25 MG: 0.25 TABLET ORAL at 21:45

## 2022-01-10 RX ADMIN — ACETAMINOPHEN 650 MG: 325 TABLET ORAL at 05:14

## 2022-01-10 RX ADMIN — CLINDAMYCIN IN 5 PERCENT DEXTROSE 300 MG: 6 INJECTION, SOLUTION INTRAVENOUS at 21:52

## 2022-01-10 RX ADMIN — ENOXAPARIN SODIUM 40 MG: 100 INJECTION SUBCUTANEOUS at 08:21

## 2022-01-10 RX ADMIN — SODIUM CHLORIDE, PRESERVATIVE FREE 10 ML: 5 INJECTION INTRAVENOUS at 21:59

## 2022-01-10 ASSESSMENT — PAIN DESCRIPTION - LOCATION
LOCATION: FOOT

## 2022-01-10 ASSESSMENT — PAIN DESCRIPTION - PAIN TYPE
TYPE: SURGICAL PAIN

## 2022-01-10 ASSESSMENT — PAIN SCALES - GENERAL
PAINLEVEL_OUTOF10: 3
PAINLEVEL_OUTOF10: 5
PAINLEVEL_OUTOF10: 4
PAINLEVEL_OUTOF10: 7
PAINLEVEL_OUTOF10: 3
PAINLEVEL_OUTOF10: 5
PAINLEVEL_OUTOF10: 4
PAINLEVEL_OUTOF10: 5
PAINLEVEL_OUTOF10: 4

## 2022-01-10 ASSESSMENT — PAIN DESCRIPTION - DESCRIPTORS
DESCRIPTORS: SHARP;THROBBING

## 2022-01-10 ASSESSMENT — PAIN DESCRIPTION - ORIENTATION
ORIENTATION: LEFT

## 2022-01-10 NOTE — PROGRESS NOTES
Hospitalist Progress Note      Patient:  Nima Moseley    Unit/Bed:6E-57/057-A  YOB: 1965  MRN: [id number]   Acct: [de-identified]   PCP: Ritika Pinzon DO  Date of Admission: 1/1/2022    Assessment/Plan:    1. Left great toe diabetic ulcer secondary to T2DM:               - S/p 1st left toe amputation on 1/1/2022. Podiatry consulted and following. NWB to LLE; Tissue culture results yield streptococcus anginosus, streptococcus agalactiae, and rare gram negative bacilli that did not grow on culture -> stopped Vanc, add Clinda. Appreciate ID. To have surgery today 1/7 for partial first ray amputation versus transmetatarsal amputation. PAD: Arterial doppler notable for high grade stenosis in the mid left superficial femoral artery. S/p arteriogram with IR -> continue with Plavix. Will need f/u in 3 months.      2. Obstructive sleep apnea:               - Continue to wear home CPAP at night with home settings.     3. Hyperlipidemia:               - Continue Lipitor 40 mg     4. Essential HTN:   - uncontrolled. Patient has remained mostly hypertensive with some normotensive pressures. - Continue Lisinopril. Increase to HTZ 25. Hydralazine PRN     5. Tobacco abuse:               - discussed risks of smoking              - educated on resources available for cessation. Reports he does not wish to quit at this time.               - Nicotine patch.     6. Restless leg syndrome:               - Continue Mirapex     7. Gait disturbance secondary to left great toe amputation:              - Heel WB per podiatry recs              - Patient reported difficulty with pivoting and balance              - PT/OT consulted              - SW consulted     8. Morbid obesity:               - BMI 40.18; discussed and educated on  lifestyle modifications.     9. Uncontrolled NIDM II with polyneuropathy: continue with SSI. Carb control. Monitor blood glucose with ACCU. 10.  Epistaxis: Likely secondary to therapy with Plavix and Lovenox, easily stemmed with pressure per patient. If continues will consult ENT. Hemoglobin stable. Resolved     11. Hyponatremia, mild: monitor with BMP, resolved    12. Acute postoperative anemia, resolved: continue to monitor Hgb, no gross bleeding identified. Continue to monitor and transfuse for Hgb < 7 or hemodynamic instability. 13.  Disposition: Await therapy. Patient does not wish for inpatient rehab, wants to go home with home health.         Chief Complaint: Foot pain    Initial H and P:-    \"54 y.o. male with PMH of  HTN, hyperlipidemia, T2DM, morbid obesity, tobacco abuse, ASHLEE, RLS, prostate cancer (in remission) who presented to [address] [place] [address] with a chief complaint of foot pain. Patient reports that approximately 2 months ago he dropped a can of sausage gravy on his left great toe. States that following the incident he developed \"purple bruising\" of the left great toe. Stating approximately 2 weeks after the incident the purple bruising had gotten worse and started to experience an increased amount of pain. States he also noticed a \"heavy calus\" formation on his left great toe in which he decided to pull the skin off and noticed and open wound on his toe. States  \"bloody-clear\" drainage started coming from the wound but states the drainage coming from his toe is now \"thick yellow. \" Endorses he believes wound on his toes was resulted from wearing size 12 rubber boots at work, which is not his normal shoe size. Also endorsed his tennis shoes are not well fitting and that a piece of plastic from his shoe has been rubbing his left toe. Currently rates pain 10/10 in the left great toe, that radiates up left leg and is aggravated by touch and movement. No alleviating factors. States he has had difficulty with walking d/t the pain. Reports associated left leg swelling and redness.   Denies CP, SOB, cough, fever, chills, weakness.      While in the ED, x ray of foot performed and notable for soft tissue swelling with evidence of soft tissue ulceration involving the first digit. Associated cortical destruction of the first distal phalanx concerning for osteomyelitis. Arterial doppler notable for high-grade stenosis in  the mid left superficial femoral artery and abnormal left GINO concerning for significant stenosis. No DVT noted venous doppler. Lab work in the ED revealed leukocytosis of 11.2, PCT 0.06, lactic acid 1.0. Patient afebrile with stable vital signs. No concern for bacterial sepsis at this time. Cefepime and Vancomycin ordered. Glucose elevated at 333. Patient reports that he has not taken his home medications in over a year d/t lack of insurance and not having a PCP. HbgA1C ordered and pending. Creatinine 0.9 with GFR 86. Monitor. 2 peripheral blood cultures collected and pending. Wound culture of left great toe collected and pending. Podiatry was consulted and evaluated patient while in the ER. Per podiatry notes, plan for OR today 1/1/22 for amputation of left great toe. At this time patient will be admitted to hospitalist services for further medical management. \"    1/5: pt doing well, back from angiogram. Denies any fever/chills. Admits to SOB while laying down. No CP.   1/6: Patient doing okay, sitting up in his chair. Possible amputation planned tomorrow with podiatry versus continue with ABX. Patient denies any fever/chills. He did have epistaxis overnight that lasted about 20 minutes    1/7: Patient doing well, sitting up in chair waiting to go to surgery. Patient denies any chest pain or shortness of breath. 1/8: pt reports he is having significant pain, 9/10 in severity at this time despite pain medications. Denies fever/chills. Having infiltration at his IV site. Denies CP/SOB. Complains of dry mouth. Subjective (past 24 hours):   1/9: pt doing okay, sitting in bed.  C/o of his pain not being controlled, although RN states this is very hit or miss. Denies CP/SOB. No abd pain, n/v. No BM yet. Past medical history, family history, social history and allergies reviewed again and is unchanged since admission. ROS (All review of systems completed. Pertinent positives noted. Otherwise All other systems reviewed and negative.)     Medications:  Reviewed    Infusion Medications    sodium chloride      dextrose       Scheduled Medications    docusate sodium  100 mg Oral Daily    acetaminophen  650 mg Oral 3 times per day    amLODIPine  5 mg Oral Daily    gabapentin  300 mg Oral TID    sodium chloride flush  10 mL IntraVENous 2 times per day    midazolam  1 mg IntraVENous Once    fentanNYL  50 mcg IntraVENous Once    clopidogrel  75 mg Oral Daily    clindamycin (CLEOCIN) IV  300 mg IntraVENous Q8H    hydroCHLOROthiazide  25 mg Oral Daily    insulin NPH  0.2 Units/kg SubCUTAneous BID- 8&2    lisinopril  40 mg Oral Daily    insulin lispro  0-18 Units SubCUTAneous TID WC    insulin lispro  0-9 Units SubCUTAneous Nightly    amitriptyline  100 mg Oral Nightly    atorvastatin  40 mg Oral Nightly    pramipexole  0.25 mg Oral Nightly    enoxaparin  40 mg SubCUTAneous Daily    cefepime  1,000 mg IntraVENous Q8H    nicotine  1 patch TransDERmal Daily     PRN Meds: morphine, ketorolac, sodium chloride flush, sodium chloride, oxyCODONE **OR** oxyCODONE, hydrALAZINE, cyclobenzaprine, ondansetron **OR** ondansetron, potassium chloride **OR** potassium alternative oral replacement **OR** potassium chloride, magnesium sulfate, polyethylene glycol, acetaminophen **OR** acetaminophen, glucose, dextrose, glucagon (rDNA), dextrose      Intake/Output Summary (Last 24 hours) at 1/10/2022 1346  Last data filed at 1/10/2022 1324  Gross per 24 hour   Intake 1615.94 ml   Output 3750 ml   Net -2134.06 ml       Diet:  ADULT DIET; Regular; 3 carb choices (45 gm/meal);  Low Fat/Low Chol/High Fiber/ANNA    Exam:  BP (!) 140/78   Pulse 61   Temp 97.5 °F (36.4 °C) (Oral)   Resp 16   Ht 5' 10\" (1.778 m)   Wt 280 lb (127 kg)   SpO2 97%   BMI 40.18 kg/m²   General appearance: obese, no apparent distress, appears stated age and cooperative. HEENT: Pupils equal, round, and reactive to light. Conjunctivae/corneas clear. Neck: Supple, with full range of motion. No jugular venous distention. Trachea midline. Respiratory:  Normal respiratory effort. Clear to auscultation, bilaterally without Rales/Wheezes/Rhonchi. Cardiovascular: Regular rate and rhythm with normal S1/S2 without murmurs, rubs or gallops. Abdomen: Soft, non-tender, non-distended with normal bowel sounds. Musculoskeletal: LLE wrapped not visualized s/p TMA  Skin: Skin color, texture, turgor normal.  No rashes or lesions. Neurologic:  Neurovascularly intact without any focal sensory/motor deficits. Cranial nerves: II-XII intact, grossly non-focal.  Psychiatric: Alert and oriented x4, thought content appropriate, normal insight  Capillary Refill: Brisk,< 3 seconds   Peripheral Pulses: +2 palpable, equal bilaterally     Labs:   Recent Labs     01/08/22  1508 01/09/22  1117 01/10/22  1245   WBC 9.1 8.4 8.6   HGB 13.3* 13.6* 14.1   HCT 41.1* 40.2* 42.9    189 217     Recent Labs     01/10/22  1245      K 3.9      CO2 26   BUN 20   CREATININE 1.0   CALCIUM 9.4     No results for input(s): AST, ALT, BILIDIR, BILITOT, ALKPHOS in the last 72 hours. No results for input(s): INR in the last 72 hours. No results for input(s): Antonio Min in the last 72 hours.     Microbiology:    Blood culture #1:   Lab Results   Component Value Date    BC No growth-preliminary  01/01/2022    BC  01/01/2022     possible contamination; clinical correlation required        Blood culture #2:No results found for: BLOODCULT2    Organism:  Lab Results   Component Value Date    ORG Streptococcus agalactiae - (Group B) 01/01/2022    ORG Streptococcus anginosus Results   Component Value Date    NITRU Negative 10/03/2017    WBCUA 0 09/23/2014    BACTERIA NONE 09/23/2014    RBCUA 0 09/23/2014    BLOODU Trace-intact 10/03/2017    BLOODU SMALL 09/23/2014    SPECGRAV 1.015 08/29/2011    GLUCOSEU 250 10/03/2017       Radiology:  IR ANGIOGRAM EXTREMITY LEFT   Final Result   1. Status post successful angioplasty of a severely stenotic lesion mid left SFA with an excellent result. .    2. Patient was started on Plavix regimen today and will be scheduled for a follow-up visit with groin check, sometime in the next few days, in addition to a routine follow-up visit in 3 months with arterial Doppler imaging at that time. .            **This report has been created using voice recognition software. It may contain minor errors which are inherent in voice recognition technology. **                        Final report electronically signed by Dr. Nicol Wan on 1/5/2022 2:40 PM      VL DUP LOWER EXTREMITY ARTERIES LEFT   Final Result   1. High-grade stenosis in the mid left superficial femoral artery. 2. Abnormal left GINO suggesting a hemodynamically significant stenosis. **This report has been created using voice recognition software. It may contain minor errors which are inherent in voice recognition technology. **      Final report electronically signed by Dr. Jeovanny Mckinley on 1/1/2022 9:40 AM      VL DUP LOWER EXTREMITY VENOUS LEFT   Final Result   No evidence of a DVT. **This report has been created using voice recognition software. It may contain minor errors which are inherent in voice recognition technology. **      Final report electronically signed by Dr. Jeovanny Mckinley on 1/1/2022 9:27 AM      XR FOOT LEFT (MIN 3 VIEWS)   Final Result   Impression:   1. Soft tissue swelling with evidence of soft tissue ulceration involving    the first digit. There is associated cortical destruction of the first    distal phalanx, most consistent with osteomyelitis. This document has been electronically signed by: Randall Toure MD on    01/01/2022 06:31 AM        XR FOOT LEFT (MIN 3 VIEWS)    Result Date: 1/1/2022  4 views left foot. Comparison: None. Findings: There is soft tissue swelling and ulceration involving the first digit. There is associated cortical destruction of the medial aspect of the first distal phalanx, concerning for osteomyelitis. No radiopaque foreign body is identified. No acute fracture or dislocation is seen. There are large superior and inferior calcaneal spurs. Mild degenerative changes are present at the first metatarsal phalangeal joint. Impression: 1. Soft tissue swelling with evidence of soft tissue ulceration involving the first digit. There is associated cortical destruction of the first distal phalanx, most consistent with osteomyelitis. This document has been electronically signed by: Randall Toure MD on 01/01/2022 06:31 AM    VL DUP LOWER EXTREMITY ARTERIES LEFT    Result Date: 1/1/2022  PROCEDURE: VL DUP LOWER EXTREMITY ARTERIES LEFT CLINICAL INFORMATION: non-palpable pulses . Nonhealing wound left great toe. COMPARISON: No prior study. TECHNIQUE: Arterial doppler ultrasound was performed of the left lower extremity using gray scale, color flow and spectral doppler imaging. Velocities were also recorded. FINDINGS: LEFT ARTERY (PSV cm/sec) ? ??????????????????????? CFA ---------------> 121 PSV cm/sec PROF -------------> 100 PSV cm/sec SFA PROX ------->77 PSV cm/sec SFA MID ---------->>454 PSV cm/sec SFA DIST -------->58 PSV cm/sec POP A PROX --->62 PSV cm/sec POP A DIST ---->114 PSV cm/sec PTA --------------->42 PSV cm/sec PERONEAL ----->40 PSV cm/sec SEAN ----------------> 42 PSV cm/sec GINO RIGHT SEAN----->1.07 PTA----->1.07 GINO LEFT SEAN----->0.65 PTA----->0.56 There is triphasic waveform in the left common femoral artery. In the proximal left superficial femoral artery, the waveform becomes dampened. The velocity decreases.  In the mid left superficial femoral artery, the velocity is markedly elevated. There is  significant spectral broadening. This is consistent with a high-grade stenosis in the mid superficial femoral artery. Distal to this, there are dampened waveforms throughout. The waveform becomes biphasic. There is a significant reduction the peak systolic velocity in the distal superficial femoral artery. The popliteal, anterior tibial, posterior tibial and peroneal arteries are patent. All have dampened waveforms. These are biphasic. There are reduced ABIs on the left compared to the right. 1. High-grade stenosis in the mid left superficial femoral artery. 2. Abnormal left GINO suggesting a hemodynamically significant stenosis. **This report has been created using voice recognition software. It may contain minor errors which are inherent in voice recognition technology. ** Final report electronically signed by Dr. Jeovanny Mckinley on 1/1/2022 9:40 AM     DUP LOWER EXTREMITY VENOUS LEFT    Result Date: 1/1/2022  PROCEDURE: VL DUP LOWER EXTREMITY VENOUS LEFT CLINICAL INFORMATION: pain, swelling. Nonhealing ulcer left great toe. COMPARISON: No prior study. TECHNIQUE: Venous doppler ultrasound was performed of the left lower extremity using gray scale, color flow and spectral doppler imaging. FINDINGS: There is normal color flow, spectral analysis and compressibility of the common femoral vein, femoral vein and popliteal vein on the left . There is mild thickening of the walls of popliteal vein. There is normal color flow and compressibility in the posterior tibial veins, anterior tibial veins and peroneal veins. There is normal color flow, spectral analysis and compressibility in the contralateral common femoral vein. The greater saphenous vein is patent. The gastrocnemius vein is patent. No evidence of a DVT. **This report has been created using voice recognition software.  It may contain minor errors which are inherent in voice recognition technology. ** Final report electronically signed by Dr. Adelia Mann on 1/1/2022 9:27 AM      Electronically signed by Jeremiah Newman PA-C on 1/10/2022 at 1:46 PM

## 2022-01-10 NOTE — PROGRESS NOTES
Progress note: Infectious diseases    Patient - Awilda Jenkins,  Age - 60 y.o.    - 1965      Room Number - 6E-57/057-A   MRN -  [id number]   [facility]  Date of Admission -  2022  2:39 AM    SUBJECTIVE:   He had TMA. stump looks intact  OBJECTIVE   VITALS    height is 5' 10\" (1.778 m) and weight is 280 lb (127 kg). His oral temperature is 97.5 °F (36.4 °C). His blood pressure is 158/81 (abnormal) and his pulse is 55. His respiration is 20 and oxygen saturation is 96%.        Wt Readings from Last 3 Encounters:   22 280 lb (127 kg)   18 (!) 300 lb 6.4 oz (136.3 kg)   18 291 lb 12.8 oz (132.4 kg)       I/O (24 Hours)    Intake/Output Summary (Last 24 hours) at 1/10/2022 0941  Last data filed at 1/10/2022 5697  Gross per 24 hour   Intake 2995.94 ml   Output 3525 ml   Net -529.06 ml       General Appearance  Awake, alert, oriented,  not  In acute distress  HEENT - normocephalic, atraumatic, pink conjunctiva,  anicteric sclera  Neck - Supple, no mass  Lungs -  Bilateral   air entry, no rhonchi, no wheeze  Cardiovascular - Heart sounds are normal.    Abdomen - soft, not distended, nontender,   Neurologic -oriented  Skin - No bruising or bleeding  Extremities -dressed foot, had TMA            MEDICATIONS:      docusate sodium  100 mg Oral Daily    acetaminophen  650 mg Oral 3 times per day    amLODIPine  5 mg Oral Daily    gabapentin  300 mg Oral TID    sodium chloride flush  10 mL IntraVENous 2 times per day    midazolam  1 mg IntraVENous Once    fentanNYL  50 mcg IntraVENous Once    clopidogrel  75 mg Oral Daily    clindamycin (CLEOCIN) IV  300 mg IntraVENous Q8H    hydroCHLOROthiazide  25 mg Oral Daily    insulin NPH  0.2 Units/kg SubCUTAneous BID- 8&2    lisinopril  40 mg Oral Daily    insulin lispro  0-18 Units SubCUTAneous TID WC    insulin lispro  0-9 Units SubCUTAneous Nightly    amitriptyline  100 mg Oral Nightly    atorvastatin  40 mg Oral Nightly    pramipexole  0.25 mg Oral Nightly    enoxaparin  40 mg SubCUTAneous Daily    cefepime  1,000 mg IntraVENous Q8H    nicotine  1 patch TransDERmal Daily      sodium chloride      dextrose       morphine, ketorolac, sodium chloride flush, sodium chloride, oxyCODONE **OR** oxyCODONE, hydrALAZINE, cyclobenzaprine, ondansetron **OR** ondansetron, potassium chloride **OR** potassium alternative oral replacement **OR** potassium chloride, magnesium sulfate, polyethylene glycol, acetaminophen **OR** acetaminophen, glucose, dextrose, glucagon (rDNA), dextrose      LABS:     CBC:   Recent Labs     01/08/22  1508 01/09/22  1117   WBC 9.1 8.4   HGB 13.3* 13.6*    189     BMP:    No results for input(s): NA, K, CL, CO2, BUN, CREATININE, GLUCOSE in the last 72 hours. Calcium:  No results for input(s): CALCIUM in the last 72 hours. Recent Labs     01/09/22  1622 01/09/22  2005 01/10/22  0801   POCGLU 80 82 113*      No results for input(s): INR in the last 72 hours. Problem list of patient:     Patient Active Problem List   Diagnosis Code    Adenocarcinoma of Prostate, GS 7, stage T1c. C61    Onychomycosis B35.1    DM type 2 (diabetes mellitus, type 2) ([facility] [address] E11.9    Dyslipidemia E78.5    DDD (degenerative disc disease), lumbar M51.36    Tobacco abuse Z72.0    Insomnia G47.00    Carpal tunnel syndrome of right wrist G56.01    Carpal tunnel syndrome of left wrist G56.02    Cubital tunnel syndrome on left G56.22    Morbid obesity (HCC) E66.01    Obstructive sleep apnea on CPAP G47.33, Z99.89    Restless leg syndrome G25.81    Controlled type 2 diabetes mellitus with microalbuminuria (HCC) E11.29, R80.9    Diabetic ulcer of left foot due to type 2 diabetes mellitus (HCC) E11.621, T77.296         ASSESSMENT/PLAN   Left foot necrotic wound s/p amputation. the stump became ischemic related to poor flow: he had angioplasty and revision with TMA  PVD: he had angioplasty by IR. DM with neuropathy  Continue current treatment.     Germain Walker MD, MD, FACP 1/10/2022 9:41 AM

## 2022-01-10 NOTE — PROGRESS NOTES
[address] [place] [address]  INPATIENT PHYSICAL THERAPY  RE-EVALUATION  [facility] - 6E-57/057-A    Time In: 4192  Time Out: 9473  Timed Code Treatment Minutes: 12 Minutes  Minutes: 24          Date: 1/10/2022  Patient Name: Awilda Jenkins,  Gender:  male        MRN: [id number]  : 1965  (60 y.o.)      Referring Practitioner: Tricia Castle DPM  Diagnosis: diabetic ulcer of left foot due to type 2 DM  Additional Pertinent Hx: 60 y.o. male with PMH of  HTN, hyperlipidemia, T2DM, morbid obesity, tobacco abuse, ASHLEE, RLS, prostate cancer (in remission) who presented to [address] [place] [address] with with a chief complaint of foot pain. Patient reports that approximately 2 months ago he dropped a can of sausage gravy on his left great toe. States that following the incident he developed \"purple bruising\" of the left great toe. PT underwent a left great toe amputation on 22 pt is s/p LEFT FOOT TRANSMETATARSAL AMPUTATION, GASTROCNEMIUS RECESSION completed by Dr Tavon Michaud on . Restrictions/Precautions:  Restrictions/Precautions: Weight Bearing,Fall Risk,General Precautions  Left Lower Extremity Weight Bearing: Non Weight Bearing  Partial Weight Bearing Percentage Or Pounds: NWB with all mobility    Subjective:  Chart Reviewed: Yes  Patient assessed for rehabilitation services?: Yes  Family / Caregiver Present: No  Subjective: Ok to see pt per nursing. Per nursing and chart, pt is NWB on L LE. Pt reports he does not have enough room for a walker to fit in his house and is requesting crutches. Pt agreeable to PT session.      General:       Vision: Within Functional Limits    Hearing: Within functional limits         Pain: 4/10: L foot    Vitals: Vitals not assessed per clinical judgement, see nursing flowsheet    Social/Functional History:    Lives With: Significant other  Type of Home: House  Home Layout: One level  Home Access: Stairs to enter with rails  Entrance Stairs - Number of Steps: 3 steps with 1 rail     Bathroom Shower/Tub: Walk-in shower  Bathroom Toilet: Standard  Bathroom Accessibility: Accessible    Receives Help From: Family  ADL Assistance: Independent  Homemaking Assistance: Independent  Homemaking Responsibilities: Yes  Ambulation Assistance: Independent  Transfer Assistance: Independent    Active : Yes  Occupation: Full time employment  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 She may be able to assist if needed. Pt reports his home it too small for a w/c for mobility. OBJECTIVE:    Balance:  Static Sitting Balance:  Independent  Dynamic Sitting Balance: Independent  Static Standing Balance: Contact Guard Assistance, Minimal Assistance, X 1, with cues for safety, with verbal cues   Crutches for support, cues for safety with unsteadiness noted in standing with 1 LOB, min A for correction  Bed Mobility:  Not Tested    Transfers:  Sit to Stand: Contact Guard Assistance, Minimal Assistance, X 1, cues for hand placement, with verbal cues  Stand to Sit:Moderate Assistance, X 1, cues for hand placement, with verbal cues  crutches for support with LOB posteriorly requiring mod A for controlling descent into chair  Ambulation:  Contact Guard Assistance, Minimal Assistance, X 1, with cues for safety, with verbal cues , with increased time for completion  Distance: 60 feet  Surface: Level Tile  Device:Crutches  Gait Deviations: Forward Flexed Posture, Slow Nancy, Decreased Step Length on Right, Decreased Gait Speed and hopping pattern to promote NWB on L LE. Pt very unsteady with mobility, cues for safety and proper sequencing with completion with fair demo. Stairs:  Pt educated on stair negotiation and use of crutch for support as pt returns home as pt reports fatigue and does not want to attempt this date. Pt educated on proper sequencing with completion.       Exercise:  Patient was guided in 1 set(s) 20 reps of exercise to Education:  PT Education: Diana Hima of Care,Functional Mobility [address] Education    Equipment Recommendations:  Equipment Needed: Yes  Mobility Devices: Crutches  Other: pt will need crutches if d/c to home    Plan:  Times per week: 6X O  Times per day: Daily  Specific instructions for Next Treatment: therex and mobility with limting distance amb with heel WB LLE  Current Treatment Recommendations: Strengthening,Neuromuscular Re-education,Home Exercise Program,Safety Education & Training,Balance Training,Endurance Training,Patient/Caregiver Education & Training,Functional Mobility Training,Equipment Evaluation, Education, & procurement,Transfer Training,Gait Training,Stair training    Goals:  Patient goals : go home  Short term goals  Time Frame for Short term goals: by discharge  Short term goal 1: bed mobility with MOD I to get in/out of bed  Short term goal 2: transfer with MOD I, maintaining heel WB, to get in/out of chairs  Short term goal 3: Pt will amb with crutches with S for safety with NWB on the R LE to progress with mobility. Short term goal 4: Pt will negotiate 3 steps for MARILOU the home with good technique and no LOB to return home safely. Long term goals  Time Frame for Long term goals : no LTGs set secondary to short ELOS    Following session, patient left in safe position with all fall risk precautions in place. Pt back in bedside chair following session, all needs and call light in reach.

## 2022-01-10 NOTE — CARE COORDINATION
DISCHARGE ON GOING EVALUATION    [address] day: 9     Procedure:   01/01/22  LEFT 1ST TOE AMPUTATION by podiatry  01/05 Run-off study with angioplasty left SFA by IR  01/07 LEFT FOOT TRANSMETATARSAL AMPUTATION, GASTROCNEMIUS RECESSION by podiatry  Barriers to Discharge: POD #3 dressing care, pain control, ID and podiatry follows, PT/OT to see today, Cleocin IV, Plavix, Cefepime IV. Patient Goals/Plan/Treatment Preferences: from home with Bob Padilla and venancio PRICE; following for possible therapy needs/IP rehab.

## 2022-01-10 NOTE — PROGRESS NOTES
Lorna Overall requires crutches due to impaired ambulation and for increased stability in order to participate in or complete daily living tasks of: bathing, toileting, personal cares, ambulating, hygiene and meal preparation. The patient is able to safely use the crutches and the functional mobility deficit can be sufficiently resolved.     Delvin Flores PT, DPT

## 2022-01-11 LAB
ANION GAP SERPL CALCULATED.3IONS-SCNC: 9 MEQ/L (ref 8–16)
BUN BLDV-MCNC: 17 MG/DL (ref 7–22)
CALCIUM SERPL-MCNC: 9.2 MG/DL (ref 8.5–10.5)
CHLORIDE BLD-SCNC: 100 MEQ/L (ref 98–111)
CO2: 26 MEQ/L (ref 23–33)
CREAT SERPL-MCNC: 1 MG/DL (ref 0.4–1.2)
ERYTHROCYTE [DISTWIDTH] IN BLOOD BY AUTOMATED COUNT: 13.4 % (ref 11.5–14.5)
ERYTHROCYTE [DISTWIDTH] IN BLOOD BY AUTOMATED COUNT: 43.3 FL (ref 35–45)
GFR SERPL CREATININE-BSD FRML MDRD: 77 ML/MIN/1.73M2
GLUCOSE BLD-MCNC: 104 MG/DL (ref 70–108)
GLUCOSE BLD-MCNC: 108 MG/DL (ref 70–108)
GLUCOSE BLD-MCNC: 116 MG/DL (ref 70–108)
GLUCOSE BLD-MCNC: 125 MG/DL (ref 70–108)
GLUCOSE BLD-MCNC: 147 MG/DL (ref 70–108)
HCT VFR BLD CALC: 42.9 % (ref 42–52)
HEMOGLOBIN: 14.1 GM/DL (ref 14–18)
MCH RBC QN AUTO: 28.9 PG (ref 26–33)
MCHC RBC AUTO-ENTMCNC: 32.9 GM/DL (ref 32.2–35.5)
MCV RBC AUTO: 87.9 FL (ref 80–94)
PLATELET # BLD: 213 THOU/MM3 (ref 130–400)
PMV BLD AUTO: 10.7 FL (ref 9.4–12.4)
POTASSIUM SERPL-SCNC: 3.9 MEQ/L (ref 3.5–5.2)
RBC # BLD: 4.88 MILL/MM3 (ref 4.7–6.1)
SODIUM BLD-SCNC: 135 MEQ/L (ref 135–145)
WBC # BLD: 8.3 THOU/MM3 (ref 4.8–10.8)

## 2022-01-11 PROCEDURE — 97110 THERAPEUTIC EXERCISES: CPT

## 2022-01-11 PROCEDURE — 2500000003 HC RX 250 WO HCPCS: Performed by: STUDENT IN AN ORGANIZED HEALTH CARE EDUCATION/TRAINING PROGRAM

## 2022-01-11 PROCEDURE — 36415 COLL VENOUS BLD VENIPUNCTURE: CPT

## 2022-01-11 PROCEDURE — 82948 REAGENT STRIP/BLOOD GLUCOSE: CPT

## 2022-01-11 PROCEDURE — 97116 GAIT TRAINING THERAPY: CPT

## 2022-01-11 PROCEDURE — 6370000000 HC RX 637 (ALT 250 FOR IP): Performed by: STUDENT IN AN ORGANIZED HEALTH CARE EDUCATION/TRAINING PROGRAM

## 2022-01-11 PROCEDURE — 6370000000 HC RX 637 (ALT 250 FOR IP): Performed by: PHYSICIAN ASSISTANT

## 2022-01-11 PROCEDURE — 2580000003 HC RX 258: Performed by: STUDENT IN AN ORGANIZED HEALTH CARE EDUCATION/TRAINING PROGRAM

## 2022-01-11 PROCEDURE — 99233 SBSQ HOSP IP/OBS HIGH 50: CPT | Performed by: PHYSICIAN ASSISTANT

## 2022-01-11 PROCEDURE — 85027 COMPLETE CBC AUTOMATED: CPT

## 2022-01-11 PROCEDURE — 1200000000 HC SEMI PRIVATE

## 2022-01-11 PROCEDURE — 80048 BASIC METABOLIC PNL TOTAL CA: CPT

## 2022-01-11 PROCEDURE — 6360000002 HC RX W HCPCS: Performed by: STUDENT IN AN ORGANIZED HEALTH CARE EDUCATION/TRAINING PROGRAM

## 2022-01-11 PROCEDURE — 97535 SELF CARE MNGMENT TRAINING: CPT

## 2022-01-11 PROCEDURE — 97530 THERAPEUTIC ACTIVITIES: CPT

## 2022-01-11 RX ADMIN — GABAPENTIN 300 MG: 600 TABLET, FILM COATED ORAL at 19:24

## 2022-01-11 RX ADMIN — GABAPENTIN 300 MG: 600 TABLET, FILM COATED ORAL at 13:26

## 2022-01-11 RX ADMIN — DOCUSATE SODIUM 100 MG: 100 CAPSULE ORAL at 09:57

## 2022-01-11 RX ADMIN — ACETAMINOPHEN 650 MG: 325 TABLET ORAL at 05:55

## 2022-01-11 RX ADMIN — AMLODIPINE BESYLATE 5 MG: 5 TABLET ORAL at 09:32

## 2022-01-11 RX ADMIN — CLINDAMYCIN IN 5 PERCENT DEXTROSE 300 MG: 6 INJECTION, SOLUTION INTRAVENOUS at 21:28

## 2022-01-11 RX ADMIN — INSULIN HUMAN 25 UNITS: 100 INJECTION, SUSPENSION SUBCUTANEOUS at 09:35

## 2022-01-11 RX ADMIN — HYDRALAZINE HYDROCHLORIDE 25 MG: 25 TABLET, FILM COATED ORAL at 09:32

## 2022-01-11 RX ADMIN — GABAPENTIN 300 MG: 600 TABLET, FILM COATED ORAL at 09:31

## 2022-01-11 RX ADMIN — CLINDAMYCIN IN 5 PERCENT DEXTROSE 300 MG: 6 INJECTION, SOLUTION INTRAVENOUS at 04:37

## 2022-01-11 RX ADMIN — LISINOPRIL 40 MG: 40 TABLET ORAL at 09:33

## 2022-01-11 RX ADMIN — OXYCODONE 10 MG: 5 TABLET ORAL at 15:08

## 2022-01-11 RX ADMIN — HYDRALAZINE HYDROCHLORIDE 25 MG: 25 TABLET, FILM COATED ORAL at 13:26

## 2022-01-11 RX ADMIN — INSULIN HUMAN 25 UNITS: 100 INJECTION, SUSPENSION SUBCUTANEOUS at 13:27

## 2022-01-11 RX ADMIN — OXYCODONE 10 MG: 5 TABLET ORAL at 19:25

## 2022-01-11 RX ADMIN — SODIUM CHLORIDE, PRESERVATIVE FREE 10 ML: 5 INJECTION INTRAVENOUS at 19:25

## 2022-01-11 RX ADMIN — SODIUM CHLORIDE, PRESERVATIVE FREE 10 ML: 5 INJECTION INTRAVENOUS at 09:59

## 2022-01-11 RX ADMIN — ENOXAPARIN SODIUM 40 MG: 100 INJECTION SUBCUTANEOUS at 09:57

## 2022-01-11 RX ADMIN — CEFEPIME 1000 MG: 1 INJECTION, POWDER, FOR SOLUTION INTRAMUSCULAR; INTRAVENOUS at 05:55

## 2022-01-11 RX ADMIN — HYDROCHLOROTHIAZIDE 25 MG: 25 TABLET ORAL at 09:32

## 2022-01-11 RX ADMIN — CEFEPIME 1000 MG: 1 INJECTION, POWDER, FOR SOLUTION INTRAMUSCULAR; INTRAVENOUS at 13:42

## 2022-01-11 RX ADMIN — CLINDAMYCIN IN 5 PERCENT DEXTROSE 300 MG: 6 INJECTION, SOLUTION INTRAVENOUS at 12:34

## 2022-01-11 RX ADMIN — CLOPIDOGREL 75 MG: 75 TABLET, FILM COATED ORAL at 09:56

## 2022-01-11 RX ADMIN — CYCLOBENZAPRINE 10 MG: 10 TABLET, FILM COATED ORAL at 19:25

## 2022-01-11 RX ADMIN — PRAMIPEXOLE DIHYDROCHLORIDE 0.25 MG: 0.25 TABLET ORAL at 19:24

## 2022-01-11 RX ADMIN — AMITRIPTYLINE HYDROCHLORIDE 100 MG: 100 TABLET, FILM COATED ORAL at 19:24

## 2022-01-11 RX ADMIN — ATORVASTATIN CALCIUM 40 MG: 40 TABLET, FILM COATED ORAL at 19:24

## 2022-01-11 RX ADMIN — CEFEPIME 1000 MG: 1 INJECTION, POWDER, FOR SOLUTION INTRAMUSCULAR; INTRAVENOUS at 22:21

## 2022-01-11 ASSESSMENT — PAIN SCALES - GENERAL
PAINLEVEL_OUTOF10: 0
PAINLEVEL_OUTOF10: 3
PAINLEVEL_OUTOF10: 7
PAINLEVEL_OUTOF10: 0
PAINLEVEL_OUTOF10: 0
PAINLEVEL_OUTOF10: 7
PAINLEVEL_OUTOF10: 4
PAINLEVEL_OUTOF10: 7

## 2022-01-11 NOTE — PROGRESS NOTES
[address] [place] [address]  INPATIENT PHYSICAL THERAPY  DAILY NOTE  [facility] San - 7E-62/200-A  Time In: 1108  Time Out: 8761  Timed Code Treatment Minutes: 28 Minutes  Minutes: 28          Date: 2022  Patient Name: India Serrato,  Gender:  male        MRN: [id number]  : 1965  (60 y.o.)     Referring Practitioner: Kandace Lobo DPM  Diagnosis: diabetic ulcer of left foot due to type 2 DM  Additional Pertinent Hx: 60 y.o. male with PMH of  HTN, hyperlipidemia, T2DM, morbid obesity, tobacco abuse, ASHLEE, RLS, prostate cancer (in remission) who presented to [address] [place] [address] with with a chief complaint of foot pain. Patient reports that approximately 2 months ago he dropped a can of sausage gravy on his left great toe. States that following the incident he developed \"purple bruising\" of the left great toe. PT underwent a left great toe amputation on 22 pt is s/p LEFT FOOT TRANSMETATARSAL AMPUTATION, GASTROCNEMIUS RECESSION completed by Dr Derek Kelly on . Prior Level of Function:  Lives With: Significant other  Type of Home: House  Home Layout: One level  Home Access: Stairs to enter with rails  Entrance Stairs - Number of Steps: 3 steps with 1 rail   Bathroom Shower/Tub: Walk-in shower  Bathroom Toilet: Standard  Bathroom Accessibility: Accessible    Receives Help From: Family  ADL Assistance: [address] [place] [address] Avenue: Independent  Homemaking Responsibilities: Yes  Ambulation Assistance: Independent  Transfer Assistance: Independent  Active : Yes  Additional Comments: Pt's girlfriend lives with him. She may be able to assist if needed. Pt reports his home it too small for a w/c for mobility. Restrictions/Precautions:  Restrictions/Precautions: Weight Bearing,Fall Risk,General Precautions  Left Lower Extremity Weight Bearing: Non Weight Bearing  Partial Weight Bearing Percentage Or Pounds: NWB with all mobility     SUBJECTIVE: Ok to see pt per nursing.  Pt in bedside chair when therapy arrived, agreeable to PT session at this time. Pt required extensive education on safety and set up to decrease risk for falls in the home. Pt educated on equipment and safety with use of crutches for support with mobility. Pt reporting his SO will be there at all times and can help assist as needed. Pt reports he is not going to any facility for therapy. PAIN: mild pain reported in L ankle, did not give rating    Vitals: Vitals not assessed per clinical judgement, see nursing flowsheet    OBJECTIVE:  Bed Mobility:  Not Tested    Transfers:  Sit to Stand: Contact Guard Assistance, X 1, with increased time for completion, cues for hand placement, with verbal cues  Stand to Sit:Contact Guard Assistance, X 1, with increased time for completion, cues for hand placement, with verbal cues  Crutches for support with cues for safety, fair demo, unsteadiness noted  Ambulation:  Contact Guard Assistance, Minimal Assistance, X 1, with cues for safety, with verbal cues , with increased time for completion  Distance: 12 feet, 6 ft x2  Surface: Level Tile  Device:Crutches  Gait Deviations:  Decreased Step Length Bilaterally, Decreased Gait Speed and Unsteady Gait  Cues for safety and to decrease step length for safety, when pt completes large step, pt more unsteady. Pt more steady with smaller steps  Balance:  Static Sitting Balance:  Supervision to complete toileting  Dynamic Sitting Balance: Supervision, to complete sharona care  Static Standing Balance: Stand By Assistance, X 1, with cues for safety, with verbal cues, with use of crutches with slight unsteadiness noted upon standing  Dynamic Standing Balance: Contact Guard Assistance, Minimal Assistance, X 1, with cues for safety, with verbal cues.  Pt placed L heel on ground to help with donning pants in standing with education on NWB and use of bra and crutches for support, fair demo  Stairs:  [employer], X 1, with cues for safety, with verbal cues , with increased time for completion  Number of Steps: 3x 2 trials  Height: 6\" step with Bilateral Handrails  Hopping pattern completed with ascend and descend for support, no LOB noted  Functional Outcome Measures: Completed  AM-PAC Inpatient Mobility Raw Score : 18  AM-PAC Inpatient T-Scale Score : 43.63    ASSESSMENT:  Assessment: Patient progressing toward established goals. Activity Tolerance:  Patient tolerance of  treatment: good. Equipment Recommendations:Equipment Needed: Yes  Mobility Devices: Crutches  Other: pt will need crutches if d/c to home  Discharge Recommendations: Continue to assess pending progress, 24 hour assistance or supervision, Home with Home Health PT and Patient would benefit from continued PT at discharge  Plan: Times per week: 6X O  Times per day: Daily  Specific instructions for Next Treatment: therex and mobility with limting distance amb with heel WB LLE  Current Treatment Recommendations: Strengthening,Neuromuscular Re-education,Home Exercise Program,Safety Education & Tracy Heal Training,Patient/Caregiver Education & Training,Functional Mobility Training,Equipment Evaluation, Education, & procurement,Transfer Training,Gait Training,Stair training    Patient Education  Patient Education: Plan of Care, Precautions/Restrictions, [employer] Mobility, Equipment Education, Transfers, Gait, Stairs, Use of Gait Belt, Home Safety Education, Activity Pacing,  - Patient Verbalized Understanding, - Patient Requires Continued Education    Goals:  Patient goals : go home  Short term goals  Time Frame for Short term goals: by discharge  Short term goal 1: bed mobility with MOD I to get in/out of bed  Short term goal 2: transfer with MOD I, maintaining heel WB, to get in/out of chairs  Short term goal 3: Pt will amb with crutches with S for safety with NWB on the R LE to progress with mobility.   Short term goal 4: Pt will negotiate 3 steps for MARILOU the home with good technique and no LOB to return home safely. Long term goals  Time Frame for Long term goals : no LTGs set secondary to short ELOS    Following session, patient left in safe position with all fall risk precautions in place. Pt left in bedside chair following session, all needs and call light in reach.

## 2022-01-11 NOTE — PROGRESS NOTES
[facility]  Occupational Therapy  Reassessment/Daily Note  Time:    Time In: 8800  Time Out: 09  Timed Code Treatment Minutes: 28 Minutes  Minutes: 28          Date: 2022  Patient Name: India Serrato,   Gender: male      Room: [facility]57/057-A  MRN: [id number]  : 1965  (60 y.o.)  Referring Practitioner: EMILY Rosenthal CNP  Diagnosis: diabetic ulcer of te left foot due to DM II  Additional Pertinent Hx: 60 y.o. male with PMH of  HTN, hyperlipidemia, T2DM, morbid obesity, tobacco abuse, ASHLEE, RLS, prostate cancer (in remission) who presented to [facility] with with a chief complaint of foot pain. Patient reports that approximately 2 months ago he dropped a can of sausage gravy on his left great toe. States that following the incident he developed \"purple bruising\" of the left great toe. PT underwent a left great toe amputation on 22    Restrictions/Precautions:  Restrictions/Precautions: Weight Bearing,Fall Risk,General Precautions  Left Lower Extremity Weight Bearing: Non Weight Bearing  Partial Weight Bearing Percentage Or Pounds: NWB with all mobility      SUBJECTIVE: Pt up in bathroom upon arrival. Pt stating he got himself there after no staff answered his call light. Pt agreeable to OT session. PAIN: 0/10:      Vitals: Vitals not assessed per clinical judgement, see nursing flowsheet    COGNITION: Decreased Insight and Decreased Safety Awareness    ADL:   Lower Extremity Dressing: Stand By Assistance. seated in chair reachign down for right sock  Toileting: Stand By Assistance. completing hygiene and clothign management. Pt had completed prior to therapist returning to [facility]  Toilet Transfer: Stand By Assistance. from standard toilet with pt having had completed prior to therapist return to room .   Concerns for safety awareness when completing toileting tasks and transfers after pt had completed prior to therapist return to room    BALANCE:  Standing Balance: Contact Guard Assistance. with one instance of min A d/t LOB when attempting to place cruthches in proper position. BED MOBILITY:  Not Tested    TRANSFERS:  Sit to Stand:  Contact Guard Assistance. from chair with education on placement of cruthces  Stand to Sit: Contact Guard Assistance. with max education on safety d/t pt sitting to early prior ot being turned all the way around    FUNCTIONAL MOBILITY:  Assistive Device: crutches  Assist Level:  Contact Guard Assistance. To min A   Distance: back from bathroom and slightly into hallways  Pt unsteady and unsafe completing with safety concerns while using crutches. Pt with noted increased fatigue and SOB as well. Pt educated on use of RW by lacing wheels on inside of legs to give further clearance while he ambulates down his narrow hallways at home. ADDITIONAL ACTIVITIES:  Education on safety at home by having 24 hour supervision to assist him as needed. Pt staitng his girlfriend who doesn't work per pt or family members will be able to assist him as needed. Pt continued to state he wanted to return home and would be ok at home. ASSESSMENT:     Activity Tolerance:  Patient tolerance of  treatment: fair. Discharge Recommendations: Subacute/skilled nursing facility.   Pt not safe to return home at this time/  Equipment Recommendations: Equipment Needed: Yes  Mobility Devices: [employer]  Other: reacher and Bedside commode  Plan: Times per week: 5xs  Current Treatment Recommendations: Strengthening,Endurance Training,Patient/Caregiver Education & Training,Equipment Evaluation, Education, & procurement,Self-Care / ADL,Balance Training,Home Management Training,Functional Mobility Training,Safety Education & Training    Patient Education  Patient Education: safety with use of crutches and walker\    Goals  Short term goals  Time Frame for Short term goals: by discharge  Short term goal 1: Pt will complete ADL routine with no > min A and min cues for NWB L LE status, (Heel wt bearing for BRP)  Short term goal 2: Pt will ambulate to / from the BR with S and RW with no > 1 cue for PWB/ heel wt bearing only L LE to increase safety for toileting routine  Short term goal 3: Pt will complete B UE medium resistence strenthening ex x 12 reps to increase UE strength needed for transfers  Short term goal 4: Pt will tolerate standing x 1 min with S and no cues for NWB/ Heel wt bearing status for ease of toileting tasks  Long term goals  Time Frame for Long term goals : not set due to est short LOS    Revised Short-Term Goals  Short term goals  Time Frame for Short term goals: by discharge  Short term goal 1: Pt will complete ADL routine with no > min A and min cues for NWB L LE status, (Heel wt bearing for BRP)  Short term goal 2: Pt will ambulate to / from the BR with consistent CGA and crutches with no > 1 cue for NWB  L LE or safety concerns to increase safety for toileting routine  Short term goal 3: Pt will complete B UE medium resistence strenthening ex x 12 reps to increase UE strength needed for transfers  Short term goal 4: Pt will tolerate standing x 1 min with S and no cues for NWB/ Heel wt bearing status for ease of toileting tasks  Long term goals  Time Frame for Long term goals : not set due to est short LOS    Following session, patient left in safe position with all fall risk precautions in place.

## 2022-01-11 NOTE — PROGRESS NOTES
Patient in chair awake watching tv, received report from [facility] .

## 2022-01-11 NOTE — PROGRESS NOTES
Progress note: Infectious diseases    Patient - Soto Elise,  Age - 60 y.o.    - 1965      Room Number - 6E-57/057-A   MRN -  [id number]   Acct # - [de-identified]  Date of Admission -  2022  2:39 AM    SUBJECTIVE:   No new issues. OBJECTIVE   VITALS    height is 5' 10\" (1.778 m) and weight is 280 lb (127 kg). His oral temperature is 98 °F (36.7 °C). His blood pressure is 162/78 (abnormal) and his pulse is 57. His respiration is 16 and oxygen saturation is 96%.        Wt Readings from Last 3 Encounters:   22 280 lb (127 kg)   18 (!) 300 lb 6.4 oz (136.3 kg)   18 291 lb 12.8 oz (132.4 kg)       I/O (24 Hours)    Intake/Output Summary (Last 24 hours) at 2022 1029  Last data filed at 2022 1010  Gross per 24 hour   Intake 810 ml   Output 1325 ml   Net -515 ml       General Appearance  Awake, alert, oriented,  not  In acute distress  HEENT - normocephalic, atraumatic, pink conjunctiva,  anicteric sclera  Neck - Supple, no mass  Lungs -  Bilateral   air entry, no rhonchi, no wheeze  Cardiovascular - Heart sounds are normal.    Abdomen - soft, not distended, nontender,   Neurologic -oriented  Skin - No bruising or bleeding  Extremities -dressed foot, had TMA                      MEDICATIONS:      docusate sodium  100 mg Oral Daily    amLODIPine  5 mg Oral Daily    gabapentin  300 mg Oral TID    sodium chloride flush  10 mL IntraVENous 2 times per day    midazolam  1 mg IntraVENous Once    fentanNYL  50 mcg IntraVENous Once    clopidogrel  75 mg Oral Daily    clindamycin (CLEOCIN) IV  300 mg IntraVENous Q8H    hydroCHLOROthiazide  25 mg Oral Daily    insulin NPH  0.2 Units/kg SubCUTAneous BID- 8&2    lisinopril  40 mg Oral Daily    insulin lispro  0-18 Units SubCUTAneous TID WC    insulin lispro  0-9 Units SubCUTAneous Nightly    amitriptyline  100 mg Oral Nightly    atorvastatin  40 mg Oral Nightly    pramipexole  0.25 mg Oral Nightly    enoxaparin  40 mg SubCUTAneous Daily    cefepime  1,000 mg IntraVENous Q8H    nicotine  1 patch TransDERmal Daily      sodium chloride      dextrose       morphine, ketorolac, sodium chloride flush, sodium chloride, oxyCODONE **OR** oxyCODONE, hydrALAZINE, cyclobenzaprine, ondansetron **OR** ondansetron, potassium chloride **OR** potassium alternative oral replacement **OR** potassium chloride, magnesium sulfate, polyethylene glycol, acetaminophen **OR** acetaminophen, glucose, dextrose, glucagon (rDNA), dextrose      LABS:     CBC:   Recent Labs     01/09/22  1117 01/10/22  1245 01/11/22  0617   WBC 8.4 8.6 8.3   HGB 13.6* 14.1 14.1    217 213     BMP:    Recent Labs     01/10/22  1245 01/11/22  0617    135   K 3.9 3.9    100   CO2 26 26   BUN 20 17   CREATININE 1.0 1.0   GLUCOSE 125* 108     Calcium:  Recent Labs     01/11/22  0617   CALCIUM 9.2      Recent Labs     01/10/22  1658 01/10/22  2144 01/11/22  0845   POCGLU 107 100 104      No results for input(s): INR in the last 72 hours. Problem list of patient:     Patient Active Problem List   Diagnosis Code    Adenocarcinoma of Prostate, GS 7, stage T1c. C61    Onychomycosis B35.1    DM type 2 (diabetes mellitus, type 2) [facility] E11.9    Dyslipidemia E78.5    DDD (degenerative disc disease), lumbar M51.36    Tobacco abuse Z72.0    Insomnia G47.00    Carpal tunnel syndrome of right wrist G56.01    Carpal tunnel syndrome of left wrist G56.02    Cubital tunnel syndrome on left G56.22    Morbid obesity (HCC) E66.01    Obstructive sleep apnea on CPAP G47.33, Z99.89    Restless leg syndrome G25.81    Controlled type 2 diabetes mellitus with microalbuminuria (HCC) E11.29, R80.9    Diabetic ulcer of left foot due to type 2 diabetes mellitus (HCC) E11.621, K41.916         ASSESSMENT/PLAN   Left foot necrotic wound s/p amputation. the stump became ischemic related to poor

## 2022-01-11 NOTE — PROGRESS NOTES
Low Risk Nutrition Note    Recommendations/Plan:  Monitor BG trends. Honor food prefs. Monitor weights as able. May benefit from outpatient DM education. Nutrition Assessment:     Pt s/p TMA + angioplasty. Noted good intake, and BG control x24 hrs. Reason for Visit:  RD Nutrition Re-Screen/LOS    Current Nutrition Therapies:  ADULT DIET; Regular; 3 carb choices (45 gm/meal); Low Fat/Low Chol/High Fiber/ANNA    Height:  5' 10\" (177.8 cm)    Current Body Weight:  208 lb (94.3 kg)       Diagnosis:  No nutrition diagnosis at this time. Monitoring and Evaluation:  Patient will be monitored per nutrition standards of care. Consult Dietitian if nutrition intervention essential to patient care is needed.      Discharge Planning:  No needs      Electronically signed by Cristobal SANTOS [facility] on 1/11/22 at 11:44 AM EST    Contact:  [phone]

## 2022-01-11 NOTE — PROGRESS NOTES
Podiatric Progress Note  Petr Smith  Subjective :   1/11/2022  Patient is seen at chair side on behalf of Dr. Bautista Dalton. Patient is 4 days status post left foot transmetatarsal amputation with gastrocnemius recession (DOS 1/7/2022). Patient states that his pain is well controlled at this point in time. Patient is curious about shoe gear modifications upon discharge. Patient is curious if he would be able to go back to his regular job after being discharged from the hospital and healing of his incision. Patient expresses that he does not want to go to inpatient rehab. Patient denies any nausea, vomiting, fever, chills, chest pain, shortness of breath. No other pedal complaints. 1/9/2022  Patient seen at chair side today on behalf of Dr. Bautista Dalton. Patient is 2 day s/p left foot TMA with gastrocnemius recession (DOS 1/7/2022). Patient appeared pleasant, was oriented to person, place and time and in no acute distress. Patient endorses mild pain to his left foot; he rates it as 3/10. He states that the pain is very well controlled with the current pain medications. Otherwise patient is feeling very well. Patient denies any N/V/F/C/SOB or CP. Patient has no further pedal concerns at this point in time. 1/6/22  Patient seen at bedside on behalf of Dr. Bautista Dalton. Patient was resting in bed with his CPAP on. He responded to questions by nodding yes and no. Patient is status post left first toe amputation. Patient has no complaints and understands the plan. 1/5/2022  Patient seen at bedside today alongside of Dr. Bautista Dalton. Patient was pleasant, oriented to person, place, and time and in no acute distress. Patient is 4 days status post left first toe amputation. Patient had his angiogram today that was successful.   Discussed with patient that he is having interval worsening of the amputation site and that he is at high risk for more proximal amputation, whether partial first ray amputation or transmetatarsal amputation. Patient states that he is wanting to save as much of his foot as possible and would prefer to move forward with partial first ray amputation if it is deemed necessary. No other pedal complaints. 1/4/2022  Patient seen at bedside today on behalf of Dr. Arvind Thakkar. Patient was pleasant, was oriented to person, place, and time and in no acute distress. Patient is 3 days status post left first toe amputation. Patient states that he is having pain in his left foot at the amputation site, any sort of stinging pain. He is curious what time we will get his angiogram today. Patient also complains of some soreness in his right arm at the site of his previous IV line that was switched to his left hand. Patient denies any nausea, vomiting, fever, chills, chest pain, shortness of breath. No other pedal complaints. 1/3/2022  Patient seen bedside today on behalf of Dr. Arvind Thakkar. Patient appeared pleasant, was oriented to person, place and time and in no acute distress. Patient is 2 days  S/p left 1st toe amputation. Patient states that he has no questions right now but that he typically gets questions after the visit. Patient denies any N/V/F/C/SOB or CP. Patient has no further pedal concerns at this point in time.      Current Medications:    Current Facility-Administered Medications: docusate sodium (COLACE) capsule 100 mg, 100 mg, Oral, Daily  amLODIPine (NORVASC) tablet 5 mg, 5 mg, Oral, Daily  morphine (PF) injection 2 mg, 2 mg, IntraVENous, Q3H PRN  gabapentin (NEURONTIN) tablet 300 mg, 300 mg, Oral, TID  ketorolac (TORADOL) injection 30 mg, 30 mg, IntraVENous, Q6H PRN  sodium chloride flush 0.9 % injection 10 mL, 10 mL, IntraVENous, 2 times per day  sodium chloride flush 0.9 % injection 10 mL, 10 mL, IntraVENous, PRN  0.9 % sodium chloride infusion, 25 mL, IntraVENous, PRN  oxyCODONE (ROXICODONE) immediate release tablet 5 mg, 5 mg, Oral, Q4H PRN **OR** oxyCODONE (ROXICODONE) immediate release tablet 10 mg, 10 mg, Oral, Q4H PRN  midazolam (VERSED) injection 1 mg, 1 mg, IntraVENous, Once  fentaNYL (SUBLIMAZE) injection 50 mcg, 50 mcg, IntraVENous, Once  clopidogrel (PLAVIX) tablet 75 mg, 75 mg, Oral, Daily  clindamycin (CLEOCIN) IVPB 300 mg, 300 mg, IntraVENous, Q8H  hydroCHLOROthiazide (HYDRODIURIL) tablet 25 mg, 25 mg, Oral, Daily  insulin NPH (HUMULIN N;NOVOLIN N) injection vial 25 Units, 0.2 Units/kg, SubCUTAneous, BID- 8&2  lisinopril (PRINIVIL;ZESTRIL) tablet 40 mg, 40 mg, Oral, Daily **AND** [DISCONTINUED] hydroCHLOROthiazide (HYDRODIURIL) tablet 12.5 mg, 12.5 mg, Oral, Daily  insulin lispro (HUMALOG) injection vial 0-18 Units, 0-18 Units, SubCUTAneous, TID WC  insulin lispro (HUMALOG) injection vial 0-9 Units, 0-9 Units, SubCUTAneous, Nightly  hydrALAZINE (APRESOLINE) tablet 25 mg, 25 mg, Oral, Q8H PRN  amitriptyline (ELAVIL) tablet 100 mg, 100 mg, Oral, Nightly  atorvastatin (LIPITOR) tablet 40 mg, 40 mg, Oral, Nightly  cyclobenzaprine (FLEXERIL) tablet 10 mg, 10 mg, Oral, TID PRN  pramipexole (MIRAPEX) tablet 0.25 mg, 0.25 mg, Oral, Nightly  enoxaparin (LOVENOX) injection 40 mg, 40 mg, SubCUTAneous, Daily  ondansetron (ZOFRAN-ODT) disintegrating tablet 4 mg, 4 mg, Oral, Q8H PRN **OR** ondansetron (ZOFRAN) injection 4 mg, 4 mg, IntraVENous, Q6H PRN  potassium chloride (KLOR-CON M) extended release tablet 40 mEq, 40 mEq, Oral, PRN **OR** potassium bicarb-citric acid (EFFER-K) effervescent tablet 40 mEq, 40 mEq, Oral, PRN **OR** potassium chloride 10 mEq/100 mL IVPB (Peripheral Line), 10 mEq, IntraVENous, PRN  magnesium sulfate 2000 mg in 50 mL IVPB premix, 2,000 mg, IntraVENous, PRN  cefepime (MAXIPIME) 1000 mg IVPB minibag, 1,000 mg, IntraVENous, Q8H  polyethylene glycol (GLYCOLAX) packet 17 g, 17 g, Oral, Daily PRN  acetaminophen (TYLENOL) tablet 650 mg, 650 mg, Oral, Q6H PRN **OR** acetaminophen (TYLENOL) suppository 650 mg, 650 mg, Rectal, Q6H PRN  nicotine (NICODERM CQ) 14 MG/24HR 1 patch, 1 patch, TransDERmal, Daily  glucose (GLUTOSE) 40 % oral gel 15 g, 15 g, Oral, PRN  dextrose 50 % IV solution, 12.5 g, IntraVENous, PRN  glucagon (rDNA) injection 1 mg, 1 mg, IntraMUSCular, PRN  dextrose 5 % solution, 100 mL/hr, IntraVENous, PRN    Objective     /82   Pulse 68   Temp 97.8 °F (36.6 °C) (Oral)   Resp 16   Ht 5' 10\" (1.778 m)   Wt 280 lb (127 kg)   SpO2 95%   BMI 40.18 kg/m²      I/O:    Intake/Output Summary (Last 24 hours) at 1/11/2022 0718  Last data filed at 1/10/2022 1855  Gross per 24 hour   Intake 810 ml   Output 1700 ml   Net -890 ml              Wt Readings from Last 3 Encounters:   01/01/22 280 lb (127 kg)   04/18/18 (!) 300 lb 6.4 oz (136.3 kg)   03/21/18 291 lb 12.8 oz (132.4 kg)       LABS:    Recent Labs     01/10/22  1245 01/11/22  0617   WBC 8.6 8.3   HGB 14.1 14.1   HCT 42.9 42.9    213        Recent Labs     01/11/22  0617      K 3.9      CO2 26   BUN 17   CREATININE 1.0        No results for input(s): PROT, INR, APTT in the last 72 hours. No results for input(s): CKTOTAL, CKMB, CKMBINDEX, TROPONINI in the last 72 hours. Exam:   Dressing intact and well maintained. No apparent strike through noted. Vascular: Dorsalis pedis and posterior tibial pulses are palpable to the RLE. Dorsalis pedis and posterior tibial pulses are non-palpable to the LLE secondary to postop edema. Skin temperature is warm to warm from proximal tibial tuberosity to distal digits of RLE and left TMA stump. CFT within normal limits to distal left TMA stump. Edema noted to the LLE consistent with postop status.      Dermatologic:  Patient has a surgical incisions to the distal aspect of the left foot at TMA stump and to posterior aspect of left lower leg. Skin edges of both incisions are well approximated. All sutures are intact without any areas of dehiscence. Dorsal and plantar flaps of TMA incision have CFT within normal limits and appear viable.   No surrounding erythema noted. Neurovascular: Light touch sensation diminished bilaterally.      Musculoskeletal: Muscle strength 4/5 for all plantarflexors, dorsiflexors, inverters and everters examined. Decreased ROM noted left AJ. S/p left TMA. Mild pain on palpation of amputation site. Foot and ankle in grossly rectus alignment bilaterally.                     Imaging  Narrative   PROCEDURE: VL DUP LOWER EXTREMITY ARTERIES LEFT     Impression   1. High-grade stenosis in the mid left superficial femoral artery. 2. Abnormal left GINO suggesting a hemodynamically significant stenosis.         Narrative   4 views left foot. Impression   Impression:   1. Soft tissue swelling with evidence of soft tissue ulceration involving    the first digit. There is associated cortical destruction of the first    distal phalanx, most consistent with osteomyelitis. ASSESSMENT  Principle  1) Osteomyelitis, left hallux  2) S/p left hallux amputation at metatarsophalangeal joint level    Chronic  Patient Active Problem List   Diagnosis    Adenocarcinoma of Prostate, GS 7, stage T1c.    Onychomycosis    DM type 2 (diabetes mellitus, type 2) ([facility])    Dyslipidemia    DDD (degenerative disc disease), lumbar    Tobacco abuse    Insomnia    Carpal tunnel syndrome of right wrist    Carpal tunnel syndrome of left wrist    Cubital tunnel syndrome on left    Morbid obesity (HCC)    Obstructive sleep apnea on CPAP    Restless leg syndrome    Controlled type 2 diabetes mellitus with microalbuminuria ([facility] [address]    Diabetic ulcer of left foot due to type 2 diabetes mellitus ([facility])       PLAN:   - Pt. is a 60 y.o. male   -Patient examined and evaluated  -WBC 8.3; patient afebrile  -Tissue culture results yield streptococcus anginosus, streptococcus agalactiae, and rare gram negative bacilli that did not grow on culture.    -Blood cultures positive for gram positive cocci in clusters, molecular panel demonstrates positive findings for staphylococcus. Likely contaminant  -X-rays taken and reviewed; impression above  -Continue IV antibiotics per infectious disease  -Arterial doppler reviewed, evidence of high grade stenosis in the mid left superficial femoral artery.   -Patient underwent angiogram by interventional radiology with successful stenting of the superficial femoral artery, popliteal, trifurcation vessels all widely patent  -Dressing changed; applied betadine, Adaptic to TMA incision site, and dressed both incisions with 4 x 4 gauze, Kerlix rolls, and an Ace bandage  -NWB to LLE. Able to heel WB for transfers and bathroom privileges.    -Discussed with patient that he will have a toe filler and rigid carbon fiber plate in his operative sided shoe  -Patient needs to regain functional status and be safe using crutches before discharging home  -Will continue to evaluate patient's prospects for returning to work as his healing progresses  - Podiatry will continue to follow patient    DISPO: No further surgical intervention planned. Patient is okay to return home when stable on crutches.     Hanh Apple DPM PGY-2    Podiatric Surgical Resident  1/11/2022   7:18 AM

## 2022-01-11 NOTE — CARE COORDINATION
1/11/22, 10:46 AM EST    DISCHARGE PLANNING EVALUATION       Spoke with [facility], plan is for discharge tomorrow, [place] orders are written. [facility] can accept at discharge.

## 2022-01-12 LAB
GLUCOSE BLD-MCNC: 106 MG/DL (ref 70–108)
GLUCOSE BLD-MCNC: 117 MG/DL (ref 70–108)
GLUCOSE BLD-MCNC: 158 MG/DL (ref 70–108)
GLUCOSE BLD-MCNC: 173 MG/DL (ref 70–108)

## 2022-01-12 PROCEDURE — 2580000003 HC RX 258: Performed by: STUDENT IN AN ORGANIZED HEALTH CARE EDUCATION/TRAINING PROGRAM

## 2022-01-12 PROCEDURE — 6360000002 HC RX W HCPCS: Performed by: STUDENT IN AN ORGANIZED HEALTH CARE EDUCATION/TRAINING PROGRAM

## 2022-01-12 PROCEDURE — 97530 THERAPEUTIC ACTIVITIES: CPT

## 2022-01-12 PROCEDURE — 6370000000 HC RX 637 (ALT 250 FOR IP): Performed by: STUDENT IN AN ORGANIZED HEALTH CARE EDUCATION/TRAINING PROGRAM

## 2022-01-12 PROCEDURE — 1200000000 HC SEMI PRIVATE

## 2022-01-12 PROCEDURE — 97116 GAIT TRAINING THERAPY: CPT

## 2022-01-12 PROCEDURE — 2500000003 HC RX 250 WO HCPCS: Performed by: STUDENT IN AN ORGANIZED HEALTH CARE EDUCATION/TRAINING PROGRAM

## 2022-01-12 PROCEDURE — 6370000000 HC RX 637 (ALT 250 FOR IP): Performed by: INTERNAL MEDICINE

## 2022-01-12 PROCEDURE — 82948 REAGENT STRIP/BLOOD GLUCOSE: CPT

## 2022-01-12 PROCEDURE — 6370000000 HC RX 637 (ALT 250 FOR IP): Performed by: PHYSICIAN ASSISTANT

## 2022-01-12 PROCEDURE — 99233 SBSQ HOSP IP/OBS HIGH 50: CPT | Performed by: INTERNAL MEDICINE

## 2022-01-12 RX ORDER — AMLODIPINE BESYLATE 5 MG/1
5 TABLET ORAL DAILY
Status: COMPLETED | OUTPATIENT
Start: 2022-01-12 | End: 2022-01-12

## 2022-01-12 RX ORDER — HYDRALAZINE HYDROCHLORIDE 25 MG/1
25 TABLET, FILM COATED ORAL EVERY 8 HOURS SCHEDULED
Status: DISCONTINUED | OUTPATIENT
Start: 2022-01-12 | End: 2022-01-13 | Stop reason: HOSPADM

## 2022-01-12 RX ORDER — AMLODIPINE BESYLATE 10 MG/1
10 TABLET ORAL DAILY
Status: DISCONTINUED | OUTPATIENT
Start: 2022-01-13 | End: 2022-01-13 | Stop reason: HOSPADM

## 2022-01-12 RX ADMIN — LISINOPRIL 40 MG: 40 TABLET ORAL at 08:58

## 2022-01-12 RX ADMIN — HYDRALAZINE HYDROCHLORIDE 25 MG: 25 TABLET, FILM COATED ORAL at 22:34

## 2022-01-12 RX ADMIN — HYDRALAZINE HYDROCHLORIDE 25 MG: 25 TABLET, FILM COATED ORAL at 08:58

## 2022-01-12 RX ADMIN — GABAPENTIN 300 MG: 600 TABLET, FILM COATED ORAL at 20:19

## 2022-01-12 RX ADMIN — CLINDAMYCIN IN 5 PERCENT DEXTROSE 300 MG: 6 INJECTION, SOLUTION INTRAVENOUS at 20:57

## 2022-01-12 RX ADMIN — GABAPENTIN 300 MG: 600 TABLET, FILM COATED ORAL at 08:57

## 2022-01-12 RX ADMIN — OXYCODONE 5 MG: 5 TABLET ORAL at 15:05

## 2022-01-12 RX ADMIN — AMITRIPTYLINE HYDROCHLORIDE 100 MG: 100 TABLET, FILM COATED ORAL at 20:19

## 2022-01-12 RX ADMIN — HYDROCHLOROTHIAZIDE 25 MG: 25 TABLET ORAL at 08:58

## 2022-01-12 RX ADMIN — HYDRALAZINE HYDROCHLORIDE 25 MG: 25 TABLET, FILM COATED ORAL at 17:10

## 2022-01-12 RX ADMIN — INSULIN HUMAN 25 UNITS: 100 INJECTION, SUSPENSION SUBCUTANEOUS at 08:20

## 2022-01-12 RX ADMIN — DOCUSATE SODIUM 100 MG: 100 CAPSULE ORAL at 08:58

## 2022-01-12 RX ADMIN — SODIUM CHLORIDE 25 ML: 9 INJECTION, SOLUTION INTRAVENOUS at 22:32

## 2022-01-12 RX ADMIN — PRAMIPEXOLE DIHYDROCHLORIDE 0.25 MG: 0.25 TABLET ORAL at 20:19

## 2022-01-12 RX ADMIN — AMLODIPINE BESYLATE 5 MG: 5 TABLET ORAL at 17:10

## 2022-01-12 RX ADMIN — CLINDAMYCIN IN 5 PERCENT DEXTROSE 300 MG: 6 INJECTION, SOLUTION INTRAVENOUS at 05:14

## 2022-01-12 RX ADMIN — GABAPENTIN 300 MG: 600 TABLET, FILM COATED ORAL at 13:36

## 2022-01-12 RX ADMIN — CEFEPIME 1000 MG: 1 INJECTION, POWDER, FOR SOLUTION INTRAMUSCULAR; INTRAVENOUS at 06:00

## 2022-01-12 RX ADMIN — INSULIN HUMAN 25 UNITS: 100 INJECTION, SUSPENSION SUBCUTANEOUS at 13:35

## 2022-01-12 RX ADMIN — AMLODIPINE BESYLATE 5 MG: 5 TABLET ORAL at 08:58

## 2022-01-12 RX ADMIN — ENOXAPARIN SODIUM 40 MG: 100 INJECTION SUBCUTANEOUS at 08:57

## 2022-01-12 RX ADMIN — CEFEPIME 1000 MG: 1 INJECTION, POWDER, FOR SOLUTION INTRAMUSCULAR; INTRAVENOUS at 14:03

## 2022-01-12 RX ADMIN — OXYCODONE 5 MG: 5 TABLET ORAL at 10:50

## 2022-01-12 RX ADMIN — OXYCODONE 5 MG: 5 TABLET ORAL at 19:10

## 2022-01-12 RX ADMIN — SODIUM CHLORIDE, PRESERVATIVE FREE 10 ML: 5 INJECTION INTRAVENOUS at 09:08

## 2022-01-12 RX ADMIN — CLOPIDOGREL 75 MG: 75 TABLET, FILM COATED ORAL at 08:58

## 2022-01-12 RX ADMIN — CEFEPIME 1000 MG: 1 INJECTION, POWDER, FOR SOLUTION INTRAMUSCULAR; INTRAVENOUS at 22:34

## 2022-01-12 RX ADMIN — CLINDAMYCIN IN 5 PERCENT DEXTROSE 300 MG: 6 INJECTION, SOLUTION INTRAVENOUS at 13:12

## 2022-01-12 RX ADMIN — SODIUM CHLORIDE, PRESERVATIVE FREE 10 ML: 5 INJECTION INTRAVENOUS at 20:18

## 2022-01-12 RX ADMIN — ATORVASTATIN CALCIUM 40 MG: 40 TABLET, FILM COATED ORAL at 20:19

## 2022-01-12 ASSESSMENT — PAIN DESCRIPTION - ORIENTATION
ORIENTATION: LEFT

## 2022-01-12 ASSESSMENT — PAIN DESCRIPTION - LOCATION
LOCATION: FOOT

## 2022-01-12 ASSESSMENT — PAIN DESCRIPTION - PAIN TYPE
TYPE: SURGICAL PAIN

## 2022-01-12 ASSESSMENT — PAIN SCALES - GENERAL
PAINLEVEL_OUTOF10: 4
PAINLEVEL_OUTOF10: 3
PAINLEVEL_OUTOF10: 3
PAINLEVEL_OUTOF10: 4
PAINLEVEL_OUTOF10: 3
PAINLEVEL_OUTOF10: 3
PAINLEVEL_OUTOF10: 4
PAINLEVEL_OUTOF10: 3
PAINLEVEL_OUTOF10: 3
PAINLEVEL_OUTOF10: 4
PAINLEVEL_OUTOF10: 4
PAINLEVEL_OUTOF10: 3
PAINLEVEL_OUTOF10: 4

## 2022-01-12 ASSESSMENT — PAIN DESCRIPTION - PROGRESSION: CLINICAL_PROGRESSION: NOT CHANGED

## 2022-01-12 ASSESSMENT — PAIN DESCRIPTION - FREQUENCY: FREQUENCY: CONTINUOUS

## 2022-01-12 ASSESSMENT — PAIN DESCRIPTION - DESCRIPTORS
DESCRIPTORS: THROBBING

## 2022-01-12 ASSESSMENT — PAIN - FUNCTIONAL ASSESSMENT: PAIN_FUNCTIONAL_ASSESSMENT: PREVENTS OR INTERFERES SOME ACTIVE ACTIVITIES AND ADLS

## 2022-01-12 ASSESSMENT — PAIN DESCRIPTION - ONSET: ONSET: ON-GOING

## 2022-01-12 NOTE — PROGRESS NOTES
[address] [place] [address]  INPATIENT PHYSICAL THERAPY  DAILY NOTE  [facility]olie [facility] - [id number]    Time In: 0932  Time Out: 1000  Timed Code Treatment Minutes: 28 Minutes  Minutes: 28          Date: 2022  Patient Name: Perry Cash,  Gender:  male        MRN: [id number]  : 1965  (60 y.o.)     Referring Practitioner: Johanna Arteaga DPM  Diagnosis: diabetic ulcer of left foot due to type 2 DM  Additional Pertinent Hx: 60 y.o. male with PMH of  HTN, hyperlipidemia, T2DM, morbid obesity, tobacco abuse, ASHLEE, RLS, prostate cancer (in remission) who presented to [address] [place] [address] with with a chief complaint of foot pain. Patient reports that approximately 2 months ago he dropped a can of sausage gravy on his left great toe. States that following the incident he developed \"purple bruising\" of the left great toe. PT underwent a left great toe amputation on 22 pt is s/p LEFT FOOT TRANSMETATARSAL AMPUTATION, GASTROCNEMIUS RECESSION completed by Dr Candice Lyman on . Prior Level of Function:  Lives With: Significant other  Type of Home: House  Home Layout: One level  Home Access: Stairs to enter with rails  Entrance Stairs - Number of Steps: 3 steps with 1 rail   Bathroom Shower/Tub: Walk-in shower  Bathroom Toilet: Standard  Bathroom Accessibility: Accessible    Receives Help From: Family  ADL Assistance: [address] [place] [address] Avenue: Independent  Homemaking Responsibilities: Yes  Ambulation Assistance: Independent  Transfer Assistance: Independent  Active : Yes  Additional Comments: Pt's girlfriend lives with him. She may be able to assist if needed. Pt reports his home it too small for a w/c for mobility.     Restrictions/Precautions:  Restrictions/Precautions: Weight Bearing,Fall Risk,General Precautions  Left Lower Extremity Weight Bearing: Non Weight Bearing  Partial Weight Bearing Percentage Or Pounds: NWB with all mobility, per podiatry okay to heel WB for transfers     SUBJECTIVE: RN approved session. Pt in recliner upon arrival and agrees to therapy. Pt pleasant and cooperative for session. PAIN: 3/10: L foot, did mention tingling in L \"big toe area\"    Vitals: Vitals not assessed per clinical judgement, see nursing flowsheet    OBJECTIVE:  Bed Mobility:  Not Tested    Transfers:  Sit to Stand: Minimal Assistance, with increased time for completion, cues for hand placement, with verbal cues, cues for safety w crutches, pt lost balance into chair 3x when trying to transition to the crutches. instructed pt to do 1 t a time  Stand to [facility] 68, cues for hand placement, with verbal cues, cues for technique & crutch use to improve eccentric control   Good carryover with WB status  *discussed safe technique for car tfer- pt expressed understanding    Ambulation:  Contact Guard Assistance, with cues for safety  Distance: 25ft  Surface: Level Tile  Device:Crutches  Gait Deviations:  Cues to slow down for improved stability, pt taking short hops with fair foot clearance on R, good carryover with NWB throughout. Pt min unsteady and shaky throughout. Did fatigue qucikly    Exercise:  Patient was guided in 1 set(s) 10 reps of exercise to both lower extremities. Ankle pumps, Glut sets, Quad sets, Heelslides, Hip abduction/adduction and Straight leg raises. Exercises were completed for increased independence with functional mobility. Functional Outcome Measures: Completed  -PAC Inpatient Mobility Raw Score : 18  -PAC Inpatient T-Scale Score : 43.63    ASSESSMENT:  Assessment: Patient progressing toward established goals. Activity Tolerance:  Patient tolerance of  treatment: good. Pt unsteady on feet and min impulsive. Pt will benefit from cont PT at this time. Recommend 24 hour assistance at d/c for safety.      Equipment Recommendations:Equipment Needed: Yes  Mobility Devices: Crutches  Other: pt will need crutches if d/c to home  Discharge Recommendations: 24 hour assistance or supervision and Patient would benefit from continued PT at discharge  Plan: Times per week: 6X O  Times per day: Daily  Specific instructions for Next Treatment: therex and mobility with limting distance amb with heel WB LLE  Current Treatment Recommendations: Strengthening,Neuromuscular Re-education,Home Exercise Program,Safety Education & Tor Specter Training,Patient/Caregiver Education & Training,Functional Mobility Training,Equipment Evaluation, Education, & procurement,Transfer Training,Gait Training,Stair training    Patient Education  Patient Education: Plan of Care, Home Exercise Program, Precautions/Restrictions, Equipment Education, Transfers, Gait, Use of Gait Belt, Car Transfers, Verbal Exercise Instruction, Home Safety Education,  - Patient Verbalized Understanding    Goals:  Patient goals : go home  Short term goals  Time Frame for Short term goals: by discharge  Short term goal 1: bed mobility with MOD I to get in/out of bed  Short term goal 2: transfer with MOD I, maintaining heel WB, to get in/out of chairs  Short term goal 3: Pt will amb with crutches with S for safety with NWB on the R LE to progress with mobility. Short term goal 4: Pt will negotiate 3 steps for MARILOU the home with good technique and no LOB to return home safely. Long term goals  Time Frame for Long term goals : no LTGs set secondary to short ELOS    Following session, patient left in safe position with all fall risk precautions in place.

## 2022-01-12 NOTE — PROGRESS NOTES
Hospitalist Progress Note  [facility]       Patient: Nanda Pantoja  Unit/Bed: 0J-70/075-N  YOB: 1965  MRN: [id number]  Acct: [de-identified]   AdmittingDiagnosis: Diabetic ulcer of left foot due to type 2 diabetes mellitus ([facility] [E11.621, L97.529]  Diabetic ulcer of toe of left foot associated with type 2 diabetes mellitus, unspecified ulcer stage ([facility] [M83.643, L97.529]  Admit Date: 1/1/2022  Hospital Day: 11    Subjective:    Denies any new or acute symptoms     Objective:   BP (!) 169/76   Pulse 61   Temp 98 °F (36.7 °C) (Oral)   Resp 20   Ht 5' 10\" (1.778 m)   Wt 280 lb (127 kg)   SpO2 96%   BMI 40.18 kg/m²     Intake/Output Summary (Last 24 hours) at 1/12/2022 1615  Last data filed at 1/12/2022 1502  Gross per 24 hour   Intake 1320 ml   Output 2325 ml   Net -1005 ml     Physical Exam    General appearance: obese, no apparent distress, appears stated age and cooperative. HEENT: Pupils equal, round, and reactive to light. Conjunctivae/corneas clear. Neck: Supple, with full range of motion. No jugular venous distention. Trachea midline. Respiratory:  Normal respiratory effort. Clear to auscultation, bilaterally without Rales/Wheezes/Rhonchi. Cardiovascular: Regular rate and rhythm with normal S1/S2 without murmurs, rubs or gallops. Abdomen: Soft, non-tender, non-distended with normal bowel sounds. Musculoskeletal: LLE wrapped not visualized s/p TMA  Skin: Skin color, texture, turgor normal.  No rashes or lesions. Neurologic:  Neurovascularly intact without any focal sensory/motor deficits.  Cranial nerves: II-XII intact, grossly non-focal.  Psychiatric: Alert and oriented x4, thought content appropriate, normal insight      DVT Prophylaxis: Lovenox     Data:  CBC:   Lab Results   Component Value Date    WBC 8.3 01/11/2022    HGB 14.1 01/11/2022    HCT 42.9 01/11/2022    HCT 41.0 08/29/2011    MCV 87.9 01/11/2022     01/11/2022     BMP:   Lab Results   Component Value Date     01/11/2022    K 3.9 01/11/2022    K 3.7 01/02/2022     01/11/2022    CO2 26 01/11/2022    BUN 17 01/11/2022    CREATININE 1.0 01/11/2022    CALCIUM 9.2 01/11/2022     ABGs: No results found for: PH, PCO2, PO2, HCO3, O2SAT  Troponin: No results found for: TROPONINI   LFTs   Lab Results   Component Value Date    AST 13 01/02/2022    ALT 17 01/02/2022    BILITOT 0.8 01/02/2022    ALKPHOS 79 01/02/2022          Imaging   XR FOOT LEFT (MIN 3 VIEWS)    Result Date: 1/1/2022  4 views left foot. Comparison: None. Findings: There is soft tissue swelling and ulceration involving the first digit. There is associated cortical destruction of the medial aspect of the first distal phalanx, concerning for osteomyelitis. No radiopaque foreign body is identified. No acute fracture or dislocation is seen. There are large superior and inferior calcaneal spurs. Mild degenerative changes are present at the first metatarsal phalangeal joint. Impression: 1. Soft tissue swelling with evidence of soft tissue ulceration involving the first digit. There is associated cortical destruction of the first distal phalanx, most consistent with osteomyelitis. This document has been electronically signed by: Katherine Vega MD on 01/01/2022 06:31 AM    IR ANGIOGRAM EXTREMITY LEFT    Result Date: 1/5/2022  PROCEDURE: AORTOFEMORAL ANGIOGRAM/with runoff procedure/angioplasty: CLINICAL INFORMATION: Diabetes mellitus. Peripheral vascular disease left leg. PERFORMED BY:  Dr. Juan Carlos Schmitz MD APPROACH: Right common femoral artery, micropuncture technique. . Access was obtained with ultrasound guidance. A permanent sonographic image was obtained for documentation. CATHETERS: 6 Kinyarwanda ANL1 sheath, 6 mm x 4 cm inPact Admiral drug coated balloon STENTS: None. VESSELS CATHETERIZED: Lower abdominal aorta, left SFA, left common iliac artery. DIAGNOSTIC PROCEDURES: Abdominal aortogram, pelvic angiography, left leg arteriogram.. INTERVENTIONS: Angioplasty mid left SFA. FLUOROSCOPY TIME: 3 minutes 38 seconds FLUOROSCOPIC IMAGES: 123 SEDATION: Versed 1 mg; fentanyl 50 mcg, IV; the patient was sedated during this procedure,  and monitored with EKG and pulse ox monitoring devices by a registered nurse. Face-to-face time with patient 45 minutes. OTHER MEDICATIONS: Heparin, 2000 units, IV. Patient was started on Plavix regimen, 75 mg, by mouth, daily, X. 45 days, starting with a 300 mg loading dose today. CONTRAST: 80 ml, Isovue-300. CLOSURE: Angio-Seal device, successful without complication. ESTIMATED BLOOD LOSS: Minimal TECHNIQUE: Signed informed consent was obtained prior to performing this procedure. The patient was sedated, as indicated above. Access was obtained and a 5 Western Shalini vascular sheath was inserted. The procedures as above were then performed. FINDINGS: AORTA: Only the distal portion of the abdominal aorta was evaluated and is unremarkable. PELVIC VESSELS: Mildly ectatic right common iliac artery. Both common, internal, and external iliac arteries are widely patent. Angiogram LEFT leg: The 6 [place] ANL1 sheath was initially advanced over the aortic bifurcation with its tip positioned in the proximal left SFA. CFA AND PROFUNDA: The common and deep femoral arteries are widely patent. SFA AND POPLITEAL ARTERY: Short segment high-grade stenosis, 80-90%, midportion left SFA. Remainder of the SFA unremarkable. Popliteal artery widely patent. TRIFURCATION VESSELS: All 3 trifurcation vessels are widely patent. INTERVENTION: At this point, a stiff angled Glidewire was advanced through the sheath and successfully across the stenosis in the mid left SFA. A 6 mm drug coated balloon was advanced over a Glidewire to the site of interest and angioplasty was performed. Post procedure images reveal an excellent result with no evidence for significant stenosis, dissection, or thrombosis.      1. Status post successful angioplasty of a severely stenotic lesion mid left SFA with an excellent result. . 2. Patient was started on Plavix regimen today and will be scheduled for a follow-up visit with groin check, sometime in the next few days, in addition to a routine follow-up visit in 3 months with arterial Doppler imaging at that time. . **This report has been created using voice recognition software. It may contain minor errors which are inherent in voice recognition technology. ** Final report electronically signed by Dr. Jeffy Baldwin on 1/5/2022 2:40 PM    VL DUP LOWER EXTREMITY ARTERIES LEFT    Result Date: 1/1/2022  PROCEDURE: VL DUP LOWER EXTREMITY ARTERIES LEFT CLINICAL INFORMATION: non-palpable pulses . Nonhealing wound left great toe. COMPARISON: No prior study. TECHNIQUE: Arterial doppler ultrasound was performed of the left lower extremity using gray scale, color flow and spectral doppler imaging. Velocities were also recorded. FINDINGS: LEFT ARTERY (PSV cm/sec) ? ??????????????????????? CFA ---------------> 121 PSV cm/sec PROF -------------> 100 PSV cm/sec SFA PROX ------->77 PSV cm/sec SFA MID ---------->>454 PSV cm/sec SFA DIST -------->58 PSV cm/sec POP A PROX --->62 PSV cm/sec POP A DIST ---->114 PSV cm/sec PTA --------------->42 PSV cm/sec PERONEAL ----->40 PSV cm/sec SEAN ----------------> 42 PSV cm/sec GINO RIGHT SEAN----->1.07 PTA----->1.07 GINO LEFT SEAN----->0.65 PTA----->0.56 There is triphasic waveform in the left common femoral artery. In the proximal left superficial femoral artery, the waveform becomes dampened. The velocity decreases. In the mid left superficial femoral artery, the velocity is markedly elevated. There is  significant spectral broadening. This is consistent with a high-grade stenosis in the mid superficial femoral artery. Distal to this, there are dampened waveforms throughout. The waveform becomes biphasic. There is a significant reduction the peak systolic velocity in the distal superficial femoral artery.  The popliteal, anterior tibial, posterior tibial and peroneal arteries are patent. All have dampened waveforms. These are biphasic. There are reduced ABIs on the left compared to the right. 1. High-grade stenosis in the mid left superficial femoral artery. 2. Abnormal left GINO suggesting a hemodynamically significant stenosis. **This report has been created using voice recognition software. It may contain minor errors which are inherent in voice recognition technology. ** Final report electronically signed by Dr. Alee Camarena on 1/1/2022 9:40 AM     DUP LOWER EXTREMITY VENOUS LEFT    Result Date: 1/1/2022  PROCEDURE: VL DUP LOWER EXTREMITY VENOUS LEFT CLINICAL INFORMATION: pain, swelling. Nonhealing ulcer left great toe. COMPARISON: No prior study. TECHNIQUE: Venous doppler ultrasound was performed of the left lower extremity using gray scale, color flow and spectral doppler imaging. FINDINGS: There is normal color flow, spectral analysis and compressibility of the common femoral vein, femoral vein and popliteal vein on the left . There is mild thickening of the walls of popliteal vein. There is normal color flow and compressibility in the posterior tibial veins, anterior tibial veins and peroneal veins. There is normal color flow, spectral analysis and compressibility in the contralateral common femoral vein. The greater saphenous vein is patent. The gastrocnemius vein is patent. No evidence of a DVT. **This report has been created using voice recognition software. It may contain minor errors which are inherent in voice recognition technology. ** Final report electronically signed by Dr. Alee Camarena on 1/1/2022 9:27 AM      Assessment/Plan:    1. Left great toe diabetic ulcer secondary to T2DM s/p transmetatarsal amputation 1/7:               2/2 ischemia from PAD.    NWB to LLE;     Tissue culture results yield streptococcus anginosus, streptococcus  agalactiae, and rare gram negative bacilli that did not grow on culture ->  stopped Vanc,   Clindamycin has been started       2. PAD: Arterial doppler notable for high grade stenosis in the mid left superficial femoral artery. S/p arteriogram with IR and baloon angioplasty  -> continue with Plavix. f/u in 3 months. With Vacular     2. Obstructive sleep apnea:               - Continue to wear home CPAP at night with home settings.     3. Hyperlipidemia:               - Continue Lipitor 40 mg     4. Essential HTN:   - uncontrolled. Patient has remained mostly hypertensive with some normotensive pressures. - Continue Lisinopril. Increase to HTZ 25. Hydralazine PRN     5. Tobacco abuse:               -encouraged to quit              - Nicotine patch.     6. Restless leg syndrome:               - Continue Mirapex     7. Gait disturbance secondary to left great toe amputation:              - Heel WB per podiatry recs              - Patient reported difficulty with pivoting and balance              - Cont. PT/OT     8. Morbid obesity:               - BMI 40.18; discussed and educated on  lifestyle modifications.      9. Uncontrolled NIDM II with polyneuropathy: continue with SSI. Carb control. Monitor blood glucose with ACCU.      10. Epistaxis: Likely secondary to therapy with Plavix and Lovenox, easily stemmed with pressure per patient. If continues will consult ENT. Hemoglobin stable. 11.  Disposition: Await therapy. Patient does not wish for inpatient rehab, wants to go home with home health.   Will d/c in AM if Blood pressure and sugars improve       Electronically signed by De Tamez MD on 1/12/2022 at 4:15 PM    Rounding Hospitalist

## 2022-01-12 NOTE — PROGRESS NOTES
Patient calm, alert oriented to person, place, time. Mucous membranes pink, moist. Hair dry, shiny. Skin warm, dry no bruising. Speech clear. Denies difficulty swallowing. Lung sounds clear, diminished throughout. Respirations easy, even. Non productive occasional cough noted. Heart sounds strong, regular. Bowel sounds active in all four quadrants. Abdomen soft round, non tender to palpation. States \"yes\" to passing gas. Denies difficulty urinating. Last bowel movement this morning. Radial pulse, pedal pulses strong, bilateral. hand grasp strong bilateral. Arm drift negative. Iv site clean, dry, intact no redness or drainage, bruising noted. Capillary refill less than 3 seconds. Skin turgor brisk. No edema noted. Pedal push pull strong, bilateral. amanda's sign negative bilateral. Leg lift strong, bilateral. Denies numbness or tingling. In chair. Call light, over bed table within reach. Request a cup of ice but denies any other needs at this time.

## 2022-01-12 NOTE — FLOWSHEET NOTE
Dixie Velázquez requires crutches due to impaired ambulation and for increased stability in order to participate in or complete daily living tasks of: toileting, personal cares and ambulating. The patient is able to safely use the crutches and the functional mobility deficit can be sufficiently resolved.     Need 5ft 2in  -5ft 10in size

## 2022-01-13 VITALS
DIASTOLIC BLOOD PRESSURE: 79 MMHG | TEMPERATURE: 98 F | BODY MASS INDEX: 40.09 KG/M2 | HEART RATE: 63 BPM | HEIGHT: 70 IN | OXYGEN SATURATION: 96 % | SYSTOLIC BLOOD PRESSURE: 149 MMHG | WEIGHT: 280 LBS | RESPIRATION RATE: 18 BRPM

## 2022-01-13 LAB
ANION GAP SERPL CALCULATED.3IONS-SCNC: 13 MEQ/L (ref 8–16)
BUN BLDV-MCNC: 19 MG/DL (ref 7–22)
CALCIUM SERPL-MCNC: 9.4 MG/DL (ref 8.5–10.5)
CHLORIDE BLD-SCNC: 100 MEQ/L (ref 98–111)
CO2: 21 MEQ/L (ref 23–33)
CREAT SERPL-MCNC: 1 MG/DL (ref 0.4–1.2)
ERYTHROCYTE [DISTWIDTH] IN BLOOD BY AUTOMATED COUNT: 13.6 % (ref 11.5–14.5)
ERYTHROCYTE [DISTWIDTH] IN BLOOD BY AUTOMATED COUNT: 42.7 FL (ref 35–45)
GFR SERPL CREATININE-BSD FRML MDRD: 77 ML/MIN/1.73M2
GLUCOSE BLD-MCNC: 120 MG/DL (ref 70–108)
GLUCOSE BLD-MCNC: 138 MG/DL (ref 70–108)
GLUCOSE BLD-MCNC: 143 MG/DL (ref 70–108)
HCT VFR BLD CALC: 43 % (ref 42–52)
HEMOGLOBIN: 14.4 GM/DL (ref 14–18)
MCH RBC QN AUTO: 29 PG (ref 26–33)
MCHC RBC AUTO-ENTMCNC: 33.5 GM/DL (ref 32.2–35.5)
MCV RBC AUTO: 86.5 FL (ref 80–94)
PLATELET # BLD: 225 THOU/MM3 (ref 130–400)
PMV BLD AUTO: 10.6 FL (ref 9.4–12.4)
POTASSIUM SERPL-SCNC: 3.9 MEQ/L (ref 3.5–5.2)
RBC # BLD: 4.97 MILL/MM3 (ref 4.7–6.1)
SODIUM BLD-SCNC: 134 MEQ/L (ref 135–145)
WBC # BLD: 10.3 THOU/MM3 (ref 4.8–10.8)

## 2022-01-13 PROCEDURE — 6370000000 HC RX 637 (ALT 250 FOR IP): Performed by: PHYSICIAN ASSISTANT

## 2022-01-13 PROCEDURE — 6370000000 HC RX 637 (ALT 250 FOR IP): Performed by: STUDENT IN AN ORGANIZED HEALTH CARE EDUCATION/TRAINING PROGRAM

## 2022-01-13 PROCEDURE — 85027 COMPLETE CBC AUTOMATED: CPT

## 2022-01-13 PROCEDURE — 2500000003 HC RX 250 WO HCPCS: Performed by: STUDENT IN AN ORGANIZED HEALTH CARE EDUCATION/TRAINING PROGRAM

## 2022-01-13 PROCEDURE — 99239 HOSP IP/OBS DSCHRG MGMT >30: CPT | Performed by: INTERNAL MEDICINE

## 2022-01-13 PROCEDURE — 2580000003 HC RX 258: Performed by: STUDENT IN AN ORGANIZED HEALTH CARE EDUCATION/TRAINING PROGRAM

## 2022-01-13 PROCEDURE — 80048 BASIC METABOLIC PNL TOTAL CA: CPT

## 2022-01-13 PROCEDURE — 6360000002 HC RX W HCPCS: Performed by: STUDENT IN AN ORGANIZED HEALTH CARE EDUCATION/TRAINING PROGRAM

## 2022-01-13 PROCEDURE — 97530 THERAPEUTIC ACTIVITIES: CPT

## 2022-01-13 PROCEDURE — 36415 COLL VENOUS BLD VENIPUNCTURE: CPT

## 2022-01-13 PROCEDURE — 82948 REAGENT STRIP/BLOOD GLUCOSE: CPT

## 2022-01-13 PROCEDURE — 97535 SELF CARE MNGMENT TRAINING: CPT

## 2022-01-13 PROCEDURE — 6370000000 HC RX 637 (ALT 250 FOR IP): Performed by: INTERNAL MEDICINE

## 2022-01-13 PROCEDURE — 97110 THERAPEUTIC EXERCISES: CPT

## 2022-01-13 RX ORDER — NICOTINE 21 MG/24HR
1 PATCH, TRANSDERMAL 24 HOURS TRANSDERMAL DAILY
Qty: 30 PATCH | Refills: 3 | Status: SHIPPED | OUTPATIENT
Start: 2022-01-14 | End: 2022-01-26

## 2022-01-13 RX ORDER — CLOPIDOGREL BISULFATE 75 MG/1
75 TABLET ORAL DAILY
Qty: 30 TABLET | Refills: 3 | Status: SHIPPED | OUTPATIENT
Start: 2022-01-14

## 2022-01-13 RX ORDER — AMLODIPINE BESYLATE 10 MG/1
10 TABLET ORAL DAILY
Qty: 30 TABLET | Refills: 3 | Status: SHIPPED | OUTPATIENT
Start: 2022-01-14 | End: 2022-01-17

## 2022-01-13 RX ORDER — HYDRALAZINE HYDROCHLORIDE 25 MG/1
25 TABLET, FILM COATED ORAL EVERY 8 HOURS SCHEDULED
Qty: 90 TABLET | Refills: 3 | Status: SHIPPED | OUTPATIENT
Start: 2022-01-13 | End: 2022-01-17

## 2022-01-13 RX ORDER — CLINDAMYCIN HYDROCHLORIDE 300 MG/1
300 CAPSULE ORAL 3 TIMES DAILY
Qty: 15 CAPSULE | Refills: 0 | Status: SHIPPED | OUTPATIENT
Start: 2022-01-13 | End: 2022-01-18

## 2022-01-13 RX ORDER — INSULIN DETEMIR 100 [IU]/ML
20 INJECTION, SOLUTION SUBCUTANEOUS NIGHTLY
Qty: 5 PEN | Refills: 3 | Status: SHIPPED | OUTPATIENT
Start: 2022-01-13 | End: 2022-01-26

## 2022-01-13 RX ADMIN — ENOXAPARIN SODIUM 40 MG: 100 INJECTION SUBCUTANEOUS at 08:36

## 2022-01-13 RX ADMIN — SODIUM CHLORIDE, PRESERVATIVE FREE 10 ML: 5 INJECTION INTRAVENOUS at 08:43

## 2022-01-13 RX ADMIN — INSULIN HUMAN 25 UNITS: 100 INJECTION, SUSPENSION SUBCUTANEOUS at 08:40

## 2022-01-13 RX ADMIN — GABAPENTIN 300 MG: 600 TABLET, FILM COATED ORAL at 08:36

## 2022-01-13 RX ADMIN — CEFEPIME 1000 MG: 1 INJECTION, POWDER, FOR SOLUTION INTRAMUSCULAR; INTRAVENOUS at 05:31

## 2022-01-13 RX ADMIN — DOCUSATE SODIUM 100 MG: 100 CAPSULE ORAL at 08:36

## 2022-01-13 RX ADMIN — LISINOPRIL 40 MG: 40 TABLET ORAL at 08:36

## 2022-01-13 RX ADMIN — CLOPIDOGREL 75 MG: 75 TABLET, FILM COATED ORAL at 08:37

## 2022-01-13 RX ADMIN — CLINDAMYCIN IN 5 PERCENT DEXTROSE 300 MG: 6 INJECTION, SOLUTION INTRAVENOUS at 04:56

## 2022-01-13 RX ADMIN — HYDROCHLOROTHIAZIDE 25 MG: 25 TABLET ORAL at 08:36

## 2022-01-13 RX ADMIN — AMLODIPINE BESYLATE 10 MG: 10 TABLET ORAL at 08:37

## 2022-01-13 RX ADMIN — HYDRALAZINE HYDROCHLORIDE 25 MG: 25 TABLET, FILM COATED ORAL at 05:32

## 2022-01-13 ASSESSMENT — PAIN DESCRIPTION - DESCRIPTORS: DESCRIPTORS: THROBBING

## 2022-01-13 ASSESSMENT — PAIN DESCRIPTION - PROGRESSION: CLINICAL_PROGRESSION: NOT CHANGED

## 2022-01-13 ASSESSMENT — PAIN DESCRIPTION - LOCATION: LOCATION: FOOT

## 2022-01-13 ASSESSMENT — PAIN DESCRIPTION - ORIENTATION: ORIENTATION: LEFT

## 2022-01-13 ASSESSMENT — PAIN DESCRIPTION - FREQUENCY: FREQUENCY: INTERMITTENT

## 2022-01-13 ASSESSMENT — PAIN DESCRIPTION - PAIN TYPE: TYPE: SURGICAL PAIN

## 2022-01-13 ASSESSMENT — PAIN DESCRIPTION - ONSET: ONSET: ON-GOING

## 2022-01-13 ASSESSMENT — PAIN SCALES - GENERAL
PAINLEVEL_OUTOF10: 3
PAINLEVEL_OUTOF10: 3

## 2022-01-13 NOTE — PROGRESS NOTES
Podiatric Progress Note  Stephanie De Los Santos  Subjective :   1/12/2022  Patient seen at chair side alongside of Dr. Marlene Vásquez. Patient is 5 days status post left foot transmetatarsal amputation with gastrocnemius recession (DOS 1/7/2022). Patient is resting comfortably at the chair. Patient states that his pain is much improved from previous. Patient states that he is comfortable going to home tomorrow utilizing crutches. He says he has a rollabout scooter at shoe. Patient denies any nausea, vomiting, fever, chills, chest pain, shortness of breath. No other pedal complaints. 1/11/2022  Patient is seen at chair side on behalf of Dr. Marlene Vásquez. Patient is 4 days status post left foot transmetatarsal amputation with gastrocnemius recession (DOS 1/7/2022). Patient states that his pain is well controlled at this point in time. Patient is curious about shoe gear modifications upon discharge. Patient is curious if he would be able to go back to his regular job after being discharged from the hospital and healing of his incision. Patient expresses that he does not want to go to inpatient rehab. Patient denies any nausea, vomiting, fever, chills, chest pain, shortness of breath. No other pedal complaints. 1/9/2022  Patient seen at chair side today on behalf of Dr. Marlene Vásquez. Patient is 2 day s/p left foot TMA with gastrocnemius recession (DOS 1/7/2022). Patient appeared pleasant, was oriented to person, place and time and in no acute distress. Patient endorses mild pain to his left foot; he rates it as 3/10. He states that the pain is very well controlled with the current pain medications. Otherwise patient is feeling very well. Patient denies any N/V/F/C/SOB or CP. Patient has no further pedal concerns at this point in time. 1/6/22  Patient seen at bedside on behalf of Dr. Marlene Vásquez. Patient was resting in bed with his CPAP on. He responded to questions by nodding yes and no.   Patient is status post left first toe amputation. Patient has no complaints and understands the plan. 1/5/2022  Patient seen at bedside today alongside of Dr. Chester Deshpande. Patient was pleasant, oriented to person, place, and time and in no acute distress. Patient is 4 days status post left first toe amputation. Patient had his angiogram today that was successful. Discussed with patient that he is having interval worsening of the amputation site and that he is at high risk for more proximal amputation, whether partial first ray amputation or transmetatarsal amputation. Patient states that he is wanting to save as much of his foot as possible and would prefer to move forward with partial first ray amputation if it is deemed necessary. No other pedal complaints. 1/4/2022  Patient seen at bedside today on behalf of Dr. Chester Deshpande. Patient was pleasant, was oriented to person, place, and time and in no acute distress. Patient is 3 days status post left first toe amputation. Patient states that he is having pain in his left foot at the amputation site, any sort of stinging pain. He is curious what time we will get his angiogram today. Patient also complains of some soreness in his right arm at the site of his previous IV line that was switched to his left hand. Patient denies any nausea, vomiting, fever, chills, chest pain, shortness of breath. No other pedal complaints. 1/3/2022  Patient seen bedside today on behalf of Dr. Chester Deshpande. Patient appeared pleasant, was oriented to person, place and time and in no acute distress. Patient is 2 days  S/p left 1st toe amputation. Patient states that he has no questions right now but that he typically gets questions after the visit. Patient denies any N/V/F/C/SOB or CP. Patient has no further pedal concerns at this point in time.      Current Medications:    Current Facility-Administered Medications: [START ON 1/13/2022] amLODIPine (NORVASC) tablet 10 mg, 10 mg, Oral, Daily  hydrALAZINE (APRESOLINE) tablet 25 mg, 25 mg, Oral, 3 times per day  docusate sodium (COLACE) capsule 100 mg, 100 mg, Oral, Daily  morphine (PF) injection 2 mg, 2 mg, IntraVENous, Q3H PRN  gabapentin (NEURONTIN) tablet 300 mg, 300 mg, Oral, TID  ketorolac (TORADOL) injection 30 mg, 30 mg, IntraVENous, Q6H PRN  sodium chloride flush 0.9 % injection 10 mL, 10 mL, IntraVENous, 2 times per day  sodium chloride flush 0.9 % injection 10 mL, 10 mL, IntraVENous, PRN  0.9 % sodium chloride infusion, 25 mL, IntraVENous, PRN  oxyCODONE (ROXICODONE) immediate release tablet 5 mg, 5 mg, Oral, Q4H PRN **OR** oxyCODONE (ROXICODONE) immediate release tablet 10 mg, 10 mg, Oral, Q4H PRN  midazolam (VERSED) injection 1 mg, 1 mg, IntraVENous, Once  fentaNYL (SUBLIMAZE) injection 50 mcg, 50 mcg, IntraVENous, Once  clopidogrel (PLAVIX) tablet 75 mg, 75 mg, Oral, Daily  clindamycin (CLEOCIN) IVPB 300 mg, 300 mg, IntraVENous, Q8H  hydroCHLOROthiazide (HYDRODIURIL) tablet 25 mg, 25 mg, Oral, Daily  insulin NPH (HUMULIN N;NOVOLIN N) injection vial 25 Units, 0.2 Units/kg, SubCUTAneous, BID- 8&2  lisinopril (PRINIVIL;ZESTRIL) tablet 40 mg, 40 mg, Oral, Daily **AND** [DISCONTINUED] hydroCHLOROthiazide (HYDRODIURIL) tablet 12.5 mg, 12.5 mg, Oral, Daily  insulin lispro (HUMALOG) injection vial 0-18 Units, 0-18 Units, SubCUTAneous, TID WC  insulin lispro (HUMALOG) injection vial 0-9 Units, 0-9 Units, SubCUTAneous, Nightly  amitriptyline (ELAVIL) tablet 100 mg, 100 mg, Oral, Nightly  atorvastatin (LIPITOR) tablet 40 mg, 40 mg, Oral, Nightly  cyclobenzaprine (FLEXERIL) tablet 10 mg, 10 mg, Oral, TID PRN  pramipexole (MIRAPEX) tablet 0.25 mg, 0.25 mg, Oral, Nightly  enoxaparin (LOVENOX) injection 40 mg, 40 mg, SubCUTAneous, Daily  ondansetron (ZOFRAN-ODT) disintegrating tablet 4 mg, 4 mg, Oral, Q8H PRN **OR** ondansetron (ZOFRAN) injection 4 mg, 4 mg, IntraVENous, Q6H PRN  potassium chloride (KLOR-CON M) extended release tablet 40 mEq, 40 mEq, Oral, PRN **OR** potassium bicarb-citric acid (EFFER-K) effervescent tablet 40 mEq, 40 mEq, Oral, PRN **OR** potassium chloride 10 mEq/100 mL IVPB (Peripheral Line), 10 mEq, IntraVENous, PRN  magnesium sulfate 2000 mg in 50 mL IVPB premix, 2,000 mg, IntraVENous, PRN  cefepime (MAXIPIME) 1000 mg IVPB minibag, 1,000 mg, IntraVENous, Q8H  polyethylene glycol (GLYCOLAX) packet 17 g, 17 g, Oral, Daily PRN  acetaminophen (TYLENOL) tablet 650 mg, 650 mg, Oral, Q6H PRN **OR** acetaminophen (TYLENOL) suppository 650 mg, 650 mg, Rectal, Q6H PRN  nicotine (NICODERM CQ) 14 MG/24HR 1 patch, 1 patch, TransDERmal, Daily  glucose (GLUTOSE) 40 % oral gel 15 g, 15 g, Oral, PRN  dextrose 50 % IV solution, 12.5 g, IntraVENous, PRN  glucagon (rDNA) injection 1 mg, 1 mg, IntraMUSCular, PRN  dextrose 5 % solution, 100 mL/hr, IntraVENous, PRN    Objective     BP (!) 175/77   Pulse 61   Temp 98 °F (36.7 °C) (Oral)   Resp 20   Ht 5' 10\" (1.778 m)   Wt 280 lb (127 kg)   SpO2 96%   BMI 40.18 kg/m²      I/O:    Intake/Output Summary (Last 24 hours) at 1/12/2022 2000  Last data filed at 1/12/2022 1713  Gross per 24 hour   Intake 720 ml   Output 2175 ml   Net -1455 ml              Wt Readings from Last 3 Encounters:   01/01/22 280 lb (127 kg)   04/18/18 (!) 300 lb 6.4 oz (136.3 kg)   03/21/18 291 lb 12.8 oz (132.4 kg)       LABS:    Recent Labs     01/10/22  1245 01/11/22  0617   WBC 8.6 8.3   HGB 14.1 14.1   HCT 42.9 42.9    213        Recent Labs     01/11/22  0617      K 3.9      CO2 26   BUN 17   CREATININE 1.0        No results for input(s): PROT, INR, APTT in the last 72 hours. No results for input(s): CKTOTAL, CKMB, CKMBINDEX, TROPONINI in the last 72 hours. Exam:   Dressing intact and well maintained. No apparent strike through noted. Vascular: Dorsalis pedis and posterior tibial pulses are palpable to the RLE. Dorsalis pedis and posterior tibial pulses are non-palpable to the LLE secondary to postop edema.  Skin ([facility]    Obstructive sleep apnea on CPAP    Restless leg syndrome    Controlled type 2 diabetes mellitus with microalbuminuria ([facility]    Diabetic ulcer of left foot due to type 2 diabetes mellitus (H[facility])       PLAN:   - Pt. is a 60 y.o. male   -Patient examined and evaluated  -WBC 8.3; patient afebrile  -Tissue culture results yield streptococcus anginosus, streptococcus agalactiae, and rare gram negative bacilli that did not grow on culture. -Blood cultures positive for gram positive cocci in clusters, molecular panel demonstrates positive findings for staphylococcus. Likely contaminant  -X-rays taken and reviewed; impression above  -Continue IV antibiotics per infectious disease  -Arterial doppler reviewed, evidence of high grade stenosis in the mid left superficial femoral artery.   -Patient underwent angiogram by interventional radiology with successful stenting of the superficial femoral artery, popliteal, trifurcation vessels all widely patent  -Dressing changed; applied betadine, Adaptic to TMA incision site, and dressed both incisions with 4 x 4 gauze, Kerlix rolls, and an Ace bandage  -Strict NWB to LLE. Able to heel WB for transfers and bathroom privileges.    -Discussed with patient that he will have a toe filler and rigid carbon fiber plate in his operative sided shoe  -Patient needs to regain functional status and be safe using crutches before discharging home  -Patient will be provided a cam boot tomorrow 1/13/2022 before discharge. - Podiatry will continue to follow patient    DISPO: No further surgical intervention planned. Pending dispensing of cam boot tomorrow 1/13/2022. Follow-up with Dr. Alonzo Macedo in 1 week.     Flora Mora DPM PGY-2    Podiatric Surgical Resident  1/12/2022   8:00 PM

## 2022-01-13 NOTE — PROGRESS NOTES
Discharge instructions gone over with patient, including follow up appointments, rx called into pharmacy,  Home health to see patient for pt/ot   Post op instructions given to patient,  No concerns noted at this time

## 2022-01-13 NOTE — CARE COORDINATION
1/13/22, 8:24 AM EST    DISCHARGE ON GOING EVALUATION      Patient Goals/Plan/Treatment Preferences: Planning discharge today, patient has crutches and roll-about-scooter. Home with GF and new  HH. Notified Giovanni Coreas at [phone]; still waiting on dressing change orders from podiatry. Spoke with Rabia Cowan; he plans home today, he was told by podiatry they would provide a CAM boot for him. Secure message this am to podiatry re: dressing changes and f/u on CAM boot. PT/OT following.

## 2022-01-13 NOTE — DISCHARGE SUMMARY
Physician Discharge Summary     Patient ID:  Nanda Pantoja  [id number]  79 y.o.  1965    Admit date: 1/1/2022    Discharge date and time: No discharge date for patient encounter. Admitting Physician: Hortencia Rosenbaum MD     Discharge Physician: Michelet Perez MD     Admission Diagnoses: Diabetic ulcer of left foot due to type 2 diabetes mellitus ([facility] [N37.500, L97.529]  Diabetic ulcer of toe of left foot associated with type 2 diabetes mellitus, unspecified ulcer stage ([facility] [W61.562, L97.529]    Discharge Diagnoses: Diabetic foot     Admission Condition: serious    Discharged Condition: stable    Indication for Admission: Toe wound     Hospital Course: The patient is a 60 y.o. male with significant past medical history of hyperlipidemia, hypertension, diabetes and obesity  who is being seen at bedside on behalf of Dr. Kortney Burns. Patient states that 2 months ago he dropped a can on his toe. He relates that it turned the toe purple. Within the last 2 weeks, the patient noticed a wound to his left great toe. The patient relates pain to his left great toe and has noticed a more difficult time walking. The patient states that he walks a lot for his job and wears rubber boots that are a size 12, which are too big for him since he normally wears a 10. The patient also states that his tennis shoes are old and he has noticed his toe rubbing on the piece of plastic. The patient has used triple antibiotic cream to his toe. The patient is diabetic, but unaware of his blood glucose since he doesn't go to the doctor regularly. Patient states that the last time he ate was at 7pm last night. The patient denies any N/V/F/C, chest pain or SOB.     Patient was admitted to hospitalist service and treated in consultation with podiatry who performed a transmetatarsal amputation of the left great toe  Eventually was noted to have ischemia to the foot and patient underwent a balloon angioplasty with good improvement in circulation in the wound is recovering well patient is being discharged home to follow-up with podiatry in 1 week    Consults: Podiatry    Treatments: Left big toe amputation    Discharge Exam:  BP (!) 149/79   Pulse 63   Temp 98 °F (36.7 °C) (Oral)   Resp 18   Ht 5' 10\" (1.778 m)   Wt 280 lb (127 kg)   SpO2 96%   BMI 40.18 kg/m²     General Appearance:    Alert, cooperative, no distress, appears stated age   Head:    Normocephalic, without obvious abnormality, atraumatic   Eyes:    PERRL, conjunctiva/corneas clear, EOM's intact, fundi     benign, both eyes        Ears:    Normal TM's and external ear canals, both ears   Nose:   Nares normal, septum midline, mucosa normal, no drainage    or sinus tenderness   Throat:   Lips, mucosa, and tongue normal; teeth and gums normal   Neck:   Supple, symmetrical, trachea midline, no adenopathy;        thyroid:  No enlargement/tenderness/nodules; no carotid    bruit or JVD   Back:     Symmetric, no curvature, ROM normal, no CVA tenderness   Lungs:     Clear to auscultation bilaterally, respirations unlabored   Chest wall:    No tenderness or deformity   Heart:    Regular rate and rhythm, S1 and S2 normal, no murmur, rub   or gallop   Abdomen:     Soft, non-tender, bowel sounds active all four quadrants,     no masses, no organomegaly                                   Disposition: home    Patient Instructions:   [unfilled]  Activity: activity as tolerated  Diet: diabetic diet  Wound Care: as directed    Follow-up with PCP in 5 days.     Signed:  Zeeshan Pulliam MD  1/13/2022  1:18 PM

## 2022-01-13 NOTE — FLOWSHEET NOTE
As the staff was working with Elizabeth Lopez before he is being discharged, I had conversation with his family members. 01/13/22 1040   Encounter Summary   Services provided to: Patient and family together;Family   Referral/Consult From: [facility]   Support System Spouse; Family members   Continue Visiting Yes  (1/13 may be leaving today)   Complexity of Encounter Low   Length of Encounter 15 minutes   Spiritual/Bahai   Type Spiritual support   Care Plan:  Continue spiritual and emotional care for patient and family. Including prayers.

## 2022-01-13 NOTE — PROGRESS NOTES
Progress note: Infectious diseases    Patient - Freddy Barksdale,  Age - 60 y.o.    - 1965      Room Number - 6E-57/057-A   MRN -  [id number]   Acct # - [de-identified]  Date of Admission -  2022  2:39 AM    SUBJECTIVE:   No new issues. he wants to go home. OBJECTIVE   VITALS    height is 5' 10\" (1.778 m) and weight is 280 lb (127 kg). His oral temperature is 99.3 °F (37.4 °C). His blood pressure is 155/70 (abnormal) and his pulse is 66. His respiration is 16 and oxygen saturation is 94%.        Wt Readings from Last 3 Encounters:   22 280 lb (127 kg)   18 (!) 300 lb 6.4 oz (136.3 kg)   18 291 lb 12.8 oz (132.4 kg)       I/O (24 Hours)    Intake/Output Summary (Last 24 hours) at 2022  Last data filed at 2022 1713  Gross per 24 hour   Intake 720 ml   Output 2175 ml   Net -1455 ml       General Appearance  Awake, alert, oriented,  not  In acute distress  HEENT - normocephalic, atraumatic, pink conjunctiva,  anicteric sclera  Neck - Supple, no mass  Lungs -  Bilateral   air entry, no rhonchi, no wheeze  Cardiovascular - Heart sounds are normal.    Abdomen - soft, not distended, nontender,   Neurologic -oriented  Skin - No bruising or bleeding  Extremities -dressed foot, had TMA                             MEDICATIONS:      [START ON 2022] amLODIPine  10 mg Oral Daily    hydrALAZINE  25 mg Oral 3 times per day    docusate sodium  100 mg Oral Daily    gabapentin  300 mg Oral TID    sodium chloride flush  10 mL IntraVENous 2 times per day    midazolam  1 mg IntraVENous Once    fentanNYL  50 mcg IntraVENous Once    clopidogrel  75 mg Oral Daily    clindamycin (CLEOCIN) IV  300 mg IntraVENous Q8H    hydroCHLOROthiazide  25 mg Oral Daily    insulin NPH  0.2 Units/kg SubCUTAneous BID- 8&2    lisinopril  40 mg Oral Daily    insulin lispro  0-18 Units SubCUTAneous TID WC    insulin lispro  0-9 Units SubCUTAneous Nightly    amitriptyline  100 mg Oral Nightly    atorvastatin  40 mg Oral Nightly    pramipexole  0.25 mg Oral Nightly    enoxaparin  40 mg SubCUTAneous Daily    cefepime  1,000 mg IntraVENous Q8H    nicotine  1 patch TransDERmal Daily      sodium chloride      dextrose       morphine, ketorolac, sodium chloride flush, sodium chloride, oxyCODONE **OR** oxyCODONE, cyclobenzaprine, ondansetron **OR** ondansetron, potassium chloride **OR** potassium alternative oral replacement **OR** potassium chloride, magnesium sulfate, polyethylene glycol, acetaminophen **OR** acetaminophen, glucose, dextrose, glucagon (rDNA), dextrose      LABS:     CBC:   Recent Labs     01/10/22  1245 01/11/22  0617   WBC 8.6 8.3   HGB 14.1 14.1    213     BMP:    Recent Labs     01/10/22  1245 01/11/22  0617    135   K 3.9 3.9    100   CO2 26 26   BUN 20 17   CREATININE 1.0 1.0   GLUCOSE 125* 108     Calcium:  Recent Labs     01/11/22  0617   CALCIUM 9.2      Recent Labs     01/12/22  0713 01/12/22  1136 01/12/22  1635   POCGLU 117* 158* 106      No results for input(s): INR in the last 72 hours.         Problem list of patient:     Patient Active Problem List   Diagnosis Code    Adenocarcinoma of Prostate, GS 7, stage T1c. C61    Onychomycosis B35.1    DM type 2 [facility]diabetes mellitus, type 2) [facility] E11.9    Dyslipidemia E78.5    DDD (degenerative disc disease), lumbar M51.36    Tobacco abuse Z72.0    Insomnia G47.00    Carpal tunnel syndrome of right wrist G56.01    Carpal tunnel syndrome of left wrist G56.02    Cubital tunnel syndrome on left G56.22    Morbid obesity (HCC) E66.01    Obstructive sleep apnea on CPAP G47.33, Z99.89    Restless leg syndrome G25.81    Controlled type 2 diabetes mellitus with microalbuminuria (HCC) E11.29, R80.9    Diabetic ulcer of left foot due to type 2 diabetes mellitus (HCC) E11.621, D37.361         ASSESSMENT/PLAN   Left foot necrotic wound s/p amputation. the stump became ischemic related to poor flow: he had angioplasty and revision with TMA: improving  PVD: he had angioplasty by IR. DM with neuropathy  Will plan discharge at am if ok with other attendings.   Montse Carrion MD, MD, FACP 1/12/2022 8:26 PM

## 2022-01-13 NOTE — PROGRESS NOTES
Progress note: Infectious diseases    Patient - Justa Benjamin,  Age - 60 y.o.    - 1965      Room Number - 6E-57/057-A   MRN -  [id number]   Acct # - [de-identified]  Date of Admission -  2022  2:39 AM    SUBJECTIVE:   No new issues. plan for discharge today  OBJECTIVE   VITALS    height is 5' 10\" (1.778 m) and weight is 280 lb (127 kg). His oral temperature is 98 °F (36.7 °C). His blood pressure is 149/79 (abnormal) and his pulse is 63. His respiration is 18 and oxygen saturation is 96%.        Wt Readings from Last 3 Encounters:   22 280 lb (127 kg)   18 (!) 300 lb 6.4 oz (136.3 kg)   18 291 lb 12.8 oz (132.4 kg)       I/O (24 Hours)    Intake/Output Summary (Last 24 hours) at 2022 0912  Last data filed at 2022 0447  Gross per 24 hour   Intake 870 ml   Output 2850 ml   Net -1980 ml       General Appearance  Awake, alert, oriented,  not  In acute distress  HEENT - normocephalic, atraumatic, pink conjunctiva,  anicteric sclera  Neck - Supple, no mass  Lungs -  Bilateral   air entry, no rhonchi, no wheeze  Cardiovascular - Heart sounds are normal.    Abdomen - soft, not distended, nontender,   Neurologic -oriented  Skin - No bruising or bleeding  Extremities -dressed foot, had TMA        MEDICATIONS:      amLODIPine  10 mg Oral Daily    hydrALAZINE  25 mg Oral 3 times per day    docusate sodium  100 mg Oral Daily    gabapentin  300 mg Oral TID    sodium chloride flush  10 mL IntraVENous 2 times per day    midazolam  1 mg IntraVENous Once    fentanNYL  50 mcg IntraVENous Once    clopidogrel  75 mg Oral Daily    clindamycin (CLEOCIN) IV  300 mg IntraVENous Q8H    hydroCHLOROthiazide  25 mg Oral Daily    insulin NPH  0.2 Units/kg SubCUTAneous BID- 8&2    lisinopril  40 mg Oral Daily    insulin lispro  0-18 Units SubCUTAneous TID WC    insulin lispro  0-9 Units SubCUTAneous Nightly    amitriptyline  100 mg Oral Nightly    atorvastatin  40 mg Oral Nightly    pramipexole  0.25 mg Oral Nightly    enoxaparin  40 mg SubCUTAneous Daily    cefepime  1,000 mg IntraVENous Q8H    nicotine  1 patch TransDERmal Daily      sodium chloride 100 mL/hr at 01/13/22 0454    dextrose       morphine, ketorolac, sodium chloride flush, sodium chloride, oxyCODONE **OR** oxyCODONE, cyclobenzaprine, ondansetron **OR** ondansetron, potassium chloride **OR** potassium alternative oral replacement **OR** potassium chloride, magnesium sulfate, polyethylene glycol, acetaminophen **OR** acetaminophen, glucose, dextrose, glucagon (rDNA), dextrose      LABS:     CBC:   Recent Labs     01/10/22  1245 01/11/22  0617 01/13/22  0608   WBC 8.6 8.3 10.3   HGB 14.1 14.1 14.4    213 225     BMP:    Recent Labs     01/10/22  1245 01/11/22  0617 01/13/22  0608    135 134*   K 3.9 3.9 3.9    100 100   CO2 26 26 21*   BUN 20 17 19   CREATININE 1.0 1.0 1.0   GLUCOSE 125* 108 138*     Calcium:  Recent Labs     01/13/22  0608   CALCIUM 9.4      Recent Labs     01/12/22  1635 01/12/22  2017 01/13/22  0730   POCGLU 106 173* 120*      No results for input(s): INR in the last 72 hours.         Problem list of patient:     Patient Active Problem List   Diagnosis Code    Adenocarcinoma of Prostate, GS 7, stage T1c. C61    Onychomycosis B35.1    DM type 2 (diabetes mellitus, type 2) ([facility] E11.9    Dyslipidemia E78.5    DDD (degenerative disc disease), lumbar M51.36    Tobacco abuse Z72.0    Insomnia G47.00    Carpal tunnel syndrome of right wrist G56.01    Carpal tunnel syndrome of left wrist G56.02    Cubital tunnel syndrome on left G56.22    Morbid obesity (HCC) E66.01    Obstructive sleep apnea on CPAP G47.33, Z99.89    Restless leg syndrome G25.81    Controlled type 2 diabetes mellitus with microalbuminuria (HCC) E11.29, R80.9    Diabetic ulcer of left foot due to type 2 diabetes mellitus ([facility] E11.621, L95.092         ASSESSMENT/PLAN   Left foot necrotic wound s/p amputation. the stump became ischemic related to poor flow: he had angioplasty and revision with TMA: improving  PVD: he had angioplasty by IR.     DM with neuropathy  Ok with discharge plan  Will e-scribe oral clindamycin for 5 days  Joe Casillas MD, MD, FACP 1/13/2022 9:12 AM

## 2022-01-13 NOTE — PROGRESS NOTES
Podiatric Progress Note  Thomas Clemens  Subjective :   1/13/2022  Patient seen at chair side this morning on behalf of Dr. Radha Diop. Patient is s/p left foot TMA with gastrocnemius recession (DOS 1/7/2022). Patient resting comfortably in chair. Physical therapist present at chair side. Physical therapist had already applied gabapentin to patient's left lower extremity and was having him exercise with it. Patient currently denies any pain to his left lower extremity. He also denies any nausea, vomiting, fever, chills, chest pains, shortness of breath, or bilateral calf tenderness. No other pedal complaints at this time. 1/12/2022  Patient seen at chair side alongside of Dr. Radha Diop. Patient is 5 days status post left foot transmetatarsal amputation with gastrocnemius recession (DOS 1/7/2022). Patient is resting comfortably at the chair. Patient states that his pain is much improved from previous. Patient states that he is comfortable going to home tomorrow utilizing crutches. He says he has a rollabout scooter at shoe. Patient denies any nausea, vomiting, fever, chills, chest pain, shortness of breath. No other pedal complaints. 1/11/2022  Patient is seen at chair side on behalf of Dr. Radha Diop. Patient is 4 days status post left foot transmetatarsal amputation with gastrocnemius recession (DOS 1/7/2022). Patient states that his pain is well controlled at this point in time. Patient is curious about shoe gear modifications upon discharge. Patient is curious if he would be able to go back to his regular job after being discharged from the hospital and healing of his incision. Patient expresses that he does not want to go to inpatient rehab. Patient denies any nausea, vomiting, fever, chills, chest pain, shortness of breath. No other pedal complaints. 1/9/2022  Patient seen at chair side today on behalf of Dr. Radha Diop.   Patient is 2 day s/p left foot TMA with gastrocnemius recession (DOS 1/7/2022). Patient appeared pleasant, was oriented to person, place and time and in no acute distress. Patient endorses mild pain to his left foot; he rates it as 3/10. He states that the pain is very well controlled with the current pain medications. Otherwise patient is feeling very well. Patient denies any N/V/F/C/SOB or CP. Patient has no further pedal concerns at this point in time. 1/6/22  Patient seen at bedside on behalf of Dr. Alexander Mayes. Patient was resting in bed with his CPAP on. He responded to questions by nodding yes and no. Patient is status post left first toe amputation. Patient has no complaints and understands the plan. 1/5/2022  Patient seen at bedside today alongside of Dr. Alexander Mayes. Patient was pleasant, oriented to person, place, and time and in no acute distress. Patient is 4 days status post left first toe amputation. Patient had his angiogram today that was successful. Discussed with patient that he is having interval worsening of the amputation site and that he is at high risk for more proximal amputation, whether partial first ray amputation or transmetatarsal amputation. Patient states that he is wanting to save as much of his foot as possible and would prefer to move forward with partial first ray amputation if it is deemed necessary. No other pedal complaints. 1/4/2022  Patient seen at bedside today on behalf of Dr. Alexander Mayes. Patient was pleasant, was oriented to person, place, and time and in no acute distress. Patient is 3 days status post left first toe amputation. Patient states that he is having pain in his left foot at the amputation site, any sort of stinging pain. He is curious what time we will get his angiogram today. Patient also complains of some soreness in his right arm at the site of his previous IV line that was switched to his left hand. Patient denies any nausea, vomiting, fever, chills, chest pain, shortness of breath.   No other pedal complaints. 1/3/2022  Patient seen bedside today on behalf of Dr. Isidoro Guerrero. Patient appeared pleasant, was oriented to person, place and time and in no acute distress. Patient is 2 days  S/p left 1st toe amputation. Patient states that he has no questions right now but that he typically gets questions after the visit. Patient denies any N/V/F/C/SOB or CP. Patient has no further pedal concerns at this point in time.      Current Medications:    Current Facility-Administered Medications: amLODIPine (NORVASC) tablet 10 mg, 10 mg, Oral, Daily  hydrALAZINE (APRESOLINE) tablet 25 mg, 25 mg, Oral, 3 times per day  docusate sodium (COLACE) capsule 100 mg, 100 mg, Oral, Daily  morphine (PF) injection 2 mg, 2 mg, IntraVENous, Q3H PRN  gabapentin (NEURONTIN) tablet 300 mg, 300 mg, Oral, TID  ketorolac (TORADOL) injection 30 mg, 30 mg, IntraVENous, Q6H PRN  sodium chloride flush 0.9 % injection 10 mL, 10 mL, IntraVENous, 2 times per day  sodium chloride flush 0.9 % injection 10 mL, 10 mL, IntraVENous, PRN  0.9 % sodium chloride infusion, 25 mL, IntraVENous, PRN  oxyCODONE (ROXICODONE) immediate release tablet 5 mg, 5 mg, Oral, Q4H PRN **OR** oxyCODONE (ROXICODONE) immediate release tablet 10 mg, 10 mg, Oral, Q4H PRN  midazolam (VERSED) injection 1 mg, 1 mg, IntraVENous, Once  fentaNYL (SUBLIMAZE) injection 50 mcg, 50 mcg, IntraVENous, Once  clopidogrel (PLAVIX) tablet 75 mg, 75 mg, Oral, Daily  clindamycin (CLEOCIN) IVPB 300 mg, 300 mg, IntraVENous, Q8H  hydroCHLOROthiazide (HYDRODIURIL) tablet 25 mg, 25 mg, Oral, Daily  insulin NPH (HUMULIN N;NOVOLIN N) injection vial 25 Units, 0.2 Units/kg, SubCUTAneous, BID- 8&2  lisinopril (PRINIVIL;ZESTRIL) tablet 40 mg, 40 mg, Oral, Daily **AND** [DISCONTINUED] hydroCHLOROthiazide (HYDRODIURIL) tablet 12.5 mg, 12.5 mg, Oral, Daily  insulin lispro (HUMALOG) injection vial 0-18 Units, 0-18 Units, SubCUTAneous, TID WC  insulin lispro (HUMALOG) injection vial 0-9 Units, 0-9 Units, SubCUTAneous, Nightly  amitriptyline (ELAVIL) tablet 100 mg, 100 mg, Oral, Nightly  atorvastatin (LIPITOR) tablet 40 mg, 40 mg, Oral, Nightly  cyclobenzaprine (FLEXERIL) tablet 10 mg, 10 mg, Oral, TID PRN  pramipexole (MIRAPEX) tablet 0.25 mg, 0.25 mg, Oral, Nightly  enoxaparin (LOVENOX) injection 40 mg, 40 mg, SubCUTAneous, Daily  ondansetron (ZOFRAN-ODT) disintegrating tablet 4 mg, 4 mg, Oral, Q8H PRN **OR** ondansetron (ZOFRAN) injection 4 mg, 4 mg, IntraVENous, Q6H PRN  potassium chloride (KLOR-CON M) extended release tablet 40 mEq, 40 mEq, Oral, PRN **OR** potassium bicarb-citric acid (EFFER-K) effervescent tablet 40 mEq, 40 mEq, Oral, PRN **OR** potassium chloride 10 mEq/100 mL IVPB (Peripheral Line), 10 mEq, IntraVENous, PRN  magnesium sulfate 2000 mg in 50 mL IVPB premix, 2,000 mg, IntraVENous, PRN  cefepime (MAXIPIME) 1000 mg IVPB minibag, 1,000 mg, IntraVENous, Q8H  polyethylene glycol (GLYCOLAX) packet 17 g, 17 g, Oral, Daily PRN  acetaminophen (TYLENOL) tablet 650 mg, 650 mg, Oral, Q6H PRN **OR** acetaminophen (TYLENOL) suppository 650 mg, 650 mg, Rectal, Q6H PRN  nicotine (NICODERM CQ) 14 MG/24HR 1 patch, 1 patch, TransDERmal, Daily  glucose (GLUTOSE) 40 % oral gel 15 g, 15 g, Oral, PRN  dextrose 50 % IV solution, 12.5 g, IntraVENous, PRN  glucagon (rDNA) injection 1 mg, 1 mg, IntraMUSCular, PRN  dextrose 5 % solution, 100 mL/hr, IntraVENous, PRN    Objective     BP (!) 149/79   Pulse 63   Temp 98 °F (36.7 °C) (Oral)   Resp 18   Ht 5' 10\" (1.778 m)   Wt 280 lb (127 kg)   SpO2 96%   BMI 40.18 kg/m²      I/O:    Intake/Output Summary (Last 24 hours) at 1/13/2022 1155  Last data filed at 1/13/2022 0918  Gross per 24 hour   Intake 1110 ml   Output 2450 ml   Net -1340 ml              Wt Readings from Last 3 Encounters:   01/01/22 280 lb (127 kg)   04/18/18 (!) 300 lb 6.4 oz (136.3 kg)   03/21/18 291 lb 12.8 oz (132.4 kg)       LABS:    Recent Labs     01/11/22  0617 01/13/22  0608   WBC 8.3 10.3   HGB 14.1 14.4   HCT 42.9 43.0    225        Recent Labs     01/13/22  0608   *   K 3.9      CO2 21*   BUN 19   CREATININE 1.0        No results for input(s): PROT, INR, APTT in the last 72 hours. No results for input(s): CKTOTAL, CKMB, CKMBINDEX, TROPONINI in the last 72 hours. Exam:   Dressing intact and well maintained. No apparent strike through noted. Dressings kept intact during today's visit. Vascular: Dorsalis pedis and posterior tibial pulses are palpable to the RLE. Dorsalis pedis and posterior tibial pulses are non-palpable to the LLE secondary to postop edema. Skin temperature is warm to warm from proximal tibial tuberosity to distal digits of RLE and left TMA stump. CFT within normal limits to distal left TMA stump. Edema noted to the LLE consistent with postop status.      Dermatologic:  Patient has a surgical incisions to the distal aspect of the left foot at TMA stump and to posterior aspect of left lower leg. Skin edges of both incisions are well approximated. All sutures are intact without any areas of dehiscence. Dorsal and plantar flaps of TMA incision have CFT within normal limits and appear viable. No surrounding erythema noted. Neurovascular: Light touch sensation diminished bilaterally.      Musculoskeletal: Muscle strength 4/5 for all plantarflexors, dorsiflexors, inverters and everters examined. Decreased ROM noted left AJ. S/p left TMA. Mild pain on palpation of amputation site. Foot and ankle in grossly rectus alignment bilaterally. Imaging  Narrative   PROCEDURE: VL DUP LOWER EXTREMITY ARTERIES LEFT     Impression   1. High-grade stenosis in the mid left superficial femoral artery. 2. Abnormal left GINO suggesting a hemodynamically significant stenosis.         Narrative   4 views left foot. Impression   Impression:   1. Soft tissue swelling with evidence of soft tissue ulceration involving    the first digit.  There is associated cortical destruction of the first    distal phalanx, most consistent with osteomyelitis. ASSESSMENT  Principle  1) Osteomyelitis, left hallux  2) S/p left hallux amputation at metatarsophalangeal joint level    Chronic  Patient Active Problem List   Diagnosis    Adenocarcinoma of Prostate, GS 7, stage T1c.    Onychomycosis    DM type 2 (diabetes mellitus, type 2) [facility]    Dyslipidemia    DDD (degenerative disc disease), lumbar    Tobacco abuse    Insomnia    Carpal tunnel syndrome of right wrist    Carpal tunnel syndrome of left wrist    Cubital tunnel syndrome on left    Morbid obesity ([facility])    Obstructive sleep apnea on CPAP    Restless leg syndrome    Controlled type 2 diabetes mellitus with microalbuminuria ([facility] [place] [address]    Diabetic ulcer of left foot due to type 2 diabetes mellitus ([facility])       PLAN:   - Pt. is a 60 y.o. male   -Patient examined and evaluated  -WBC 10.3; patient afebrile  -Tissue culture results yield streptococcus anginosus, streptococcus agalactiae, and rare gram negative bacilli that did not grow on culture. -Blood cultures positive for gram positive cocci in clusters, molecular panel demonstrates positive findings for staphylococcus. Likely contaminant  -X-rays taken and reviewed; impression above  -Continue IV antibiotics per infectious disease  -Arterial doppler reviewed, evidence of high grade stenosis in the mid left superficial femoral artery.   -Patient underwent angiogram by interventional radiology with successful stenting of the superficial femoral artery, popliteal, trifurcation vessels all widely patent  -Dressing kept intact during today's visit. -Applied medium size cam boot to the patient's left lower extremity; physical therapist had patient ambulate on crutches while wearing medium cam boot, and patient related that he liked and weight with a medium size versus large CAM boot. -Strict NWB to LLE.  Able to heel WB for transfers and bathroom privileges.    -Discussed with patient that he will have a toe filler and rigid carbon fiber plate in his operative sided shoe  -Patient needs to regain functional status and be safe using crutches before discharging home  - Podiatry will continue to follow patient    DISPO: Patient OK to discharge. Follow-up with Dr. Radha Diop in 1 week.     Bernardino Soni DPM PGY-2    Podiatric Surgical Resident  1/13/2022   11:55 AM

## 2022-01-13 NOTE — PROGRESS NOTES
[facility]  Occupational Therapy  Daily Note  Time:    Time In: 1020  Time Out: 1101  Timed Code Treatment Minutes: 41 Minutes  Minutes: 41          Date: 2022  Patient Name: Bebe Campo,   Gender: male      Room: -57/057-A  MRN: [id number]  : 1965  (60 y.o.)  Referring Practitioner: GARCIA Chamorro- CNP  Diagnosis: diabetic ulcer of te left foot due to DM II  Additional Pertinent Hx: 60 y.o. male with PMH of  HTN, hyperlipidemia, T2DM, morbid obesity, tobacco abuse, ASHLEE, RLS, prostate cancer (in remission) who presented to [facility] with a chief complaint of foot pain. Patient reports that approximately 2 months ago he dropped a can of sausage gravy on his left great toe. States that following the incident he developed \"purple bruising\" of the left great toe. PT underwent a left great toe amputation on 22. Pt s/pLEFT FOOT TRANSMETATARSAL AMPUTATION, GASTROCNEMIUS RECESSIONon 22    Restrictions/Precautions:  Restrictions/Precautions: Weight Bearing,Fall Risk,General Precautions  Left Lower Extremity Weight Bearing: Non Weight Bearing  Partial Weight Bearing Percentage Or Pounds: NWB with all mobility, per podiatry okay to heel WB for transfers      SUBJECTIVE: Pt sitting up in bedside chair ready to go home. Per RN, pt needs a CAM boot on left LE prior to discharge but unsure where to get boot from. Staff wanting pt to ambulate with it on prior to discharge. After boot placed on pt. Podiatrist entering room with boot as well. Podiatrist adjusted pts boot size and observed pt ambulating with it on. PAIN: 0 /10:      Vitals: Vitals not assessed per clinical judgement, see nursing flowsheet    COGNITION: Decreased Insight and Decreased Safety Awareness    ADL:   Lower Extremity Dressing: Maximum Assistance. Pt educated on donning CAM boot on left LE. Podiatrist answered questions that pt had regading it and his progress. [place] BALANCE:  Standing Balance: Contact Guard Assistance. with crutches    BED MOBILITY:  Not Tested    TRANSFERS:  Sit to Stand:  Contact Guard Assistance. from bedside chair on 2 occasions  Stand to Sit: [address] into bedside chair on 2 occasions with cues for safety to turn all the way around prior to sitting d/t pt wanting to sit to early    FUNCTIONAL MOBILITY:  Assistive Device: cruthces  Assist Level:  Contact Guard Assistance. Distance: to doorway  Pt completed x 2 trials. On 2nd trial, pt with LOB requiring min A x2 to correct. Pt continues to require education on safety and is a high fall risk. ASSESSMENT:     Activity Tolerance:  Patient tolerance of  treatment: fair. LOB with mobility requiring assistance for correction       Discharge Recommendations: Subacute/skilled nursing facility and d/t pt being a high fall risk, pt not safe to return home. Pt adament about returnign home.  If pt does so, recommending HH therapy and pt to have 24 hour supervision including supervision with mobility  Equipment Recommendations: Equipment Needed: Yes  Mobility Devices: [employer]  Other: reacher and Bedside commode  Plan: Times per week: 5xs  Current Treatment Recommendations: Strengthening,Endurance Training,Patient/Caregiver Education & Training,Equipment Evaluation, Education, & procurement,Self-Care / ADL,Balance Training,Home Management Training,Functional Mobility Training,Safety Education & Training    Patient Education  Patient Education: safety wtih mobility     Goals  Short term goals  Time Frame for Short term goals: by discharge  Short term goal 1: Pt will complete ADL routine with no > min A and min cues for NWB L LE status, (Heel wt bearing for BRP)  Short term goal 2: Pt will ambulate to / from the BR with consistent CGA and crutches with no > 1 cue for NWB  L LE or safety concerns to increase safety for toileting routine  Short term goal 3: Pt will complete B UE medium resistence strenthening ex x 12 reps to increase UE strength needed for transfers  Short term goal 4: Pt will tolerate standing x 1 min with S and no cues for NWB/ Heel wt bearing status for ease of toileting tasks  Long term goals  Time Frame for Long term goals : not set due to est short LOS    Following session, patient left in safe position with all fall risk precautions in place.

## 2022-01-14 ENCOUNTER — TELEPHONE (OUTPATIENT)
Dept: FAMILY MEDICINE CLINIC | Age: 57
End: 2022-01-14

## 2022-01-14 NOTE — CARE COORDINATION
1/14/22, 8:51 AM EST    DISCHARGE PLANNING EVALUATION      Notified [facility] of discharge yesterday    1/14/22, 8:52 AM EST    Patient goals/plan/ treatment preferences discussed by  and . Patient goals/plan/ treatment preferences reviewed with patient/ family. Patient/ family verbalize understanding of discharge plan and are in agreement with goal/plan/treatment preferences. Understanding was demonstrated using the teach back method. AVS provided by RN at time of discharge, which includes all necessary medical information pertaining to the patients current course of illness, treatment, post-discharge goals of care, and treatment preferences.     Services After Discharge  Services At/After Discharge: PT,OT,Nursing Services,Aide Services ([address] [place] [address] [address])

## 2022-01-17 ENCOUNTER — OFFICE VISIT (OUTPATIENT)
Dept: FAMILY MEDICINE CLINIC | Age: 57
End: 2022-01-17
Payer: COMMERCIAL

## 2022-01-17 VITALS
SYSTOLIC BLOOD PRESSURE: 162 MMHG | RESPIRATION RATE: 16 BRPM | HEART RATE: 76 BPM | TEMPERATURE: 98 F | DIASTOLIC BLOOD PRESSURE: 90 MMHG

## 2022-01-17 DIAGNOSIS — E66.01 MORBID OBESITY (HCC): ICD-10-CM

## 2022-01-17 DIAGNOSIS — E11.621 DIABETIC ULCER OF LEFT FOOT DUE TO TYPE 2 DIABETES MELLITUS (HCC): Primary | ICD-10-CM

## 2022-01-17 DIAGNOSIS — Z72.0 TOBACCO ABUSE: ICD-10-CM

## 2022-01-17 DIAGNOSIS — Z91.199 MEDICAL NON-COMPLIANCE: ICD-10-CM

## 2022-01-17 DIAGNOSIS — E78.5 DYSLIPIDEMIA: ICD-10-CM

## 2022-01-17 DIAGNOSIS — I10 PRIMARY HYPERTENSION: ICD-10-CM

## 2022-01-17 DIAGNOSIS — L97.529 DIABETIC ULCER OF LEFT FOOT DUE TO TYPE 2 DIABETES MELLITUS (HCC): Primary | ICD-10-CM

## 2022-01-17 DIAGNOSIS — G47.33 OBSTRUCTIVE SLEEP APNEA ON CPAP: ICD-10-CM

## 2022-01-17 DIAGNOSIS — Z99.89 OBSTRUCTIVE SLEEP APNEA ON CPAP: ICD-10-CM

## 2022-01-17 PROCEDURE — 99496 TRANSJ CARE MGMT HIGH F2F 7D: CPT | Performed by: FAMILY MEDICINE

## 2022-01-17 RX ORDER — AMLODIPINE BESYLATE AND BENAZEPRIL HYDROCHLORIDE 5; 10 MG/1; MG/1
1 CAPSULE ORAL DAILY
Qty: 30 CAPSULE | Refills: 2 | Status: SHIPPED | OUTPATIENT
Start: 2022-01-17 | End: 2022-05-09 | Stop reason: SDUPTHER

## 2022-01-17 RX ORDER — LANCETS 33 GAUGE
EACH MISCELLANEOUS
Qty: 100 EACH | Refills: 3 | Status: SHIPPED | OUTPATIENT
Start: 2022-01-17

## 2022-01-17 RX ORDER — ATORVASTATIN CALCIUM 40 MG/1
40 TABLET, FILM COATED ORAL NIGHTLY
Qty: 30 TABLET | Refills: 2 | Status: SHIPPED | OUTPATIENT
Start: 2022-01-17 | End: 2022-05-09 | Stop reason: SDUPTHER

## 2022-01-17 RX ORDER — BLOOD-GLUCOSE METER
EACH MISCELLANEOUS
Qty: 1 KIT | Refills: 0 | Status: SHIPPED | OUTPATIENT
Start: 2022-01-17

## 2022-01-17 RX ORDER — PIOGLITAZONE HCL AND METFORMIN HCL 500; 15 MG/1; MG/1
1 TABLET ORAL 2 TIMES DAILY WITH MEALS
Qty: 60 TABLET | Refills: 2 | Status: SHIPPED | OUTPATIENT
Start: 2022-01-17 | End: 2022-05-09 | Stop reason: SDUPTHER

## 2022-01-17 RX ORDER — BLOOD SUGAR DIAGNOSTIC
STRIP MISCELLANEOUS
Qty: 100 EACH | Refills: 3 | Status: SHIPPED | OUTPATIENT
Start: 2022-01-17

## 2022-01-17 SDOH — ECONOMIC STABILITY: FOOD INSECURITY: WITHIN THE PAST 12 MONTHS, YOU WORRIED THAT YOUR FOOD WOULD RUN OUT BEFORE YOU GOT MONEY TO BUY MORE.: PATIENT DECLINED

## 2022-01-17 SDOH — ECONOMIC STABILITY: FOOD INSECURITY: WITHIN THE PAST 12 MONTHS, THE FOOD YOU BOUGHT JUST DIDN'T LAST AND YOU DIDN'T HAVE MONEY TO GET MORE.: PATIENT DECLINED

## 2022-01-17 ASSESSMENT — SOCIAL DETERMINANTS OF HEALTH (SDOH): HOW HARD IS IT FOR YOU TO PAY FOR THE VERY BASICS LIKE FOOD, HOUSING, MEDICAL CARE, AND HEATING?: PATIENT DECLINED

## 2022-01-17 NOTE — PROGRESS NOTES
Post-Discharge Transitional Care Management Services      Jacobo Licona   YOB: 1965    Date of Visit:  1/17/2022  30 Day Post-Discharge Date: 2/13/22  Chief Complaint   Patient presents with    Follow-Up from [facility] 1/1/22-1/13/22     Admit date: 1/1/2022     Discharge date and time: No discharge date for patient encounter.      Admitting Physician: Patricia Lugo MD      Discharge Physician: Flaquito Granados. Chepe Corbett MD      Admission Diagnoses: Diabetic ulcer of left foot due to type 2 diabetes mellitus ([facility] [B44.220, L97.529]  Diabetic ulcer of toe of left foot associated with type 2 diabetes mellitus, unspecified ulcer stage ([facility] [B15.292, L97.529]     Discharge Diagnoses: Diabetic foot      Admission Condition: serious     Discharged Condition: stable     Indication for Admission: Toe wound      Hospital Course:      The patient is a 60 y. o. male with significant past medical history of hyperlipidemia, hypertension, diabetes and obesity  who is being seen at bedside on behalf of Dr. Rl Gamez. Patient states that 2 months ago he dropped a can on his toe. He relates that it turned the toe purple. Within the last 2 weeks, the patient noticed a wound to his left great toe. The patient relates pain to his left great toe and has noticed a more difficult time walking. The patient states that he walks a lot for his job and wears rubber boots that are a size 12, which are too big for him since he normally wears a 10. The patient also states that his tennis shoes are old and he has noticed his toe rubbing on the piece of plastic. The patient has used triple antibiotic cream to his toe. The patient is diabetic, but unaware of his blood glucose since he doesn't go to the doctor regularly. Patient states that the last time he ate was at 7pm last night.  The patient denies any N/V/F/C, chest pain or SOB.     Patient was admitted to hospitalist service and treated in consultation with podiatry who performed a transmetatarsal amputation of the left great toe  Eventually was noted to have ischemia to the foot and patient underwent a balloon angioplasty with good improvement in circulation in the wound is recovering well patient is being discharged home to follow-up with podiatry in 1 week     Lab Results   Component Value Date    LABA1C 10.8 (H) 01/01/2022    LABA1C 7.7 (H) 03/16/2018    LABA1C 7.5 (H) 09/09/2017     Lab Results   Component Value Date    LABMICR 54.57 03/16/2018    LDLCALC 80 03/16/2018    CREATININE 1.0 01/13/2022     BP Readings from Last 3 Encounters:   01/17/22 (!) 162/90   01/13/22 (!) 149/79   01/07/22 116/60     Wt Readings from Last 3 Encounters:   01/01/22 280 lb (127 kg)   04/18/18 (!) 300 lb 6.4 oz (136.3 kg)   03/21/18 291 lb 12.8 oz (132.4 kg)         Allergies   Allergen Reactions    Pcn [Penicillins] Hives     Outpatient Medications Marked as Taking for the 1/17/22 encounter (Office Visit) with Mateo Vaca DO   Medication Sig Dispense Refill    amLODIPine-benazepril (LOTREL) 5-10 MG per capsule Take 1 capsule by mouth daily 30 capsule 2    atorvastatin (LIPITOR) 40 MG tablet Take 1 tablet by mouth nightly 30 tablet 2    pioglitazone-metFORMIN (ACTOPLUS MET)  MG per tablet Take 1 tablet by mouth 2 times daily (with meals) 60 tablet 2    blood glucose test strips (ONETOUCH VERIO) strip Check sugars daily. Dx: E11.9 100 each 3    Blood Glucose Monitoring Suppl (ONETOUCH VERIO) w/Device KIT Check sugars daily. Dx: E11.9 1 kit 0    OneTouch Delica Lancets 32P MISC Check sugars daily. Dx: E11.9 100 each 3         Vitals:    01/17/22 1258   BP: (!) 162/90   Site: Left Upper Arm   Position: Sitting   Pulse: 76   Resp: 16   Temp: 98 °F (36.7 °C)   TempSrc: Oral     There is no height or weight on file to calculate BMI.      Wt Readings from Last 3 Encounters:   01/01/22 280 lb (127 kg)   04/18/18 (!) 300 lb 6.4 oz (136.3 kg)   03/21/18 291 lb 12.8 oz (132.4 kg) BP Readings from Last 3 Encounters:   01/17/22 (!) 162/90   01/13/22 (!) 149/79   01/07/22 116/60        Patient was admitted to [facility] from 1/1/22 to 1/13/22 for foot ulcer, DM, HTN. Inpatient course: Discharge summary reviewed- see chart. Current status: improving    Review of Systems:  A comprehensive review of systems was negative except for what was noted in the HPI. Physical Exam:  General Appearance: alert and oriented to person, place and time, well developed and well- nourished, in no acute distress  Skin: warm and dry, no rash or erythema  Head: normocephalic and atraumatic  Eyes: pupils equal, round, and reactive to light, extraocular eye movements intact, conjunctivae normal  ENT: tympanic membrane, external ear and ear canal normal bilaterally, nose without deformity, nasal mucosa and turbinates normal without polyps  Neck: supple and non-tender without mass, no thyromegaly or thyroid nodules, no cervical lymphadenopathy  Pulmonary/Chest: clear to auscultation bilaterally- no wheezes, rales or rhonchi, normal air movement, no respiratory distress  Cardiovascular: normal rate, regular rhythm, normal S1 and S2, no murmurs, rubs, clicks, or gallops, distal pulses intact, no carotid bruits  Abdomen: soft, non-tender, non-distended, normal bowel sounds, no masses or organomegaly  Extremities: no cyanosis, clubbing or edema  Musculoskeletal: left foot in boot  Neurologic: reflexes normal and symmetric, no cranial nerve deficit, gait, coordination and speech normal    Initial post-discharge communication occurred between medical assistant and LVM on 1/14/22- see documentation in chart: telephone encounter. Assessment/Plan:  Bobby Lopez was seen today for follow-up from hospital.    Diagnoses and all orders for this visit:    Diabetic ulcer of left foot due to type 2 diabetes mellitus ([facility]    Dyslipidemia  -     atorvastatin (LIPITOR) 40 MG tablet;  Take 1 tablet by mouth nightly    Uncontrolled type 2 diabetes mellitus, without long-term current use of insulin (HCC)  -     pioglitazone-metFORMIN (ACTOPLUS MET)  MG per tablet; Take 1 tablet by mouth 2 times daily (with meals)  -     blood glucose test strips (ONETOUCH VERIO) strip; Check sugars daily. Dx: E11.9  -     Blood Glucose Monitoring Suppl (Identified) w/Device KIT; Check sugars daily. Dx: E11.9  -     OneTouch Delica Lancets 08V MISC; Check sugars daily. Dx: E11.9    Primary hypertension  -     amLODIPine-benazepril (LOTREL) 5-10 MG per capsule;  Take 1 capsule by mouth daily    Morbid obesity (HCC)    Tobacco abuse    Obstructive sleep apnea on CPAP    Medical non-compliance    -  Orders above  -  Check sugars daily and record  -  Follow up with specialists as scheduled  -  RTO 2 weeks    Diagnostic test results reviewed: inpatient labs, EKG, x-ray-left foot, US-legs and vascular study-IR angiogram    Patient risk of morbidity and mortality: high    Medical Decision Making: high complexity

## 2022-01-31 ENCOUNTER — OFFICE VISIT (OUTPATIENT)
Dept: FAMILY MEDICINE CLINIC | Age: 57
End: 2022-01-31
Payer: COMMERCIAL

## 2022-01-31 VITALS
RESPIRATION RATE: 16 BRPM | BODY MASS INDEX: 38.02 KG/M2 | DIASTOLIC BLOOD PRESSURE: 80 MMHG | WEIGHT: 265 LBS | HEART RATE: 76 BPM | SYSTOLIC BLOOD PRESSURE: 152 MMHG

## 2022-01-31 DIAGNOSIS — E78.5 DYSLIPIDEMIA: ICD-10-CM

## 2022-01-31 DIAGNOSIS — Z12.5 SCREENING FOR PROSTATE CANCER: ICD-10-CM

## 2022-01-31 DIAGNOSIS — E11.621 DIABETIC ULCER OF LEFT FOOT DUE TO TYPE 2 DIABETES MELLITUS (HCC): ICD-10-CM

## 2022-01-31 DIAGNOSIS — L97.529 DIABETIC ULCER OF LEFT FOOT DUE TO TYPE 2 DIABETES MELLITUS (HCC): ICD-10-CM

## 2022-01-31 DIAGNOSIS — Z99.89 OBSTRUCTIVE SLEEP APNEA ON CPAP: ICD-10-CM

## 2022-01-31 DIAGNOSIS — I10 PRIMARY HYPERTENSION: ICD-10-CM

## 2022-01-31 DIAGNOSIS — G47.33 OBSTRUCTIVE SLEEP APNEA ON CPAP: ICD-10-CM

## 2022-01-31 DIAGNOSIS — Z72.0 TOBACCO ABUSE: ICD-10-CM

## 2022-01-31 PROCEDURE — 99214 OFFICE O/P EST MOD 30 MIN: CPT | Performed by: FAMILY MEDICINE

## 2022-01-31 ASSESSMENT — PATIENT HEALTH QUESTIONNAIRE - PHQ9
SUM OF ALL RESPONSES TO PHQ QUESTIONS 1-9: 0
SUM OF ALL RESPONSES TO PHQ9 QUESTIONS 1 & 2: 0
1. LITTLE INTEREST OR PLEASURE IN DOING THINGS: 0
SUM OF ALL RESPONSES TO PHQ QUESTIONS 1-9: 0
SUM OF ALL RESPONSES TO PHQ QUESTIONS 1-9: 0
2. FEELING DOWN, DEPRESSED OR HOPELESS: 0
SUM OF ALL RESPONSES TO PHQ QUESTIONS 1-9: 0

## 2022-01-31 ASSESSMENT — ENCOUNTER SYMPTOMS
RESPIRATORY NEGATIVE: 1
GASTROINTESTINAL NEGATIVE: 1

## 2022-01-31 NOTE — PROGRESS NOTES
Sara Patrick (:  1965) is a 60 y.o. male,Established patient, here for evaluation of the following chief complaint(s):  2 Week Follow-Up and Diabetes        Subjective   SUBJECTIVE/OBJECTIVE:  HPI:    Chief Complaint   Patient presents with    2 Week Follow-Up    Diabetes     Sugars better on current meds. . Checking daily. Lab Results   Component Value Date    LABA1C 10.8 (H) 2022    LABA1C 7.7 (H) 2018    LABA1C 7.5 (H) 2017     Lab Results   Component Value Date    LABMICR 54.57 2018    LDLCALC 80 2018    CREATININE 1.0 2022     BP still on high end. Home readings ok per pt. BP Readings from Last 3 Encounters:   22 (!) 152/80   22 (!) 162/90   22 (!) 149/79     Wt Readings from Last 3 Encounters:   22 265 lb (120.2 kg)   22 280 lb (127 kg)   18 (!) 300 lb 6.4 oz (136.3 kg)     Patient Active Problem List   Diagnosis    Adenocarcinoma of Prostate, GS 7, stage T1c.    Onychomycosis    DM type 2 (diabetes mellitus, type 2) ([address]    Dyslipidemia    DDD (degenerative disc disease), lumbar    Tobacco abuse    Insomnia    Carpal tunnel syndrome of right wrist    Carpal tunnel syndrome of left wrist    Cubital tunnel syndrome on left    Morbid obesity ([address]    Obstructive sleep apnea on CPAP    Restless leg syndrome    Controlled type 2 diabetes mellitus with microalbuminuria ([address]    Diabetic ulcer of left foot due to type 2 diabetes mellitus ([address]     Past Surgical History:   Procedure Laterality Date    CARPAL TUNNEL RELEASE  10/22/12    right    CARPAL TUNNEL RELEASE  2012    left    COLONOSCOPY  2015    CYST REMOVAL  T    RT SIDE UPPER BACK,     CYST REMOVAL  12    excision of right shoulder mass/cyst and of left axillary skin lesion-Dr. Sheila Alas    HAND SURGERY      partial finger amputee rt hand    KNEE SURGERY  ?     left knee scope    LEG SURGERY      LEG SURGERY  12 I&D rt leg abscess- Dr. Eduardo Travis  12-    TRUS/BX    PROSTATE SURGERY  06-    BRACHYTHERAPY OF THE PROSTATE    SKIN TAG REMOVAL  T    LT ARMPIT    TOE AMPUTATION Left 1/1/2022    LEFT 1ST TOE AMPUTATION performed by Carlos Enrique Reynoso DPM at [facility] TOE AMPUTATION Left 1/7/2022    LEFT FOOT TRANSMETATARSAL AMPUTATION performed by Carlos Enrique Reynoso DPM at [address] Use    Smoking status: Current Every Day Smoker     Packs/day: 1.00     Years: 40.00     Pack years: 40.00     Types: Cigarettes     Start date: 6/29/1975    Smokeless tobacco: Never Used   Substance Use Topics    Alcohol use: No     Alcohol/week: 0.0 standard drinks    Drug use: No         Review of Systems   Constitutional: Negative. HENT: Negative. Respiratory: Negative. Cardiovascular: Negative. Gastrointestinal: Negative. Musculoskeletal: Negative. All other systems reviewed and are negative. Objective   Physical Exam  Vitals and nursing note reviewed. Constitutional:       General: He is not in acute distress. Appearance: Normal appearance. He is well-developed. HENT:      Head: Normocephalic and atraumatic. Right Ear: Tympanic membrane normal.      Left Ear: Tympanic membrane normal.   Eyes:      Conjunctiva/sclera: Conjunctivae normal.   Cardiovascular:      Rate and Rhythm: Normal rate and regular rhythm. Heart sounds: Normal heart sounds. No murmur heard. Pulmonary:      Effort: Pulmonary effort is normal.      Breath sounds: Normal breath sounds. No wheezing, rhonchi or rales. Abdominal:      General: There is no distension. Musculoskeletal:      Cervical back: Neck supple. Left foot: Deformity (transmetatarsal amputation left foot) present. Skin:     General: Skin is warm and dry. Findings: No rash (on exposed surfaces). Neurological:      General: No focal deficit present.       Mental Status: He is alert.   Psychiatric:         Attention and Perception: Attention normal.         Mood and Affect: Mood normal.         Speech: Speech normal.         Behavior: Behavior normal. Behavior is cooperative. Thought Content: Thought content normal.         Judgment: Judgment normal.               ASSESSMENT/PLAN:  1. Uncontrolled type 2 diabetes mellitus, without long-term current use of insulin ([facility]  -      DIABETES FOOT EXAM  -     Comprehensive Metabolic Panel; Future  -     Hemoglobin A1C; Future  -     Microalbumin / Creatinine Urine Ratio; Future  2. Primary hypertension  -     CBC Auto Differential; Future  3. Dyslipidemia  -     Lipid Panel w/ Reflex Direct LDL; Future  -     TSH WITH REFLEX TO FT4; Future  4. Obstructive sleep apnea on CPAP  5. Diabetic ulcer of left foot due to type 2 diabetes mellitus ([facility]  -     Handicap placard  6. Tobacco abuse  7. Screening for prostate cancer  -     PSA screening; Future    -  Sugars improving  -  BP still high today, will continue to monitor  -  Follow up with Podiatry as scheduled  -pt was educated on regular exercise and healthy living with diabetes. Maintain healthy diabetic diet  -  Recheck labs 3 mos    Return in about 3 months (around 4/30/2022) for DM2. An electronic signature was used to authenticate this note.     --Terese Amaral, DO

## 2022-01-31 NOTE — PATIENT INSTRUCTIONS
You may receive a survey about your visit with us today. The feedback from our patients helps us identify what is working well and where the service to all patients can be enhanced. Thank you! Tobacco Cessation Programs     Telephonic behavior modification  Sid Cordoba ([phone])   Counseling service for those who are ready to quit using tobacco.     Available for uninsured [place] residents, [place] recipients, pregnant women, or patients whose health plans or employers are members of the [address] [place] [address] behavior modification   http://[place]. Quitlogix. org   Online support program to help patients through each step of the quitting process. Available 24 hours a day 7 days a week. Provides up to date researched based tool, step-by-step guides, and motivational messages. Online behavior modification   www.lungusa.org/stop-smoking/how-to-quit   HelpLine: 1-800-LUNG-[place] (465-0163)   Email questions to:  Mary Lou@[employer]. Trustpilot    Website offers resources to help tobacco users figure out their reasons for quitting and then take the big step of quitting for good. Hypnosis   Location: [facility], [employer] [address], [place]   Contact: Izabel Zhu, PhD at [phone]   Hypnosis for tobacco cessation   Cost $225 for the initial session and $175 for each session afterwards. Most patients require 6-8 sessions. There is the option to submit through the patients insurance. Hypnosis and behavior modification   Location: [place] [address],  [address] [place] [address] [address], [place]   Contact: Puma Lagos, PhD at [phone]  [facility] and hypnosis for nicotine addition   Cost: For uninsured patients:  Please call above phone number  Cost for insured patients depends on patients insurance plan.     Behavior modification   Location: [facility], [address] [employer] [address] [employer] [address], [address]: [place] [address] include four one-on-one appointments between the patient and a respiratory therapist.  The four appointments span over three weeks. The respiratory therapist schedules one of the appointments to occur 48 hours after the patients quit date.  Cost $100 total for the four sessions.   Tobacco cessation products are not included in the cost and are not provided by [facility].

## 2022-02-23 ENCOUNTER — TELEPHONE (OUTPATIENT)
Dept: FAMILY MEDICINE CLINIC | Age: 57
End: 2022-02-23

## 2022-02-23 NOTE — TELEPHONE ENCOUNTER
Wife Lorena Sanon on HIPAA asking if patient can get a script for pain/nervce to [facility]. Patient is S/P transmetatarsal with left first toe amputation 1/1/22 by Dr. Raf Aquino. Wife would like a call back with advice.

## 2022-02-24 ENCOUNTER — TELEMEDICINE (OUTPATIENT)
Dept: FAMILY MEDICINE CLINIC | Age: 57
End: 2022-02-24
Payer: COMMERCIAL

## 2022-02-24 DIAGNOSIS — E11.621 DIABETIC ULCER OF LEFT FOOT DUE TO TYPE 2 DIABETES MELLITUS (HCC): ICD-10-CM

## 2022-02-24 DIAGNOSIS — G62.9 PERIPHERAL POLYNEUROPATHY: Primary | ICD-10-CM

## 2022-02-24 DIAGNOSIS — L97.529 DIABETIC ULCER OF LEFT FOOT DUE TO TYPE 2 DIABETES MELLITUS (HCC): ICD-10-CM

## 2022-02-24 DIAGNOSIS — Z89.432 PARTIAL NONTRAUMATIC AMPUTATION OF LEFT FOOT (HCC): ICD-10-CM

## 2022-02-24 PROCEDURE — 99213 OFFICE O/P EST LOW 20 MIN: CPT | Performed by: FAMILY MEDICINE

## 2022-02-24 RX ORDER — GABAPENTIN 100 MG/1
100 CAPSULE ORAL 3 TIMES DAILY
Qty: 21 CAPSULE | Refills: 0 | Status: SHIPPED | OUTPATIENT
Start: 2022-02-24 | End: 2022-03-03

## 2022-02-24 ASSESSMENT — ENCOUNTER SYMPTOMS
GASTROINTESTINAL NEGATIVE: 1
RESPIRATORY NEGATIVE: 1

## 2022-02-24 NOTE — PROGRESS NOTES
Nima Moseley (:  1965) is a Established patient, here for evaluation of the following:         Subjective   HPI:   Chief Complaint   Patient presents with    Pain     Pt presents today to discuss possible pain medication for nerve pain in his left foot. S/p partial amputation, constant ache since then in the left foot. See Podiatry today for foot ucler. Patient Active Problem List   Diagnosis    Adenocarcinoma of Prostate, GS 7, stage T1c.    Onychomycosis    DM type 2 (diabetes mellitus, type 2) (HCC)    Dyslipidemia    DDD (degenerative disc disease), lumbar    Tobacco abuse    Insomnia    Carpal tunnel syndrome of right wrist    Carpal tunnel syndrome of left wrist    Cubital tunnel syndrome on left    Morbid obesity ([address]    Obstructive sleep apnea on CPAP    Restless leg syndrome    Controlled type 2 diabetes mellitus with microalbuminuria ([address]    Diabetic ulcer of left foot due to type 2 diabetes mellitus ([address]     Past Surgical History:   Procedure Laterality Date    CARPAL TUNNEL RELEASE  10/22/12    right    CARPAL TUNNEL RELEASE  2012    left    COLONOSCOPY  2015    CYST REMOVAL  T    RT SIDE UPPER BACK,     CYST REMOVAL  12    excision of right shoulder mass/cyst and of left axillary skin lesion-Dr. Laurie Lowe    HAND SURGERY      partial finger amputee rt hand    KNEE SURGERY  ?     left knee scope    LEG SURGERY      LEG SURGERY  12    I&D rt leg abscess- Dr. Fabio Jean  2009    TRUS/BX    PROSTATE SURGERY  2010    BRACHYTHERAPY OF THE PROSTATE    SKIN TAG REMOVAL  T    LT ARMPIT    TOE AMPUTATION Left 2022    LEFT 1ST TOE AMPUTATION performed by Lennie Burton DPM at [facility] TOE AMPUTATION Left 2022    LEFT FOOT TRANSMETATARSAL AMPUTATION performed by Lennie Burton DPM at [address] [place] [address] History     Tobacco Use    Smoking status: Current Every Day Smoker     Packs/day: 1.00 Years: 40.00     Pack years: 40.00     Types: Cigarettes     Start date: 6/29/1975    Smokeless tobacco: Never Used   Substance Use Topics    Alcohol use: No     Alcohol/week: 0.0 standard drinks    Drug use: No     Prior to Admission medications    Medication Sig Start Date End Date Taking? Authorizing Provider   gabapentin (NEURONTIN) 100 MG capsule Take 1 capsule by mouth 3 times daily for 7 days. 2/24/22 3/3/22 Yes [facility], DO   amLODIPine-benazepril (LOTREL) 5-10 MG per capsule Take 1 capsule by mouth daily 1/17/22   [facility], DO   atorvastatin (LIPITOR) 40 MG tablet Take 1 tablet by mouth nightly 1/17/22   [facility], DO   pioglitazone-metFORMIN (ACTOPLUS MET)  MG per tablet Take 1 tablet by mouth 2 times daily (with meals) 1/17/22   [facility], DO   blood glucose test strips [facility]) strip Check sugars daily. Dx: E11.9 1/17/22   [facility], DO   Blood Glucose Monitoring Suppl (Emma Lyons) w/Device KIT Check sugars daily. Dx: E11.9 1/17/22   [facility], DO   OneTouch Delica Lancets 58L MISC Check sugars daily. Dx: E11.9 1/17/22   [facility], DO   clopidogrel (PLAVIX) 75 MG tablet Take 1 tablet by mouth daily 1/14/22   Alia Fang MD   aspirin 81 MG EC tablet Take 81 mg by mouth daily. Historical Provider, MD       Review of Systems   Constitutional: Negative. HENT: Negative. Respiratory: Negative. Cardiovascular: Negative. Gastrointestinal: Negative. Musculoskeletal: Positive for arthralgias (left foot pain). All other systems reviewed and are negative. Objective   Patient-Reported Vitals  No data recorded     Physical Exam  Constitutional:       General: He is not in acute distress. Appearance: Normal appearance. He is well-developed. He is not ill-appearing. HENT:      Head: Normocephalic and atraumatic.       Right Ear: External ear normal.      Left Ear: External ear normal. Eyes:      Conjunctiva/sclera: Conjunctivae normal.   Pulmonary:      Effort: Pulmonary effort is normal. No respiratory distress. Skin:     Findings: No rash (on exposed surfaces). Neurological:      Mental Status: He is alert and oriented to person, place, and time. Psychiatric:         Mood and Affect: Mood normal.         Behavior: Behavior normal.         Thought Content: Thought content normal.         Judgment: Judgment normal.     Assessment & Plan   Below is the assessment and plan developed based on review of pertinent history, physical exam, labs, studies, and medications. 1. Peripheral polyneuropathy  -     gabapentin (NEURONTIN) 100 MG capsule; Take 1 capsule by mouth 3 times daily for 7 days. , Disp-21 capsule, R-0Normal  2. Diabetic ulcer of left foot due to type 2 diabetes mellitus ([facility]  3. Partial nontraumatic amputation of left foot ([facility]    -  Start gabapentin  -  Follow up with Podiatry as scheduled    Return for Keep known appt. Glenn Timmy, was evaluated through a synchronous (real-time) audio-video encounter. The patient (or guardian if applicable) is aware that this is a billable service, which includes applicable co-pays. This Virtual Visit was conducted with patient's (and/or legal guardian's) consent. The visit was conducted pursuant to the emergency declaration under the [address] [place] [address] [facility] authority and the [employer] General Act. Patient identification was verified, and a caregiver was present when appropriate. The patient was located at home in a state where the provider was licensed to provide care.        --Wilberto Rodriguez DO

## 2022-04-23 NOTE — TELEPHONE ENCOUNTER
[facility] Transitions Initial Follow Up Call    Outreach made within 2 business days of discharge: Yes    Patient: Deborah Mariee Patient : 1965   MRN: [id number]  Reason for Admission: There are no discharge diagnoses documented for the most recent discharge. Discharge Date: 22       Spoke with: attempted to notify pt, left vm.     Discharge department/facility: [facility]    TCM Interactive Patient Contact:      Scheduled appointment with PCP within 7-14 days    Follow Up  Future Appointments   Date Time Provider Rhiannon Bullard   2022  2:00 PM STR VASCULAR LAB ROOM 2 [facility] VAS LAB STR [facility]   2022  3:00 PM STR SPECIAL PROCEDURE ROOM 1 [facility] SPEC STR [facility]       [facility] ([address] [place] [address])
[facility] Transitions Initial Follow Up Call    Outreach made within 2 business days of discharge: Yes    Patient: Jodee King Patient : 1965   MRN: [id number]  Reason for Admission: There are no discharge diagnoses documented for the most recent discharge. Discharge Date: 22       Spoke with: Pt    Discharge department/facility: [facility]    TCM Interactive Patient Contact:  Was patient able to fill all prescriptions: Yes  Was patient instructed to bring all medications to the follow-up visit: Yes  Is patient taking all medications as directed in the discharge summary? Yes  Does patient understand their discharge instructions: Yes  Does patient have questions or concerns that need addressed prior to 7-14 day follow up office visit: no    Scheduled appointment with PCP within 7-14 days. Appt scheduled.     Follow Up  Future Appointments   Date Time Provider Rhiannon Bullard   2022  2:00 PM [facility] LAB [facility] 2 [facility]   2022  3:00 PM [facility] SPECIAL PROCEDURE ROOM 1 MYESHA SPEC [facility]       [facility] ([address] [place] [address])
None

## 2022-05-09 DIAGNOSIS — E78.5 DYSLIPIDEMIA: ICD-10-CM

## 2022-05-09 DIAGNOSIS — I10 PRIMARY HYPERTENSION: ICD-10-CM

## 2022-05-09 RX ORDER — ATORVASTATIN CALCIUM 40 MG/1
40 TABLET, FILM COATED ORAL NIGHTLY
Qty: 30 TABLET | Refills: 2 | Status: SHIPPED | OUTPATIENT
Start: 2022-05-09 | End: 2022-08-26

## 2022-05-09 RX ORDER — AMLODIPINE BESYLATE AND BENAZEPRIL HYDROCHLORIDE 5; 10 MG/1; MG/1
1 CAPSULE ORAL DAILY
Qty: 30 CAPSULE | Refills: 2 | Status: SHIPPED | OUTPATIENT
Start: 2022-05-09 | End: 2022-08-26

## 2022-05-09 RX ORDER — PIOGLITAZONE HCL AND METFORMIN HCL 500; 15 MG/1; MG/1
1 TABLET ORAL 2 TIMES DAILY WITH MEALS
Qty: 60 TABLET | Refills: 2 | Status: SHIPPED | OUTPATIENT
Start: 2022-05-09 | End: 2022-08-26

## 2022-05-09 NOTE — TELEPHONE ENCOUNTER
Pt called office stating he had lapse in insurance and just got it back. He had to reschedule his follow up appt with you to 5/16/22. Pt says he has been out of his medication since Friday and needs a refill of Amlodipine, Metformin and Lipitor to [facility]. Pt is aware he needs to get his labs done, but wants to be back on the meds prior to that. If no call back, he will check with the pharmacy after 1pm. Refill if appropriate.

## 2022-05-12 ENCOUNTER — HOSPITAL ENCOUNTER (OUTPATIENT)
Age: 57
Discharge: HOME | End: 2022-05-12
Payer: COMMERCIAL

## 2022-05-12 DIAGNOSIS — E78.5 DYSLIPIDEMIA: ICD-10-CM

## 2022-05-12 DIAGNOSIS — I10 PRIMARY HYPERTENSION: ICD-10-CM

## 2022-05-12 DIAGNOSIS — Z12.5 SCREENING FOR PROSTATE CANCER: ICD-10-CM

## 2022-05-12 LAB
BASOPHILS # BLD: 0.5 %
BASOPHILS ABSOLUTE: 0 THOU/MM3 (ref 0–0.1)
EOSINOPHIL # BLD: 2.4 %
EOSINOPHILS ABSOLUTE: 0.2 THOU/MM3 (ref 0–0.4)
ERYTHROCYTE [DISTWIDTH] IN BLOOD BY AUTOMATED COUNT: 13.4 % (ref 11.5–14.5)
ERYTHROCYTE [DISTWIDTH] IN BLOOD BY AUTOMATED COUNT: 46.3 FL (ref 35–45)
HCT VFR BLD CALC: 46.1 % (ref 42–52)
HEMOGLOBIN: 15.6 GM/DL (ref 14–18)
IMMATURE GRANS (ABS): 0.01 THOU/MM3 (ref 0–0.07)
IMMATURE GRANULOCYTES: 0.1 %
LYMPHOCYTES # BLD: 33.3 %
LYMPHOCYTES ABSOLUTE: 3.2 THOU/MM3 (ref 1–4.8)
MCH RBC QN AUTO: 31.9 PG (ref 26–33)
MCHC RBC AUTO-ENTMCNC: 33.8 GM/DL (ref 32.2–35.5)
MCV RBC AUTO: 94.3 FL (ref 80–94)
MONOCYTES # BLD: 5.5 %
MONOCYTES ABSOLUTE: 0.5 THOU/MM3 (ref 0.4–1.3)
NUCLEATED RED BLOOD CELLS: 0 /100 WBC
PLATELET # BLD: 201 THOU/MM3 (ref 130–400)
PMV BLD AUTO: 11.1 FL (ref 9.4–12.4)
RBC # BLD: 4.89 MILL/MM3 (ref 4.7–6.1)
SEG NEUTROPHILS: 58.2 %
SEGMENTED NEUTROPHILS ABSOLUTE COUNT: 5.6 THOU/MM3 (ref 1.8–7.7)
WBC # BLD: 9.6 THOU/MM3 (ref 4.8–10.8)

## 2022-05-12 PROCEDURE — 80053 COMPREHEN METABOLIC PANEL: CPT

## 2022-05-12 PROCEDURE — 85025 COMPLETE CBC W/AUTO DIFF WBC: CPT

## 2022-05-12 PROCEDURE — 82043 UR ALBUMIN QUANTITATIVE: CPT

## 2022-05-12 PROCEDURE — 80061 LIPID PANEL: CPT

## 2022-05-12 PROCEDURE — G0103 PSA SCREENING: HCPCS

## 2022-05-12 PROCEDURE — 83036 HEMOGLOBIN GLYCOSYLATED A1C: CPT

## 2022-05-12 PROCEDURE — 36415 COLL VENOUS BLD VENIPUNCTURE: CPT

## 2022-05-13 LAB
ALBUMIN SERPL-MCNC: 4.6 G/DL (ref 3.5–5.1)
ALP BLD-CCNC: 76 U/L (ref 38–126)
ALT SERPL-CCNC: 22 U/L (ref 11–66)
ANION GAP SERPL CALCULATED.3IONS-SCNC: 11 MEQ/L (ref 8–16)
AST SERPL-CCNC: 23 U/L (ref 5–40)
AVERAGE GLUCOSE: 144 MG/DL (ref 70–126)
BILIRUB SERPL-MCNC: 0.7 MG/DL (ref 0.3–1.2)
BUN BLDV-MCNC: 16 MG/DL (ref 7–22)
CALCIUM SERPL-MCNC: 9.5 MG/DL (ref 8.5–10.5)
CHLORIDE BLD-SCNC: 105 MEQ/L (ref 98–111)
CHOLESTEROL, TOTAL: 147 MG/DL (ref 100–199)
CO2: 24 MEQ/L (ref 23–33)
CREAT SERPL-MCNC: 1 MG/DL (ref 0.4–1.2)
CREATININE, URINE: 207.5 MG/DL
GFR SERPL CREATININE-BSD FRML MDRD: 76 ML/MIN/1.73M2
GLUCOSE BLD-MCNC: 130 MG/DL (ref 70–108)
HBA1C MFR BLD: 6.8 % (ref 4.4–6.4)
HDLC SERPL-MCNC: 36 MG/DL
LDL CHOLESTEROL CALCULATED: 84 MG/DL
MICROALBUMIN UR-MCNC: 76.53 MG/DL
MICROALBUMIN/CREAT UR-RTO: 369 MG/G (ref 0–30)
POTASSIUM SERPL-SCNC: 4.1 MEQ/L (ref 3.5–5.2)
PROSTATE SPECIFIC ANTIGEN: 0.11 NG/ML (ref 0–1)
SODIUM BLD-SCNC: 140 MEQ/L (ref 135–145)
TOTAL PROTEIN: 6.7 G/DL (ref 6.1–8)
TRIGL SERPL-MCNC: 135 MG/DL (ref 0–199)

## 2022-05-16 ENCOUNTER — OFFICE VISIT (OUTPATIENT)
Dept: FAMILY MEDICINE CLINIC | Age: 57
End: 2022-05-16
Payer: COMMERCIAL

## 2022-05-16 VITALS
RESPIRATION RATE: 16 BRPM | HEART RATE: 88 BPM | WEIGHT: 262.1 LBS | DIASTOLIC BLOOD PRESSURE: 88 MMHG | SYSTOLIC BLOOD PRESSURE: 168 MMHG | BODY MASS INDEX: 37.61 KG/M2

## 2022-05-16 DIAGNOSIS — Z99.89 OBSTRUCTIVE SLEEP APNEA ON CPAP: ICD-10-CM

## 2022-05-16 DIAGNOSIS — E78.5 DYSLIPIDEMIA: ICD-10-CM

## 2022-05-16 DIAGNOSIS — Z89.432 PARTIAL NONTRAUMATIC AMPUTATION OF LEFT FOOT (HCC): ICD-10-CM

## 2022-05-16 DIAGNOSIS — E11.9 CONTROLLED TYPE 2 DIABETES MELLITUS WITHOUT COMPLICATION, WITHOUT LONG-TERM CURRENT USE OF INSULIN (HCC): Primary | ICD-10-CM

## 2022-05-16 DIAGNOSIS — I10 PRIMARY HYPERTENSION: ICD-10-CM

## 2022-05-16 DIAGNOSIS — G47.33 OBSTRUCTIVE SLEEP APNEA ON CPAP: ICD-10-CM

## 2022-05-16 DIAGNOSIS — E66.9 OBESITY (BMI 30-39.9): ICD-10-CM

## 2022-05-16 DIAGNOSIS — Z72.0 TOBACCO ABUSE: ICD-10-CM

## 2022-05-16 DIAGNOSIS — G62.9 PERIPHERAL POLYNEUROPATHY: ICD-10-CM

## 2022-05-16 PROCEDURE — 99214 OFFICE O/P EST MOD 30 MIN: CPT | Performed by: FAMILY MEDICINE

## 2022-05-16 PROCEDURE — 3044F HG A1C LEVEL LT 7.0%: CPT | Performed by: FAMILY MEDICINE

## 2022-05-16 ASSESSMENT — ENCOUNTER SYMPTOMS
RESPIRATORY NEGATIVE: 1
GASTROINTESTINAL NEGATIVE: 1

## 2022-05-16 NOTE — PROGRESS NOTES
Bette Betts (:  1965) is a 59 y.o. male,Established patient, here for evaluation of the following chief complaint(s):  3 Month Follow-Up        Subjective   SUBJECTIVE/OBJECTIVE:  HPI:    Chief Complaint   Patient presents with    3 Month Follow-Up     3 month eval.    Sugars much better controlled. Lab Results   Component Value Date    LABA1C 6.8 (H) 2022    LABA1C 10.8 (H) 2022    LABA1C 7.7 (H) 2018     Lab Results   Component Value Date    LABMICR 76.53 2022    LDLCALC 84 2022    CREATININE 1.0 2022     BP Readings from Last 3 Encounters:   22 (!) 168/88   22 (!) 152/80   22 (!) 162/90     Wt Readings from Last 3 Encounters:   22 262 lb 1.6 oz (118.9 kg)   22 265 lb (120.2 kg)   22 280 lb (127 kg)     Patient Active Problem List   Diagnosis    Adenocarcinoma of Prostate, GS 7, stage T1c.    Onychomycosis    DM type 2 (diabetes mellitus, type 2) ([address]    Dyslipidemia    DDD (degenerative disc disease), lumbar    Tobacco abuse    Insomnia    Carpal tunnel syndrome of right wrist    Carpal tunnel syndrome of left wrist    Cubital tunnel syndrome on left    Morbid obesity ([address]    Obstructive sleep apnea on CPAP    Restless leg syndrome    Controlled type 2 diabetes mellitus with microalbuminuria ([address]    Diabetic ulcer of left foot due to type 2 diabetes mellitus ([address]     Past Surgical History:   Procedure Laterality Date    CARPAL TUNNEL RELEASE  10/22/12    right    CARPAL TUNNEL RELEASE  2012    left    COLONOSCOPY  2015    CYST REMOVAL  T    RT SIDE UPPER BACK,     CYST REMOVAL  12    excision of right shoulder mass/cyst and of left axillary skin lesion-Dr. Philip Ped    HAND SURGERY      partial finger amputee rt hand    KNEE SURGERY  ?     left knee scope    LEG SURGERY      LEG SURGERY  12    I&D rt leg abscess- Dr. Ga Suarez  2009    TRUS/BX    PROSTATE SURGERY  06-    BRACHYTHERAPY OF THE PROSTATE    SKIN TAG REMOVAL  T    LT ARMPIT    TOE AMPUTATION Left 1/1/2022    LEFT 1ST TOE AMPUTATION performed by Marito Treviño DPM at [address] Left 1/7/2022    LEFT FOOT TRANSMETATARSAL AMPUTATION performed by Marito Treviño DPM at [address] History     Tobacco Use    Smoking status: Current Every Day Smoker     Packs/day: 1.00     Years: 40.00     Pack years: 40.00     Types: Cigarettes     Start date: 6/29/1975    Smokeless tobacco: Never Used   Substance Use Topics    Alcohol use: No     Alcohol/week: 0.0 standard drinks    Drug use: No     Prior to Admission medications    Medication Sig Start Date End Date Taking? Authorizing Provider   Multiple Vitamin (MULTIVITAMIN ADULT PO) Take by mouth   Yes Historical Provider, MD   amLODIPine-benazepril (LOTREL) 5-10 MG per capsule Take 1 capsule by mouth daily 5/9/22  Yes Ltanya Liming, DO   atorvastatin (LIPITOR) 40 MG tablet Take 1 tablet by mouth nightly 5/9/22  Yes Ltanya Liming, DO   pioglitazone-metFORMIN (ACTOPLUS MET)  MG per tablet Take 1 tablet by mouth 2 times daily (with meals) 5/9/22  Yes Ltanya Liming, DO   blood glucose test strips [facility]) strip Check sugars daily. Dx: E11.9 1/17/22  Yes Ltanya Liming, DO   Blood Glucose Monitoring Suppl (Ravi Richard) w/Device KIT Check sugars daily. Dx: E11.9 1/17/22  Yes Ltanya Liming, DO   OneTouch Delica Lancets 41L MISC Check sugars daily. Dx: E11.9 1/17/22  Yes Ltanya Liming, DO   clopidogrel (PLAVIX) 75 MG tablet Take 1 tablet by mouth daily 1/14/22  Yes David Cho MD   aspirin 81 MG EC tablet Take 81 mg by mouth daily. Yes Historical Provider, MD   gabapentin (NEURONTIN) 100 MG capsule Take 1 capsule by mouth 3 times daily for 7 days. 2/24/22 3/3/22  Ltanya Liming, DO         Review of Systems   Constitutional: Negative. HENT: Negative. Respiratory: Negative. Cardiovascular: Negative. Gastrointestinal: Negative. Musculoskeletal: Negative. All other systems reviewed and are negative. Objective   Physical Exam  Vitals and nursing note reviewed. Constitutional:       General: He is not in acute distress. Appearance: Normal appearance. He is well-developed. HENT:      Head: Normocephalic and atraumatic. Right Ear: Tympanic membrane normal.      Left Ear: Tympanic membrane normal.   Eyes:      Conjunctiva/sclera: Conjunctivae normal.   Cardiovascular:      Rate and Rhythm: Normal rate and regular rhythm. Heart sounds: Normal heart sounds. No murmur heard. Pulmonary:      Effort: Pulmonary effort is normal.      Breath sounds: Normal breath sounds. No wheezing, rhonchi or rales. Abdominal:      General: There is no distension. Musculoskeletal:      Cervical back: Neck supple. Skin:     General: Skin is warm and dry. Findings: No rash (on exposed surfaces). Neurological:      General: No focal deficit present. Mental Status: He is alert. Psychiatric:         Attention and Perception: Attention normal.         Mood and Affect: Mood normal.         Speech: Speech normal.         Behavior: Behavior normal. Behavior is cooperative. Thought Content: Thought content normal.         Judgment: Judgment normal.               ASSESSMENT/PLAN:  1. Controlled type 2 diabetes mellitus without complication, without long-term current use of insulin (HCC)  -     Hemoglobin A1C; Future  2. Primary hypertension  3. Dyslipidemia  4. Peripheral polyneuropathy  5. Obstructive sleep apnea on CPAP  6. Tobacco abuse  7. Obesity (BMI 30-39.9)  8.  Partial nontraumatic amputation of left foot ([facility]    -  Chronic medical problems stable  -  Labs reviewed, look fine  -  Continue current medications  -  Follow up with specialists as scheduled  -  Recheck A1C 6 mos      Return in about 6 months (around 11/16/2022) for DM2. An electronic signature was used to authenticate this note.     --Eliz Garner, DO

## 2022-07-22 ENCOUNTER — TELEMEDICINE (OUTPATIENT)
Dept: FAMILY MEDICINE CLINIC | Age: 57
End: 2022-07-22
Payer: COMMERCIAL

## 2022-07-22 DIAGNOSIS — K52.9 ACUTE GASTROENTERITIS: Primary | ICD-10-CM

## 2022-07-22 PROCEDURE — 99213 OFFICE O/P EST LOW 20 MIN: CPT | Performed by: NURSE PRACTITIONER

## 2022-07-22 RX ORDER — ONDANSETRON 4 MG/1
4-8 TABLET, ORALLY DISINTEGRATING ORAL EVERY 8 HOURS PRN
Qty: 21 TABLET | Refills: 0 | Status: SHIPPED | OUTPATIENT
Start: 2022-07-22

## 2022-07-22 RX ORDER — LOPERAMIDE HYDROCHLORIDE 2 MG/1
2 CAPSULE ORAL 4 TIMES DAILY PRN
Qty: 16 CAPSULE | Refills: 0 | Status: SHIPPED | OUTPATIENT
Start: 2022-07-22 | End: 2022-07-26

## 2022-07-22 ASSESSMENT — ENCOUNTER SYMPTOMS
DIARRHEA: 1
ABDOMINAL PAIN: 0
COUGH: 0
SHORTNESS OF BREATH: 0
NAUSEA: 1

## 2022-07-22 NOTE — PROGRESS NOTES
Subjective:      Patient ID: Bebeto Harrison is a 59 y.o. male    HPI: Acute for N/V    TELEHEALTH EVALUATION -- Audio/Visual (During PNJQT-95 public health emergency)    Patient identification was verified at the start of the visit: Yes    Services were provided through a video synchronous discussion virtually to substitute for in-person clinic visit. Patient and provider were located at their individual homes. Patient has requested an audio/video evaluation for the following concern(s):    Chief Complaint   Patient presents with    Nausea & Vomiting       Onset of Monday and Tuesday with N/V. Fatigue. Achiness. No fevers. Diarrhea started on Wednesday and Thursday. Has been able to keep fluids. Denies lightheadedness or muscle cramps. Missed work the whole week. Patient Active Problem List   Diagnosis    Adenocarcinoma of Prostate, GS 7, stage T1c. Onychomycosis    DM type 2 (diabetes mellitus, type 2) ([facility]    Dyslipidemia    DDD (degenerative disc disease), lumbar    Tobacco abuse    Insomnia    Carpal tunnel syndrome of right wrist    Carpal tunnel syndrome of left wrist    Cubital tunnel syndrome on left    Morbid obesity (HCC)    Obstructive sleep apnea on CPAP    Restless leg syndrome    Controlled type 2 diabetes mellitus with microalbuminuria (HCC)    Diabetic ulcer of left foot due to type 2 diabetes mellitus ([facility]       Review of Systems   Constitutional:  Negative for chills and fever. HENT: Negative. Respiratory:  Negative for cough and shortness of breath. Cardiovascular:  Negative for chest pain. Gastrointestinal:  Positive for diarrhea and nausea. Negative for abdominal pain. Skin:  Negative for rash. Neurological:  Negative for dizziness, light-headedness and headaches. Psychiatric/Behavioral: Negative. Objective:   Physical Exam  Constitutional:       General: He is not in acute distress. Appearance: He is not ill-appearing.    Pulmonary:      Effort: Pulmonary effort is normal. No respiratory distress. Neurological:      Mental Status: He is alert and oriented to person, place, and time. Psychiatric:         Mood and Affect: Mood normal.         Behavior: Behavior normal.       Assessment:       Diagnosis Orders   1. Acute gastroenteritis  ondansetron (ZOFRAN ODT) 4 MG disintegrating tablet    loperamide (RA ANTI-DIARRHEAL) 2 MG capsule          Plan:     Viral nature of symptoms discussed  Symptomatic Care - Zofran and Imodium  Increase fluids and rest  Off work 7/17 through 7/22  RTO if symptoms worsen or stay the same        Denice Basilio, was evaluated through a synchronous (real-time) audio-video encounter. The patient (or guardian if applicable) is aware that this is a billable service, which includes applicable co-pays. This Virtual Visit was conducted with patient's (and/or legal guardian's) consent. The visit was conducted pursuant to the emergency declaration under the [facility], [facility] authority and the [employer] [facility] Act. Patient identification was verified, and a caregiver was present when appropriate. The patient was located at Home: [address] [place] [address]. Provider was located at [facility] (Appt Dept): [address]  [facility] [place] [address],  [address]. Total time spent for this encounter: Not billed by time    --GARCIA Hart CNP on 7/22/2022 at 2:06 PM    An electronic signature was used to authenticate this note.

## 2022-07-22 NOTE — LETTER
[address] [address]  Phone: [phone]  Fax: [phone]    GARCIA Pool CNP        July 22, 2022     Patient: Humble Dumont   YOB: 1965   Date of Visit: 7/22/2022       To Whom It May Concern: It is my medical opinion that Anila Garcia excuse patient from work 7/18/22 through 7/22/22 due to non-COVID infection. He may return to work on 7/25/22 with no restrictions. If you have any questions or concerns, please don't hesitate to call.     Sincerely,        GARCIA Pool CNP

## 2022-08-25 DIAGNOSIS — I10 PRIMARY HYPERTENSION: ICD-10-CM

## 2022-08-26 DIAGNOSIS — E78.5 DYSLIPIDEMIA: ICD-10-CM

## 2022-08-26 RX ORDER — AMLODIPINE BESYLATE AND BENAZEPRIL HYDROCHLORIDE 5; 10 MG/1; MG/1
CAPSULE ORAL
Qty: 30 CAPSULE | Refills: 2 | Status: SHIPPED | OUTPATIENT
Start: 2022-08-26

## 2022-08-26 RX ORDER — ATORVASTATIN CALCIUM 40 MG/1
TABLET, FILM COATED ORAL
Qty: 30 TABLET | Refills: 2 | Status: SHIPPED | OUTPATIENT
Start: 2022-08-26

## 2022-08-26 RX ORDER — PIOGLITAZONE HCL AND METFORMIN HCL 500; 15 MG/1; MG/1
TABLET ORAL
Qty: 60 TABLET | Refills: 2 | Status: SHIPPED | OUTPATIENT
Start: 2022-08-26

## 2022-11-16 ENCOUNTER — HOSPITAL ENCOUNTER (OUTPATIENT)
Age: 57
Discharge: HOME | End: 2022-11-16
Payer: COMMERCIAL

## 2022-11-16 ENCOUNTER — OFFICE VISIT (OUTPATIENT)
Dept: FAMILY MEDICINE CLINIC | Age: 57
End: 2022-11-16
Payer: COMMERCIAL

## 2022-11-16 VITALS
DIASTOLIC BLOOD PRESSURE: 76 MMHG | WEIGHT: 270.1 LBS | HEART RATE: 76 BPM | RESPIRATION RATE: 16 BRPM | SYSTOLIC BLOOD PRESSURE: 134 MMHG | BODY MASS INDEX: 38.76 KG/M2

## 2022-11-16 DIAGNOSIS — I10 PRIMARY HYPERTENSION: ICD-10-CM

## 2022-11-16 DIAGNOSIS — Z99.89 OBSTRUCTIVE SLEEP APNEA ON CPAP: ICD-10-CM

## 2022-11-16 DIAGNOSIS — E78.5 DYSLIPIDEMIA: ICD-10-CM

## 2022-11-16 DIAGNOSIS — G62.9 PERIPHERAL POLYNEUROPATHY: ICD-10-CM

## 2022-11-16 DIAGNOSIS — E11.9 CONTROLLED TYPE 2 DIABETES MELLITUS WITHOUT COMPLICATION, WITHOUT LONG-TERM CURRENT USE OF INSULIN (HCC): Primary | ICD-10-CM

## 2022-11-16 DIAGNOSIS — E11.9 CONTROLLED TYPE 2 DIABETES MELLITUS WITHOUT COMPLICATION, WITHOUT LONG-TERM CURRENT USE OF INSULIN (HCC): ICD-10-CM

## 2022-11-16 DIAGNOSIS — E66.9 OBESITY (BMI 30-39.9): ICD-10-CM

## 2022-11-16 DIAGNOSIS — G47.33 OBSTRUCTIVE SLEEP APNEA ON CPAP: ICD-10-CM

## 2022-11-16 DIAGNOSIS — Z23 NEED FOR INFLUENZA VACCINATION: ICD-10-CM

## 2022-11-16 PROCEDURE — 83036 HEMOGLOBIN GLYCOSYLATED A1C: CPT

## 2022-11-16 PROCEDURE — 3074F SYST BP LT 130 MM HG: CPT | Performed by: FAMILY MEDICINE

## 2022-11-16 PROCEDURE — 90471 IMMUNIZATION ADMIN: CPT | Performed by: FAMILY MEDICINE

## 2022-11-16 PROCEDURE — 3044F HG A1C LEVEL LT 7.0%: CPT | Performed by: FAMILY MEDICINE

## 2022-11-16 PROCEDURE — 3078F DIAST BP <80 MM HG: CPT | Performed by: FAMILY MEDICINE

## 2022-11-16 PROCEDURE — 99214 OFFICE O/P EST MOD 30 MIN: CPT | Performed by: FAMILY MEDICINE

## 2022-11-16 PROCEDURE — 36415 COLL VENOUS BLD VENIPUNCTURE: CPT

## 2022-11-16 PROCEDURE — 90674 CCIIV4 VAC NO PRSV 0.5 ML IM: CPT | Performed by: FAMILY MEDICINE

## 2022-11-16 ASSESSMENT — ENCOUNTER SYMPTOMS
GASTROINTESTINAL NEGATIVE: 1
RESPIRATORY NEGATIVE: 1

## 2022-11-16 NOTE — PROGRESS NOTES
Popeye Dwyer (:  1965) is a 59 y.o. male,Established patient, here for evaluation of the following chief complaint(s):  6 Month Follow-Up, Diabetes, and Flu Vaccine        Subjective   SUBJECTIVE/OBJECTIVE:  HPI:    Chief Complaint   Patient presents with    6 Month Follow-Up    Diabetes    Flu Vaccine     6 month eval.    Due for A1C. Lab Results   Component Value Date    LABA1C 6.8 (H) 2022    LABA1C 10.8 (H) 2022    LABA1C 7.7 (H) 2018     Lab Results   Component Value Date    LABMICR 76.53 2022    LDLCALC 84 2022    CREATININE 1.0 2022     BP Readings from Last 3 Encounters:   22 134/76   22 (!) 168/88   22 (!) 152/80     Wt Readings from Last 3 Encounters:   22 270 lb 1.6 oz (122.5 kg)   22 262 lb 1.6 oz (118.9 kg)   22 265 lb (120.2 kg)     Patient Active Problem List   Diagnosis    Adenocarcinoma of Prostate, GS 7, stage T1c. Onychomycosis    DM type 2 (diabetes mellitus, type 2) ([address]    Dyslipidemia    DDD (degenerative disc disease), lumbar    Tobacco abuse    Insomnia    Carpal tunnel syndrome of right wrist    Carpal tunnel syndrome of left wrist    Cubital tunnel syndrome on left    Morbid obesity ([address]    Obstructive sleep apnea on CPAP    Restless leg syndrome    Controlled type 2 diabetes mellitus with microalbuminuria ([address]    Diabetic ulcer of left foot due to type 2 diabetes mellitus [facility])     Past Surgical History:   Procedure Laterality Date    CARPAL TUNNEL RELEASE  10/22/12    right    CARPAL TUNNEL RELEASE  2012    left    COLONOSCOPY  2015    CYST REMOVAL  T    RT SIDE UPPER BACK,     CYST REMOVAL  12    excision of right shoulder mass/cyst and of left axillary skin lesion-Dr. Sneha Braswell    HAND SURGERY      partial finger amputee rt hand    KNEE SURGERY  ?     left knee scope    LEG SURGERY      LEG SURGERY  12    I&D rt leg abscess- Dr. Jesus Hilton  2009 TRUS/BX    PROSTATE SURGERY  06-    BRACHYTHERAPY OF THE PROSTATE    SKIN TAG REMOVAL  T    LT ARMPIT    TOE AMPUTATION Left 1/1/2022    LEFT 1ST TOE AMPUTATION performed by Alveda Lesch, DPM at [address] Left 1/7/2022    LEFT FOOT TRANSMETATARSAL AMPUTATION performed by Alveda Lesch, DPM at [address] History     Tobacco Use    Smoking status: Every Day     Packs/day: 1.00     Years: 40.00     Pack years: 40.00     Types: Cigarettes     Start date: 6/29/1975    Smokeless tobacco: Never   Substance Use Topics    Alcohol use: No     Alcohol/week: 0.0 standard drinks    Drug use: No         Review of Systems   Constitutional: Negative. HENT: Negative. Respiratory: Negative. Cardiovascular: Negative. Gastrointestinal: Negative. Musculoskeletal: Negative. All other systems reviewed and are negative. Objective   Physical Exam  Vitals and nursing note reviewed. Constitutional:       General: He is not in acute distress. Appearance: Normal appearance. He is well-developed. HENT:      Head: Normocephalic and atraumatic. Right Ear: Tympanic membrane normal.      Left Ear: Tympanic membrane normal.   Eyes:      Conjunctiva/sclera: Conjunctivae normal.   Cardiovascular:      Rate and Rhythm: Normal rate and regular rhythm. Heart sounds: Normal heart sounds. No murmur heard. Pulmonary:      Effort: Pulmonary effort is normal.      Breath sounds: Normal breath sounds. No wheezing, rhonchi or rales. Abdominal:      General: There is no distension. Musculoskeletal:      Cervical back: Neck supple. Skin:     General: Skin is warm and dry. Findings: No rash (on exposed surfaces). Neurological:      General: No focal deficit present. Mental Status: He is alert.    Psychiatric:         Attention and Perception: Attention normal.         Mood and Affect: Mood normal.         Speech: Speech normal.         Behavior: Behavior normal. Behavior is cooperative. Thought Content: Thought content normal.         Judgment: Judgment normal.             ASSESSMENT/PLAN:  1. Controlled type 2 diabetes mellitus without complication, without long-term current use of insulin ([address]  2. Peripheral polyneuropathy  3. Primary hypertension  4. Dyslipidemia  5. Obstructive sleep apnea on CPAP  6. Obesity (BMI 30-39.9)  7. Need for influenza vaccination  -     Influenza, FLUCELVAX, (age 10 mo+), IM, Preservative Free, 0.5 mL    -  Chronic medical problems stable  -  Continue current medications  -  Follow up with specialists as scheduled  -  Check A1C, will call    Return in about 6 months (around 5/16/2023) for DM2. An electronic signature was used to authenticate this note.     --Pippa Carpio, DO

## 2022-11-16 NOTE — PATIENT INSTRUCTIONS
You may receive a survey about your visit with us today. The feedback from our patients helps us identify what is working well and where the service to all patients can be enhanced. Thank you! Tobacco Cessation Programs     Telephonic behavior modification  1-800-QUIT-NOW (467-0434)  Counseling service for those who are ready to quit using tobacco.    Available for uninsured [place] residents, Medicaid recipients, pregnant women, or patients whose health plans or employers are members of the [address] [place] [address] behavior modification  http://[place]. Quitlogix. org  Online support program to help patients through each step of the quitting process. Available 24 hours a day 7 days a week. Provides up to date researched based tool, step-by-step guides, and motivational messages. Online behavior modification  www.lungusa.org/stop-smoking/how-to-quit  HelpLine: 1-800-LUNG-USA (197-8123)  Email questions to:  Alexy@[place]. LEYIO   Website offers resources to help tobacco users figure out their reasons for quitting and then take the big step of quitting for good. Hypnosis  Location: [facility], [place] [address]  Contact: Arvind Estrada, PhD at [phone]  Hypnosis for tobacco cessation  Cost $225 for the initial session and $175 for each session afterwards. Most patients require 6-8 sessions. There is the option to submit through the patients insurance. Hypnosis and behavior modification  Location: [place] [address],  [facility] [address]  Contact: Lissy Reza, PhD at [phone]  Counseling and hypnosis for nicotine addition  Cost: For uninsured patients:  Please call above phone number  Cost for insured patients depends on patients insurance plan.     Behavior modification  Location: [facility], [address] [facility] [address]: [phone]  Services include four one-on-one appointments between the patient and a respiratory therapist.  The four appointments span over three weeks. The respiratory therapist schedules one of the appointments to occur 48 hours after the patients quit date. Cost $100 total for the four sessions.   Tobacco cessation products are not included in the cost and are not provided by [facility].

## 2022-11-16 NOTE — PROGRESS NOTES
Immunizations Administered       Name Date Dose Route    Influenza, FLUCELVAX, (age 10 mo+), MDCK, PF, 0.5mL 11/16/2022 0.5 mL Intramuscular    Site: Deltoid- Left    Lot: [id number]    NDC: 52449-581-33          Patient was given Flucelvax Quadrivalent flu vaccine 0.5 ml IM in left deltoid per v.oMariaelena Flores. Patient tolerated well. VIS given and reviewed.   NDC# 98389-154-73  LOT# [id number] Exp: 6/30/23

## 2022-11-17 LAB
AVERAGE GLUCOSE: 159 MG/DL (ref 70–126)
HBA1C MFR BLD: 7.3 % (ref 4.4–6.4)

## 2022-11-29 DIAGNOSIS — E78.5 DYSLIPIDEMIA: ICD-10-CM

## 2022-11-29 DIAGNOSIS — I10 PRIMARY HYPERTENSION: ICD-10-CM

## 2022-11-29 RX ORDER — AMLODIPINE BESYLATE AND BENAZEPRIL HYDROCHLORIDE 5; 10 MG/1; MG/1
CAPSULE ORAL
Qty: 30 CAPSULE | Refills: 5 | Status: SHIPPED | OUTPATIENT
Start: 2022-11-29

## 2022-11-29 RX ORDER — ATORVASTATIN CALCIUM 40 MG/1
TABLET, FILM COATED ORAL
Qty: 30 TABLET | Refills: 5 | Status: SHIPPED | OUTPATIENT
Start: 2022-11-29

## 2022-12-02 RX ORDER — PIOGLITAZONE HCL AND METFORMIN HCL 500; 15 MG/1; MG/1
TABLET ORAL
Qty: 60 TABLET | Refills: 11 | Status: SHIPPED | OUTPATIENT
Start: 2022-12-02

## 2023-04-10 DIAGNOSIS — L97.521 NON-HEALING ULCER OF FOOT, LEFT, LIMITED TO BREAKDOWN OF SKIN (HCC): Primary | ICD-10-CM

## 2023-04-13 ENCOUNTER — TELEPHONE (OUTPATIENT)
Dept: CARDIOTHORACIC SURGERY | Age: 58
End: 2023-04-13

## 2023-04-18 RX ORDER — ALPRAZOLAM 0.5 MG/1
TABLET ORAL
Qty: 1 TABLET | Refills: 0
Start: 2023-04-18 | End: 2023-04-21

## 2023-04-19 ENCOUNTER — TELEPHONE (OUTPATIENT)
Dept: CARDIOTHORACIC SURGERY | Age: 58
End: 2023-04-19

## 2023-04-19 NOTE — TELEPHONE ENCOUNTER
Patient left a message stating that his Xanax ws not at the pharmacy. I spoke with [facility] a pharmacist who said that I can give a verbal order over the phone for the Xanax. Verbal order for Xanax 0.5 mg, 1 tablet, no refills given. Patient to bring medication to MRI with him and take the medication at the appointment. Tried to reach patient and inform him that medication has been called in, no answer, LMOM.

## 2023-04-21 ENCOUNTER — HOSPITAL ENCOUNTER (OUTPATIENT)
Dept: MRI IMAGING | Age: 58
Discharge: HOME | End: 2023-04-21
Payer: COMMERCIAL

## 2023-04-21 DIAGNOSIS — E11.621 DIABETIC ULCER OF LEFT FOOT DUE TO TYPE 2 DIABETES MELLITUS (HCC): ICD-10-CM

## 2023-04-21 DIAGNOSIS — L97.529 DIABETIC ULCER OF LEFT FOOT DUE TO TYPE 2 DIABETES MELLITUS (HCC): ICD-10-CM

## 2023-04-21 PROCEDURE — A9579 GAD-BASE MR CONTRAST NOS,1ML: HCPCS | Performed by: THORACIC SURGERY (CARDIOTHORACIC VASCULAR SURGERY)

## 2023-04-21 PROCEDURE — 6360000004 HC RX CONTRAST MEDICATION: Performed by: THORACIC SURGERY (CARDIOTHORACIC VASCULAR SURGERY)

## 2023-04-21 PROCEDURE — 73720 MRI LWR EXTREMITY W/O&W/DYE: CPT

## 2023-04-21 RX ADMIN — GADOTERIDOL 20 ML: 279.3 INJECTION, SOLUTION INTRAVENOUS at 19:38

## 2023-04-24 ENCOUNTER — TELEPHONE (OUTPATIENT)
Dept: CARDIOLOGY CLINIC | Age: 58
End: 2023-04-24

## 2023-04-24 NOTE — TELEPHONE ENCOUNTER
Dr. De León First had an emergency and is out of the office today. Spoke with the patient -  he states that he will be loosing his insurance soon. If he has no insurance he will not have the procedure. I did ask him what day he would be out of insurance. He stated 90 days from his last day of work. Again asked when that was? Could he find out? States he thinks its with in the next 2 weeks.

## 2023-04-24 NOTE — TELEPHONE ENCOUNTER
Spoke with Cata in [facility] scheduling. Able to r/s angiogram for Tues 5/2 at 800am. Arrival of 600am. LMOM to inform patient.

## 2023-04-27 NOTE — TELEPHONE ENCOUNTER
Able to move up Dr. Jefry Loo case to Monday starting at 130pm. Notified patient. He voiced understanding.

## 2023-05-01 ENCOUNTER — HOSPITAL ENCOUNTER (OUTPATIENT)
Dept: INTERVENTIONAL RADIOLOGY/VASCULAR | Age: 58
Setting detail: OBSERVATION
Discharge: HOME | End: 2023-05-02
Attending: THORACIC SURGERY (CARDIOTHORACIC VASCULAR SURGERY)
Payer: COMMERCIAL

## 2023-05-01 DIAGNOSIS — E11.621 DIABETIC ULCER OF LEFT FOOT DUE TO TYPE 2 DIABETES MELLITUS (HCC): ICD-10-CM

## 2023-05-01 DIAGNOSIS — L97.529 DIABETIC ULCER OF LEFT FOOT DUE TO TYPE 2 DIABETES MELLITUS (HCC): ICD-10-CM

## 2023-05-01 PROBLEM — I70.245 ATHSCL NATIVE ARTERIES OF LEFT LEG W ULCERATION OTH PRT FOOT (HCC): Status: ACTIVE | Noted: 2023-05-01

## 2023-05-01 PROBLEM — L97.521: Status: ACTIVE | Noted: 2023-05-01

## 2023-05-01 LAB
ABO: NORMAL
ACTIVATED CLOTTING TIME: 233 SECONDS (ref 1–150)
ACTIVATED CLOTTING TIME: 245 SECONDS (ref 1–150)
ACTIVATED CLOTTING TIME: 287 SECONDS (ref 1–150)
ANION GAP SERPL CALC-SCNC: 12 MEQ/L (ref 8–16)
ANTIBODY SCREEN: NORMAL
BUN SERPL-MCNC: 15 MG/DL (ref 7–22)
CALCIUM SERPL-MCNC: 9.4 MG/DL (ref 8.5–10.5)
CHLORIDE SERPL-SCNC: 105 MEQ/L (ref 98–111)
CO2 SERPL-SCNC: 22 MEQ/L (ref 23–33)
CREAT SERPL-MCNC: 0.9 MG/DL (ref 0.4–1.2)
DEPRECATED RDW RBC AUTO: 46.5 FL (ref 35–45)
ERYTHROCYTE [DISTWIDTH] IN BLOOD BY AUTOMATED COUNT: 13.5 % (ref 11.5–14.5)
GFR SERPL CREATININE-BSD FRML MDRD: > 60 ML/MIN/1.73M2
GLUCOSE BLD STRIP.AUTO-MCNC: 114 MG/DL (ref 70–108)
GLUCOSE SERPL-MCNC: 131 MG/DL (ref 70–108)
HCT VFR BLD AUTO: 46.9 % (ref 42–52)
HGB BLD-MCNC: 15.5 GM/DL (ref 14–18)
INR PPP: 0.98 (ref 0.85–1.13)
MCH RBC QN AUTO: 30.9 PG (ref 26–33)
MCHC RBC AUTO-ENTMCNC: 33 GM/DL (ref 32.2–35.5)
MCV RBC AUTO: 93.4 FL (ref 80–94)
PLATELET # BLD AUTO: 154 THOU/MM3 (ref 130–400)
PMV BLD AUTO: 11.2 FL (ref 9.4–12.4)
POTASSIUM SERPL-SCNC: 4 MEQ/L (ref 3.5–5.2)
RBC # BLD AUTO: 5.02 MILL/MM3 (ref 4.7–6.1)
RH FACTOR: NORMAL
SODIUM SERPL-SCNC: 139 MEQ/L (ref 135–145)
WBC # BLD AUTO: 9.1 THOU/MM3 (ref 4.8–10.8)

## 2023-05-01 PROCEDURE — 2580000003 HC RX 258: Performed by: THORACIC SURGERY (CARDIOTHORACIC VASCULAR SURGERY)

## 2023-05-01 PROCEDURE — C1874 STENT, COATED/COV W/DEL SYS: HCPCS

## 2023-05-01 PROCEDURE — 6360000004 HC RX CONTRAST MEDICATION: Performed by: THORACIC SURGERY (CARDIOTHORACIC VASCULAR SURGERY)

## 2023-05-01 PROCEDURE — 86901 BLOOD TYPING SEROLOGIC RH(D): CPT

## 2023-05-01 PROCEDURE — 75716 ARTERY X-RAYS ARMS/LEGS: CPT

## 2023-05-01 PROCEDURE — 86900 BLOOD TYPING SEROLOGIC ABO: CPT

## 2023-05-01 PROCEDURE — 2580000003 HC RX 258: Performed by: PHYSICIAN ASSISTANT

## 2023-05-01 PROCEDURE — G0378 HOSPITAL OBSERVATION PER HR: HCPCS

## 2023-05-01 PROCEDURE — C1769 GUIDE WIRE: HCPCS

## 2023-05-01 PROCEDURE — 37232 HC PLASTY TIBPER EA ADDL: CPT

## 2023-05-01 PROCEDURE — 75774 ARTERY X-RAY EACH VESSEL: CPT

## 2023-05-01 PROCEDURE — 86850 RBC ANTIBODY SCREEN: CPT

## 2023-05-01 PROCEDURE — 6360000002 HC RX W HCPCS: Performed by: THORACIC SURGERY (CARDIOTHORACIC VASCULAR SURGERY)

## 2023-05-01 PROCEDURE — 37226 HC FEMPOP PLASTY & STENT: CPT

## 2023-05-01 PROCEDURE — 36415 COLL VENOUS BLD VENIPUNCTURE: CPT

## 2023-05-01 PROCEDURE — 75625 CONTRAST EXAM ABDOMINL AORTA: CPT

## 2023-05-01 PROCEDURE — 6360000002 HC RX W HCPCS: Performed by: PHYSICIAN ASSISTANT

## 2023-05-01 PROCEDURE — 37228 HC PLASTY UNI TIB/PER INITIAL: CPT

## 2023-05-01 PROCEDURE — 82948 REAGENT STRIP/BLOOD GLUCOSE: CPT

## 2023-05-01 PROCEDURE — 6370000000 HC RX 637 (ALT 250 FOR IP): Performed by: THORACIC SURGERY (CARDIOTHORACIC VASCULAR SURGERY)

## 2023-05-01 PROCEDURE — 80048 BASIC METABOLIC PNL TOTAL CA: CPT

## 2023-05-01 PROCEDURE — 85610 PROTHROMBIN TIME: CPT

## 2023-05-01 PROCEDURE — G0379 DIRECT REFER HOSPITAL OBSERV: HCPCS

## 2023-05-01 PROCEDURE — 85347 COAGULATION TIME ACTIVATED: CPT

## 2023-05-01 PROCEDURE — 85027 COMPLETE CBC AUTOMATED: CPT

## 2023-05-01 RX ORDER — SODIUM CHLORIDE 0.9 % (FLUSH) 0.9 %
5-40 SYRINGE (ML) INJECTION PRN
Status: DISCONTINUED | OUTPATIENT
Start: 2023-05-01 | End: 2023-05-02 | Stop reason: HOSPADM

## 2023-05-01 RX ORDER — DEXTROSE MONOHYDRATE 100 MG/ML
INJECTION, SOLUTION INTRAVENOUS CONTINUOUS PRN
Status: DISCONTINUED | OUTPATIENT
Start: 2023-05-01 | End: 2023-05-02 | Stop reason: HOSPADM

## 2023-05-01 RX ORDER — ACETAMINOPHEN 500 MG
1000 TABLET ORAL 2 TIMES DAILY
COMMUNITY

## 2023-05-01 RX ORDER — HEPARIN SODIUM 1000 [USP'U]/ML
INJECTION, SOLUTION INTRAVENOUS; SUBCUTANEOUS PRN
Status: COMPLETED | OUTPATIENT
Start: 2023-05-01 | End: 2023-05-01

## 2023-05-01 RX ORDER — HYDROMORPHONE HYDROCHLORIDE 2 MG/1
1 TABLET ORAL EVERY 4 HOURS PRN
Status: DISCONTINUED | OUTPATIENT
Start: 2023-05-01 | End: 2023-05-02 | Stop reason: HOSPADM

## 2023-05-01 RX ORDER — AMLODIPINE BESYLATE 5 MG/1
5 TABLET ORAL DAILY
Status: DISCONTINUED | OUTPATIENT
Start: 2023-05-02 | End: 2023-05-01 | Stop reason: SDUPTHER

## 2023-05-01 RX ORDER — MULTIVITAMIN WITH IRON
1 TABLET ORAL DAILY
Status: DISCONTINUED | OUTPATIENT
Start: 2023-05-02 | End: 2023-05-02 | Stop reason: HOSPADM

## 2023-05-01 RX ORDER — ATORVASTATIN CALCIUM 40 MG/1
40 TABLET, FILM COATED ORAL NIGHTLY
Status: DISCONTINUED | OUTPATIENT
Start: 2023-05-01 | End: 2023-05-02 | Stop reason: HOSPADM

## 2023-05-01 RX ORDER — SODIUM CHLORIDE 9 MG/ML
INJECTION, SOLUTION INTRAVENOUS CONTINUOUS
Status: ACTIVE | OUTPATIENT
Start: 2023-05-01 | End: 2023-05-01

## 2023-05-01 RX ORDER — HYDROMORPHONE HYDROCHLORIDE 2 MG/1
2 TABLET ORAL EVERY 4 HOURS PRN
Status: DISCONTINUED | OUTPATIENT
Start: 2023-05-01 | End: 2023-05-02 | Stop reason: HOSPADM

## 2023-05-01 RX ORDER — COVID-19 ANTIGEN TEST
1 KIT MISCELLANEOUS 2 TIMES DAILY
COMMUNITY

## 2023-05-01 RX ORDER — ASPIRIN 81 MG/1
81 TABLET ORAL DAILY
Status: DISCONTINUED | OUTPATIENT
Start: 2023-05-02 | End: 2023-05-02 | Stop reason: HOSPADM

## 2023-05-01 RX ORDER — SODIUM CHLORIDE 9 MG/ML
INJECTION, SOLUTION INTRAVENOUS PRN
Status: DISCONTINUED | OUTPATIENT
Start: 2023-05-01 | End: 2023-05-02 | Stop reason: HOSPADM

## 2023-05-01 RX ORDER — LISINOPRIL 10 MG/1
10 TABLET ORAL DAILY
Status: DISCONTINUED | OUTPATIENT
Start: 2023-05-02 | End: 2023-05-01 | Stop reason: SDUPTHER

## 2023-05-01 RX ORDER — SODIUM CHLORIDE 0.9 % (FLUSH) 0.9 %
5-40 SYRINGE (ML) INJECTION EVERY 12 HOURS SCHEDULED
Status: DISCONTINUED | OUTPATIENT
Start: 2023-05-01 | End: 2023-05-02 | Stop reason: HOSPADM

## 2023-05-01 RX ORDER — ASPIRIN 81 MG/1
81 TABLET ORAL DAILY
Status: DISCONTINUED | OUTPATIENT
Start: 2023-05-02 | End: 2023-05-01 | Stop reason: SDUPTHER

## 2023-05-01 RX ORDER — AMOXICILLIN AND CLAVULANATE POTASSIUM 500; 125 MG/1; MG/1
1 TABLET, FILM COATED ORAL EVERY 8 HOURS SCHEDULED
Status: DISCONTINUED | OUTPATIENT
Start: 2023-05-01 | End: 2023-05-02 | Stop reason: HOSPADM

## 2023-05-01 RX ORDER — AMLODIPINE BESYLATE AND BENAZEPRIL HYDROCHLORIDE 5; 10 MG/1; MG/1
1 CAPSULE ORAL DAILY
Status: DISCONTINUED | OUTPATIENT
Start: 2023-05-01 | End: 2023-05-02 | Stop reason: HOSPADM

## 2023-05-01 RX ORDER — PROTAMINE SULFATE 10 MG/ML
INJECTION, SOLUTION INTRAVENOUS PRN
Status: COMPLETED | OUTPATIENT
Start: 2023-05-01 | End: 2023-05-01

## 2023-05-01 RX ORDER — ONDANSETRON 2 MG/ML
4 INJECTION INTRAMUSCULAR; INTRAVENOUS EVERY 6 HOURS PRN
Status: DISCONTINUED | OUTPATIENT
Start: 2023-05-01 | End: 2023-05-02 | Stop reason: HOSPADM

## 2023-05-01 RX ORDER — ONDANSETRON 4 MG/1
4 TABLET, ORALLY DISINTEGRATING ORAL EVERY 8 HOURS PRN
Status: DISCONTINUED | OUTPATIENT
Start: 2023-05-01 | End: 2023-05-02 | Stop reason: HOSPADM

## 2023-05-01 RX ORDER — CLOPIDOGREL BISULFATE 75 MG/1
300 TABLET ORAL ONCE
Status: COMPLETED | OUTPATIENT
Start: 2023-05-01 | End: 2023-05-01

## 2023-05-01 RX ORDER — AMLODIPINE BESYLATE AND BENAZEPRIL HYDROCHLORIDE 5; 10 MG/1; MG/1
1 CAPSULE ORAL DAILY
Status: DISCONTINUED | OUTPATIENT
Start: 2023-05-01 | End: 2023-05-01 | Stop reason: SDUPTHER

## 2023-05-01 RX ORDER — MIDAZOLAM HYDROCHLORIDE 1 MG/ML
INJECTION INTRAMUSCULAR; INTRAVENOUS PRN
Status: COMPLETED | OUTPATIENT
Start: 2023-05-01 | End: 2023-05-01

## 2023-05-01 RX ORDER — INSULIN LISPRO 100 [IU]/ML
0-4 INJECTION, SOLUTION INTRAVENOUS; SUBCUTANEOUS NIGHTLY
Status: DISCONTINUED | OUTPATIENT
Start: 2023-05-01 | End: 2023-05-02 | Stop reason: HOSPADM

## 2023-05-01 RX ORDER — FENTANYL CITRATE 50 UG/ML
INJECTION, SOLUTION INTRAMUSCULAR; INTRAVENOUS PRN
Status: COMPLETED | OUTPATIENT
Start: 2023-05-01 | End: 2023-05-01

## 2023-05-01 RX ORDER — INSULIN LISPRO 100 [IU]/ML
0-8 INJECTION, SOLUTION INTRAVENOUS; SUBCUTANEOUS
Status: DISCONTINUED | OUTPATIENT
Start: 2023-05-01 | End: 2023-05-02 | Stop reason: HOSPADM

## 2023-05-01 RX ORDER — CLOPIDOGREL BISULFATE 75 MG/1
75 TABLET ORAL DAILY
Status: DISCONTINUED | OUTPATIENT
Start: 2023-05-02 | End: 2023-05-02 | Stop reason: HOSPADM

## 2023-05-01 RX ORDER — ACETAMINOPHEN 325 MG/1
650 TABLET ORAL EVERY 4 HOURS PRN
Status: DISCONTINUED | OUTPATIENT
Start: 2023-05-01 | End: 2023-05-02 | Stop reason: HOSPADM

## 2023-05-01 RX ORDER — SODIUM CHLORIDE 9 MG/ML
INJECTION, SOLUTION INTRAVENOUS CONTINUOUS
Status: DISCONTINUED | OUTPATIENT
Start: 2023-05-01 | End: 2023-05-02 | Stop reason: HOSPADM

## 2023-05-01 RX ORDER — GABAPENTIN 600 MG/1
600 TABLET ORAL 3 TIMES DAILY
Status: DISCONTINUED | OUTPATIENT
Start: 2023-05-01 | End: 2023-05-02 | Stop reason: HOSPADM

## 2023-05-01 RX ORDER — GABAPENTIN 600 MG/1
600 TABLET ORAL 3 TIMES DAILY
COMMUNITY

## 2023-05-01 RX ADMIN — MIDAZOLAM 1 MG: 1 INJECTION INTRAMUSCULAR; INTRAVENOUS at 16:15

## 2023-05-01 RX ADMIN — SODIUM CHLORIDE: 9 INJECTION, SOLUTION INTRAVENOUS at 12:45

## 2023-05-01 RX ADMIN — FENTANYL CITRATE 50 MCG: 50 INJECTION, SOLUTION INTRAMUSCULAR; INTRAVENOUS at 15:47

## 2023-05-01 RX ADMIN — IOPAMIDOL 210 ML: 612 INJECTION, SOLUTION INTRAVENOUS at 17:30

## 2023-05-01 RX ADMIN — FENTANYL CITRATE 50 MCG: 50 INJECTION, SOLUTION INTRAMUSCULAR; INTRAVENOUS at 16:15

## 2023-05-01 RX ADMIN — ATORVASTATIN CALCIUM 40 MG: 40 TABLET, FILM COATED ORAL at 20:02

## 2023-05-01 RX ADMIN — HYDROMORPHONE HYDROCHLORIDE 2 MG: 2 TABLET ORAL at 20:57

## 2023-05-01 RX ADMIN — FENTANYL CITRATE 50 MCG: 50 INJECTION, SOLUTION INTRAMUSCULAR; INTRAVENOUS at 14:44

## 2023-05-01 RX ADMIN — CLOPIDOGREL BISULFATE 300 MG: 75 TABLET ORAL at 20:00

## 2023-05-01 RX ADMIN — HEPARIN SODIUM 10000 UNITS: 1000 INJECTION INTRAVENOUS; SUBCUTANEOUS at 14:39

## 2023-05-01 RX ADMIN — MIDAZOLAM 1 MG: 1 INJECTION INTRAMUSCULAR; INTRAVENOUS at 15:47

## 2023-05-01 RX ADMIN — MIDAZOLAM 1 MG: 1 INJECTION INTRAMUSCULAR; INTRAVENOUS at 14:44

## 2023-05-01 RX ADMIN — FENTANYL CITRATE 50 MCG: 50 INJECTION, SOLUTION INTRAMUSCULAR; INTRAVENOUS at 14:19

## 2023-05-01 RX ADMIN — FENTANYL CITRATE 50 MCG: 50 INJECTION, SOLUTION INTRAMUSCULAR; INTRAVENOUS at 16:57

## 2023-05-01 RX ADMIN — HEPARIN SODIUM 2500 UNITS: 1000 INJECTION INTRAVENOUS; SUBCUTANEOUS at 16:48

## 2023-05-01 RX ADMIN — MIDAZOLAM 1 MG: 1 INJECTION INTRAMUSCULAR; INTRAVENOUS at 14:19

## 2023-05-01 RX ADMIN — FENTANYL CITRATE 50 MCG: 50 INJECTION, SOLUTION INTRAMUSCULAR; INTRAVENOUS at 17:40

## 2023-05-01 RX ADMIN — HYDROMORPHONE HYDROCHLORIDE 1 MG: 1 INJECTION, SOLUTION INTRAMUSCULAR; INTRAVENOUS; SUBCUTANEOUS at 15:12

## 2023-05-01 RX ADMIN — FENTANYL CITRATE 50 MCG: 50 INJECTION, SOLUTION INTRAMUSCULAR; INTRAVENOUS at 18:03

## 2023-05-01 RX ADMIN — Medication 20 MG: at 17:58

## 2023-05-01 RX ADMIN — GABAPENTIN 600 MG: 600 TABLET ORAL at 20:02

## 2023-05-01 RX ADMIN — HEPARIN SODIUM 2500 UNITS: 1000 INJECTION INTRAVENOUS; SUBCUTANEOUS at 15:47

## 2023-05-01 RX ADMIN — MIDAZOLAM 1 MG: 1 INJECTION INTRAMUSCULAR; INTRAVENOUS at 16:57

## 2023-05-01 RX ADMIN — SODIUM CHLORIDE: 9 INJECTION, SOLUTION INTRAVENOUS at 20:00

## 2023-05-01 RX ADMIN — Medication 3000 MG: at 12:49

## 2023-05-01 RX ADMIN — AMOXICILLIN AND CLAVULANATE POTASSIUM 1 TABLET: 500; 125 TABLET, FILM COATED ORAL at 20:59

## 2023-05-01 RX ADMIN — FENTANYL CITRATE 50 MCG: 50 INJECTION, SOLUTION INTRAMUSCULAR; INTRAVENOUS at 15:12

## 2023-05-01 ASSESSMENT — PAIN SCALES - GENERAL
PAINLEVEL_OUTOF10: 7
PAINLEVEL_OUTOF10: 5
PAINLEVEL_OUTOF10: 7

## 2023-05-01 ASSESSMENT — PAIN - FUNCTIONAL ASSESSMENT: PAIN_FUNCTIONAL_ASSESSMENT: PREVENTS OR INTERFERES SOME ACTIVE ACTIVITIES AND ADLS

## 2023-05-01 ASSESSMENT — PAIN DESCRIPTION - LOCATION
LOCATION: GENERALIZED
LOCATION: LEG
LOCATION: LEG

## 2023-05-01 ASSESSMENT — PAIN DESCRIPTION - ONSET: ONSET: ON-GOING

## 2023-05-01 ASSESSMENT — PAIN DESCRIPTION - PAIN TYPE
TYPE: ACUTE PAIN
TYPE: CHRONIC PAIN

## 2023-05-01 ASSESSMENT — PAIN DESCRIPTION - ORIENTATION
ORIENTATION: LEFT
ORIENTATION: LEFT

## 2023-05-01 ASSESSMENT — PAIN DESCRIPTION - FREQUENCY: FREQUENCY: CONTINUOUS

## 2023-05-01 ASSESSMENT — LIFESTYLE VARIABLES
HOW MANY STANDARD DRINKS CONTAINING ALCOHOL DO YOU HAVE ON A TYPICAL DAY: PATIENT DOES NOT DRINK
HOW OFTEN DO YOU HAVE A DRINK CONTAINING ALCOHOL: NEVER

## 2023-05-01 ASSESSMENT — PAIN DESCRIPTION - DESCRIPTORS
DESCRIPTORS: STABBING
DESCRIPTORS: ACHING

## 2023-05-01 NOTE — OP NOTE
Operative Note      Patient: Jorge Robles  YOB: 1965  MRN: [id number]    Date of Procedure: 5/1/2023    Pre-Op Diagnosis:    Left heel ulcer  Severe left lower extremity arterial occlusive disease  S/P Left SFA angioplasty and Left TMA 1/2023  Obesity  ASHLEE  T2DM  HTN  Hyperlipidemia  Tobacco use disorder    Post-Op Diagnosis: Same plus:    Diffuse SFA 70% stenosis  Occlusion Left Above Knee Popliteal artery  Occlusion Left Below Knee Popliteal artery  Occlusion Left Anterior Tibial artery  Occlusion Left Posterior Tibial artery  Occlusion Left Peroneal artery       Procedure:    USG guidance, Right Common Femoral artery  USG guidance, Left Posterior Tibial artery  Non-selective Aortogram  Selective Left Posterior Tibial angiogram  Selective Left Anterior Tibial angiogram  Balloon angioplasty Left Anterior Tibial artery with 3 mm x 200 balloon  Balloon angioplasty Left Posterior Tibial artery with 3 mm x 200 balloon  Stent to Left Popliteal artery (below knee) with 6 mm x 100 [place] Viabahn  Stent to Left SFA-Popliteal arteries with [place] Odalis 6 mm x 150, 6 mm x 150, and 6 mm x 120 stents    Surgeon:  Aubrey Granda. Garrick Reyes MD    Assistant:   None    Anesthesia:    Versed 6 mg  Fentanyl 350 mcg  Lidocaine local anesthesia    Estimated Blood Loss (mL): less than 50     Complications: None    Specimens:   * No specimens in log *    Implants:  Implant Name Type Inv.  Item Serial No.  Lot No. LRB No. Used Action   STENT PERIPH L100MM DIAM 6MM CATH L120CM VASC W/ RADPQ MRK - [id number] Peripheral stents STENT PERIPH L100MM DIAM 6MM CATH L120CM VASC W/ RADPQ MRK   [employer]WD [id number] Left 1 Implanted   STENT VASC L150MM DIA6MM CATH L130CM SHTH 6FR DRUG ELUT - [id number] Coronary stents STENT VASC L150MM DIA6MM CATH L130CM [facility] 6FR DRUG ELUT  [employer] [id number] Left 2 Implanted   STENT VASC L120MM DIA6MM CATH L130CM SHTH 6FR DRUG ELUT - [id number] Peripheral stents

## 2023-05-01 NOTE — PROGRESS NOTES
1845 [facility]. Significant other, Khadijah Brothlilli, also at bedside. Patient A&Ox4. Right femoral site soft, dressing clean/dry/intact. Left lower extremity wrapped with kerlex and ACE. The left posterior tibial site drsg clean/dry/intact. KENNETH left pedal site due to wrap drsg. Doppler pulses on right and left posterior tibials and right pedal.  KENNETH to assess left pedal pulse due to drsg. Upon returning from procedure, pt agitated. States his back hurts from laying, and he wants to get up to urinate. This RN explained importance of keeping leg straight and not bending the leg, nor getting out of bed. Pt agitated and yelling at significant other, who was also encouraging patient to follow post procedure protocols. This RN attempted to put patients gown back on and patient pulled gown off stating \"it makes me claustrophobic get it off me\". Pt insists he has to urinate and can only do it while laying on his side. This RN and significant other get patient into position and continue to instruct patient not to bend leg. However pt unsuccessful at urinating and threw his urine bottle at the significant other. Significant other became upset and decided to leave for the evening. Patient finally able to urine 900 ml. Left lower leg elevated on 2 pillows. 1930 Dr. Aubrey Councilman at bedside to evaluate. Dr. Aubrey Councilman aware that unable to access pedal pulse due to dressing, and okay with obtaining only posterior tibial pulse at this time. 1945 patient dinner arrived, pt eating at 30 degree angle.

## 2023-05-01 NOTE — PROGRESS NOTES
1635 Pt voided 500 ml's clear yellow urine per urinal.  1642 ACT drawn. 1645 Angioplasty of left popliteal artery with 5 x 80 [place] 18 balloon. 1647 . Dr Chrystal Bright informed. 1701 6 mm x 100 mm Viabahn stent deployed in left popliteal artery. 1707 6 mm x 150 mm Odalis stent deployed in left popliteal/distal SFA artery. 1709 6 mm x 150 mm Odalis stent deployed in mid SFA per Dr Chrystal Bright.  1717 6mm x 120 mm Odalis stent deployed in proximal SFA per Dr Chrystal Bright.  3318 ACT drawn. 1720 Stents post dilated with 6 x 200 [place] 35 balloon. 1724 . Dr Chrystal Bright notified. No order. 1730 Procedure complete. Prepping for sheath removal with Perclose. 1 First Perclose device deployed in right femoral artery. [address] device deployed in right femoral artery. 1748 ACT drawn. 1749 Incision right groin approximated with suture. No bleeding or hematoma noted. .  1750 Triple antibiotic ointment applied to site with 4 x 4 and op-site dressing. 1800 Pt positioned on bed for comfort. Pt c/o cramp in left calf. Dr Chrystal Bright to exam leg. Pedal and posterior tibial pulse with doppler. 1803 Sheath in left posterior tibial artery pulled and manual pressure applied per J Hauter RT. No bleeding noted. 1820 Report called to Jacklyn HAWTHORNE-2E.  1820 Manual pressure to posterior tibial site complete. Quick clot with 4 x 4 and op-site applied. Quick clot with 4 x 4 applied to attempted pedal sites. No bleeding noted. Kerlix with Ace wrap applied for toes to knee. Pt denies any pain in calf at this time. 1830 Pt transferred to 2E per bed. Report to RN updated.

## 2023-05-01 NOTE — H&P
medical history of Back pain, Carpal tunnel syndrome of right wrist, Hyperlipidemia, Hypertension, Hypogonadism male, Nocturia, Obesity, Osteoarthritis, Prostate cancer ([address] Restless leg syndrome, Shortness of breath, Sleep apnea, Tobacco abuse, Tobacco user, Type II or unspecified type diabetes mellitus without mention of complication, not stated as uncontrolled, and Urgency of urination. Past Surgical History:  The patient  has a past surgical history that includes Hand surgery (1987); Leg Surgery; knee surgery (?); Prostate surgery (12-); Prostate surgery (06-); Leg Surgery (6/13/12); cyst removal (T); Skin tag removal (T); cyst removal (7-5-12); Carpal tunnel release (10/22/12); Carpal tunnel release (11/26/2012); Colonoscopy (2/1/2015); Toe amputation (Left, 1/1/2022); and Toe amputation (Left, 1/7/2022). Allergies: The patient is allergic to pcn [penicillins]. Family History: This patient's family history includes Cancer in his maternal grandmother; Diabetes in his father; Heart Disease in his brother and father; Thyroid Disease in his mother. Social History:  [facility]  reports that he has been smoking cigarettes. He started smoking about 47 years ago. He has a 20.00 pack-year smoking history. He has never used smokeless tobacco. He reports current drug use. Frequency: 7.00 times per week. Drug: Marijuana Zbigniew Venubbgina). He reports that he does not drink alcohol. ROS:     Constitutional:  Positive for activity change, appetite change and fatigue. HENT: Negative. Respiratory: Negative. Cardiovascular:  Positive for leg swelling. Gastrointestinal: Negative. Genitourinary: Negative. Musculoskeletal:  Positive for myalgias. Skin:  Positive for color change, pallor and wound. Hematological: Negative. Psychiatric/Behavioral: Negative. Physical Exam  Constitutional:       Appearance: He is obese. HENT:      Head: Normocephalic and atraumatic.

## 2023-05-01 NOTE — PLAN OF CARE
Problem: Chronic Conditions and Co-morbidities  Goal: Patient's chronic conditions and co-morbidity symptoms are monitored and maintained or improved  Outcome: Progressing  Flowsheets (Taken 5/1/2023 1208)  Care Plan - Patient's Chronic Conditions and Co-Morbidity Symptoms are Monitored and Maintained or Improved:   Monitor and assess patient's chronic conditions and comorbid symptoms for stability, deterioration, or improvement   Collaborate with multidisciplinary team to address chronic and comorbid conditions and prevent exacerbation or deterioration     Problem: Discharge Planning  Goal: Discharge to home or other facility with appropriate resources  Outcome: Progressing  Flowsheets (Taken 5/1/2023 1208)  Discharge to home or other facility with appropriate resources:   Identify barriers to discharge with patient and caregiver   Arrange for needed discharge resources and transportation as appropriate     Problem: Pain  Goal: Verbalizes/displays adequate comfort level or baseline comfort level  Outcome: Progressing  Flowsheets (Taken 5/1/2023 1208)  Verbalizes/displays adequate comfort level or baseline comfort level:   Encourage patient to monitor pain and request assistance   Assess pain using appropriate pain scale     Problem: Safety - Adult  Goal: Free from fall injury  Outcome: Progressing     Problem: Cardiovascular - Adult  Goal: Maintains optimal cardiac output and hemodynamic stability  Outcome: Progressing  Flowsheets (Taken 5/1/2023 1208)  Maintains optimal cardiac output and hemodynamic stability:   Monitor blood pressure and heart rate   Monitor urine output and notify Licensed Independent Practitioner for values outside of normal range   Care plan reviewed with patient. Patient verbalizes understanding of the plan of care and contributes to goal setting.

## 2023-05-01 NOTE — PROGRESS NOTES
1130 Patient admitted to 2E08  ambulatory for Angiogram.  Patient NPO. Patient accompanied by girl friend, Gianluca Manning. Vital signs obtained. Assessment and data collection intiated. Oriented to room. Policies and procedures for 2E explained. All questions answered with no further questions at this time. Fall prevention and safety precautions discussed with patient.

## 2023-05-01 NOTE — PROGRESS NOTES
1354 Patient received in IR for procedure. 56 Dr. Aubrey Councilman in; spoke to patient and assessment obtained. 1400 This procedure has been fully reviewed with the patient and written informed consent has been obtained. 1402 Patient prepped for procedure. 1428 Procedure started with Dr. Aubrey Councilman.  1068 Access obtained in right femoral with use of sonosite. 1454 ACT running. 1459 , Dr Aubrey Councilman notified. 1530 Access obtained in left posterior tibial with use of sonosite. 1540 ACT running. 1545 , Dr Aubrey Councilman notified. 1549 Angioplasty of AT/PT using [place] 18 3 x 200 balloon. 1612 Angioplasty of SFA using IN. PACT 018 6 x 150 balloon. 1625 Angioplasty of popliteal using IN. PACT 018 6 x 80 balloon. 1631 Angioplasty of popliteal using IN. PACT 018 4 x 100 balloon. 1020 Report given to [facility].

## 2023-05-02 VITALS
WEIGHT: 276 LBS | OXYGEN SATURATION: 100 % | BODY MASS INDEX: 39.51 KG/M2 | SYSTOLIC BLOOD PRESSURE: 164 MMHG | RESPIRATION RATE: 16 BRPM | DIASTOLIC BLOOD PRESSURE: 81 MMHG | TEMPERATURE: 98 F | HEART RATE: 75 BPM | HEIGHT: 70 IN

## 2023-05-02 LAB — GLUCOSE BLD STRIP.AUTO-MCNC: 142 MG/DL (ref 70–108)

## 2023-05-02 PROCEDURE — G0378 HOSPITAL OBSERVATION PER HR: HCPCS

## 2023-05-02 PROCEDURE — APPSS60 APP SPLIT SHARED TIME 46-60 MINUTES: Performed by: PHYSICIAN ASSISTANT

## 2023-05-02 PROCEDURE — 82948 REAGENT STRIP/BLOOD GLUCOSE: CPT

## 2023-05-02 RX ORDER — CLOPIDOGREL BISULFATE 75 MG/1
75 TABLET ORAL DAILY
Qty: 90 TABLET | Refills: 3 | Status: SHIPPED | OUTPATIENT
Start: 2023-05-02

## 2023-05-02 RX ADMIN — ASPIRIN 81 MG: 81 TABLET ORAL at 08:46

## 2023-05-02 RX ADMIN — AMLODIPINE BESYLATE AND BENAZEPRIL HYDROCHLORIDE 1 CAPSULE: 5; 10 CAPSULE ORAL at 08:46

## 2023-05-02 RX ADMIN — CLOPIDOGREL BISULFATE 75 MG: 75 TABLET ORAL at 08:46

## 2023-05-02 RX ADMIN — GABAPENTIN 600 MG: 600 TABLET ORAL at 08:46

## 2023-05-02 RX ADMIN — Medication 1 TABLET: at 08:46

## 2023-05-02 ASSESSMENT — PAIN SCALES - GENERAL: PAINLEVEL_OUTOF10: 0

## 2023-05-02 NOTE — DISCHARGE SUMMARY
CT/CV Surgery Discharge Summary     Pt Name: Cortez Low  MRN: [id number]  YOB: 1965  Primary Care Physician: Nila Cancino DO    Admit date:  5/1/2023 11:35 AM     Discharge date:  05/02/23     Disposition: Home    Admitting Diagnosis: Left heel ulcer  Severe left lower extremity arterial occlusive disease  S/P Left SFA angioplasty and Left TMA 1/2023  Obesity  ASHLEE  T2DM  HTN  Hyperlipidemia  Tobacco use disorder    Discharge Diagnosis: Left heel ulcer  Severe left lower extremity arterial occlusive disease  S/P Left SFA angioplasty and Left TMA 1/2023  Obesity  ASHLEE  T2DM  HTN  Hyperlipidemia  Tobacco use disorder    Diffuse SFA 70% stenosis  Occlusion Left Above Knee Popliteal artery  Occlusion Left Below Knee Popliteal artery  Occlusion Left Anterior Tibial artery  Occlusion Left Posterior Tibial artery  Occlusion Left Peroneal artery    Condition: Stable    Problem List:   Patient Active Problem List   Diagnosis Code    Adenocarcinoma of Prostate, GS 7, stage T1c. C61    Onychomycosis B35.1    DM type 2 (diabetes mellitus, type 2) ([address] E11.9    Dyslipidemia E78.5    DDD (degenerative disc disease), lumbar M51.36    Tobacco abuse Z72.0    Insomnia G47.00    Carpal tunnel syndrome of right wrist G56.01    Carpal tunnel syndrome of left wrist G56.02    Cubital tunnel syndrome on left G56.22    Morbid obesity (HCC) E66.01    Obstructive sleep apnea on CPAP G47.33, Z99.89    Restless leg syndrome G25.81    Controlled type 2 diabetes mellitus with microalbuminuria (HCC) E11.29, R80.9    Diabetic ulcer of left foot due to type 2 diabetes mellitus ([address] E11.621, L97.529    Non-healing ulcer of foot, left, limited to breakdown of skin ([address] L97.521    Athscl native arteries of left leg w ulceration oth prt foot ([address] I70.245     Procedures:  5/1/23 USG guidance, Right Common Femoral artery  USG guidance, Left Posterior Tibial artery  Non-selective Aortogram  Selective Left Posterior Tibial

## 2023-05-02 NOTE — DISCHARGE INSTRUCTIONS
Discharge Instructions Following Vascular Surgery for  [facility] Cardiothoracic and Vascular Surgery  Pt Name: Jame Obrien  Medical Record Number: [id number]  Today's Date: 5/2/2023    ACTIVITY/EXERCISE   ? Slowly increase your activity after you go home. Pace yourself and listen to your body. ? It will take 2 to 3 days to feel like you did before surgery in terms of energy and strength. APPETITE   ? Your appetite might be decreased at first.  It should slowly improve. ? Small, frequent meals may help. ? The dietitian will assist you with a low fat, low cholesterol, low salt diet. PAIN   ? You may have pain at the incision. Take your pain medications as ordered. INCISIONS  ? Warning signs of infection: redness, warmth to touch, green colored pus, tender to touch. Notify surgeon office right away if any warnings occur. ? Clean your incisions twice daily with soap. Ok to shower daily and do not bathe for the first month following procedure. SMOKING   If you smoke, STOP. Smoking will cause early graft closure, other blockages, new heart attacks, and possibly even death. CALL YOUR SURGEON IF YOU HAVE:   ? Fever over 101° F.   ? Persistent nausea or vomiting        ? Increasing shortness of breath   ? Pain unrelieved by prescribed medication    OFFICE VISIT   ? You should have a follow up appointment in the office in about 1 week after discharge from the hospital.   ?   You may call the office to make an appointment, if you don't have an appointment. ([phone]). ?   Bring any questions you have so they may be addressed. ? BRING YOUR MEDICATION LIST and medications even over the counter medications to all your follow up apointments. ? Office Location:   [address]            EMERGENCIES  ? You should either call 911 or go to the Emergency Room to be evaluated if you need seen immediately.   ?         Sudden, severe shortness

## 2023-05-02 NOTE — PROGRESS NOTES
1950 Report called to \[facility]\"" SPECIALTY \[facility] [place] [facility]\"" OF [facility] on 8A    2007 transport here to take pt to [facility].

## 2023-05-02 NOTE — PROGRESS NOTES
Heart attack teaching covered with patient and/or family or significant other:  Signs and symptoms of a heart attack. When to call 911 and the importance of calling 911. Personal risk factors and ways to lower their risk. 4.   Importance of quitting smoking if applicable. Heart attack booklet given to the patient and/or family or significant other. Reviewed: How to take Nitroglycerin. The importance of participating in Cardiac Rehab and hours of operation. Heart Healthy Diet. Risk factor modification. (Overweight, Obesity, Diabetes, Hypertension, Smoking, High Cholesterol, Stress)  Discharge instructions for Cath/Intervention procedure site if applicable. Patient received pamphlet about cardiac intervention, how to take care of the incision site, mended hearts program, cardiac rehab and the hours of operations, and Nutritional information regarding cardiac diet. MI teaching done reviewing causes, signs symptoms, and risk factors.

## 2023-05-03 ENCOUNTER — TELEPHONE (OUTPATIENT)
Dept: FAMILY MEDICINE CLINIC | Age: 58
End: 2023-05-03

## 2023-05-03 ENCOUNTER — TELEPHONE (OUTPATIENT)
Dept: CARDIOTHORACIC SURGERY | Age: 58
End: 2023-05-03

## 2023-05-03 ENCOUNTER — CARE COORDINATION (OUTPATIENT)
Dept: CASE MANAGEMENT | Age: 58
End: 2023-05-03

## 2023-05-03 DIAGNOSIS — E11.621 DIABETIC ULCER OF LEFT FOOT DUE TO TYPE 2 DIABETES MELLITUS (HCC): Primary | ICD-10-CM

## 2023-05-03 DIAGNOSIS — L97.529 DIABETIC ULCER OF LEFT FOOT DUE TO TYPE 2 DIABETES MELLITUS (HCC): Primary | ICD-10-CM

## 2023-05-03 NOTE — CARE COORDINATION
[address] [address] Transition Nurse provided contact information. Plan for follow-up call in 5-7 days based on severity of symptoms and risk factors.   Plan for next call: symptom management-new or worsening symtpoms, left heel, BG  follow-up appointment-review f/u 5/8, any changes, Dr Arabella Givens 5/5, any changes    Stephen Alvarez RN

## 2023-05-03 NOTE — TELEPHONE ENCOUNTER
I spoke with the patient, informed him, that he only needs to take Augmentin until he is out, as prescribed by his podiatrist so once the pills are out, he doesn't need to continue the medication. Patient voiced understanding.

## 2023-05-03 NOTE — TELEPHONE ENCOUNTER
Girlfriend Linda Andressamarilou on HIPAA was notified and voiced understanding. Says pt is out getting his labs drawn as we speak. She will have him double up on the Lotrel.

## 2023-05-03 NOTE — TELEPHONE ENCOUNTER
Care Transitions Initial Follow Up Call    Outreach made within 2 business days of discharge: Yes    Patient: Valerie Fraser Patient : 1965   MRN: [id number]  Reason for Admission: There are no discharge diagnoses documented for the most recent discharge. Discharge Date: 23       Spoke with: attempted to contact pt, female with have pt give us a call back when he wakes up.     Discharge department/facility: [facility]    TCM Interactive Patient Contact:      Scheduled appointment with PCP within 7-14 days    Follow Up  Future Appointments   Date Time Provider Rhiannon Bullard   2023  9:00 AM Maria Dolores Pretty MD N SRPX CT/CV P - [place]   2023  8:00 AM Duc Flores DO [address] [place] [address] ([address] [place] [address])

## 2023-05-03 NOTE — TELEPHONE ENCOUNTER
Pt says his bp has been really elevated and is worried about it. He is asking if any changes in his bp meds need made. Pt does have a hospital follow up appt scheduled tomorrow afternoon. Please advise. Pt is also suppose to get labs done today. The hospital stopped his sugar meds and he is to restart them tomorrow. Pt is asking if this will affect his labs he was getting done today? Please advise.

## 2023-05-03 NOTE — TELEPHONE ENCOUNTER
Care Transitions Initial Follow Up Call    Outreach made within 2 business days of discharge: Yes    Patient: Joann Bashir Patient : 1965   MRN: [id number]  Reason for Admission: There are no discharge diagnoses documented for the most recent discharge. Discharge Date: 23       Spoke with: Pt    Discharge department/facility: [address] [place] [address] Interactive Patient Contact:  Was patient able to fill all prescriptions: No: will  today  Was patient instructed to bring all medications to the follow-up visit: Yes  Is patient taking all medications as directed in the discharge summary?  Yes  Does patient understand their discharge instructions: Yes  Does patient have questions or concerns that need addressed prior to 7-14 day follow up office visit: yes    Scheduled appointment with PCP within 7-14 days    Follow Up  Future Appointments   Date Time Provider Rhiannon Gutierrezi   2023  9:00 AM Sebastian Tate MD N SRPX CT/CV [facility]   2023  8:00 AM Shaina Chavira DO [address] [place] [address] ([address] [place] [address])

## 2023-05-03 NOTE — TELEPHONE ENCOUNTER
Patient left a message stating he was told he was to continue taking augmentin 500-125 mg 1 tablet po TID, but was told by his pharmacy he doesn't have any refills, and would like a call back. Patient thought he had enough at home, but only has one tablet left. I show that Dr. Kiran Rolling d/c the medication. Is the patient suppose to continue taking the medication? Please advise, thank you?

## 2023-05-04 ENCOUNTER — OFFICE VISIT (OUTPATIENT)
Dept: FAMILY MEDICINE CLINIC | Age: 58
End: 2023-05-04

## 2023-05-04 DIAGNOSIS — L97.429 DIABETIC ULCER OF LEFT HEEL ASSOCIATED WITH DIABETES MELLITUS DUE TO UNDERLYING CONDITION, UNSPECIFIED ULCER STAGE (HCC): Primary | ICD-10-CM

## 2023-05-04 DIAGNOSIS — Z98.62 S/P PERIPHERAL ARTERY ANGIOPLASTY: ICD-10-CM

## 2023-05-04 DIAGNOSIS — I70.245 ATHSCL NATIVE ARTERIES OF LEFT LEG W ULCERATION OTH PRT FOOT (HCC): ICD-10-CM

## 2023-05-04 DIAGNOSIS — Z99.89 OBSTRUCTIVE SLEEP APNEA ON CPAP: ICD-10-CM

## 2023-05-04 DIAGNOSIS — Z09 HOSPITAL DISCHARGE FOLLOW-UP: ICD-10-CM

## 2023-05-04 DIAGNOSIS — G47.33 OBSTRUCTIVE SLEEP APNEA ON CPAP: ICD-10-CM

## 2023-05-04 DIAGNOSIS — Z89.432 PARTIAL NONTRAUMATIC AMPUTATION OF LEFT FOOT (HCC): ICD-10-CM

## 2023-05-04 DIAGNOSIS — F32.A DEPRESSION, UNSPECIFIED DEPRESSION TYPE: ICD-10-CM

## 2023-05-04 DIAGNOSIS — E66.01 SEVERE OBESITY (BMI 35.0-39.9) WITH COMORBIDITY (HCC): ICD-10-CM

## 2023-05-04 DIAGNOSIS — I10 PRIMARY HYPERTENSION: ICD-10-CM

## 2023-05-04 DIAGNOSIS — C61 PROSTATE CANCER (HCC): ICD-10-CM

## 2023-05-04 DIAGNOSIS — E08.621 DIABETIC ULCER OF LEFT HEEL ASSOCIATED WITH DIABETES MELLITUS DUE TO UNDERLYING CONDITION, UNSPECIFIED ULCER STAGE (HCC): Primary | ICD-10-CM

## 2023-05-04 DIAGNOSIS — G62.9 PERIPHERAL POLYNEUROPATHY: ICD-10-CM

## 2023-05-04 DIAGNOSIS — E78.5 DYSLIPIDEMIA: ICD-10-CM

## 2023-05-04 DIAGNOSIS — I73.9 PAD (PERIPHERAL ARTERY DISEASE) (HCC): ICD-10-CM

## 2023-05-04 RX ORDER — ESCITALOPRAM OXALATE 10 MG/1
10 TABLET ORAL DAILY
Qty: 30 TABLET | Refills: 1 | Status: SHIPPED | OUTPATIENT
Start: 2023-05-04

## 2023-05-04 SDOH — ECONOMIC STABILITY: FOOD INSECURITY: WITHIN THE PAST 12 MONTHS, THE FOOD YOU BOUGHT JUST DIDN'T LAST AND YOU DIDN'T HAVE MONEY TO GET MORE.: OFTEN TRUE

## 2023-05-04 SDOH — ECONOMIC STABILITY: FOOD INSECURITY: WITHIN THE PAST 12 MONTHS, YOU WORRIED THAT YOUR FOOD WOULD RUN OUT BEFORE YOU GOT MONEY TO BUY MORE.: OFTEN TRUE

## 2023-05-04 SDOH — ECONOMIC STABILITY: HOUSING INSECURITY
IN THE LAST 12 MONTHS, WAS THERE A TIME WHEN YOU DID NOT HAVE A STEADY PLACE TO SLEEP OR SLEPT IN A SHELTER (INCLUDING NOW)?: NO

## 2023-05-04 SDOH — ECONOMIC STABILITY: INCOME INSECURITY: HOW HARD IS IT FOR YOU TO PAY FOR THE VERY BASICS LIKE FOOD, HOUSING, MEDICAL CARE, AND HEATING?: VERY HARD

## 2023-05-04 ASSESSMENT — PATIENT HEALTH QUESTIONNAIRE - PHQ9
2. FEELING DOWN, DEPRESSED OR HOPELESS: 3
8. MOVING OR SPEAKING SO SLOWLY THAT OTHER PEOPLE COULD HAVE NOTICED. OR THE OPPOSITE, BEING SO FIGETY OR RESTLESS THAT YOU HAVE BEEN MOVING AROUND A LOT MORE THAN USUAL: 3
SUM OF ALL RESPONSES TO PHQ QUESTIONS 1-9: 21
1. LITTLE INTEREST OR PLEASURE IN DOING THINGS: 3
SUM OF ALL RESPONSES TO PHQ QUESTIONS 1-9: 23
3. TROUBLE FALLING OR STAYING ASLEEP: 3
4. FEELING TIRED OR HAVING LITTLE ENERGY: 3
9. THOUGHTS THAT YOU WOULD BE BETTER OFF DEAD, OR OF HURTING YOURSELF: 2
SUM OF ALL RESPONSES TO PHQ QUESTIONS 1-9: 23
5. POOR APPETITE OR OVEREATING: 0
10. IF YOU CHECKED OFF ANY PROBLEMS, HOW DIFFICULT HAVE THESE PROBLEMS MADE IT FOR YOU TO DO YOUR WORK, TAKE CARE OF THINGS AT HOME, OR GET ALONG WITH OTHER PEOPLE: 1
SUM OF ALL RESPONSES TO PHQ9 QUESTIONS 1 & 2: 6
7. TROUBLE CONCENTRATING ON THINGS, SUCH AS READING THE NEWSPAPER OR WATCHING TELEVISION: 3
SUM OF ALL RESPONSES TO PHQ QUESTIONS 1-9: 23
6. FEELING BAD ABOUT YOURSELF - OR THAT YOU ARE A FAILURE OR HAVE LET YOURSELF OR YOUR FAMILY DOWN: 3

## 2023-05-04 ASSESSMENT — COLUMBIA-SUICIDE SEVERITY RATING SCALE - C-SSRS
3. HAVE YOU BEEN THINKING ABOUT HOW YOU MIGHT KILL YOURSELF?: NO
2. HAVE YOU ACTUALLY HAD ANY THOUGHTS OF KILLING YOURSELF?: YES
6. HAVE YOU EVER DONE ANYTHING, STARTED TO DO ANYTHING, OR PREPARED TO DO ANYTHING TO END YOUR LIFE?: NO
4. HAVE YOU HAD THESE THOUGHTS AND HAD SOME INTENTION OF ACTING ON THEM?: NO
1. WITHIN THE PAST MONTH, HAVE YOU WISHED YOU WERE DEAD OR WISHED YOU COULD GO TO SLEEP AND NOT WAKE UP?: YES
5. HAVE YOU STARTED TO WORK OUT OR WORKED OUT THE DETAILS OF HOW TO KILL YOURSELF? DO YOU INTEND TO CARRY OUT THIS PLAN?: NO

## 2023-05-04 NOTE — PATIENT INSTRUCTIONS
You may receive a survey about your visit with us today. The feedback from our patients helps us identify what is working well and where the service to all patients can be enhanced. Thank you! Tobacco Cessation Programs     Telephonic behavior modification  1-800-QUIT-NOW (529-3831)  Counseling service for those who are ready to quit using tobacco.    Available for uninsured [place] residents, Medicaid recipients, pregnant women, or patients whose health plans or employers are members of the [address] [place] [address] behavior modification  http://[place]. Quitlogix. org  Online support program to help patients through each step of the quitting process. Available 24 hours a day 7 days a week. Provides up to date researched based tool, step-by-step guides, and motivational messages. Online behavior modification  www.lungusa.org/stop-smoking/how-to-quit  HelpLine: 1-800-LUNG-USA (295-4125)  Email questions to:  Osmin@[employer]. [employer]   Website offers resources to help tobacco users figure out their reasons for quitting and then take the big step of quitting for good. Hypnosis  Location: [address] [place] [address], [place] AM Member [place], [place]  Contact: Aniket Larsen, PhD at [phone]  Hypnosis for tobacco cessation  Cost $225 for the initial session and $175 for each session afterwards. Most patients require 6-8 sessions. There is the option to submit through the patients insurance. Hypnosis and behavior modification  Location: [address] [address],  [address] [place]LLO AM Member Des[place], [place]  Contact: Alisa Morin, PhD at [phone]  Counseling and hypnosis for nicotine addition  Cost: For uninsured patients:  Please call above phone number  Cost for insured patients depends on patients insurance plan.     Behavior modification  Location: [facility], [address] [facility] [address] [place].[address]: [phone]  Services include four one-on-one appointments between the patient and a respiratory therapist.  The four appointments

## 2023-05-04 NOTE — PROGRESS NOTES
Post-Discharge Transitional Care Management Services      [address]   YOB: 1965    Date of Visit:  5/4/2023  30 Day Post-Discharge Date: 6/2/23  Chief Complaint   Patient presents with    Follow-Up from Hospital     Left heel ulcer    6 Month Follow-Up    Diabetes    Other     Needs lipid, A1C     Admit date:  5/1/2023 11:35 AM            Discharge date:  05/02/23      Disposition: Home     Admitting Diagnosis: Left heel ulcer  Severe left lower extremity arterial occlusive disease  S/P Left SFA angioplasty and Left TMA 1/2023  Obesity  ASHLEE  T2DM  HTN  Hyperlipidemia  Tobacco use disorder     Discharge Diagnosis: Left heel ulcer  Severe left lower extremity arterial occlusive disease  S/P Left SFA angioplasty and Left TMA 1/2023  Obesity  ASHLEE  T2DM  HTN  Hyperlipidemia  Tobacco use disorder     Diffuse SFA 70% stenosis  Occlusion Left Above Knee Popliteal artery  Occlusion Left Below Knee Popliteal artery  Occlusion Left Anterior Tibial artery  Occlusion Left Posterior Tibial artery  Occlusion Left Peroneal artery       Allergies   Allergen Reactions    Pcn [Penicillins] Hives     Outpatient Medications Marked as Taking for the 5/4/23 encounter (Office Visit) with Matt Tran, DO   Medication Sig Dispense Refill    amLODIPine-benazepril (LOTREL) 5-10 MG per capsule Take 1 capsule by mouth in the morning and at bedtime 180 capsule 3    escitalopram (LEXAPRO) 10 MG tablet Take 1 tablet by mouth daily 30 tablet 1    clopidogrel (PLAVIX) 75 MG tablet Take 1 tablet by mouth daily 90 tablet 3    acetaminophen (TYLENOL) 500 MG tablet Take 2 tablets by mouth in the morning and at bedtime      Naproxen Sodium 220 MG CAPS Take 1 tablet by mouth in the morning and at bedtime      gabapentin (NEURONTIN) 600 MG tablet Take 1 tablet by mouth 3 times daily.       pioglitazone-metFORMIN (ACTOPLUS MET)  MG per tablet take 1 tablet by mouth twice a day with meals 60 tablet 11    atorvastatin

## 2023-05-05 VITALS
RESPIRATION RATE: 18 BRPM | HEART RATE: 88 BPM | BODY MASS INDEX: 39.18 KG/M2 | HEIGHT: 70 IN | WEIGHT: 273.7 LBS | DIASTOLIC BLOOD PRESSURE: 80 MMHG | SYSTOLIC BLOOD PRESSURE: 138 MMHG

## 2023-05-05 PROBLEM — E66.01 SEVERE OBESITY (BMI 35.0-39.9) WITH COMORBIDITY (HCC): Status: ACTIVE | Noted: 2023-05-05

## 2023-05-05 PROBLEM — Z89.432 PARTIAL NONTRAUMATIC AMPUTATION OF LEFT FOOT (HCC): Status: ACTIVE | Noted: 2023-05-05

## 2023-05-05 RX ORDER — AMLODIPINE BESYLATE AND BENAZEPRIL HYDROCHLORIDE 5; 10 MG/1; MG/1
1 CAPSULE ORAL 2 TIMES DAILY
Qty: 180 CAPSULE | Refills: 3 | Status: SHIPPED | OUTPATIENT
Start: 2023-05-05

## 2023-05-08 ENCOUNTER — HOSPITAL ENCOUNTER (OUTPATIENT)
Dept: CT IMAGING | Age: 58
Discharge: HOME | End: 2023-05-08
Payer: COMMERCIAL

## 2023-05-08 ENCOUNTER — OFFICE VISIT (OUTPATIENT)
Dept: CARDIOTHORACIC SURGERY | Age: 58
End: 2023-05-08
Payer: COMMERCIAL

## 2023-05-08 VITALS
OXYGEN SATURATION: 98 % | WEIGHT: 265 LBS | BODY MASS INDEX: 37.94 KG/M2 | HEIGHT: 70 IN | DIASTOLIC BLOOD PRESSURE: 78 MMHG | SYSTOLIC BLOOD PRESSURE: 187 MMHG | HEART RATE: 72 BPM

## 2023-05-08 DIAGNOSIS — I83.893 VARICOSE VEINS OF LEG WITH EDEMA, BILATERAL: ICD-10-CM

## 2023-05-08 DIAGNOSIS — L97.521 NON-HEALING ULCER OF FOOT, LEFT, LIMITED TO BREAKDOWN OF SKIN (HCC): Primary | ICD-10-CM

## 2023-05-08 PROCEDURE — 99214 OFFICE O/P EST MOD 30 MIN: CPT | Performed by: THORACIC SURGERY (CARDIOTHORACIC VASCULAR SURGERY)

## 2023-05-08 PROCEDURE — 6360000004 HC RX CONTRAST MEDICATION: Performed by: THORACIC SURGERY (CARDIOTHORACIC VASCULAR SURGERY)

## 2023-05-08 PROCEDURE — 74174 CTA ABD&PLVS W/CONTRAST: CPT

## 2023-05-08 RX ADMIN — IOPAMIDOL 95 ML: 755 INJECTION, SOLUTION INTRAVENOUS at 10:20

## 2023-05-08 NOTE — PROGRESS NOTES
Take 1 tablet by mouth in the morning and at bedtime, Disp: , Rfl:     gabapentin (NEURONTIN) 600 MG tablet, Take 1 tablet by mouth 3 times daily. , Disp: , Rfl:     pioglitazone-metFORMIN (ACTOPLUS MET)  MG per tablet, take 1 tablet by mouth twice a day with meals, Disp: 60 tablet, Rfl: 11    atorvastatin (LIPITOR) 40 MG tablet, take 1 tablet by mouth every evening, Disp: 30 tablet, Rfl: 5    Multiple Vitamin (MULTIVITAMIN ADULT PO), Take by mouth, Disp: , Rfl:     aspirin 81 MG EC tablet, Take 1 tablet by mouth daily, Disp: , Rfl:     blood glucose test strips (ONETOUCH VERIO) strip, Check sugars daily. Dx: E11.9, Disp: 100 each, Rfl: 3    Blood Glucose Monitoring Suppl [employer]) w/Device KIT, Check sugars daily. Dx: E11.9, Disp: 1 kit, Rfl: 0    OneTouch Delica Lancets 34I MISC, Check sugars daily. Dx: E11.9, Disp: 100 each, Rfl: 3    Allergies   Allergen Reactions    Pcn [Penicillins] Hives       Past Medical History  Wellington Call  has a past medical history of Back pain, Carpal tunnel syndrome of right wrist, Hyperlipidemia, Hypertension, Hypogonadism male, Nocturia, Obesity, Osteoarthritis, Prostate cancer ([address] Restless leg syndrome, Shortness of breath, Sleep apnea, Tobacco abuse, Tobacco user, Type II or unspecified type diabetes mellitus without mention of complication, not stated as uncontrolled, and Urgency of urination. Social History  Wellington Call  reports that he has been smoking cigarettes. He started smoking about 47 years ago. He has a 20.00 pack-year smoking history. He has never used smokeless tobacco. He reports current drug use. Frequency: 7.00 times per week. Drug: Marijuana Monica Etelvina). He reports that he does not drink alcohol. Family History  Wellington Call family history includes Cancer in his maternal grandmother; Diabetes in his father; Heart Disease in his brother and father; Thyroid Disease in his mother.     There is no family history of bicuspid aortic valve, aneurysms, heart transplant,

## 2023-05-09 ENCOUNTER — HOSPITAL ENCOUNTER (OUTPATIENT)
Dept: INTERVENTIONAL RADIOLOGY/VASCULAR | Age: 58
Discharge: HOME | End: 2023-05-09
Payer: COMMERCIAL

## 2023-05-09 ENCOUNTER — CARE COORDINATION (OUTPATIENT)
Dept: CARE COORDINATION | Age: 58
End: 2023-05-09

## 2023-05-09 DIAGNOSIS — L97.521 NON-HEALING ULCER OF FOOT, LEFT, LIMITED TO BREAKDOWN OF SKIN (HCC): ICD-10-CM

## 2023-05-09 PROCEDURE — 93925 LOWER EXTREMITY STUDY: CPT

## 2023-05-09 NOTE — CARE COORDINATION
ANTHONY mailed out [employer] resources to pt. Called and spoke with Yoli Horowitz to advise her to watch the mail for them and to call me if I can help in any other way. Betsy Franco voiced understanding and was appreciative and will call if additional needs arise.

## 2023-05-10 ENCOUNTER — TELEPHONE (OUTPATIENT)
Dept: CARDIOTHORACIC SURGERY | Age: 58
End: 2023-05-10

## 2023-05-10 ENCOUNTER — CARE COORDINATION (OUTPATIENT)
Dept: CASE MANAGEMENT | Age: 58
End: 2023-05-10

## 2023-05-10 NOTE — TELEPHONE ENCOUNTER
Patient had CTAV Abd/Pelv on 5/8/23 and BLE arterial duplex on 5/9/23. Please call with results. No follow up Dr. Kaia Barnett - to follow with Dr. Rafita Apple.

## 2023-05-10 NOTE — CARE COORDINATION
[facility] Care Transitions Follow Up Call    Patient Current Location:  Home: [address] [place] [address]    Care Transition Nurse contacted the family by telephone to follow up after admission on 23. Verified name and  with family as identifiers. Patient: Maximo Marsh  Patient : 1965   MRN: [id number]  Reason for Admission: Diabetic ulcer of left foot due to type 2 diabetes mellitus   Discharge Date: 23 RARS: No data recorded    Needs to be reviewed by the provider   Additional needs identified to be addressed with provider: No  none             Method of communication with provider: none. Spoke with SO, Vipin Bolton, said Edson Menendez is doing well. His heel is healing well. Denies fever, pain. Saw PCP last week, increased his Lotrel d/t higher BP readings, started him on Lexapro. Saw Dr Arin Owen and will f/u again . He is taking his health more serious, cutting back on smoking, eating healthy, no sweets in the house, has lost a little wt, eating more salads. . He will lose his insurance end of next week so will have to pay OOP until he can go back to work . Has to work for 2 wks and will have insurance back. His employer will work with him, said is a hard worker and doesn't want to lose him so will accommodate, giving him easier work. Denies any other needs. No other questions or concerns at this time. Will continue to follow. Addressed changes since last contact:  medications-Lotrel, Lexapro  Discussed follow-up appointments. If no appointment was previously scheduled, appointment scheduling offered: Yes. Is follow up appointment scheduled within 7 days of discharge? Yes. Follow Up  No future appointments. Non-[facility] follow up appointment(s): Dr Arin Owen     Care Transition Nurse reviewed medical action plan and red flags with family and discussed any barriers to care and/or understanding of plan of care after discharge.  Discussed appropriate site of care

## 2023-05-11 ENCOUNTER — TELEPHONE (OUTPATIENT)
Dept: CARDIOTHORACIC SURGERY | Age: 58
End: 2023-05-11

## 2023-05-11 NOTE — TELEPHONE ENCOUNTER
Called pt with CT venous abd/pel and GINO results. He will stay on his ASA and Plavix. He will call us back if any symptoms.

## 2023-05-18 ENCOUNTER — CARE COORDINATION (OUTPATIENT)
Dept: CASE MANAGEMENT | Age: 58
End: 2023-05-18

## 2023-05-18 NOTE — CARE COORDINATION
[facility] Care Transitions Follow Up Call    Patient: Carlos Hoffmann  Patient : 1965   MRN: <[id number]>  Reason for Admission: Diabetic ulcer of left foot due to type 2 diabetes mellitus   Discharge Date: 23 RARS: No data recorded        Attempted to contact patient for follow up transition call. Left voicemail message to return call with an update on condition since discharge. Contact information provided. Will continue to follow up.     Care Transitions Subsequent and Final Call    Subsequent and Final Calls  Care Transitions Interventions     Transportation Support: Declined   Other Interventions:             Rosario Parrish LPN  Care Coordinator  [phone]

## 2023-05-24 DIAGNOSIS — E78.5 DYSLIPIDEMIA: ICD-10-CM

## 2023-05-24 RX ORDER — ATORVASTATIN CALCIUM 40 MG/1
TABLET, FILM COATED ORAL
Qty: 30 TABLET | Refills: 5 | Status: SHIPPED | OUTPATIENT
Start: 2023-05-24

## 2023-05-25 ENCOUNTER — CARE COORDINATION (OUTPATIENT)
Dept: CASE MANAGEMENT | Age: 58
End: 2023-05-25

## 2023-05-25 NOTE — CARE COORDINATION
Care Transitions Outreach Attempt-2nd attempt    Call within 2 business days of discharge: Yes   Attempted to reach patient for subsequent transitional call. Left HIPPA compliant VM to return call directly to [phone]. If no return call, CTN will sign off-2nd attempt. Unable to reach letter not sent, end of episode. Patient: Emma Tran Patient : 1965 MRN: [id number]    Last Discharge  [facility]       Date Complaint Diagnosis Description Type Department Provider    23  Diabetic ulcer of left foot due to type 2 diabetes mellitus ([facility] [address] Admission (Discharged) from [address] [place] [address]               Noted following upcoming appointments from discharge chart review:   [address] follow up appointment(s): No future appointments.   Non-[facility] follow up appointment(s): josh

## 2023-05-31 ENCOUNTER — CARE COORDINATION (OUTPATIENT)
Dept: CARE COORDINATION | Age: 58
End: 2023-05-31

## 2023-05-31 NOTE — CARE COORDINATION
Attempted to reach Ramy Campo today for enrollment in care coordination. Pt's significant other [facility] answered. Reports pt is not home at present time. Message left with her with my contact info for pt to return call.

## 2023-06-02 ENCOUNTER — CARE COORDINATION (OUTPATIENT)
Dept: CARE COORDINATION | Age: 58
End: 2023-06-02

## 2023-06-21 ENCOUNTER — CARE COORDINATION (OUTPATIENT)
Dept: CARE COORDINATION | Age: 58
End: 2023-06-21

## 2023-06-21 NOTE — CARE COORDINATION
Ambulatory Care Coordination Note  2023    Patient Current Location:  Home: [place] [address] [place] [address]     ACM contacted the patient by telephone. Verified name and  with patient as identifiers. Provided introduction to self, and explanation of the ACM role. Challenges to be reviewed by the provider   Additional needs identified to be addressed with provider: Yes  none               Method of communication with provider: none. ACM: Lynn Sharif, RN    Rosalva Winn was referred to care coordination following recent hospitalization. Spoke with significant other Valerie (HIPAA auth) today for f/u  Pt has h/o: DM, ASHLEE, prostate Ca. S/p revascularization of lle. DM: monitoring BS's. Dilia Oviedo reports that readings have been in the 130's. Continues to f/u with Dr. Nikolas Rodriges. Just recently lost ins. D/t not working for more than 90 days. Once pt gets released to go back to work he will get his insurance back again. Dilia Oviedo is changing dsg daily. Has appt with Dr. Nikolas Rodriges. Recently placed on BP medication. Has cuff but has not been monitoring routinely. Tyshawn Saldivar to have him check his BP 1-2 hrs after taking his Lotrel and keep log. Plan of care: Will f/u next wk to obtain bp readings. If BP's running high despite taking Suanne Chickasha was advised to contact PCP. Continue wound care as per podiatry  Continue monitoring BS's  Call with new concerns. Offered patient enrollment in the Remote Patient Monitoring (RPM) program for in-home monitoring:  pt not eligible.   .  Diabetes Assessment    Medic Alert ID: No  How often do you test your blood sugar?: Daily (Comment: checking 1-2x daily)       Do you have hyperglycemia symptoms?: No   Do you have hypoglycemia symptoms?: No   Last Blood Sugar Value: 130   Blood Sugar Monitoring Regimen: Once a Day   Blood Sugar Trends: No Change            Lab Results       None            Care Coordination Interventions    Referral from Primary Care

## 2023-06-23 ENCOUNTER — CARE COORDINATION (OUTPATIENT)
Dept: CARE COORDINATION | Age: 58
End: 2023-06-23

## 2023-06-27 DIAGNOSIS — F32.A DEPRESSION, UNSPECIFIED DEPRESSION TYPE: ICD-10-CM

## 2023-06-27 RX ORDER — ESCITALOPRAM OXALATE 10 MG/1
TABLET ORAL
Qty: 90 TABLET | Refills: 3 | Status: SHIPPED | OUTPATIENT
Start: 2023-06-27

## 2023-06-29 ENCOUNTER — CARE COORDINATION (OUTPATIENT)
Dept: CARE COORDINATION | Age: 58
End: 2023-06-29

## 2023-07-10 ENCOUNTER — CARE COORDINATION (OUTPATIENT)
Dept: CARE COORDINATION | Age: 58
End: 2023-07-10

## 2023-07-20 ENCOUNTER — CARE COORDINATION (OUTPATIENT)
Dept: CARE COORDINATION | Age: 58
End: 2023-07-20

## 2023-07-20 NOTE — CARE COORDINATION
Ambulatory Care Coordination Note  2023    Patient Current Location:  Home: [address] [place] [address]     ACM contacted the patient by telephone. Verified name and  with patient as identifiers. Provided introduction to self, and explanation of the ACM role. Challenges to be reviewed by the provider   Additional needs identified to be addressed with provider: No  none               Method of communication with provider: none. ACM: Maureen Skinner, RN     Nicola Vargas was referred to care coordination following recent hospitalization. Spoke with significant other Valerie (HIPAA auth) today for f/u  Pt has h/o: DM, ASHLEE, prostate Ca. S/p revascularization of lle. Continues to f/u with Dr. Tamia Tse. Now being seen every 2 wks. Pt needs to have skin graft. Waiting til he can return to work to get insurance back. Currently pt does not have a return date from his employer. Pt's significant other has been doing daily dsg changes. Pt has been monitoring BP twice daily   AM- 114/74,  PM-134/73  : AM-173/79 PM-154/91  7/15: AM-151/85 PM-142/90  : AM-152/90 PM-142/91  : AM-127/96 PM-138/96  : AM-119/87 PM-163/88  : AM-127/82  Pt under significant amt of stress d/t being off work, limited income, not having insurance. States that he thinks about it constantly. Pt has been having increased pain in bilat hips. Seeing chiropractor. Not getting relief. DM: pt monitoring BS's daily. Majority of time BS is between 130-140. Has had a few in the 200's. Encouraged to keep BS under tight control. Activity has been limited d/t foot wound. Offered patient enrollment in the Remote Patient Monitoring (RPM) program for in-home monitoring:  pt unable to take part at this time as he does not have coverage. Pt is monitoring his BP per his cuff.   .  Plan of care:  Pt has f/u with Dr. Tamia Tse this wk  Continue dsg changes as instructed and call podiatry with any concerns re:

## 2023-07-27 ENCOUNTER — CARE COORDINATION (OUTPATIENT)
Dept: CARE COORDINATION | Age: 58
End: 2023-07-27

## 2023-07-27 NOTE — CARE COORDINATION
Attempted to reach pt today for f/u. No answer. Patient called states he needs paperwork with insurance information faxed over to [phone] so that he can get his seated rollator.            Please call and advise patient at [phone]

## 2023-08-10 ENCOUNTER — CARE COORDINATION (OUTPATIENT)
Dept: CARE COORDINATION | Age: 58
End: 2023-08-10

## 2023-08-21 ENCOUNTER — CARE COORDINATION (OUTPATIENT)
Dept: CARE COORDINATION | Age: 58
End: 2023-08-21

## 2023-08-24 ENCOUNTER — OFFICE VISIT (OUTPATIENT)
Dept: FAMILY MEDICINE CLINIC | Age: 58
End: 2023-08-24
Payer: COMMERCIAL

## 2023-08-24 VITALS
BODY MASS INDEX: 35.1 KG/M2 | DIASTOLIC BLOOD PRESSURE: 68 MMHG | SYSTOLIC BLOOD PRESSURE: 138 MMHG | HEART RATE: 56 BPM | WEIGHT: 244.6 LBS | RESPIRATION RATE: 18 BRPM

## 2023-08-24 DIAGNOSIS — R42 VERTIGO: Primary | ICD-10-CM

## 2023-08-24 DIAGNOSIS — R09.81 SINUS CONGESTION: ICD-10-CM

## 2023-08-24 PROCEDURE — 99213 OFFICE O/P EST LOW 20 MIN: CPT | Performed by: NURSE PRACTITIONER

## 2023-08-24 RX ORDER — FLUTICASONE PROPIONATE 50 MCG
2 SPRAY, SUSPENSION (ML) NASAL DAILY
Qty: 48 G | Refills: 1 | Status: SHIPPED | OUTPATIENT
Start: 2023-08-24

## 2023-08-24 RX ORDER — GABAPENTIN 300 MG/1
1 CAPSULE ORAL 3 TIMES DAILY
COMMUNITY
Start: 2023-07-27

## 2023-08-24 RX ORDER — MECLIZINE HYDROCHLORIDE 25 MG/1
25 TABLET ORAL 3 TIMES DAILY PRN
Qty: 15 TABLET | Refills: 0 | Status: SHIPPED | OUTPATIENT
Start: 2023-08-24 | End: 2023-08-29

## 2023-08-24 ASSESSMENT — ENCOUNTER SYMPTOMS
SHORTNESS OF BREATH: 0
COUGH: 0
ABDOMINAL PAIN: 0
NAUSEA: 1
RHINORRHEA: 1

## 2023-08-24 NOTE — PROGRESS NOTES
Diallo Elias (1965) 59 y.o. male here for evaluation of the following chief complaint(s):      HPI:  Chief Complaint   Patient presents with    Dizziness     Started Sunday night    Nausea    Letter for School/Work       2nd times he has had episode in last 6 months. Room spinning. Nausea. Sweats. Movements in bed or sitting can set it up. Unsteady when he walks due to dizziness. Sinus congestion and pressure. Right side dominant. Had a fall due to dizziness and busted above his left eyebrow. DOI 8/21/23. Sterri strip by partner. Missed this whole week of work    Vitals:    08/24/23 0948   BP: 138/68   Pulse: 56   Resp: 18       Patient Active Problem List   Diagnosis    Adenocarcinoma of Prostate, GS 7, stage T1c. 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 9) with comorbidity ([address])    Partial nontraumatic amputation of left foot (HCC)       SUBJECTIVE/OBJECTIVE:  Review of Systems   Constitutional:  Negative for chills and fever. HENT:  Positive for congestion, postnasal drip and rhinorrhea. Respiratory:  Negative for cough and shortness of breath. Cardiovascular:  Negative for chest pain. Gastrointestinal:  Positive for nausea. Negative for abdominal pain. Skin:  Negative for rash. Neurological:  Positive for dizziness. Negative for light-headedness and headaches.

## 2023-08-28 ENCOUNTER — TELEPHONE (OUTPATIENT)
Dept: FAMILY MEDICINE CLINIC | Age: 58
End: 2023-08-28

## 2023-08-28 DIAGNOSIS — R42 VERTIGO: ICD-10-CM

## 2023-08-28 NOTE — TELEPHONE ENCOUNTER
I spoke with patient and he is still having balance issues and double vision. Medications does help but still with c/o.   Please advise

## 2023-08-28 NOTE — TELEPHONE ENCOUNTER
Will recommend take medications every 8 hours. Do the exercises for dizziness that he was given at appointment. Will extend off work till Wednesday. Next step would be to see PT. In meantime he can also reach out to chiropractor for vertigo treatments.

## 2023-08-28 NOTE — TELEPHONE ENCOUNTER
Patient called and stated that he is still having dizziness. He stated that he was seen the other day and was give medication and a letter for work. He stated that he was to go back to work today however he is still having dizziness. He wanted a work excuse today, he doesn't think he can go to work with the dizziness still going on. He is currently on 2nd shift he is to be at work today at 2:30. Patient also mentioned that after his appointment he started to have double vision. He stated that it is better then it was.

## 2023-08-29 ENCOUNTER — CARE COORDINATION (OUTPATIENT)
Dept: CARE COORDINATION | Age: 58
End: 2023-08-29

## 2023-08-29 RX ORDER — MECLIZINE HYDROCHLORIDE 25 MG/1
25 TABLET ORAL 3 TIMES DAILY PRN
Qty: 30 TABLET | Refills: 1 | Status: SHIPPED | OUTPATIENT
Start: 2023-08-29 | End: 2023-09-18

## 2023-08-29 SDOH — ECONOMIC STABILITY: HOUSING INSECURITY: IN THE LAST 12 MONTHS, HOW MANY PLACES HAVE YOU LIVED?: 1

## 2023-08-29 SDOH — HEALTH STABILITY: PHYSICAL HEALTH: ON AVERAGE, HOW MANY DAYS PER WEEK DO YOU ENGAGE IN MODERATE TO STRENUOUS EXERCISE (LIKE A BRISK WALK)?: 0 DAYS

## 2023-08-29 SDOH — ECONOMIC STABILITY: TRANSPORTATION INSECURITY
IN THE PAST 12 MONTHS, HAS THE LACK OF TRANSPORTATION KEPT YOU FROM MEDICAL APPOINTMENTS OR FROM GETTING MEDICATIONS?: NO

## 2023-08-29 SDOH — HEALTH STABILITY: PHYSICAL HEALTH: ON AVERAGE, HOW MANY MINUTES DO YOU ENGAGE IN EXERCISE AT THIS LEVEL?: 0 MIN

## 2023-08-29 SDOH — ECONOMIC STABILITY: INCOME INSECURITY: IN THE LAST 12 MONTHS, WAS THERE A TIME WHEN YOU WERE NOT ABLE TO PAY THE MORTGAGE OR RENT ON TIME?: NO

## 2023-08-29 SDOH — ECONOMIC STABILITY: TRANSPORTATION INSECURITY
IN THE PAST 12 MONTHS, HAS LACK OF TRANSPORTATION KEPT YOU FROM MEETINGS, WORK, OR FROM GETTING THINGS NEEDED FOR DAILY LIVING?: NO

## 2023-08-29 ASSESSMENT — SOCIAL DETERMINANTS OF HEALTH (SDOH)
IN A TYPICAL WEEK, HOW MANY TIMES DO YOU TALK ON THE PHONE WITH FAMILY, FRIENDS, OR NEIGHBORS?: MORE THAN THREE TIMES A WEEK
DO YOU BELONG TO ANY CLUBS OR ORGANIZATIONS SUCH AS CHURCH GROUPS UNIONS, FRATERNAL OR ATHLETIC GROUPS, OR SCHOOL GROUPS?: NO
HOW OFTEN DO YOU GET TOGETHER WITH FRIENDS OR RELATIVES?: MORE THAN THREE TIMES A WEEK
IN A TYPICAL WEEK, HOW MANY TIMES DO YOU TALK ON THE PHONE WITH FAMILY, FRIENDS, OR NEIGHBORS?: MORE THAN THREE TIMES A WEEK
ARE YOU MARRIED, WIDOWED, DIVORCED, SEPARATED, NEVER MARRIED, OR LIVING WITH A PARTNER?: LIVING WITH PARTNER
HOW OFTEN DO YOU ATTEND CHURCH OR RELIGIOUS SERVICES?: NEVER
HOW OFTEN DO YOU ATTENT MEETINGS OF THE CLUB OR ORGANIZATION YOU BELONG TO?: NEVER
HOW OFTEN DO YOU GET TOGETHER WITH FRIENDS OR RELATIVES?: MORE THAN THREE TIMES A WEEK
HOW OFTEN DO YOU ATTENT MEETINGS OF THE CLUB OR ORGANIZATION YOU BELONG TO?: NEVER
DO YOU BELONG TO ANY CLUBS OR ORGANIZATIONS SUCH AS CHURCH GROUPS UNIONS, FRATERNAL OR ATHLETIC GROUPS, OR SCHOOL GROUPS?: NO
HOW OFTEN DO YOU ATTEND CHURCH OR RELIGIOUS SERVICES?: NEVER

## 2023-08-29 NOTE — TELEPHONE ENCOUNTER
Spoke with patient and he verbalized understanding. He will stop in the office and  the letter. Informed patient he would return to work 9/1/23 Thursday. He verbalized understanding. He wanted us to add on his return to work slip that with no restrictions but if we could add to follow the foot doctor restrictions still. He stated that the foot doctor released him to go back to work a while ago when he had the hole on the back of his foot with the restriction to not be back on the floor because he cant wear boots right now. 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

## 2023-08-29 NOTE — TELEPHONE ENCOUNTER
Called patient and informed. He verbalized understanding. He also mention he will need a refill on the meclizine so he can take it every 8 hrs as advised.      Patient will check with his pharmacy around 12:00 today

## 2023-08-29 NOTE — CARE COORDINATION
Attempted to reach pt. No answer. Unable to leave VM. Unable to reach message sent via My Chart. D/c pt from care coordination d/t unable to reach.

## 2023-09-11 ENCOUNTER — PATIENT MESSAGE (OUTPATIENT)
Dept: FAMILY MEDICINE CLINIC | Age: 58
End: 2023-09-11

## 2023-09-11 DIAGNOSIS — L97.521 NON-HEALING ULCER OF FOOT, LEFT, LIMITED TO BREAKDOWN OF SKIN (HCC): ICD-10-CM

## 2023-09-11 DIAGNOSIS — H53.9 VISUAL DISTURBANCE: ICD-10-CM

## 2023-09-11 DIAGNOSIS — R42 DIZZINESS: Primary | ICD-10-CM

## 2023-09-11 DIAGNOSIS — R26.81 GAIT INSTABILITY: ICD-10-CM

## 2023-09-11 RX ORDER — ALPRAZOLAM 0.5 MG/1
TABLET ORAL
Qty: 1 TABLET | Refills: 0 | Status: SHIPPED | OUTPATIENT
Start: 2023-09-11 | End: 2023-09-14

## 2023-09-11 NOTE — TELEPHONE ENCOUNTER
From: Timbo Fuentes  To: Fermin Geri  Sent: 9/11/2023 10:04 AM EDT  Subject: Dizziness    I had met with my employers' nurse to turn in my off-work notes and at that time within our conversation on Friday 9/8/23 she stated she believes I should either have a CT scan or an MRI because of the amount of time involved in my issue with this problem to make sure nothing else is wrong other than the vertigo, dizziness and vision issues, I started with when I hit my head. Then today I went to the chiropractor for my visit and I told him there was times throughout the weekend when I would still have issues focusing my vision and get very dizzy while walking and it felt like my left leg was almost pulling me towards the left and causing me to lean into the wall in that direction or need help from [place] to maintain my forward direction. He also stated he believes I now need to be scanned. I am now worried about what is going on and unsure if therapy is gone to help.

## 2023-09-16 ENCOUNTER — HOSPITAL ENCOUNTER (OUTPATIENT)
Dept: MRI IMAGING | Age: 58
Discharge: HOME | End: 2023-09-16
Payer: COMMERCIAL

## 2023-09-16 DIAGNOSIS — R26.81 GAIT INSTABILITY: ICD-10-CM

## 2023-09-16 DIAGNOSIS — H53.9 VISUAL DISTURBANCE: ICD-10-CM

## 2023-09-16 DIAGNOSIS — R42 DIZZINESS: ICD-10-CM

## 2023-09-16 PROCEDURE — 70551 MRI BRAIN STEM W/O DYE: CPT

## 2023-09-18 DIAGNOSIS — H81.13 BENIGN PAROXYSMAL POSITIONAL VERTIGO DUE TO BILATERAL VESTIBULAR DISORDER: Primary | ICD-10-CM

## 2023-09-22 ENCOUNTER — HOSPITAL ENCOUNTER (OUTPATIENT)
Dept: PHYSICAL THERAPY | Age: 58
Setting detail: THERAPIES SERIES
Discharge: HOME | End: 2023-09-22
Payer: COMMERCIAL

## 2023-09-22 PROCEDURE — 97162 PT EVAL MOD COMPLEX 30 MIN: CPT

## 2023-09-28 ENCOUNTER — TELEPHONE (OUTPATIENT)
Dept: FAMILY MEDICINE CLINIC | Age: 58
End: 2023-09-28

## 2023-09-28 NOTE — TELEPHONE ENCOUNTER
Dequan Anderson with [facility] called office wanting to know pt's last office visit with us, advised 8/24/23. She is going to fax over the disability paperwork for completion.

## 2023-09-29 ENCOUNTER — HOSPITAL ENCOUNTER (OUTPATIENT)
Dept: PHYSICAL THERAPY | Age: 58
Setting detail: THERAPIES SERIES
Discharge: HOME | End: 2023-09-29
Payer: COMMERCIAL

## 2023-09-29 ENCOUNTER — PATIENT MESSAGE (OUTPATIENT)
Dept: FAMILY MEDICINE CLINIC | Age: 58
End: 2023-09-29

## 2023-09-29 PROCEDURE — 97112 NEUROMUSCULAR REEDUCATION: CPT

## 2023-09-29 PROCEDURE — 97116 GAIT TRAINING THERAPY: CPT

## 2023-09-29 PROCEDURE — 95992 CANALITH REPOSITIONING PROC: CPT

## 2023-09-29 NOTE — PROGRESS NOTES
appropriately, VOR training, balance training    Activity/Treatment Tolerance:  [x]  Patient tolerated treatment well  []  Patient limited by fatigue  []  Patient limited by pain   []  Patient limited by medical complications  []  Other:     Assessment: PT did email CNP, just starting treatment as had to wait for insurance to approve session, recommend off work to do 6 therapy sessions until 10/16. Did work on R AP canal with R canalith. GOALS:  Patient Goal: Get back to work    Short Term Goals:  Time Frame: 4 weeks  Patient will have negative bilateral loaded Rojas Hallpike test to allow for safe transitional motions. Patient will reduce Dizziness Handicap Inventory score from 74/100 to 50/100 to tolerate bending over, quick head movements, and getting in/out of bed. Patient will pass 2/4 tests on mCTSIB to improve balance for walking on level surfaces. Long Term Goals:  Time Frame: 8 weeks  Patient will tolerate walking with head turns and nods with straight path and no LOB for safe community mobility. Patient will reduce Dizziness Handicap Inventory score to 25/100 to be able to return to normal work duties. Patient will pass 4/4 tests on mCTSIB to tolerate walking on uneven terrain. Patient will be able to ambulate without hesitation and good foot clearance to decrease fall risk. Patient Education:   [x]  HEP/Education Completed: Plan of Care, Goals, Vestibular Education and Post Maneuver Precautions  [facility] Access Code:  []  No new Education completed  []  Reviewed Prior HEP      [x]  Patient verbalized and/or demonstrated understanding of education provided. []  Patient unable to verbalize and/or demonstrate understanding of education provided. Will continue education. []  Barriers to learning:     PLAN:  Treatment Recommendations: Balance Training, Neuromuscular Re-education, Home Exercise Program, Patient Education, and Vestibular Rehabilitation    [x]  Plan of care initiated.

## 2023-10-04 ENCOUNTER — HOSPITAL ENCOUNTER (OUTPATIENT)
Dept: PHYSICAL THERAPY | Age: 58
Setting detail: THERAPIES SERIES
Discharge: HOME | End: 2023-10-04
Payer: COMMERCIAL

## 2023-10-04 PROCEDURE — 95992 CANALITH REPOSITIONING PROC: CPT

## 2023-10-04 PROCEDURE — 97530 THERAPEUTIC ACTIVITIES: CPT

## 2023-10-04 NOTE — PROGRESS NOTES
moderate nystagmus  x    L canalith repositioning maneuver (L epley)   x    PT gait training walking patient out to car with emphasis on keeping head in neutral and scanning with eyes, min assist for walk out with off balance after maneuver   x                           Specific Interventions Next Treatment: recheck Bria Manjarrez, treat appropriately, VOR training, balance training    Activity/Treatment Tolerance:  [x]  Patient tolerated treatment well  []  Patient limited by fatigue  []  Patient limited by pain   []  Patient limited by medical complications  []  Other:     Assessment: off work until 10/16. Worked on L BPPV today, will recheck R on Friday. Is improving with less s/s high today    GOALS:  Patient Goal: Get back to work    Short Term Goals:  Time Frame: 4 weeks  Patient will have negative bilateral loaded Rojas Hallpike test to allow for safe transitional motions. Patient will reduce Dizziness Handicap Inventory score from 74/100 to 50/100 to tolerate bending over, quick head movements, and getting in/out of bed. Patient will pass 2/4 tests on mCTSIB to improve balance for walking on level surfaces. Long Term Goals:  Time Frame: 8 weeks  Patient will tolerate walking with head turns and nods with straight path and no LOB for safe community mobility. Patient will reduce Dizziness Handicap Inventory score to 25/100 to be able to return to normal work duties. Patient will pass 4/4 tests on mCTSIB to tolerate walking on uneven terrain. Patient will be able to ambulate without hesitation and good foot clearance to decrease fall risk. Patient Education:   [x]  HEP/Education Completed: Plan of Care, Goals, Vestibular Education and Post Maneuver Precautions  [facility] Access Code:  []  No new Education completed  []  Reviewed Prior HEP      [x]  Patient verbalized and/or demonstrated understanding of education provided.   []  Patient unable to verbalize and/or demonstrate understanding of

## 2023-10-06 ENCOUNTER — HOSPITAL ENCOUNTER (OUTPATIENT)
Dept: PHYSICAL THERAPY | Age: 58
Setting detail: THERAPIES SERIES
Discharge: HOME | End: 2023-10-06
Payer: COMMERCIAL

## 2023-10-06 PROCEDURE — 97112 NEUROMUSCULAR REEDUCATION: CPT

## 2023-10-06 PROCEDURE — 95992 CANALITH REPOSITIONING PROC: CPT

## 2023-10-06 NOTE — PROGRESS NOTES
patient out to car with emphasis on keeping head in neutral and scanning with eyes, min assist for walk out with off balance after maneuver                              Specific Interventions Next Treatment: recheck Krystyna Birmingham, treat appropriately, VOR training, balance training    Activity/Treatment Tolerance:  [x]  Patient tolerated treatment well  []  Patient limited by fatigue  []  Patient limited by pain   []  Patient limited by medical complications  []  Other:     Assessment: R александр brown pike 2/10 today, improving each visit with decreased intensity of s/s, will work on L next visit. GOALS:  Patient Goal: Get back to work    Short Term Goals:  Time Frame: 4 weeks  Patient will have negative bilateral loaded Александр Hallpike test to allow for safe transitional motions. Patient will reduce Dizziness Handicap Inventory score from 74/100 to 50/100 to tolerate bending over, quick head movements, and getting in/out of bed. Patient will pass 2/4 tests on mCTSIB to improve balance for walking on level surfaces. Long Term Goals:  Time Frame: 8 weeks  Patient will tolerate walking with head turns and nods with straight path and no LOB for safe community mobility. Patient will reduce Dizziness Handicap Inventory score to 25/100 to be able to return to normal work duties. Patient will pass 4/4 tests on mCTSIB to tolerate walking on uneven terrain. Patient will be able to ambulate without hesitation and good foot clearance to decrease fall risk. Patient Education:   [x]  HEP/Education Completed: Plan of Care, Goals, Vestibular Education and Post Maneuver Precautions  [facility] Access Code:  []  No new Education completed  []  Reviewed Prior HEP      [x]  Patient verbalized and/or demonstrated understanding of education provided. []  Patient unable to verbalize and/or demonstrate understanding of education provided. Will continue education.   []  Barriers to learning:     PLAN:  Treatment

## 2023-10-09 ENCOUNTER — HOSPITAL ENCOUNTER (OUTPATIENT)
Dept: PHYSICAL THERAPY | Age: 58
Setting detail: THERAPIES SERIES
Discharge: HOME | End: 2023-10-09
Payer: COMMERCIAL

## 2023-10-09 PROCEDURE — 97530 THERAPEUTIC ACTIVITIES: CPT

## 2023-10-09 PROCEDURE — 95992 CANALITH REPOSITIONING PROC: CPT

## 2023-10-11 ENCOUNTER — HOSPITAL ENCOUNTER (OUTPATIENT)
Dept: PHYSICAL THERAPY | Age: 58
Setting detail: THERAPIES SERIES
Discharge: HOME | End: 2023-10-11
Payer: COMMERCIAL

## 2023-10-11 PROCEDURE — 95992 CANALITH REPOSITIONING PROC: CPT

## 2023-10-11 PROCEDURE — 97530 THERAPEUTIC ACTIVITIES: CPT

## 2023-10-11 NOTE — PROGRESS NOTES
with off balance after maneuver                              Specific Interventions Next Treatment: recheck Nicholas Serum, treat appropriately, VOR training, balance training    Activity/Treatment Tolerance:  [x]  Patient tolerated treatment well  []  Patient limited by fatigue  []  Patient limited by pain   []  Patient limited by medical complications  []  Other:     Assessment: each visit s/s are lessening and less intense with testing and maneuver. Continue per POC    GOALS:  Patient Goal: Get back to work    Short Term Goals:  Time Frame: 4 weeks  Patient will have negative bilateral loaded Rojas Hallpike test to allow for safe transitional motions. Patient will reduce Dizziness Handicap Inventory score from 74/100 to 50/100 to tolerate bending over, quick head movements, and getting in/out of bed. Patient will pass 2/4 tests on mCTSIB to improve balance for walking on level surfaces. Long Term Goals:  Time Frame: 8 weeks  Patient will tolerate walking with head turns and nods with straight path and no LOB for safe community mobility. Patient will reduce Dizziness Handicap Inventory score to 25/100 to be able to return to normal work duties. Patient will pass 4/4 tests on mCTSIB to tolerate walking on uneven terrain. Patient will be able to ambulate without hesitation and good foot clearance to decrease fall risk. Patient Education:   [x]  HEP/Education Completed: Plan of Care, Goals, Vestibular Education and Post Maneuver Precautions  [facility] Access Code:  []  No new Education completed  []  Reviewed Prior HEP      [x]  Patient verbalized and/or demonstrated understanding of education provided. []  Patient unable to verbalize and/or demonstrate understanding of education provided. Will continue education.   []  Barriers to learning:     PLAN:  Treatment Recommendations: Balance Training, Neuromuscular Re-education, Home Exercise Program, Patient Education, and Vestibular

## 2023-10-16 ENCOUNTER — HOSPITAL ENCOUNTER (OUTPATIENT)
Dept: PHYSICAL THERAPY | Age: 58
Setting detail: THERAPIES SERIES
Discharge: HOME | End: 2023-10-16
Payer: COMMERCIAL

## 2023-10-16 PROCEDURE — 95992 CANALITH REPOSITIONING PROC: CPT

## 2023-10-16 PROCEDURE — 97530 THERAPEUTIC ACTIVITIES: CPT

## 2023-10-16 NOTE — PROGRESS NOTES
Ann  PHYSICAL THERAPY  [] VESTIBULAR EVALUATION  [] DAILY NOTE [x] PROGRESS NOTE [] DISCHARGE NOTE    [] [facility]   [x] [facility]    [] [facility]   [] Manuel Dan    Date: 10/16/2023  Patient Name:  Marsha Mullins  : 1965  MRN: [id number]  CSN: [id number]    Referring Practitioner GARCIA Ryan -*   Diagnosis Benign paroxysmal positional vertigo due to bilateral vestibular disorder [H81.13]    Treatment Diagnosis Vertigo, impaired balance, vestibular dysfunction   Date of Evaluation 23   Additional Pertinent History HTN, diabetes, anxiety/depression, obesity, arthritis, memory issues, prostate cancer. L forefoot amputation 2022      Functional Outcome Measure Used [facility]   Functional Outcome Score 74/100 eval (23) PN 52/100      Insurance: Primary: Payor: Spencer Luna /  /  / ,   Secondary:    Authorization Information: PRE CERTIFICATION REQUIRED: Rick Matamoros ( [phone] )   INSURANCE THERAPY BENEFIT:  61 VISITS ALLOWED COMBINED WITH PT OT AND ST AT A HARD MAX, NONE USED   AQUATIC THERAPY COVERED: YES  MODALITIES COVERED:  YES  RECEIVED AUTH FOR 6 PT VISITS FROM 23-23 FOR CPT CODES 77959, 17706, 23172. CPT CODE 57870 DOES NOT REQUIRE AUTH   Visit # 7, 7/10 for progress not   Visits Allowed: 1 + 6 =7   Recertification Date:    Physician Follow-Up: None scheduled/as needed. Off work until 10/16   Physician Orders:    History of Present Illness: Pt presents with vertigo that began ~1 month ago. It started on Saturday night and by  it took over and he fell and couldn't get up off the floor. Upon getting up, he went to climb into bed, arms gave out and he fell hitting his head. Dizziness described as having no control. Lying in bed with fan on him he felt better but upon getting up would become diaphoretic and nauseous. Pt had spinning sensations and double vision as well.   Pt was

## 2023-11-25 DIAGNOSIS — E78.5 DYSLIPIDEMIA: ICD-10-CM

## 2023-11-27 RX ORDER — ATORVASTATIN CALCIUM 40 MG/1
TABLET, FILM COATED ORAL
Qty: 30 TABLET | Refills: 5 | Status: SHIPPED | OUTPATIENT
Start: 2023-11-27

## 2023-12-11 RX ORDER — PIOGLITAZONE HCL AND METFORMIN HCL 500; 15 MG/1; MG/1
TABLET ORAL
Qty: 60 TABLET | Refills: 11 | Status: SHIPPED | OUTPATIENT
Start: 2023-12-11

## 2024-03-13 ENCOUNTER — OFFICE VISIT (OUTPATIENT)
Dept: FAMILY MEDICINE CLINIC | Age: 59
End: 2024-03-13
Payer: COMMERCIAL

## 2024-03-13 VITALS
HEART RATE: 84 BPM | BODY MASS INDEX: 36.65 KG/M2 | SYSTOLIC BLOOD PRESSURE: 138 MMHG | TEMPERATURE: 98 F | DIASTOLIC BLOOD PRESSURE: 80 MMHG | RESPIRATION RATE: 16 BRPM | WEIGHT: 256 LBS | HEIGHT: 70 IN

## 2024-03-13 DIAGNOSIS — B34.9 HEADACHE DUE TO VIRAL INFECTION: ICD-10-CM

## 2024-03-13 DIAGNOSIS — R51.9 HEADACHE DUE TO VIRAL INFECTION: ICD-10-CM

## 2024-03-13 DIAGNOSIS — K52.9 ACUTE GASTROENTERITIS: Primary | ICD-10-CM

## 2024-03-13 PROCEDURE — 99213 OFFICE O/P EST LOW 20 MIN: CPT | Performed by: NURSE PRACTITIONER

## 2024-03-13 PROCEDURE — G2211 COMPLEX E/M VISIT ADD ON: HCPCS | Performed by: NURSE PRACTITIONER

## 2024-03-13 RX ORDER — ONDANSETRON 4 MG/1
4 TABLET, FILM COATED ORAL EVERY 6 HOURS PRN
Qty: 20 TABLET | Refills: 0 | Status: SHIPPED | OUTPATIENT
Start: 2024-03-13 | End: 2024-03-18

## 2024-03-13 ASSESSMENT — PATIENT HEALTH QUESTIONNAIRE - PHQ9
1. LITTLE INTEREST OR PLEASURE IN DOING THINGS: 3
2. FEELING DOWN, DEPRESSED OR HOPELESS: 3
SUM OF ALL RESPONSES TO PHQ QUESTIONS 1-9: 19
SUM OF ALL RESPONSES TO PHQ QUESTIONS 1-9: 19
8. MOVING OR SPEAKING SO SLOWLY THAT OTHER PEOPLE COULD HAVE NOTICED. OR THE OPPOSITE, BEING SO FIGETY OR RESTLESS THAT YOU HAVE BEEN MOVING AROUND A LOT MORE THAN USUAL: 3
6. FEELING BAD ABOUT YOURSELF - OR THAT YOU ARE A FAILURE OR HAVE LET YOURSELF OR YOUR FAMILY DOWN: 3
5. POOR APPETITE OR OVEREATING: 1
4. FEELING TIRED OR HAVING LITTLE ENERGY: 3
9. THOUGHTS THAT YOU WOULD BE BETTER OFF DEAD, OR OF HURTING YOURSELF: 0
SUM OF ALL RESPONSES TO PHQ9 QUESTIONS 1 & 2: 6
SUM OF ALL RESPONSES TO PHQ QUESTIONS 1-9: 19
SUM OF ALL RESPONSES TO PHQ QUESTIONS 1-9: 19
10. IF YOU CHECKED OFF ANY PROBLEMS, HOW DIFFICULT HAVE THESE PROBLEMS MADE IT FOR YOU TO DO YOUR WORK, TAKE CARE OF THINGS AT HOME, OR GET ALONG WITH OTHER PEOPLE: 2
3. TROUBLE FALLING OR STAYING ASLEEP: 3
7. TROUBLE CONCENTRATING ON THINGS, SUCH AS READING THE NEWSPAPER OR WATCHING TELEVISION: 0

## 2024-03-13 ASSESSMENT — ENCOUNTER SYMPTOMS
DIARRHEA: 1
NAUSEA: 1
COUGH: 0
VOMITING: 1
ABDOMINAL PAIN: 0
SHORTNESS OF BREATH: 0

## 2024-03-13 NOTE — PROGRESS NOTES
Kannan Fuentes (1965) 59 y.o. male here for evaluation of the following chief complaint(s):      HPI:  Chief Complaint   Patient presents with    Headache     X 2 days, started on Sunday night.   Hasn't  been to work for the last couple days     Emesis    Diarrhea       Ongoing x 3 days with N/V/D.  HA as well.  Dry heaved this AM.  Last emesis was yesterday.   HA better today.     Has been able to keep fluid down.  Appetite decreased.   Food will still upset stomach.     Vitals:    03/13/24 1029   BP: 138/80   Pulse: 84   Resp: 16   Temp: 98 °F (36.7 °C)       Patient Active Problem List   Diagnosis    Adenocarcinoma of Prostate, GS 7, stage T1c.    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 Negative for chills and fever.   HENT: Negative.     Respiratory:  Negative for cough and shortness of breath.    Cardiovascular:  Negative for chest pain.   Gastrointestinal:  Positive for diarrhea, nausea and vomiting. Negative for abdominal pain.   Skin:  Negative for rash.   Neurological:  Positive for headaches. Negative for dizziness and light-headedness.   Psychiatric/Behavioral: Negative.         Physical Exam  Constitutional:       General: He is

## 2024-03-13 NOTE — PATIENT INSTRUCTIONS
span over three weeks.  The respiratory therapist schedules one of the appointments to occur 48 hours after the patient’s quit date.   Cost $100 total for the four sessions.  Tobacco cessation products are not included in the cost and are not provided by [facility].

## 2024-05-20 RX ORDER — AMLODIPINE BESYLATE AND BENAZEPRIL HYDROCHLORIDE 5; 10 MG/1; MG/1
CAPSULE ORAL 2 TIMES DAILY
Qty: 180 CAPSULE | Refills: 3 | OUTPATIENT
Start: 2024-05-20

## 2024-05-20 RX ORDER — CLOPIDOGREL BISULFATE 75 MG/1
75 TABLET ORAL DAILY
Qty: 90 TABLET | Refills: 3 | OUTPATIENT
Start: 2024-05-20

## 2024-05-21 ENCOUNTER — TELEPHONE (OUTPATIENT)
Dept: FAMILY MEDICINE CLINIC | Age: 59
End: 2024-05-21

## 2024-05-21 DIAGNOSIS — C61 PROSTATE CANCER (HCC): ICD-10-CM

## 2024-05-21 DIAGNOSIS — E78.5 DYSLIPIDEMIA: ICD-10-CM

## 2024-05-21 DIAGNOSIS — E11.9 CONTROLLED TYPE 2 DIABETES MELLITUS WITHOUT COMPLICATION, WITHOUT LONG-TERM CURRENT USE OF INSULIN (HCC): Primary | ICD-10-CM

## 2024-05-21 DIAGNOSIS — I10 PRIMARY HYPERTENSION: ICD-10-CM

## 2024-05-21 NOTE — TELEPHONE ENCOUNTER
----- Message from Latoya Ross sent at 5/21/2024 12:13 PM EDT -----  Regarding: ECC Message to Provider  ECC Message to Provider    Relationship to Patient: Self     Additional Information : Pt wanted to know if the Order for Bloodwork done has been sent to [facility] in [place]. Because Pt need to have Lab before he can have an appt for his Prescription  --------------------------------------------------------------------------------------------------------------------------    Call Back Information: OK to leave message on voicemail  Preferred Call Back Number: Phone  [phone]

## 2024-05-21 NOTE — FLOWSHEET NOTE
01/08/22 0013   Provider Notification   Reason for Communication New orders   Provider Name Dr. Pablo Bond   Provider Notification Advance Practice Clinician (CNS/NP/CNM/CRNA/PA)   Method of Communication Secure Message   Response See orders   Notification Time 0013   Pt in 24 [id number] is rating 10/10 pain. He had a transmetatarsal amputation today. I gave him 10 mg oxycodone and 10 mg flexeril, and it is not touching it. Can I get breakthrough ordered? Thanks. Patient Name: Beverly Rowan  MRN: [id number]  Today's Date: 5/21/2024     Current Problem:  1. Traumatic complete tear of right rotator cuff, subsequent encounter  Follow Up In Physical Therapy          Visit 18/20    Subjective:  Change in pain/ pain level: 1/10    Patient comments: spent 5 days in hospital with leg numbness, think it is some sort of infection     Objective: finger ladder #30      Treatment:    Therapeutic exercise (46159):   UBE   PullMydeo  ladder  Manual Therapy (09293):  Glenohumeral mobilization A-P Inferior Lateral grade 3/4 Multiple planes  Stretch elevation, IR, ER, cross body  Scapular mobilization all planes/ manual scapular stabilization  TPR subscapularis, Upper Trapezius, Pectoralis major, Pectoralis minor, latissimus dorsi    Assessment:  Patient continues to require active therapy to progress functional capabilities with decreasing pain levels and improving mechanics with functional activities including progression of Home exercise program. Tolerance to today's treatment was good. Muscle fatigue noted.  Patient appears motivated and compliant this date, demonstrating a working understanding of principals instructed and home program.    Plan:  Progress with functional strength as tolerated with respect to tissue healing. Manual therapy PRN to improve/maintain ROM, prevent adhesion, reduce pain.

## 2024-05-21 NOTE — TELEPHONE ENCOUNTER
Attempted to speak with patient regarding above, left message with female.  She states she will have him call the office today

## 2024-05-21 NOTE — TELEPHONE ENCOUNTER
Tried to contact patient to see what lab work he needed to have completed. If he was just needing lab orders in general prior to scheduling an appointment.     Unable to reach patient, left a message to call the office.

## 2024-05-22 ENCOUNTER — HOSPITAL ENCOUNTER (OUTPATIENT)
Age: 59
Discharge: HOME | End: 2024-05-22
Payer: COMMERCIAL

## 2024-05-22 DIAGNOSIS — C61 PROSTATE CANCER (HCC): ICD-10-CM

## 2024-05-22 DIAGNOSIS — I10 PRIMARY HYPERTENSION: ICD-10-CM

## 2024-05-22 DIAGNOSIS — E11.9 CONTROLLED TYPE 2 DIABETES MELLITUS WITHOUT COMPLICATION, WITHOUT LONG-TERM CURRENT USE OF INSULIN (HCC): ICD-10-CM

## 2024-05-22 DIAGNOSIS — E78.5 DYSLIPIDEMIA: ICD-10-CM

## 2024-05-22 LAB
ALBUMIN SERPL BCG-MCNC: 4.4 G/DL (ref 3.5–5.1)
ALP SERPL-CCNC: 62 U/L (ref 38–126)
ALT SERPL W/O P-5'-P-CCNC: 13 U/L (ref 11–66)
ANION GAP SERPL CALC-SCNC: 12 MEQ/L (ref 8–16)
AST SERPL-CCNC: 19 U/L (ref 5–40)
BASOPHILS ABSOLUTE: 0.1 THOU/MM3 (ref 0–0.1)
BASOPHILS NFR BLD AUTO: 0.7 %
BILIRUB SERPL-MCNC: 0.7 MG/DL (ref 0.3–1.2)
BUN SERPL-MCNC: 17 MG/DL (ref 7–22)
CALCIUM SERPL-MCNC: 9.5 MG/DL (ref 8.5–10.5)
CHLORIDE SERPL-SCNC: 104 MEQ/L (ref 98–111)
CHOLEST SERPL-MCNC: 200 MG/DL (ref 100–199)
CO2 SERPL-SCNC: 23 MEQ/L (ref 23–33)
CREAT SERPL-MCNC: 1 MG/DL (ref 0.4–1.2)
CREAT UR-MCNC: 403.7 MG/DL
DEPRECATED RDW RBC AUTO: 50.2 FL (ref 35–45)
EOSINOPHIL NFR BLD AUTO: 2.3 %
EOSINOPHILS ABSOLUTE: 0.2 THOU/MM3 (ref 0–0.4)
ERYTHROCYTE [DISTWIDTH] IN BLOOD BY AUTOMATED COUNT: 13.9 % (ref 11.5–14.5)
GFR SERPL CREATININE-BSD FRML MDRD: 86 ML/MIN/1.73M2
GLUCOSE SERPL-MCNC: 132 MG/DL (ref 70–108)
HCT VFR BLD AUTO: 48.9 % (ref 42–52)
HDLC SERPL-MCNC: 32 MG/DL
HGB BLD-MCNC: 16 GM/DL (ref 14–18)
IMM GRANULOCYTES # BLD AUTO: 0.03 THOU/MM3 (ref 0–0.07)
IMM GRANULOCYTES NFR BLD AUTO: 0.3 %
LDLC SERPL CALC-MCNC: 137 MG/DL
LYMPHOCYTES ABSOLUTE: 2.4 THOU/MM3 (ref 1–4.8)
LYMPHOCYTES NFR BLD AUTO: 25.8 %
MCH RBC QN AUTO: 32 PG (ref 26–33)
MCHC RBC AUTO-ENTMCNC: 32.7 GM/DL (ref 32.2–35.5)
MCV RBC AUTO: 97.8 FL (ref 80–94)
MICROALBUMIN UR-MCNC: 38.33 MG/DL
MICROALBUMIN/CREAT RATIO PNL UR: 95 MG/G (ref 0–30)
MONOCYTES ABSOLUTE: 0.5 THOU/MM3 (ref 0.4–1.3)
MONOCYTES NFR BLD AUTO: 5.6 %
NEUTROPHILS ABSOLUTE: 6.1 THOU/MM3 (ref 1.8–7.7)
NEUTROPHILS NFR BLD AUTO: 65.3 %
NRBC BLD AUTO-RTO: 0 /100 WBC
PLATELET # BLD AUTO: 181 THOU/MM3 (ref 130–400)
PMV BLD AUTO: 11.8 FL (ref 9.4–12.4)
POTASSIUM SERPL-SCNC: 4.2 MEQ/L (ref 3.5–5.2)
PROT SERPL-MCNC: 7 G/DL (ref 6.1–8)
RBC # BLD AUTO: 5 MILL/MM3 (ref 4.7–6.1)
SODIUM SERPL-SCNC: 139 MEQ/L (ref 135–145)
TRIGL SERPL-MCNC: 154 MG/DL (ref 0–199)
TSH SERPL DL<=0.005 MIU/L-ACNC: 1.42 UIU/ML (ref 0.4–4.2)
WBC # BLD AUTO: 9.4 THOU/MM3 (ref 4.8–10.8)

## 2024-05-22 PROCEDURE — 85025 COMPLETE CBC W/AUTO DIFF WBC: CPT

## 2024-05-22 PROCEDURE — 84443 ASSAY THYROID STIM HORMONE: CPT

## 2024-05-22 PROCEDURE — 83036 HEMOGLOBIN GLYCOSYLATED A1C: CPT

## 2024-05-22 PROCEDURE — 36415 COLL VENOUS BLD VENIPUNCTURE: CPT

## 2024-05-22 PROCEDURE — 82043 UR ALBUMIN QUANTITATIVE: CPT

## 2024-05-22 PROCEDURE — 80053 COMPREHEN METABOLIC PANEL: CPT

## 2024-05-22 PROCEDURE — 80061 LIPID PANEL: CPT

## 2024-05-22 PROCEDURE — 84153 ASSAY OF PSA TOTAL: CPT

## 2024-05-23 LAB
DEPRECATED MEAN GLUCOSE BLD GHB EST-ACNC: 117 MG/DL (ref 70–126)
HBA1C MFR BLD HPLC: 5.9 % (ref 4.4–6.4)
PSA SERPL-MCNC: 0.12 NG/ML (ref 0–1)

## 2024-05-24 ENCOUNTER — OFFICE VISIT (OUTPATIENT)
Dept: FAMILY MEDICINE CLINIC | Age: 59
End: 2024-05-24
Payer: COMMERCIAL

## 2024-05-24 VITALS
RESPIRATION RATE: 16 BRPM | BODY MASS INDEX: 36.4 KG/M2 | SYSTOLIC BLOOD PRESSURE: 138 MMHG | DIASTOLIC BLOOD PRESSURE: 64 MMHG | HEART RATE: 76 BPM | WEIGHT: 253.7 LBS

## 2024-05-24 DIAGNOSIS — I10 PRIMARY HYPERTENSION: ICD-10-CM

## 2024-05-24 DIAGNOSIS — E11.9 CONTROLLED TYPE 2 DIABETES MELLITUS WITHOUT COMPLICATION, WITHOUT LONG-TERM CURRENT USE OF INSULIN (HCC): Primary | ICD-10-CM

## 2024-05-24 DIAGNOSIS — E78.00 PURE HYPERCHOLESTEROLEMIA: ICD-10-CM

## 2024-05-24 DIAGNOSIS — R09.81 SINUS CONGESTION: ICD-10-CM

## 2024-05-24 DIAGNOSIS — I73.9 PAD (PERIPHERAL ARTERY DISEASE) (HCC): ICD-10-CM

## 2024-05-24 DIAGNOSIS — E66.01 SEVERE OBESITY (BMI 35.0-39.9) WITH COMORBIDITY (HCC): ICD-10-CM

## 2024-05-24 DIAGNOSIS — G47.33 OBSTRUCTIVE SLEEP APNEA ON CPAP: ICD-10-CM

## 2024-05-24 DIAGNOSIS — Z89.432 PARTIAL NONTRAUMATIC AMPUTATION OF LEFT FOOT (HCC): ICD-10-CM

## 2024-05-24 DIAGNOSIS — E78.5 DYSLIPIDEMIA: ICD-10-CM

## 2024-05-24 PROBLEM — E11.29 CONTROLLED TYPE 2 DIABETES MELLITUS WITH MICROALBUMINURIA (HCC): Status: RESOLVED | Noted: 2017-03-22 | Resolved: 2024-05-24

## 2024-05-24 PROBLEM — E11.22 TYPE 2 DIABETES MELLITUS WITH CHRONIC KIDNEY DISEASE (HCC): Status: ACTIVE | Noted: 2024-05-24

## 2024-05-24 PROBLEM — L97.529 DIABETIC ULCER OF LEFT FOOT DUE TO TYPE 2 DIABETES MELLITUS (HCC): Status: RESOLVED | Noted: 2022-01-01 | Resolved: 2024-05-24

## 2024-05-24 PROBLEM — I70.245 ATHSCL NATIVE ARTERIES OF LEFT LEG W ULCERATION OTH PRT FOOT (HCC): Status: RESOLVED | Noted: 2023-05-01 | Resolved: 2024-05-24

## 2024-05-24 PROBLEM — E11.621 DIABETIC ULCER OF LEFT FOOT DUE TO TYPE 2 DIABETES MELLITUS (HCC): Status: RESOLVED | Noted: 2022-01-01 | Resolved: 2024-05-24

## 2024-05-24 PROBLEM — E11.22 TYPE 2 DIABETES MELLITUS WITH CHRONIC KIDNEY DISEASE (HCC): Status: RESOLVED | Noted: 2024-05-24 | Resolved: 2024-05-24

## 2024-05-24 PROBLEM — R80.9 CONTROLLED TYPE 2 DIABETES MELLITUS WITH MICROALBUMINURIA (HCC): Status: RESOLVED | Noted: 2017-03-22 | Resolved: 2024-05-24

## 2024-05-24 PROBLEM — L97.521: Status: RESOLVED | Noted: 2023-05-01 | Resolved: 2024-05-24

## 2024-05-24 PROCEDURE — 3075F SYST BP GE 130 - 139MM HG: CPT | Performed by: FAMILY MEDICINE

## 2024-05-24 PROCEDURE — 3078F DIAST BP <80 MM HG: CPT | Performed by: FAMILY MEDICINE

## 2024-05-24 PROCEDURE — 99214 OFFICE O/P EST MOD 30 MIN: CPT | Performed by: FAMILY MEDICINE

## 2024-05-24 PROCEDURE — 3044F HG A1C LEVEL LT 7.0%: CPT | Performed by: FAMILY MEDICINE

## 2024-05-24 PROCEDURE — G2211 COMPLEX E/M VISIT ADD ON: HCPCS | Performed by: FAMILY MEDICINE

## 2024-05-24 RX ORDER — FLUTICASONE PROPIONATE 50 MCG
2 SPRAY, SUSPENSION (ML) NASAL DAILY
Qty: 48 G | Refills: 1 | Status: SHIPPED | OUTPATIENT
Start: 2024-05-24

## 2024-05-24 RX ORDER — CLOPIDOGREL BISULFATE 75 MG/1
75 TABLET ORAL DAILY
Qty: 90 TABLET | Refills: 3 | Status: SHIPPED | OUTPATIENT
Start: 2024-05-24

## 2024-05-24 RX ORDER — ATORVASTATIN CALCIUM 80 MG/1
80 TABLET, FILM COATED ORAL NIGHTLY
Qty: 90 TABLET | Refills: 3 | Status: SHIPPED | OUTPATIENT
Start: 2024-05-24

## 2024-05-24 SDOH — ECONOMIC STABILITY: FOOD INSECURITY: WITHIN THE PAST 12 MONTHS, YOU WORRIED THAT YOUR FOOD WOULD RUN OUT BEFORE YOU GOT MONEY TO BUY MORE.: NEVER TRUE

## 2024-05-24 SDOH — ECONOMIC STABILITY: FOOD INSECURITY: WITHIN THE PAST 12 MONTHS, THE FOOD YOU BOUGHT JUST DIDN'T LAST AND YOU DIDN'T HAVE MONEY TO GET MORE.: NEVER TRUE

## 2024-05-24 SDOH — ECONOMIC STABILITY: INCOME INSECURITY: HOW HARD IS IT FOR YOU TO PAY FOR THE VERY BASICS LIKE FOOD, HOUSING, MEDICAL CARE, AND HEATING?: NOT HARD AT ALL

## 2024-05-24 ASSESSMENT — ENCOUNTER SYMPTOMS
RESPIRATORY NEGATIVE: 1
GASTROINTESTINAL NEGATIVE: 1

## 2024-05-24 NOTE — PROGRESS NOTES
daily Yes Fernando Alarcon DO   pioglitazone-metFORMIN (ACTOPLUS MET)  MG per tablet take 1 tablet by mouth twice a day with meals Yes Fernando Alarcon DO   atorvastatin (LIPITOR) 40 MG tablet take 1 tablet by mouth every evening Yes Fernando Alarcon DO   gabapentin (NEURONTIN) 300 MG capsule Take 1 capsule by mouth 3 times daily. Yes Reymundo Elena MD   escitalopram (LEXAPRO) 10 MG tablet take 1 tablet by mouth once daily Yes Fernando Alarcon DO   amLODIPine-benazepril (LOTREL) 5-10 MG per capsule Take 1 capsule by mouth in the morning and at bedtime Yes Fernando Alarcon DO   acetaminophen (TYLENOL) 500 MG tablet Take 2 tablets by mouth in the morning and at bedtime Yes Reymundo Elena MD   Naproxen Sodium 220 MG CAPS Take 1 tablet by mouth in the morning and at bedtime Yes Reymundo Elena MD   Multiple Vitamin (MULTIVITAMIN ADULT PO) Take by mouth Yes Reymundo Elena MD   aspirin 81 MG EC tablet Take 1 tablet by mouth daily Yes Reymundo Elena MD        Allergies   Allergen Reactions    Pcn [Penicillins] Hives       Past Medical History:   Diagnosis Date    Back pain     HERNIATED DISKS    Carpal tunnel syndrome of right wrist 10/22/2012    Hyperlipidemia     Hypertension     Hypogonadism male     Nocturia     Obesity     Osteoarthritis     Prostate cancer (HCC) 12-    GLEASONS 3+4=7 ON RT/3+3=6 ON LT/T1C    Restless leg syndrome 8/2/2016    Shortness of breath     Sleep apnea 10-    HAS C-PAP MACHINE    Tobacco abuse     Tobacco user     Type II or unspecified type diabetes mellitus without mention of complication, not stated as uncontrolled     Urgency of urination        Past Surgical History:   Procedure Laterality Date    CARPAL TUNNEL RELEASE  10/22/12    right    CARPAL TUNNEL RELEASE  11/26/2012    left    COLONOSCOPY  2/1/2015    CYST REMOVAL  T    RT SIDE UPPER BACK,     CYST REMOVAL  7-5-12    excision of right shoulder

## 2024-06-03 RX ORDER — AMLODIPINE BESYLATE AND BENAZEPRIL HYDROCHLORIDE 5; 10 MG/1; MG/1
CAPSULE ORAL 2 TIMES DAILY
Qty: 180 CAPSULE | Refills: 3 | Status: SHIPPED | OUTPATIENT
Start: 2024-06-03

## 2024-08-11 DIAGNOSIS — I73.9 PAD (PERIPHERAL ARTERY DISEASE) (HCC): ICD-10-CM

## 2024-08-12 RX ORDER — CLOPIDOGREL BISULFATE 75 MG/1
75 TABLET ORAL DAILY
Qty: 90 TABLET | Refills: 3 | Status: SHIPPED | OUTPATIENT
Start: 2024-08-12

## 2024-09-09 ENCOUNTER — OFFICE VISIT (OUTPATIENT)
Dept: FAMILY MEDICINE CLINIC | Age: 59
End: 2024-09-09
Payer: COMMERCIAL

## 2024-09-09 VITALS — RESPIRATION RATE: 16 BRPM | HEART RATE: 76 BPM | SYSTOLIC BLOOD PRESSURE: 172 MMHG | DIASTOLIC BLOOD PRESSURE: 80 MMHG

## 2024-09-09 DIAGNOSIS — E78.00 PURE HYPERCHOLESTEROLEMIA: ICD-10-CM

## 2024-09-09 DIAGNOSIS — I10 HYPERTENSION, UNSPECIFIED TYPE: ICD-10-CM

## 2024-09-09 DIAGNOSIS — R42 EPISODIC LIGHTHEADEDNESS: Primary | ICD-10-CM

## 2024-09-09 DIAGNOSIS — I73.9 PAD (PERIPHERAL ARTERY DISEASE) (HCC): ICD-10-CM

## 2024-09-09 DIAGNOSIS — G62.9 PERIPHERAL POLYNEUROPATHY: ICD-10-CM

## 2024-09-09 DIAGNOSIS — E11.9 CONTROLLED TYPE 2 DIABETES MELLITUS WITHOUT COMPLICATION, WITHOUT LONG-TERM CURRENT USE OF INSULIN (HCC): ICD-10-CM

## 2024-09-09 DIAGNOSIS — F32.A DEPRESSION, UNSPECIFIED DEPRESSION TYPE: ICD-10-CM

## 2024-09-09 PROCEDURE — 3044F HG A1C LEVEL LT 7.0%: CPT | Performed by: FAMILY MEDICINE

## 2024-09-09 PROCEDURE — 3077F SYST BP >= 140 MM HG: CPT | Performed by: FAMILY MEDICINE

## 2024-09-09 PROCEDURE — G2211 COMPLEX E/M VISIT ADD ON: HCPCS | Performed by: FAMILY MEDICINE

## 2024-09-09 PROCEDURE — 3079F DIAST BP 80-89 MM HG: CPT | Performed by: FAMILY MEDICINE

## 2024-09-09 PROCEDURE — 99214 OFFICE O/P EST MOD 30 MIN: CPT | Performed by: FAMILY MEDICINE

## 2024-09-09 RX ORDER — ESCITALOPRAM OXALATE 10 MG/1
10 TABLET ORAL DAILY
Qty: 90 TABLET | Refills: 3 | Status: SHIPPED | OUTPATIENT
Start: 2024-09-09

## 2024-09-09 RX ORDER — GABAPENTIN 300 MG/1
300 CAPSULE ORAL 3 TIMES DAILY
Qty: 270 CAPSULE | Refills: 3 | Status: SHIPPED | OUTPATIENT
Start: 2024-09-09 | End: 2025-09-09

## 2024-09-09 RX ORDER — ATORVASTATIN CALCIUM 80 MG/1
80 TABLET, FILM COATED ORAL NIGHTLY
Qty: 90 TABLET | Refills: 3 | Status: SHIPPED | OUTPATIENT
Start: 2024-09-09

## 2024-09-09 RX ORDER — CLOPIDOGREL BISULFATE 75 MG/1
75 TABLET ORAL DAILY
Qty: 90 TABLET | Refills: 3 | Status: SHIPPED | OUTPATIENT
Start: 2024-09-09

## 2024-09-09 RX ORDER — PIOGLITAZONE HCL AND METFORMIN HCL 500; 15 MG/1; MG/1
1 TABLET ORAL 2 TIMES DAILY WITH MEALS
Qty: 180 TABLET | Refills: 3 | Status: SHIPPED | OUTPATIENT
Start: 2024-09-09

## 2024-09-09 RX ORDER — AMLODIPINE AND BENAZEPRIL HYDROCHLORIDE 5; 10 MG/1; MG/1
1 CAPSULE ORAL DAILY
Qty: 90 CAPSULE | Refills: 3 | Status: SHIPPED | OUTPATIENT
Start: 2024-09-09

## 2024-09-09 ASSESSMENT — ENCOUNTER SYMPTOMS
GASTROINTESTINAL NEGATIVE: 1
SHORTNESS OF BREATH: 0
CHEST TIGHTNESS: 0
RESPIRATORY NEGATIVE: 1

## 2024-09-16 ENCOUNTER — TELEMEDICINE (OUTPATIENT)
Dept: FAMILY MEDICINE CLINIC | Age: 59
End: 2024-09-16
Payer: COMMERCIAL

## 2024-09-16 DIAGNOSIS — M51.36 DDD (DEGENERATIVE DISC DISEASE), LUMBAR: ICD-10-CM

## 2024-09-16 DIAGNOSIS — M54.16 LUMBAR RADICULAR PAIN: ICD-10-CM

## 2024-09-16 DIAGNOSIS — I73.9 CLAUDICATION (HCC): ICD-10-CM

## 2024-09-16 DIAGNOSIS — G62.9 PERIPHERAL POLYNEUROPATHY: ICD-10-CM

## 2024-09-16 DIAGNOSIS — R26.81 GAIT INSTABILITY: ICD-10-CM

## 2024-09-16 DIAGNOSIS — I73.9 PAD (PERIPHERAL ARTERY DISEASE) (HCC): ICD-10-CM

## 2024-09-16 DIAGNOSIS — M79.604 BILATERAL LEG PAIN: Primary | ICD-10-CM

## 2024-09-16 DIAGNOSIS — M79.605 BILATERAL LEG PAIN: Primary | ICD-10-CM

## 2024-09-16 PROCEDURE — 99443 PR PHYS/QHP TELEPHONE EVALUATION 21-30 MIN: CPT | Performed by: FAMILY MEDICINE

## 2024-09-16 ASSESSMENT — ENCOUNTER SYMPTOMS
RESPIRATORY NEGATIVE: 1
GASTROINTESTINAL NEGATIVE: 1

## 2024-09-17 ENCOUNTER — TELEPHONE (OUTPATIENT)
Dept: FAMILY MEDICINE CLINIC | Age: 59
End: 2024-09-17

## 2024-09-17 ASSESSMENT — ENCOUNTER SYMPTOMS: BACK PAIN: 1

## 2024-09-18 ENCOUNTER — TELEPHONE (OUTPATIENT)
Dept: FAMILY MEDICINE CLINIC | Age: 59
End: 2024-09-18

## 2024-09-23 ENCOUNTER — HOSPITAL ENCOUNTER (OUTPATIENT)
Dept: INTERVENTIONAL RADIOLOGY/VASCULAR | Age: 59
Discharge: HOME | End: 2024-09-25
Attending: FAMILY MEDICINE
Payer: COMMERCIAL

## 2024-09-23 DIAGNOSIS — M79.605 BILATERAL LEG PAIN: ICD-10-CM

## 2024-09-23 DIAGNOSIS — I73.9 PAD (PERIPHERAL ARTERY DISEASE) (HCC): ICD-10-CM

## 2024-09-23 DIAGNOSIS — M79.604 BILATERAL LEG PAIN: ICD-10-CM

## 2024-09-23 DIAGNOSIS — I73.9 CLAUDICATION (HCC): ICD-10-CM

## 2024-09-23 PROCEDURE — 93922 UPR/L XTREMITY ART 2 LEVELS: CPT

## 2024-09-24 ENCOUNTER — TELEMEDICINE (OUTPATIENT)
Dept: FAMILY MEDICINE CLINIC | Age: 59
End: 2024-09-24
Payer: COMMERCIAL

## 2024-09-24 DIAGNOSIS — M79.605 BILATERAL LEG PAIN: ICD-10-CM

## 2024-09-24 DIAGNOSIS — M79.604 BILATERAL LEG PAIN: ICD-10-CM

## 2024-09-24 DIAGNOSIS — I73.9 CLAUDICATION (HCC): Primary | ICD-10-CM

## 2024-09-24 DIAGNOSIS — I73.9 PAD (PERIPHERAL ARTERY DISEASE) (HCC): ICD-10-CM

## 2024-09-24 PROCEDURE — 99442 PR PHYS/QHP TELEPHONE EVALUATION 11-20 MIN: CPT | Performed by: FAMILY MEDICINE

## 2024-09-24 ASSESSMENT — ENCOUNTER SYMPTOMS
BACK PAIN: 1
RESPIRATORY NEGATIVE: 1
GASTROINTESTINAL NEGATIVE: 1

## 2024-10-08 ENCOUNTER — HOSPITAL ENCOUNTER (OUTPATIENT)
Dept: PHYSICAL THERAPY | Age: 59
Setting detail: THERAPIES SERIES
Discharge: HOME | End: 2024-10-08
Payer: COMMERCIAL

## 2024-10-08 PROCEDURE — 97163 PT EVAL HIGH COMPLEX 45 MIN: CPT

## 2024-10-08 PROCEDURE — 97110 THERAPEUTIC EXERCISES: CPT

## 2024-10-08 NOTE — PROGRESS NOTES
* PLEASE SIGN, DATE AND TIME CERTIFICATION BELOW AND RETURN TO [facility] OUTPATIENT REHABILITATION (FAX #: [phone]).  ATTEST/CO-SIGN IF ACCESSING VIA [employer].  THANK YOU.**    I certify that I have examined the patient below and determined that Physical Medicine and Rehabilitation service is necessary and that I approve the established plan of care for up to 90 days or as specifically noted.  Attestation, signature or co-signature of physician indicates approval of certification requirements.    ________________________ ____________  Physician Signature   Date   [facility]  PHYSICAL THERAPY  [x] EVALUATION  [] DAILY NOTE (LAND) [] DAILY NOTE (AQUATIC ) [] PROGRESS NOTE [] DISCHARGE NOTE    [] OUTPATIENT REHABILITATION [facility]   [] [facility] CARE [place]    [x] [facility]   [] [facility]    Date: 10/8/2024  Patient Name:  Kannan Fuentes  : 1965  MRN: [id number]  CSN: [id number]    Referring Practitioner Boogie Jordan MD [id number]      Diagnosis Radiculopathy, lumbar region  Radiculopathy, cervical region   Treatment Diagnosis M54.59  Other Low Back Pain  M54.59  Other Low Back Pain  R53.1 Weakness  M62.81 Generalized Muscle Weakness   Date of Evaluation 10/8/24   Additional Pertinent History Kannan Fuentes has a past medical history of Back pain, Carpal tunnel syndrome of right wrist, Hyperlipidemia, Hypertension, Hypogonadism male, Nocturia, Obesity, Osteoarthritis, Prostate cancer (HCC), Restless leg syndrome, Shortness of breath, Sleep apnea, Tobacco abuse, Tobacco user, Type II or unspecified type diabetes mellitus without mention of complication, not stated as uncontrolled, and Urgency of urination.  he has a past surgical history that includes Hand surgery (); Leg Surgery; knee surgery (?); Prostate surgery (2009); Prostate surgery (2010); Leg Surgery (12); cyst removal (T); Skin tag removal (T); cyst removal (12);

## 2024-10-10 ENCOUNTER — HOSPITAL ENCOUNTER (OUTPATIENT)
Dept: PHYSICAL THERAPY | Age: 59
Setting detail: THERAPIES SERIES
Discharge: HOME | End: 2024-10-10
Payer: COMMERCIAL

## 2024-10-10 PROCEDURE — 97032 APPL MODALITY 1+ESTIM EA 15: CPT

## 2024-10-10 PROCEDURE — 97110 THERAPEUTIC EXERCISES: CPT

## 2024-10-10 PROCEDURE — 97035 APP MDLTY 1+ULTRASOUND EA 15: CPT

## 2024-10-10 NOTE — PROGRESS NOTES
[facility]  PHYSICAL THERAPY  [] EVALUATION  [x] DAILY NOTE (LAND) [] DAILY NOTE (AQUATIC ) [] PROGRESS NOTE [] DISCHARGE NOTE    [] OUTPATIENT REHABILITATION CENTER [facility]   [] [place] [facility]    [x] [facility]   [] [facility]    Date: 10/10/2024  Patient Name:  Kannan Fuentes  : 1965  MRN: [id number]  CSN: [id number]    Referring Practitioner Boogie Jordan MD [id number]      Diagnosis Radiculopathy, lumbar region  Radiculopathy, cervical region   Treatment Diagnosis M54.59  Other Low Back Pain  M54.59  Other Low Back Pain  R53.1 Weakness  M62.81 Generalized Muscle Weakness   Date of Evaluation 10/8/24   Additional Pertinent History Kannan Fuentes has a past medical history of Back pain, Carpal tunnel syndrome of right wrist, Hyperlipidemia, Hypertension, Hypogonadism male, Nocturia, Obesity, Osteoarthritis, Prostate cancer (HCC), Restless leg syndrome, Shortness of breath, Sleep apnea, Tobacco abuse, Tobacco user, Type II or unspecified type diabetes mellitus without mention of complication, not stated as uncontrolled, and Urgency of urination.  he has a past surgical history that includes Hand surgery (); Leg Surgery; knee surgery (?); Prostate surgery (2009); Prostate surgery (2010); Leg Surgery (12); cyst removal (T); Skin tag removal (T); cyst removal (12); Carpal tunnel release (10/22/12); Carpal tunnel release (2012); Colonoscopy (2015); Toe amputation (Left, 2022); and Toe amputation (Left, 2022).     L forefoot amputation 2022    Allergies Allergies   Allergen Reactions    Pcn [Penicillins] Hives      Medications   Current Outpatient Medications:     amLODIPine-benazepril (LOTREL) 5-10 MG per capsule, Take 1 capsule by mouth daily, Disp: 90 capsule, Rfl: 3    atorvastatin (LIPITOR) 80 MG tablet, Take 1 tablet by mouth nightly, Disp: 90 tablet, Rfl: 3    clopidogrel (PLAVIX) 75 MG tablet, Take 1

## 2024-10-11 ENCOUNTER — HOSPITAL ENCOUNTER (OUTPATIENT)
Dept: INTERVENTIONAL RADIOLOGY/VASCULAR | Age: 59
Discharge: HOME | End: 2024-10-11
Attending: RADIOLOGY | Admitting: RADIOLOGY
Payer: COMMERCIAL

## 2024-10-11 VITALS
DIASTOLIC BLOOD PRESSURE: 72 MMHG | RESPIRATION RATE: 14 BRPM | BODY MASS INDEX: 34.15 KG/M2 | SYSTOLIC BLOOD PRESSURE: 179 MMHG | TEMPERATURE: 98.4 F | OXYGEN SATURATION: 97 % | WEIGHT: 238.54 LBS | HEIGHT: 70 IN | HEART RATE: 53 BPM

## 2024-10-11 DIAGNOSIS — I73.9 PVD (PERIPHERAL VASCULAR DISEASE) (HCC): ICD-10-CM

## 2024-10-11 DIAGNOSIS — Z72.0 TOBACCO ABUSE: ICD-10-CM

## 2024-10-11 DIAGNOSIS — Z89.432 PARTIAL NONTRAUMATIC AMPUTATION OF LEFT FOOT (HCC): ICD-10-CM

## 2024-10-11 DIAGNOSIS — G25.81 RESTLESS LEG SYNDROME: ICD-10-CM

## 2024-10-11 DIAGNOSIS — E78.5 DYSLIPIDEMIA: ICD-10-CM

## 2024-10-11 LAB
APTT PPP: 31.9 SECONDS (ref 22–38)
CREAT SERPL-MCNC: 1.2 MG/DL (ref 0.4–1.2)
DEPRECATED RDW RBC AUTO: 48.9 FL (ref 35–45)
ERYTHROCYTE [DISTWIDTH] IN BLOOD BY AUTOMATED COUNT: 14.7 % (ref 11.5–14.5)
GFR SERPL CREATININE-BSD FRML MDRD: 69 ML/MIN/1.73M2
HCT VFR BLD AUTO: 38.6 % (ref 42–52)
HGB BLD-MCNC: 13.4 GM/DL (ref 14–18)
INR PPP: 0.99 (ref 0.85–1.13)
MCH RBC QN AUTO: 31.5 PG (ref 26–33)
MCHC RBC AUTO-ENTMCNC: 34.7 GM/DL (ref 32.2–35.5)
MCV RBC AUTO: 90.6 FL (ref 80–94)
PLATELET # BLD AUTO: 178 THOU/MM3 (ref 130–400)
PMV BLD AUTO: 10.8 FL (ref 9.4–12.4)
RBC # BLD AUTO: 4.26 MILL/MM3 (ref 4.7–6.1)
WBC # BLD AUTO: 9 THOU/MM3 (ref 4.8–10.8)

## 2024-10-11 PROCEDURE — 75625 CONTRAST EXAM ABDOMINL AORTA: CPT

## 2024-10-11 PROCEDURE — 2580000003 HC RX 258: Performed by: RADIOLOGY

## 2024-10-11 PROCEDURE — 6360000002 HC RX W HCPCS: Performed by: RADIOLOGY

## 2024-10-11 PROCEDURE — 85730 THROMBOPLASTIN TIME PARTIAL: CPT

## 2024-10-11 PROCEDURE — 37226 HC FEM POP TERR PLASTY AND STENT: CPT

## 2024-10-11 PROCEDURE — 36415 COLL VENOUS BLD VENIPUNCTURE: CPT

## 2024-10-11 PROCEDURE — 6360000004 HC RX CONTRAST MEDICATION: Performed by: RADIOLOGY

## 2024-10-11 PROCEDURE — 82565 ASSAY OF CREATININE: CPT

## 2024-10-11 PROCEDURE — 85610 PROTHROMBIN TIME: CPT

## 2024-10-11 PROCEDURE — 85027 COMPLETE CBC AUTOMATED: CPT

## 2024-10-11 PROCEDURE — 75716 ARTERY X-RAYS ARMS/LEGS: CPT

## 2024-10-11 PROCEDURE — 6370000000 HC RX 637 (ALT 250 FOR IP): Performed by: RADIOLOGY

## 2024-10-11 PROCEDURE — 2500000003 HC RX 250 WO HCPCS: Performed by: RADIOLOGY

## 2024-10-11 PROCEDURE — C1876 STENT, NON-COA/NON-COV W/DEL: HCPCS

## 2024-10-11 RX ORDER — HEPARIN SODIUM 1000 [USP'U]/ML
INJECTION, SOLUTION INTRAVENOUS; SUBCUTANEOUS PRN
Status: COMPLETED | OUTPATIENT
Start: 2024-10-11 | End: 2024-10-11

## 2024-10-11 RX ORDER — LIDOCAINE HYDROCHLORIDE 20 MG/ML
INJECTION, SOLUTION EPIDURAL; INFILTRATION; INTRACAUDAL; PERINEURAL PRN
Status: COMPLETED | OUTPATIENT
Start: 2024-10-11 | End: 2024-10-11

## 2024-10-11 RX ORDER — MIDAZOLAM HYDROCHLORIDE 1 MG/ML
INJECTION, SOLUTION INTRAMUSCULAR; INTRAVENOUS PRN
Status: COMPLETED | OUTPATIENT
Start: 2024-10-11 | End: 2024-10-11

## 2024-10-11 RX ORDER — CLOPIDOGREL BISULFATE 75 MG/1
TABLET ORAL PRN
Status: COMPLETED | OUTPATIENT
Start: 2024-10-11 | End: 2024-10-11

## 2024-10-11 RX ORDER — SODIUM CHLORIDE 450 MG/100ML
INJECTION, SOLUTION INTRAVENOUS CONTINUOUS
Status: DISCONTINUED | OUTPATIENT
Start: 2024-10-11 | End: 2024-10-11 | Stop reason: HOSPADM

## 2024-10-11 RX ORDER — FENTANYL CITRATE 50 UG/ML
50 INJECTION, SOLUTION INTRAMUSCULAR; INTRAVENOUS ONCE
Status: COMPLETED | OUTPATIENT
Start: 2024-10-11 | End: 2024-10-11

## 2024-10-11 RX ORDER — FENTANYL CITRATE 50 UG/ML
INJECTION, SOLUTION INTRAMUSCULAR; INTRAVENOUS PRN
Status: COMPLETED | OUTPATIENT
Start: 2024-10-11 | End: 2024-10-11

## 2024-10-11 RX ORDER — MIDAZOLAM HYDROCHLORIDE 1 MG/ML
1 INJECTION, SOLUTION INTRAMUSCULAR; INTRAVENOUS ONCE
Status: COMPLETED | OUTPATIENT
Start: 2024-10-11 | End: 2024-10-11

## 2024-10-11 RX ORDER — IOPAMIDOL 612 MG/ML
INJECTION, SOLUTION INTRAVASCULAR PRN
Status: COMPLETED | OUTPATIENT
Start: 2024-10-11 | End: 2024-10-11

## 2024-10-11 RX ADMIN — FENTANYL CITRATE 50 MCG: 50 INJECTION, SOLUTION INTRAMUSCULAR; INTRAVENOUS at 09:27

## 2024-10-11 RX ADMIN — LIDOCAINE HYDROCHLORIDE 10 ML: 20 INJECTION, SOLUTION EPIDURAL; INFILTRATION; INTRACAUDAL; PERINEURAL at 10:36

## 2024-10-11 RX ADMIN — MIDAZOLAM 1 MG: 1 INJECTION INTRAMUSCULAR; INTRAVENOUS at 09:27

## 2024-10-11 RX ADMIN — HEPARIN SODIUM 2000 UNITS: 1000 INJECTION INTRAVENOUS; SUBCUTANEOUS at 10:17

## 2024-10-11 RX ADMIN — MIDAZOLAM 1 MG: 1 INJECTION INTRAMUSCULAR; INTRAVENOUS at 09:15

## 2024-10-11 RX ADMIN — CLOPIDOGREL BISULFATE 300 MG: 75 TABLET, FILM COATED ORAL at 10:38

## 2024-10-11 RX ADMIN — FENTANYL CITRATE 50 MCG: 50 INJECTION, SOLUTION INTRAMUSCULAR; INTRAVENOUS at 09:15

## 2024-10-11 RX ADMIN — HEPARIN SODIUM 4000 UNITS: 1000 INJECTION INTRAVENOUS; SUBCUTANEOUS at 09:46

## 2024-10-11 RX ADMIN — SODIUM CHLORIDE: 4.5 INJECTION, SOLUTION INTRAVENOUS at 07:56

## 2024-10-11 RX ADMIN — MIDAZOLAM 0.5 MG: 1 INJECTION INTRAMUSCULAR; INTRAVENOUS at 10:14

## 2024-10-11 RX ADMIN — IOPAMIDOL 200 ML: 612 INJECTION, SOLUTION INTRAVENOUS at 10:35

## 2024-10-11 ASSESSMENT — PAIN SCALES - GENERAL: PAINLEVEL_OUTOF10: 8

## 2024-10-11 ASSESSMENT — PAIN DESCRIPTION - LOCATION: LOCATION: GENERALIZED

## 2024-10-11 NOTE — PROGRESS NOTES
0855 Patient received in IR for arteriogram procedure with family taken to waiting room.  0858 This procedure has been fully reviewed with the patient and written informed consent has been obtained.  0905 Patient prepped for procedure.  0927 Procedure started with Dr. Sesay.  0931 Access to left femoral artery with use of sonosite.   0958 Angioplasty to right SFA with IN.PACT 5 x 150  1004 Angioplasty to right SFA with IN.PACT 5 x 150  1015 Angioplasty to right SFA with [place] 5 x 40  1023 Stent deployed to right SFA with Everflex 6 x 60  1025 Post dilatation with [place] 6 x 60   1035 Angioseal deployed to left femoral artery. Procedure completed; patient tolerated well.    1036 quick clot, gauze and op site to left femoral site; area soft to touch with no bleeding noted.   1037 Dr. Sesay spoke to patient's family.  1045 Patient on bed; comfort ensured. Left femoral dressing remains dry and intact with area soft.   1046 Patient taken to 2E via bed with family at bedside. Left femoral dressing remains dry and intact with area soft. Pt alert and oriented x3; follows commands. Skin pink, warm, and dry. Respirations easy, regular, and nonlabored.   1100 Bedside report given to Gaby HAWTHORNE.

## 2024-10-11 NOTE — FLOWSHEET NOTE
Discharged home in stable condition.  Patient and spouse verbalize understanding of discharge instructions and follow-up

## 2024-10-11 NOTE — DISCHARGE INSTRUCTIONS
Reviewed with the patient post arteriogram instructions.  Discussed care of puncture site:  remove dressing in 24 hours, gently clean site with soap and water, pat dry, and apply bandaid daily for 5 days.  Keep site clean and dry.  Do not apply any creams, lotions, or powders to puncture site.  Do not submerse site in water (no tub baths, swimming, or whirlpool) for 5 days.  Take it easy for the next 3-5 days.  No driving for 3 days.  Avoid heaving lifting, pushing, pulling or straining.  Monitor site for bleeding, drainage, or swelling.  Monitor for signs of infection which include:  redness, drainage, soreness, or temp greater than 101 F.  Notify doctor of any abnormal findings as listed.  If bleeding occurs, hold firm pressure at site and call 911.

## 2024-10-11 NOTE — PROGRESS NOTES
Patient admitted to 07  Ambulatory for arteriogram.  Patient NPO. Patient accompanied by family.  Vital signs obtained.   Assessment and data collection intiated.   Oriented to room.  Policies and procedures for 2E explained.   All questions answered with no further questions at this time.   Fall prevention and safety precautions discussed with patient.

## 2024-10-11 NOTE — PROGRESS NOTES
Returned to 2E007.  Monitor attached showing SR.  Dressing to left groin remains dry and intact, hemostasis maintained, no bleeding, swelling, or edema noted.  0.9NSS infusing with approx 700 ml remaining

## 2024-10-11 NOTE — H&P
[facility]  Sedation/Analgesia History & Physical    Pt Name: Kannan Fuentes  MRN: [id number]  YOB: 1965  Provider Performing Procedure: Yassine Sesay MD, MD  Primary Care Physician: Fernando Alarcon DO    Formulation and discussion of sedation / procedure plans, risks, benefits, side effects and alternatives with patient and/or responsible adult completed.    PRE-PROCEDURE   DNR-CCA/DNR-CC []Yes [x]No  Brief History/Pre-Procedure Diagnosis: RLE pain          MEDICAL HISTORY  []CAD/Valve  []Liver Disease  []Lung Disease []Diabetes  []Hypertension []Renal Disease  [x]Additional information:       has a past medical history of Back pain, Carpal tunnel syndrome of right wrist, Hyperlipidemia, Hypertension, Hypogonadism male, Nocturia, Obesity, Osteoarthritis, Prostate cancer (HCC), Restless leg syndrome, Shortness of breath, Sleep apnea, Tobacco abuse, Tobacco user, Type II or unspecified type diabetes mellitus without mention of complication, not stated as uncontrolled, and Urgency of urination.    SURGICAL HISTORY   has a past surgical history that includes Hand surgery (1987); Leg Surgery; knee surgery (?); Prostate surgery (12-); Prostate surgery (06-); Leg Surgery (6/13/12); cyst removal (T); Skin tag removal (T); cyst removal (7-5-12); Carpal tunnel release (10/22/12); Carpal tunnel release (11/26/2012); Colonoscopy (2/1/2015); Toe amputation (Left, 1/1/2022); and Toe amputation (Left, 1/7/2022).  Additional information:       ALLERGIES   Allergies as of 10/11/2024 - Fully Reviewed 10/11/2024   Allergen Reaction Noted    Pcn [penicillins] Hives 08/25/2011     Additional information:       MEDICATIONS   Coumadin Use Last 5 Days [x]No []Yes  Antiplatelet drug therapy use last 5 days  []No [x]Yes  Other anticoagulant use last 5 days  [x]No []Yes    Current Facility-Administered Medications:     0.45 % sodium chloride infusion, , IntraVENous, Continuous, Faulkiner,  Yassine SYKES MD, Last Rate: 20 mL/hr at 10/11/24 0756, New Bag at 10/11/24 0756  Prior to Admission medications    Medication Sig Start Date End Date Taking? Authorizing Provider   amLODIPine-benazepril (LOTREL) 5-10 MG per capsule Take 1 capsule by mouth daily 9/9/24  Yes Fernando Alarcon DO   atorvastatin (LIPITOR) 80 MG tablet Take 1 tablet by mouth nightly 9/9/24  Yes Fernando Alarcon DO   clopidogrel (PLAVIX) 75 MG tablet Take 1 tablet by mouth daily 9/9/24  Yes Fernando Alarcon DO   escitalopram (LEXAPRO) 10 MG tablet Take 1 tablet by mouth daily 9/9/24  Yes Fernando Alarcon DO   pioglitazone-metFORMIN (ACTOPLUS MET)  MG per tablet Take 1 tablet by mouth 2 times daily (with meals) 9/9/24  Yes Fernando Alarcon DO   gabapentin (NEURONTIN) 300 MG capsule Take 1 capsule by mouth 3 times daily. 9/9/24 9/9/25 Yes Fernando Alarcon DO   fluticasone (FLONASE) 50 MCG/ACT nasal spray 2 sprays by Each Nostril route daily 5/24/24  Yes Fernando Alarcon DO   acetaminophen (TYLENOL) 500 MG tablet Take 2 tablets by mouth in the morning and at bedtime   Yes Reymundo Elena MD   Naproxen Sodium 220 MG CAPS Take 1 tablet by mouth in the morning and at bedtime   Yes Reymundo Elena MD   Multiple Vitamin (MULTIVITAMIN ADULT PO) Take by mouth   Yes Reymundo Elena MD   aspirin 81 MG EC tablet Take 1 tablet by mouth daily   Yes Reymundo Elena MD     Additional information:       VITAL SIGNS   Vitals:    10/11/24 0920   BP: 137/64   Pulse: (!) 49   Resp: 13   Temp:    SpO2: 97%       PHYSICAL:   Heart:  [x]Regular rate and rhythm  []Other:    Lungs:  [x]Clear    []Other:    Abdomen: [x]Soft    []Other:    Mental Status: [x]Alert & Oriented  []Other:      PLANNED PROCEDURE   []Biospy [x]Arteriogram              []Drainage   []Mediport Insertion  []Fistulogram []IV access       []Vertebroplasty / Augmentation  []IVC filter []Dialysis catheter []Biliary

## 2024-10-11 NOTE — OP NOTE
Department of Radiology  Post Procedure Progress Note      Pre-Procedure Diagnosis:  RLE pain, PAD    Procedure Performed:  B/L LE arteriograms, RLE angioplasty and stenting    Anesthesia: local / versed and fentanyl    Findings: successful    Immediate Complications:  None    Estimated Blood Loss: minimal    SEE DICTATED PROCEDURE NOTE FOR COMPLETE DETAILS.    Electronically signed by Yassine Sesay MD on 10/11/2024 at 10:49 AM

## 2024-10-14 ENCOUNTER — HOSPITAL ENCOUNTER (OUTPATIENT)
Dept: PHYSICAL THERAPY | Age: 59
Setting detail: THERAPIES SERIES
Discharge: HOME | End: 2024-10-14
Payer: COMMERCIAL

## 2024-10-14 ENCOUNTER — TELEPHONE (OUTPATIENT)
Dept: FAMILY MEDICINE CLINIC | Age: 59
End: 2024-10-14

## 2024-10-14 PROCEDURE — 97032 APPL MODALITY 1+ESTIM EA 15: CPT

## 2024-10-14 PROCEDURE — 97110 THERAPEUTIC EXERCISES: CPT

## 2024-10-14 PROCEDURE — 97035 APP MDLTY 1+ULTRASOUND EA 15: CPT

## 2024-10-14 NOTE — TELEPHONE ENCOUNTER
Care Transitions Initial Follow Up Call    Outreach made within 2 business days of discharge: Yes    Patient: Kannan Fuentes Patient : 1965   MRN: [id number]  Reason for Admission: 10/11/2024  Discharge Date: 10/11/24       Spoke with: OSMAN    Discharge department/facility: [facility]      Scheduled appointment with PCP within 7-14 days    Follow Up  Future Appointments   Date Time Provider Department Center   10/16/2024  8:00 AM Kelin Newberry, PTA [facility] [place] [facility]   10/18/2024  3:00 PM STR SPECIAL PROCEDURE ROOM 1 [facility] SPEC STR Rad/Card   10/21/2024  8:00 AM Ida Verde, PTA [facility] [place] [facility]   10/23/2024  8:00 AM RecKelin kenny, PTA [facility] [place] [facility]   10/28/2024  7:30 AM RecKelin kenny, PTA [facility] [place] [facility]   10/30/2024  8:00 AM RecKelin kenny, PTA [facility] [place] [facility]   2024  7:30 AM Kelin Newberry, PTA [facility] [place] [facility]   2024  8:00 AM Luda Wiseman, PT [facility] [place] [facility]   2024  7:30 AM Kelin Newberry, PTA [facility] [place] [facility]   2024  8:00 AM Luda Wiseman, PT [facility] [place] HOD   2025 10:00 AM STR VASCULAR LAB ROOM 2 [facility] VAS LAB STR Rad/Card   2025 11:30 AM STR SPECIAL PROCEDURE ROOM 1 [facility] SPEC STR Rad/Card       OUMAR BUENO LPN

## 2024-10-14 NOTE — TELEPHONE ENCOUNTER
Care Transitions Initial Follow Up Call    Outreach made within 2 business days of discharge: Yes    Patient: Kannan Fuentes Patient : 1965   MRN: [id number]  Reason for Admission: outpatient procedure  Discharge Date: 10/11/24       Spoke with: Patient    Discharge department/facility: [facility]    TCM Interactive Patient Contact:  Was patient able to fill all prescriptions: N/A  Was patient instructed to bring all medications to the follow-up visit: Yes  Is patient taking all medications as directed in the discharge summary? Yes  Does patient understand their discharge instructions: Yes  Does patient have questions or concerns that need addressed prior to 7-14 day follow up office visit: no  Declines PCP follow-up at this time. Will keep his next regular f/u appt.    Additional needs identified to be addressed with provider  No needs identified             Scheduled appointment with PCP within 7-14 days    Follow Up  Future Appointments   Date Time Provider Department Center   10/16/2024  8:00 AM Kelin Newberry, PTA [facility] [place] [facility]   10/18/2024  3:00 PM STR SPECIAL PROCEDURE ROOM 1 [facility] SPEC STR Rad/Card   10/21/2024  8:00 AM Ida Verde, PTA [facility] [place] [facility]   10/23/2024  8:00 AM RecKelin kenny N, PTA [facility] [place] [facility]   10/28/2024  7:30 AM RecKelin kenny, PTA [facility] [place] [facility]   10/30/2024  8:00 AM RecKelin kenny N, PTA [facility] [place] [facility]   2024  7:30 AM Kelin Newberry, PTA [facility] [place] [facility]   2024  8:00 AM Luda Wiseman, PT [facility] [place] [facility]   2024  7:30 AM Kelin Newberry, PTA [facility] [place] [facility]   2024  8:00 AM Luda Wiseman, PT [facility] [place] [facility]   2024  8:00 AM Fernando Alarcon, DO [facility] DEP   2025 10:00 AM STR VASCULAR LAB ROOM 2 STR[facility] VAS LAB STR Rad/Card   2025 11:30 AM STR SPECIAL PROCEDURE ROOM 1 [facility] SPEC STR Rad/Card       Ida Alas LPN

## 2024-10-14 NOTE — PROGRESS NOTES
[facility]  PHYSICAL THERAPY  [] EVALUATION  [x] DAILY NOTE (LAND) [] DAILY NOTE (AQUATIC ) [] PROGRESS NOTE [] DISCHARGE NOTE    [] OUTPATIENT REHABILITATION CENTER [facility]   [] [place] [facility]    [x] [facility]   [] [facility]    Date: 10/14/2024  Patient Name:  Kannan Fuentes  : 1965  MRN: [id number]  CSN: [id number]    Referring Practitioner Boogie Jordan MD [id number]      Diagnosis Radiculopathy, lumbar region  Radiculopathy, cervical region   Treatment Diagnosis M54.59  Other Low Back Pain  M54.59  Other Low Back Pain  R53.1 Weakness  M62.81 Generalized Muscle Weakness   Date of Evaluation 10/8/24   Additional Pertinent History Kannan Fuentes has a past medical history of Back pain, Carpal tunnel syndrome of right wrist, Hyperlipidemia, Hypertension, Hypogonadism male, Nocturia, Obesity, Osteoarthritis, Prostate cancer (HCC), Restless leg syndrome, Shortness of breath, Sleep apnea, Tobacco abuse, Tobacco user, Type II or unspecified type diabetes mellitus without mention of complication, not stated as uncontrolled, and Urgency of urination.  he has a past surgical history that includes Hand surgery (); Leg Surgery; knee surgery (?); Prostate surgery (2009); Prostate surgery (2010); Leg Surgery (12); cyst removal (T); Skin tag removal (T); cyst removal (12); Carpal tunnel release (10/22/12); Carpal tunnel release (2012); Colonoscopy (2015); Toe amputation (Left, 2022); and Toe amputation (Left, 2022).     L forefoot amputation 2022    Allergies Allergies   Allergen Reactions    Pcn [Penicillins] Hives      Medications   Current Outpatient Medications:     amLODIPine-benazepril (LOTREL) 5-10 MG per capsule, Take 1 capsule by mouth daily, Disp: 90 capsule, Rfl: 3    atorvastatin (LIPITOR) 80 MG tablet, Take 1 tablet by mouth nightly, Disp: 90 tablet, Rfl: 3    clopidogrel (PLAVIX) 75 MG tablet, Take 1

## 2024-10-16 ENCOUNTER — HOSPITAL ENCOUNTER (OUTPATIENT)
Dept: PHYSICAL THERAPY | Age: 59
Setting detail: THERAPIES SERIES
Discharge: HOME | End: 2024-10-16
Payer: COMMERCIAL

## 2024-10-16 PROCEDURE — 97035 APP MDLTY 1+ULTRASOUND EA 15: CPT

## 2024-10-16 PROCEDURE — 97110 THERAPEUTIC EXERCISES: CPT

## 2024-10-16 PROCEDURE — 97032 APPL MODALITY 1+ESTIM EA 15: CPT

## 2024-10-16 NOTE — PROGRESS NOTES
[facility]  PHYSICAL THERAPY  [] EVALUATION  [x] DAILY NOTE (LAND) [] DAILY NOTE (AQUATIC ) [] PROGRESS NOTE [] DISCHARGE NOTE    [] OUTPATIENT REHABILITATION CENTER [facility]   [] [place] [facility]    [x] [facility]   [] [facility]    Date: 10/16/2024  Patient Name:  Kannan Fuentes  : 1965  MRN: [id number]  CSN: [id number]    Referring Practitioner Boogie Jordan MD [id number]      Diagnosis Radiculopathy, lumbar region  Radiculopathy, cervical region   Treatment Diagnosis M54.59  Other Low Back Pain  M54.59  Other Low Back Pain  R53.1 Weakness  M62.81 Generalized Muscle Weakness   Date of Evaluation 10/8/24   Additional Pertinent History Kannan Fuentes has a past medical history of Back pain, Carpal tunnel syndrome of right wrist, Hyperlipidemia, Hypertension, Hypogonadism male, Nocturia, Obesity, Osteoarthritis, Prostate cancer (HCC), Restless leg syndrome, Shortness of breath, Sleep apnea, Tobacco abuse, Tobacco user, Type II or unspecified type diabetes mellitus without mention of complication, not stated as uncontrolled, and Urgency of urination.  he has a past surgical history that includes Hand surgery (); Leg Surgery; knee surgery (?); Prostate surgery (2009); Prostate surgery (2010); Leg Surgery (12); cyst removal (T); Skin tag removal (T); cyst removal (12); Carpal tunnel release (10/22/12); Carpal tunnel release (2012); Colonoscopy (2015); Toe amputation (Left, 2022); and Toe amputation (Left, 2022).     L forefoot amputation 2022    Allergies Allergies   Allergen Reactions    Pcn [Penicillins] Hives      Medications   Current Outpatient Medications:     amLODIPine-benazepril (LOTREL) 5-10 MG per capsule, Take 1 capsule by mouth daily, Disp: 90 capsule, Rfl: 3    atorvastatin (LIPITOR) 80 MG tablet, Take 1 tablet by mouth nightly, Disp: 90 tablet, Rfl: 3    clopidogrel (PLAVIX) 75 MG tablet, Take 1

## 2024-10-18 ENCOUNTER — HOSPITAL ENCOUNTER (OUTPATIENT)
Dept: INTERVENTIONAL RADIOLOGY/VASCULAR | Age: 59
Discharge: HOME | End: 2024-10-18
Attending: RADIOLOGY
Payer: COMMERCIAL

## 2024-10-18 DIAGNOSIS — I73.9 PVD (PERIPHERAL VASCULAR DISEASE) (HCC): ICD-10-CM

## 2024-10-18 DIAGNOSIS — E78.5 DYSLIPIDEMIA: ICD-10-CM

## 2024-10-18 DIAGNOSIS — Z72.0 TOBACCO ABUSE: ICD-10-CM

## 2024-10-18 DIAGNOSIS — G25.81 RESTLESS LEG SYNDROME: ICD-10-CM

## 2024-10-18 DIAGNOSIS — Z89.432 PARTIAL NONTRAUMATIC AMPUTATION OF LEFT FOOT (HCC): ICD-10-CM

## 2024-10-18 PROCEDURE — 99211 OFF/OP EST MAY X REQ PHY/QHP: CPT

## 2024-10-23 ENCOUNTER — HOSPITAL ENCOUNTER (OUTPATIENT)
Dept: PHYSICAL THERAPY | Age: 59
Setting detail: THERAPIES SERIES
Discharge: HOME | End: 2024-10-23
Payer: COMMERCIAL

## 2024-10-23 PROCEDURE — 97110 THERAPEUTIC EXERCISES: CPT

## 2024-10-23 PROCEDURE — 97035 APP MDLTY 1+ULTRASOUND EA 15: CPT

## 2024-10-23 PROCEDURE — 97032 APPL MODALITY 1+ESTIM EA 15: CPT

## 2024-10-23 NOTE — PROGRESS NOTES
[facility]  PHYSICAL THERAPY  [] EVALUATION  [x] DAILY NOTE (LAND) [] DAILY NOTE (AQUATIC ) [] PROGRESS NOTE [] DISCHARGE NOTE    [] OUTPATIENT REHABILITATION CENTER [facility]   [] [facility]    [x] [facility]   [] [facility]    Date: 10/23/2024  Patient Name:  Kannan Fuentes  : 1965  MRN: [id number]  CSN: [id number]    Referring Practitioner Boogie Jordan MD [id number]      Diagnosis Radiculopathy, lumbar region  Radiculopathy, cervical region   Treatment Diagnosis M54.59  Other Low Back Pain  M54.59  Other Low Back Pain  R53.1 Weakness  M62.81 Generalized Muscle Weakness   Date of Evaluation 10/8/24   Additional Pertinent History Kannan Fuentes has a past medical history of Back pain, Carpal tunnel syndrome of right wrist, Hyperlipidemia, Hypertension, Hypogonadism male, Nocturia, Obesity, Osteoarthritis, Prostate cancer (HCC), Restless leg syndrome, Shortness of breath, Sleep apnea, Tobacco abuse, Tobacco user, Type II or unspecified type diabetes mellitus without mention of complication, not stated as uncontrolled, and Urgency of urination.  he has a past surgical history that includes Hand surgery (); Leg Surgery; knee surgery (?); Prostate surgery (2009); Prostate surgery (2010); Leg Surgery (12); cyst removal (T); Skin tag removal (T); cyst removal (12); Carpal tunnel release (10/22/12); Carpal tunnel release (2012); Colonoscopy (2015); Toe amputation (Left, 2022); and Toe amputation (Left, 2022).     L forefoot amputation 2022    Allergies Allergies   Allergen Reactions    Pcn [Penicillins] Hives      Medications   Current Outpatient Medications:     amLODIPine-benazepril (LOTREL) 5-10 MG per capsule, Take 1 capsule by mouth daily, Disp: 90 capsule, Rfl: 3    atorvastatin (LIPITOR) 80 MG tablet, Take 1 tablet by mouth nightly, Disp: 90 tablet, Rfl: 3    clopidogrel (PLAVIX) 75 MG tablet, Take 1

## 2024-10-28 ENCOUNTER — HOSPITAL ENCOUNTER (OUTPATIENT)
Dept: PHYSICAL THERAPY | Age: 59
Setting detail: THERAPIES SERIES
Discharge: HOME | End: 2024-10-28
Payer: COMMERCIAL

## 2024-10-28 PROCEDURE — 97035 APP MDLTY 1+ULTRASOUND EA 15: CPT

## 2024-10-28 PROCEDURE — 97110 THERAPEUTIC EXERCISES: CPT

## 2024-10-28 PROCEDURE — 97032 APPL MODALITY 1+ESTIM EA 15: CPT

## 2024-10-28 NOTE — PROGRESS NOTES
of Present Illness Patient reports having LBP x 20 years intermittent.  B LE to calf pain, L> R 20 years ago.  Has tried chiropractor for LB and neck, temporary lessening of pain x 1 day.  Patient to family MD, referral to [facility] due to increased pain and falls.  Consult with [facility] on 12/1/24.  Xray- with disc problem, curvature in lumbar spine, 2 disc problems,  and ordered PT.   Dr. Jordan also wrote work restrictions to 5 x per day, 6 hours per day.  To do PT then f/u with Dr. Jordan 12/3, may need MRI  Neck pain - x 15 years, B UE pain started in past year, increased with falls.    B LE buckling frequently and has had \"at least 50 falls\" in past year  Working at [employer] - decreased hours to 6 hours per night, 5 days per week.       SUBJECTIVE: Patient reporting that neck pain is 6/10 and low back pain is still around 7-8/10. States he gets a little relief from therapy but feel like relief does not last long.       OBJECTIVE:       TREATMENT   Precautions: Work restriction of hours to 6 hour per night, 5 days per week   Pain: LBP 8/10, B LE to calf 6/10, neck pain 8/10, L UE to hand 6/10, R UE to hand 2/10    \"X” in shaded column indicates activity completed today    “*\" next to exercise/intervention indicates progression   Modalities Parameters/  Location  Notes   Ultrasound to R/L upper trap seated in chair  10 min 50% 1.0 w/cm2 and Mhz x    E-Stim (IFC manual) to lumbar spine in right side lying  10 minutes. Turned intensity up 2 times during treatment. x          Manual Therapy Time/Technique  Notes                     Exercise/Intervention   Notes   Abdominal bracing knees bent 10 x 5 seconds  x    SAQ alternating legs 2 inch lift 15 x 5 seconds  x    Quad set with pillow behind knees 15*x5 sec      Backward shoulder circles sitting 15*  x    Hip adduction with ball 10x5 sec      Shoulder blade pinch elbows bent 15  x    Thoracic extension over ball 10*x5 sec  x    Bilateral shoulder ER seated 15*      Wall

## 2024-10-30 ENCOUNTER — HOSPITAL ENCOUNTER (OUTPATIENT)
Dept: PHYSICAL THERAPY | Age: 59
Setting detail: THERAPIES SERIES
Discharge: HOME | End: 2024-10-30
Payer: COMMERCIAL

## 2024-10-30 PROCEDURE — 97035 APP MDLTY 1+ULTRASOUND EA 15: CPT

## 2024-10-30 PROCEDURE — 97032 APPL MODALITY 1+ESTIM EA 15: CPT

## 2024-10-30 PROCEDURE — 97110 THERAPEUTIC EXERCISES: CPT

## 2024-10-30 NOTE — PROGRESS NOTES
tablet by mouth daily, Disp: 90 tablet, Rfl: 3    escitalopram (LEXAPRO) 10 MG tablet, Take 1 tablet by mouth daily, Disp: 90 tablet, Rfl: 3    pioglitazone-metFORMIN (ACTOPLUS MET)  MG per tablet, Take 1 tablet by mouth 2 times daily (with meals), Disp: 180 tablet, Rfl: 3    gabapentin (NEURONTIN) 300 MG capsule, Take 1 capsule by mouth 3 times daily., Disp: 270 capsule, Rfl: 3    fluticasone (FLONASE) 50 MCG/ACT nasal spray, 2 sprays by Each Nostril route daily, Disp: 48 g, Rfl: 1    acetaminophen (TYLENOL) 500 MG tablet, Take 2 tablets by mouth in the morning and at bedtime, Disp: , Rfl:     Naproxen Sodium 220 MG CAPS, Take 1 tablet by mouth in the morning and at bedtime, Disp: , Rfl:     Multiple Vitamin (MULTIVITAMIN ADULT PO), Take by mouth, Disp: , Rfl:     aspirin 81 MG EC tablet, Take 1 tablet by mouth daily, Disp: , Rfl:       Functional Outcome Measure Used Oswestry back disability scale,  Neck disability index   Functional Outcome Score 22 OBDS,  20 NDI (10/8/24)       Insurance: Primary: Payor: [employer] /  /  / ,   Secondary:        Authorization Information DEDUCTIBLE: $1100 MET $425.51 OUT OF POCKET: $3800 MET $1038.47              INSURANCE PAYS AT: 100% after copay              PATIENT RESPONSIBILITY AND/OR CO-PAY: $25 copay per visit  SECONDARY INSURANCE COMPANY:  NA      PRE CERTIFICATION REQUIRED: No precert required.  INSURANCE THERAPY BENEFIT: Allowed 60 visits per calendar year.  UNKNOWN visits have been used.. [facility]..   AQUATIC THERAPY COVERED: Yes  MODALITIES COVERED:  Yes   Initial CPT Codes Requested 37922 - Therapeutic Exercise, 18704 - Manual Therapy , 78668 - Ultrasound , and 62873 - Manual Electrical Stimulation   Progress Note Counter 7/10 for progress note (Reporting Period: 10/8/24 to     )   Recertification Date 11/19/2024    Physician Follow-Up Angiogram for decreased circulation R LE on 10/11  F/u with Dr. Jordan 12/3  F/u with Dr. Alarcon 11/22   Physician Orders    History

## 2024-11-04 ENCOUNTER — HOSPITAL ENCOUNTER (OUTPATIENT)
Dept: PHYSICAL THERAPY | Age: 59
Setting detail: THERAPIES SERIES
Discharge: HOME | End: 2024-11-04
Payer: COMMERCIAL

## 2024-11-04 PROCEDURE — 97035 APP MDLTY 1+ULTRASOUND EA 15: CPT

## 2024-11-04 PROCEDURE — 97110 THERAPEUTIC EXERCISES: CPT

## 2024-11-04 PROCEDURE — 97032 APPL MODALITY 1+ESTIM EA 15: CPT

## 2024-11-04 NOTE — PROGRESS NOTES
of Present Illness Patient reports having LBP x 20 years intermittent.  B LE to calf pain, L> R 20 years ago.  Has tried chiropractor for LB and neck, temporary lessening of pain x 1 day.  Patient to family MD, referral to [facility] due to increased pain and falls.  Consult with [facility] on 12/1/24.  Xray- with disc problem, curvature in lumbar spine, 2 disc problems,  and ordered PT.   Dr. Jordan also wrote work restrictions to 5 x per day, 6 hours per day.  To do PT then f/u with Dr. Jordan 12/3, may need MRI  Neck pain - x 15 years, B UE pain started in past year, increased with falls.    B LE buckling frequently and has had \"at least 50 falls\" in past year  Working at [employer] - decreased hours to 6 hours per night, 5 days per week.       SUBJECTIVE: Patient reporting that he fell two times over the weekend and almost fell two more times due to his legs giving out. Patient reported he scraped his back and a bruising on his tailbone. Denies hitting head or any other injury. Patient reporting that low back and neck pain is 6/10 today and stating that it is not feeling too bad for him today.      OBJECTIVE:       TREATMENT   Precautions: Work restriction of hours to 6 hour per night, 5 days per week   Pain: LBP 6/10, B LE to calf 6/10, neck pain 6/10, L UE to hand 6/10, R UE to hand 2/10    \"X” in shaded column indicates activity completed today    “*\" next to exercise/intervention indicates progression   Modalities Parameters/  Location  Notes   Ultrasound to R/L upper trap seated in chair  10 min 50% 1.0 w/cm2 and Mhz x    E-Stim (IFC manual) to lumbar spine in right side lying  10 minutes. Intensity to patient tolerance 65 x Patient with scab on back before patch placement and avoided this area with patches.         Manual Therapy Time/Technique  Notes                     Exercise/Intervention   Notes   Abdominal bracing knees bent 10 x 5 seconds      SAQ alternating legs 2 inch lift 15 x 5 seconds  x    Quad set with

## 2024-11-06 ENCOUNTER — HOSPITAL ENCOUNTER (OUTPATIENT)
Dept: PHYSICAL THERAPY | Age: 59
Setting detail: THERAPIES SERIES
Discharge: HOME | End: 2024-11-06
Payer: COMMERCIAL

## 2024-11-06 PROCEDURE — 97035 APP MDLTY 1+ULTRASOUND EA 15: CPT

## 2024-11-06 PROCEDURE — 97110 THERAPEUTIC EXERCISES: CPT

## 2024-11-11 ENCOUNTER — HOSPITAL ENCOUNTER (OUTPATIENT)
Dept: PHYSICAL THERAPY | Age: 59
Setting detail: THERAPIES SERIES
Discharge: HOME | End: 2024-11-11
Payer: COMMERCIAL

## 2024-11-11 PROCEDURE — 97110 THERAPEUTIC EXERCISES: CPT

## 2024-11-11 PROCEDURE — 97035 APP MDLTY 1+ULTRASOUND EA 15: CPT

## 2024-11-11 NOTE — PROGRESS NOTES
[facility]  PHYSICAL THERAPY  [] EVALUATION  [x] DAILY NOTE (LAND) [] DAILY NOTE (AQUATIC ) [] PROGRESS NOTE [] DISCHARGE NOTE    [] OUTPATIENT REHABILITATION CENTER [facility]   [] [place] [facility]    [x] [facility]   [] [facility]    Date: 2024  Patient Name:  Kannan Fuentes  : 1965  MRN: [id number]  CSN: [id number]    Referring Practitioner Boogie Jordan MD [id number]      Diagnosis Radiculopathy, lumbar region  Radiculopathy, cervical region   Treatment Diagnosis M54.59  Other Low Back Pain  M54.59  Other Low Back Pain  R53.1 Weakness  M62.81 Generalized Muscle Weakness   Date of Evaluation 10/8/24   Additional Pertinent History Kannan Fuentes has a past medical history of Back pain, Carpal tunnel syndrome of right wrist, Hyperlipidemia, Hypertension, Hypogonadism male, Nocturia, Obesity, Osteoarthritis, Prostate cancer (HCC), Restless leg syndrome, Shortness of breath, Sleep apnea, Tobacco abuse, Tobacco user, Type II or unspecified type diabetes mellitus without mention of complication, not stated as uncontrolled, and Urgency of urination.  he has a past surgical history that includes Hand surgery (); Leg Surgery; knee surgery (?); Prostate surgery (2009); Prostate surgery (2010); Leg Surgery (12); cyst removal (T); Skin tag removal (T); cyst removal (12); Carpal tunnel release (10/22/12); Carpal tunnel release (2012); Colonoscopy (2015); Toe amputation (Left, 2022); and Toe amputation (Left, 2022).     L forefoot amputation 2022    Allergies Allergies   Allergen Reactions    Pcn [Penicillins] Hives      Medications   Current Outpatient Medications:     amLODIPine-benazepril (LOTREL) 5-10 MG per capsule, Take 1 capsule by mouth daily, Disp: 90 capsule, Rfl: 3    atorvastatin (LIPITOR) 80 MG tablet, Take 1 tablet by mouth nightly, Disp: 90 tablet, Rfl: 3    clopidogrel (PLAVIX) 75 MG tablet, Take 1

## 2024-11-12 NOTE — PROGRESS NOTES
Hospitalist Progress Note      Patient:  Ernie Kent    Unit/Bed:6E-57/057-A  YOB: 1965  MRN: [id number]   Acct: [de-identified]   PCP: Suad Lockett DO  Date of Admission: 1/1/2022    Assessment/Plan:    1. Left great toe diabetic ulcer secondary to T2DM s/p transmetatarsal amputation 1/7:               - S/p 1st left toe amputation on 1/1/2022 with resulting ischemia from PAD. Podiatry consulted and following. NWB to LLE; Tissue culture results yield streptococcus anginosus, streptococcus agalactiae, and rare gram negative bacilli that did not grow on culture -> stopped Vanc, add Clinda. Appreciate ID, planning one more day IV abx per note. Concern for gait instability with crutches for pt. However, pt is adamant that he wishes to go home rather than rehab. Continue PT/OT. Home health ordered. PAD: Arterial doppler notable for high grade stenosis in the mid left superficial femoral artery. S/p arteriogram with IR -> continue with Plavix. Will need f/u in 3 months.      2. Obstructive sleep apnea:               - Continue to wear home CPAP at night with home settings.     3. Hyperlipidemia:               - Continue Lipitor 40 mg     4. Essential HTN:   - uncontrolled. Patient has remained mostly hypertensive with some normotensive pressures. - Continue Lisinopril. Increase to HTZ 25. Hydralazine PRN     5. Tobacco abuse:               - discussed risks of smoking              - educated on resources available for cessation. Reports he does not wish to quit at this time.               - Nicotine patch.     6. Restless leg syndrome:               - Continue Mirapex     7. Gait disturbance secondary to left great toe amputation:              - Heel WB per podiatry recs              - Patient reported difficulty with pivoting and balance              - PT/OT consulted              - SW consulted     8.  Morbid obesity:               - BMI 40.18; discussed and educated on  lifestyle modifications.     9. Uncontrolled NIDM II with polyneuropathy: continue with SSI. Carb control. Monitor blood glucose with ACCU. 10.  Epistaxis: Likely secondary to therapy with Plavix and Lovenox, easily stemmed with pressure per patient. If continues will consult ENT. Hemoglobin stable. Resolved     11. Hyponatremia, mild: monitor with BMP, resolved    12. Acute postoperative anemia, resolved: continue to monitor Hgb, no gross bleeding identified. Continue to monitor and transfuse for Hgb < 7 or hemodynamic instability. 13.  Disposition: Await therapy. Patient does not wish for inpatient rehab, wants to go home with home health. Chief Complaint: Foot pain    Initial H and P:-    \"54 y.o. male with PMH of  HTN, hyperlipidemia, T2DM, morbid obesity, tobacco abuse, ASHLEE, RLS, prostate cancer (in remission) who presented to [facility] with a chief complaint of foot pain. Patient reports that approximately 2 months ago he dropped a can of sausage gravy on his left great toe. States that following the incident he developed \"purple bruising\" of the left great toe. Stating approximately 2 weeks after the incident the purple bruising had gotten worse and started to experience an increased amount of pain. States he also noticed a \"heavy calus\" formation on his left great toe in which he decided to pull the skin off and noticed and open wound on his toe. States  \"bloody-clear\" drainage started coming from the wound but states the drainage coming from his toe is now \"thick yellow. \" Endorses he believes wound on his toes was resulted from wearing size 12 rubber boots at work, which is not his normal shoe size. Also endorsed his tennis shoes are not well fitting and that a piece of plastic from his shoe has been rubbing his left toe. Currently rates pain 10/10 in the left great toe, that radiates up left leg and is aggravated by touch and movement.  No alleviating factors. States he has had difficulty with walking d/t the pain. Reports associated left leg swelling and redness. Denies CP, SOB, cough, fever, chills, weakness.      While in the ED, x ray of foot performed and notable for soft tissue swelling with evidence of soft tissue ulceration involving the first digit. Associated cortical destruction of the first distal phalanx concerning for osteomyelitis. Arterial doppler notable for high-grade stenosis in  the mid left superficial femoral artery and abnormal left GINO concerning for significant stenosis. No DVT noted venous doppler. Lab work in the ED revealed leukocytosis of 11.2, PCT 0.06, lactic acid 1.0. Patient afebrile with stable vital signs. No concern for bacterial sepsis at this time. Cefepime and Vancomycin ordered. Glucose elevated at 333. Patient reports that he has not taken his home medications in over a year d/t lack of insurance and not having a PCP. HbgA1C ordered and pending. Creatinine 0.9 with GFR 86. Monitor. 2 peripheral blood cultures collected and pending. Wound culture of left great toe collected and pending. Podiatry was consulted and evaluated patient while in the ER. Per podiatry notes, plan for OR today 1/1/22 for amputation of left great toe. At this time patient will be admitted to hospitalist services for further medical management. \"    1/5: pt doing well, back from angiogram. Denies any fever/chills. Admits to SOB while laying down. No CP.   1/6: Patient doing okay, sitting up in his chair. Possible amputation planned tomorrow with podiatry versus continue with ABX. Patient denies any fever/chills. He did have epistaxis overnight that lasted about 20 minutes    1/7: Patient doing well, sitting up in chair waiting to go to surgery. Patient denies any chest pain or shortness of breath. 1/8: pt reports he is having significant pain, 9/10 in severity at this time despite pain medications. Denies fever/chills.  Having infiltration at his IV site. Denies CP/SOB. Complains of dry mouth. 1/9: pt doing okay, sitting in bed. C/o of his pain not being controlled, although RN states this is very hit or miss. Denies CP/SOB. No abd pain, n/v. No BM yet. 1/10: no changes    Subjective (past 24 hours):   1/11: Per chart, ID is hopeful for 1 more day of IV antibiotics and possible discharge home tomorrow. There is concern for patient's gait instability on crutches. Continued physical therapy recommended to ensure that patient is safe to go home. Assess daily and monitor. Patient denies any chest pain or shortness of breath    Past medical history, family history, social history and allergies reviewed again and is unchanged since admission. ROS (All review of systems completed. Pertinent positives noted.  Otherwise All other systems reviewed and negative.)     Medications:  Reviewed    Infusion Medications    sodium chloride      dextrose       Scheduled Medications    docusate sodium  100 mg Oral Daily    amLODIPine  5 mg Oral Daily    gabapentin  300 mg Oral TID    sodium chloride flush  10 mL IntraVENous 2 times per day    midazolam  1 mg IntraVENous Once    fentanNYL  50 mcg IntraVENous Once    clopidogrel  75 mg Oral Daily    clindamycin (CLEOCIN) IV  300 mg IntraVENous Q8H    hydroCHLOROthiazide  25 mg Oral Daily    insulin NPH  0.2 Units/kg SubCUTAneous BID- 8&2    lisinopril  40 mg Oral Daily    insulin lispro  0-18 Units SubCUTAneous TID WC    insulin lispro  0-9 Units SubCUTAneous Nightly    amitriptyline  100 mg Oral Nightly    atorvastatin  40 mg Oral Nightly    pramipexole  0.25 mg Oral Nightly    enoxaparin  40 mg SubCUTAneous Daily    cefepime  1,000 mg IntraVENous Q8H    nicotine  1 patch TransDERmal Daily     PRN Meds: morphine, ketorolac, sodium chloride flush, sodium chloride, oxyCODONE **OR** oxyCODONE, hydrALAZINE, cyclobenzaprine, ondansetron **OR** ondansetron, potassium chloride **OR** potassium alternative oral replacement **OR** potassium chloride, magnesium sulfate, polyethylene glycol, acetaminophen **OR** acetaminophen, glucose, dextrose, glucagon (rDNA), dextrose      Intake/Output Summary (Last 24 hours) at 1/11/2022 1254  Last data filed at 1/11/2022 1010  Gross per 24 hour   Intake 810 ml   Output 1325 ml   Net -515 ml       Diet:  ADULT DIET; Regular; 3 carb choices (45 gm/meal); Low Fat/Low Chol/High Fiber/ANNA    Exam:  BP (!) 162/78   Pulse 57   Temp 98.4 °F (36.9 °C) (Axillary)   Resp 16   Ht 5' 10\" (1.778 m)   Wt 280 lb (127 kg)   SpO2 94%   BMI 40.18 kg/m²   General appearance: obese, no apparent distress, appears stated age and cooperative. HEENT: Pupils equal, round, and reactive to light. Conjunctivae/corneas clear. Neck: Supple, with full range of motion. No jugular venous distention. Trachea midline. Respiratory:  Normal respiratory effort. Clear to auscultation, bilaterally without Rales/Wheezes/Rhonchi. Cardiovascular: Regular rate and rhythm with normal S1/S2 without murmurs, rubs or gallops. Abdomen: Soft, non-tender, non-distended with normal bowel sounds. Musculoskeletal: LLE wrapped not visualized s/p TMA  Skin: Skin color, texture, turgor normal.  No rashes or lesions. Neurologic:  Neurovascularly intact without any focal sensory/motor deficits. Cranial nerves: II-XII intact, grossly non-focal.  Psychiatric: Alert and oriented x4, thought content appropriate, normal insight  Capillary Refill: Brisk,< 3 seconds   Peripheral Pulses: +2 palpable, equal bilaterally     Labs:   Recent Labs     01/09/22  1117 01/10/22  1245 01/11/22  0617   WBC 8.4 8.6 8.3   HGB 13.6* 14.1 14.1   HCT 40.2* 42.9 42.9    217 213     Recent Labs     01/10/22  1245 01/11/22  0617    135   K 3.9 3.9    100   CO2 26 26   BUN 20 17   CREATININE 1.0 1.0   CALCIUM 9.4 9.2     No results for input(s): AST, ALT, BILIDIR, BILITOT, ALKPHOS in the last 72 hours.   No results for input(s): INR in the last 72 hours. No results for input(s): Amelia Kingier in the last 72 hours. Microbiology:    Blood culture #1:   Lab Results   Component Value Date    BC No growth-preliminary  01/01/2022    BC  01/01/2022     possible contamination; clinical correlation required        Blood culture #2:No results found for: BLOODCULT2    Organism:  Lab Results   Component Value Date    ORG Streptococcus agalactiae - (Group B) 01/01/2022    ORG Streptococcus anginosus 01/01/2022    ORG mixed anaerobic growth 01/01/2022         Lab Results   Component Value Date    LABGRAM  01/01/2022     Few segmented neutrophils observed. Rare epithelial cells observed. Moderate gram positive cocci occurring singly and in pairs. Rare gram negative bacilli. Rare small gram positive bacilli. MRSA culture only:No results found for: [facility]    Urine culture: No results found for: LABURIN    Respiratory culture: No results found for: CULTRESP    Aerobic and Anaerobic :  Lab Results   Component Value Date    LABAERO  01/01/2022     Culture also yielded light growth of gram positive bacilli most consistent with Corynebacterium species. Direct gram stain indicated rare gram negative bacilli which did not grow on culture. This could be inhibited growth due to antibiotic therapy, a fastidious organism or an anaerobic organism. If a true mixed aerobic and anaerobic infection is suspected, then broad spectrum empiric antibiotic therapy is indicated and should include coverage for anaerobic organisms. LABAERO  01/01/2022     moderate growth Group B streptococci are suspectible to ampicillin, penicillin and cefazolin, but may be erythromycin and/or clindamycin resistant. Contact microbiology if erythromycin and/or clindamycin testing is necessary. LABAERO  01/01/2022     moderate growth S. anginosus group are largely susceptible to beta-lactam agents; ceftriaxone is the preferred agent.  If allergy or resistance precludes use of beta-lactam agents, vancomycin is an appropriate alternative agent. Lab Results   Component Value Date    LABANAE  01/01/2022     Culture yielded heavy mixed anaerobic growth, including multiple colony types of both gram negative bacilli and gram positive cocci. If a true mixed aerobic and anaerobic infection is suspected, then broad spectrum empiric antibiotic therapy is indicated and should include coverage for anaerobic organisms. Urinalysis:      Lab Results   Component Value Date    NITRU Negative 10/03/2017    WBCUA 0 09/23/2014    BACTERIA NONE 09/23/2014    RBCUA 0 09/23/2014    BLOODU Trace-intact 10/03/2017    BLOODU SMALL 09/23/2014    SPECGRAV 1.015 08/29/2011    GLUCOSEU 250 10/03/2017       Radiology:  IR ANGIOGRAM EXTREMITY LEFT   Final Result   1. Status post successful angioplasty of a severely stenotic lesion mid left SFA with an excellent result. .    2. Patient was started on Plavix regimen today and will be scheduled for a follow-up visit with groin check, sometime in the next few days, in addition to a routine follow-up visit in 3 months with arterial Doppler imaging at that time. .            **This report has been created using voice recognition software. It may contain minor errors which are inherent in voice recognition technology. **                        Final report electronically signed by Dr. Maikel Eli on 1/5/2022 2:40 PM      VL DUP LOWER EXTREMITY ARTERIES LEFT   Final Result   1. High-grade stenosis in the mid left superficial femoral artery. 2. Abnormal left GINO suggesting a hemodynamically significant stenosis. **This report has been created using voice recognition software. It may contain minor errors which are inherent in voice recognition technology. **      Final report electronically signed by Dr. Hilary Bo on 1/1/2022 9:40 AM      VL DUP LOWER EXTREMITY VENOUS LEFT   Final Result   No evidence of a DVT.                **This report has been created using voice recognition software. It may contain minor errors which are inherent in voice recognition technology. **      Final report electronically signed by Dr. James James on 1/1/2022 9:27 AM      XR FOOT LEFT (MIN 3 VIEWS)   Final Result   Impression:   1. Soft tissue swelling with evidence of soft tissue ulceration involving    the first digit. There is associated cortical destruction of the first    distal phalanx, most consistent with osteomyelitis. This document has been electronically signed by: Ana Lopez MD on    01/01/2022 06:31 AM        XR FOOT LEFT (MIN 3 VIEWS)    Result Date: 1/1/2022  4 views left foot. Comparison: None. Findings: There is soft tissue swelling and ulceration involving the first digit. There is associated cortical destruction of the medial aspect of the first distal phalanx, concerning for osteomyelitis. No radiopaque foreign body is identified. No acute fracture or dislocation is seen. There are large superior and inferior calcaneal spurs. Mild degenerative changes are present at the first metatarsal phalangeal joint. Impression: 1. Soft tissue swelling with evidence of soft tissue ulceration involving the first digit. There is associated cortical destruction of the first distal phalanx, most consistent with osteomyelitis. This document has been electronically signed by: Ana Lopez MD on 01/01/2022 06:31 AM    VL DUP LOWER EXTREMITY ARTERIES LEFT    Result Date: 1/1/2022  PROCEDURE: VL DUP LOWER EXTREMITY ARTERIES LEFT CLINICAL INFORMATION: non-palpable pulses . Nonhealing wound left great toe. COMPARISON: No prior study. TECHNIQUE: Arterial doppler ultrasound was performed of the left lower extremity using gray scale, color flow and spectral doppler imaging. Velocities were also recorded. FINDINGS: LEFT ARTERY (PSV cm/sec) ? ??????????????????????? CFA ---------------> 121 PSV cm/sec PROF -------------> 100 PSV cm/sec SFA PROX ------->77 PSV cm/sec SFA MID ---------->>454 PSV cm/sec SFA DIST -------->58 PSV cm/sec POP A PROX --->62 PSV cm/sec POP A DIST ---->114 PSV cm/sec PTA --------------->42 PSV cm/sec PERONEAL ----->40 PSV cm/sec SEAN ----------------> 42 PSV cm/sec GINO RIGHT SEAN----->1.07 PTA----->1.07 GINO LEFT SEAN----->0.65 PTA----->0.56 There is triphasic waveform in the left common femoral artery. In the proximal left superficial femoral artery, the waveform becomes dampened. The velocity decreases. In the mid left superficial femoral artery, the velocity is markedly elevated. There is  significant spectral broadening. This is consistent with a high-grade stenosis in the mid superficial femoral artery. Distal to this, there are dampened waveforms throughout. The waveform becomes biphasic. There is a significant reduction the peak systolic velocity in the distal superficial femoral artery. The popliteal, anterior tibial, posterior tibial and peroneal arteries are patent. All have dampened waveforms. These are biphasic. There are reduced ABIs on the left compared to the right. 1. High-grade stenosis in the mid left superficial femoral artery. 2. Abnormal left GINO suggesting a hemodynamically significant stenosis. **This report has been created using voice recognition software. It may contain minor errors which are inherent in voice recognition technology. ** Final report electronically signed by Dr. Chela Greenfield on 1/1/2022 9:40 AM    VL DUP LOWER EXTREMITY VENOUS LEFT    Result Date: 1/1/2022  PROCEDURE: VL DUP LOWER EXTREMITY VENOUS LEFT CLINICAL INFORMATION: pain, swelling. Nonhealing ulcer left great toe. COMPARISON: No prior study. TECHNIQUE: Venous doppler ultrasound was performed of the left lower extremity using gray scale, color flow and spectral doppler imaging. FINDINGS: There is normal color flow, spectral analysis and compressibility of the common femoral vein, femoral vein and popliteal vein on the left .  There is mild thickening of the walls of popliteal vein. There is normal color flow and compressibility in the posterior tibial veins, anterior tibial veins and peroneal veins. There is normal color flow, spectral analysis and compressibility in the contralateral common femoral vein. The greater saphenous vein is patent. The gastrocnemius vein is patent. No evidence of a DVT. **This report has been created using voice recognition software. It may contain minor errors which are inherent in voice recognition technology. ** Final report electronically signed by Dr. Adelia Mann on 1/1/2022 9:27 AM      Electronically signed by Jeremiah Newman PA-C on 1/11/2022 at 12:54 PM If you are a smoker, it is important for your health to stop smoking. Please be aware that second hand smoke is also harmful.

## 2024-11-13 ENCOUNTER — HOSPITAL ENCOUNTER (OUTPATIENT)
Dept: PHYSICAL THERAPY | Age: 59
Setting detail: THERAPIES SERIES
Discharge: HOME | End: 2024-11-13
Payer: COMMERCIAL

## 2024-11-13 PROCEDURE — 97110 THERAPEUTIC EXERCISES: CPT

## 2024-11-13 PROCEDURE — 97035 APP MDLTY 1+ULTRASOUND EA 15: CPT

## 2024-11-13 PROCEDURE — 97140 MANUAL THERAPY 1/> REGIONS: CPT

## 2024-11-13 NOTE — PROGRESS NOTES
[facility]  PHYSICAL THERAPY  [] EVALUATION  [x] DAILY NOTE (LAND) [] DAILY NOTE (AQUATIC ) [] PROGRESS NOTE [] DISCHARGE NOTE    [] OUTPATIENT REHABILITATION CENTER [facility]   [] [place] [facility]    [x] [facility]   [] [facility]    Date: 2024  Patient Name:  Kannan Fuentes  : 1965  MRN: [id number]  CSN: [id number]    Referring Practitioner Boogie Jordan MD [id number]      Diagnosis Radiculopathy, lumbar region  Radiculopathy, cervical region   Treatment Diagnosis M54.59  Other Low Back Pain  M54.59  Other Low Back Pain  R53.1 Weakness  M62.81 Generalized Muscle Weakness   Date of Evaluation 10/8/24   Additional Pertinent History Kannan Fuentes has a past medical history of Back pain, Carpal tunnel syndrome of right wrist, Hyperlipidemia, Hypertension, Hypogonadism male, Nocturia, Obesity, Osteoarthritis, Prostate cancer (HCC), Restless leg syndrome, Shortness of breath, Sleep apnea, Tobacco abuse, Tobacco user, Type II or unspecified type diabetes mellitus without mention of complication, not stated as uncontrolled, and Urgency of urination.  he has a past surgical history that includes Hand surgery (); Leg Surgery; knee surgery (?); Prostate surgery (2009); Prostate surgery (2010); Leg Surgery (12); cyst removal (T); Skin tag removal (T); cyst removal (12); Carpal tunnel release (10/22/12); Carpal tunnel release (2012); Colonoscopy (2015); Toe amputation (Left, 2022); and Toe amputation (Left, 2022).     L forefoot amputation 2022    Allergies Allergies   Allergen Reactions    Pcn [Penicillins] Hives      Medications   Current Outpatient Medications:     amLODIPine-benazepril (LOTREL) 5-10 MG per capsule, Take 1 capsule by mouth daily, Disp: 90 capsule, Rfl: 3    atorvastatin (LIPITOR) 80 MG tablet, Take 1 tablet by mouth nightly, Disp: 90 tablet, Rfl: 3    clopidogrel (PLAVIX) 75 MG tablet, Take 1

## 2024-11-18 ENCOUNTER — HOSPITAL ENCOUNTER (OUTPATIENT)
Dept: PHYSICAL THERAPY | Age: 59
Setting detail: THERAPIES SERIES
Discharge: HOME | End: 2024-11-18
Payer: COMMERCIAL

## 2024-11-18 PROCEDURE — 97110 THERAPEUTIC EXERCISES: CPT

## 2024-11-18 PROCEDURE — 97140 MANUAL THERAPY 1/> REGIONS: CPT

## 2024-11-18 NOTE — PROGRESS NOTES
[facility]  PHYSICAL THERAPY  [] EVALUATION  [x] DAILY NOTE (LAND) [] DAILY NOTE (AQUATIC ) [] PROGRESS NOTE [] DISCHARGE NOTE    [] OUTPATIENT REHABILITATION CENTER [facility]   [] [place] [facility]    [x] [facility]   [] [facility]    Date: 2024  Patient Name:  Kannan Fuentes  : 1965  MRN: [id number]  CSN: [id number]    Referring Practitioner Boogie Jordan MD [id number]      Diagnosis Radiculopathy, lumbar region  Radiculopathy, cervical region   Treatment Diagnosis M54.59  Other Low Back Pain  M54.59  Other Low Back Pain  R53.1 Weakness  M62.81 Generalized Muscle Weakness   Date of Evaluation 10/8/24   Additional Pertinent History Kannan Fuentes has a past medical history of Back pain, Carpal tunnel syndrome of right wrist, Hyperlipidemia, Hypertension, Hypogonadism male, Nocturia, Obesity, Osteoarthritis, Prostate cancer (HCC), Restless leg syndrome, Shortness of breath, Sleep apnea, Tobacco abuse, Tobacco user, Type II or unspecified type diabetes mellitus without mention of complication, not stated as uncontrolled, and Urgency of urination.  he has a past surgical history that includes Hand surgery (); Leg Surgery; knee surgery (?); Prostate surgery (2009); Prostate surgery (2010); Leg Surgery (12); cyst removal (T); Skin tag removal (T); cyst removal (12); Carpal tunnel release (10/22/12); Carpal tunnel release (2012); Colonoscopy (2015); Toe amputation (Left, 2022); and Toe amputation (Left, 2022).     L forefoot amputation 2022    Allergies Allergies   Allergen Reactions    Pcn [Penicillins] Hives      Medications   Current Outpatient Medications:     amLODIPine-benazepril (LOTREL) 5-10 MG per capsule, Take 1 capsule by mouth daily, Disp: 90 capsule, Rfl: 3    atorvastatin (LIPITOR) 80 MG tablet, Take 1 tablet by mouth nightly, Disp: 90 tablet, Rfl: 3    clopidogrel (PLAVIX) 75 MG tablet, Take 1

## 2024-11-19 ENCOUNTER — HOSPITAL ENCOUNTER (OUTPATIENT)
Age: 59
Discharge: HOME | End: 2024-11-19
Payer: COMMERCIAL

## 2024-11-19 DIAGNOSIS — E11.9 CONTROLLED TYPE 2 DIABETES MELLITUS WITHOUT COMPLICATION, WITHOUT LONG-TERM CURRENT USE OF INSULIN (HCC): ICD-10-CM

## 2024-11-19 DIAGNOSIS — E78.00 PURE HYPERCHOLESTEROLEMIA: ICD-10-CM

## 2024-11-19 LAB
CHOLEST SERPL-MCNC: 135 MG/DL (ref 100–199)
HDLC SERPL-MCNC: 34 MG/DL
LDLC SERPL CALC-MCNC: 79 MG/DL
TRIGL SERPL-MCNC: 109 MG/DL (ref 0–199)

## 2024-11-19 PROCEDURE — 80061 LIPID PANEL: CPT

## 2024-11-19 PROCEDURE — 36415 COLL VENOUS BLD VENIPUNCTURE: CPT

## 2024-11-19 PROCEDURE — 83036 HEMOGLOBIN GLYCOSYLATED A1C: CPT

## 2024-11-20 ENCOUNTER — OFFICE VISIT (OUTPATIENT)
Dept: FAMILY MEDICINE CLINIC | Age: 59
End: 2024-11-20

## 2024-11-20 VITALS
BODY MASS INDEX: 34.64 KG/M2 | DIASTOLIC BLOOD PRESSURE: 92 MMHG | RESPIRATION RATE: 20 BRPM | WEIGHT: 241.4 LBS | SYSTOLIC BLOOD PRESSURE: 178 MMHG | HEART RATE: 68 BPM

## 2024-11-20 DIAGNOSIS — I10 HYPERTENSION, UNSPECIFIED TYPE: ICD-10-CM

## 2024-11-20 DIAGNOSIS — F32.A DEPRESSION, UNSPECIFIED DEPRESSION TYPE: ICD-10-CM

## 2024-11-20 DIAGNOSIS — E11.9 CONTROLLED TYPE 2 DIABETES MELLITUS WITHOUT COMPLICATION, WITHOUT LONG-TERM CURRENT USE OF INSULIN (HCC): Primary | ICD-10-CM

## 2024-11-20 DIAGNOSIS — G62.9 PERIPHERAL POLYNEUROPATHY: ICD-10-CM

## 2024-11-20 DIAGNOSIS — Z12.5 SCREENING FOR PROSTATE CANCER: ICD-10-CM

## 2024-11-20 DIAGNOSIS — E78.00 PURE HYPERCHOLESTEROLEMIA: ICD-10-CM

## 2024-11-20 DIAGNOSIS — I73.9 PAD (PERIPHERAL ARTERY DISEASE) (HCC): ICD-10-CM

## 2024-11-20 DIAGNOSIS — I73.9 CLAUDICATION (HCC): ICD-10-CM

## 2024-11-20 LAB
DEPRECATED MEAN GLUCOSE BLD GHB EST-ACNC: 114 MG/DL (ref 70–126)
HBA1C MFR BLD HPLC: 5.8 % (ref 4.4–6.4)

## 2024-11-20 RX ORDER — AMLODIPINE AND BENAZEPRIL HYDROCHLORIDE 5; 10 MG/1; MG/1
1 CAPSULE ORAL 2 TIMES DAILY
Qty: 180 CAPSULE | Refills: 3 | Status: SHIPPED | OUTPATIENT
Start: 2024-11-20

## 2024-11-20 ASSESSMENT — ENCOUNTER SYMPTOMS
RESPIRATORY NEGATIVE: 1
GASTROINTESTINAL NEGATIVE: 1

## 2024-11-20 NOTE — PROGRESS NOTES
MG capsule Take 1 capsule by mouth 3 times daily. 270 capsule 3    fluticasone (FLONASE) 50 MCG/ACT nasal spray 2 sprays by Each Nostril route daily 48 g 1    acetaminophen (TYLENOL) 500 MG tablet Take 2 tablets by mouth in the morning and at bedtime      Naproxen Sodium 220 MG CAPS Take 1 tablet by mouth in the morning and at bedtime      Multiple Vitamin (MULTIVITAMIN ADULT PO) Take by mouth      aspirin 81 MG EC tablet Take 1 tablet by mouth daily       No current facility-administered medications for this visit.       Past Surgical History:   Procedure Laterality Date    CARPAL TUNNEL RELEASE  10/22/12    right    CARPAL TUNNEL RELEASE  11/26/2012    left    COLONOSCOPY  2/1/2015    CYST REMOVAL  T    RT SIDE UPPER BACK,     CYST REMOVAL  7-5-12    excision of right shoulder mass/cyst and of left axillary skin lesion-Dr. Maki    HAND SURGERY  1987    partial finger amputee rt hand    KNEE SURGERY  ?    left knee scope    LEG SURGERY      LEG SURGERY  6/13/12    I&D rt leg abscess- Dr. Maki     PROSTATE SURGERY  12-    TRUS/BX    PROSTATE SURGERY  06-    BRACHYTHERAPY OF THE PROSTATE    SKIN TAG REMOVAL  T    LT ARMPIT    TOE AMPUTATION Left 1/1/2022    LEFT 1ST TOE AMPUTATION performed by Parish Stewart DPM at [facility] [place]    TOE AMPUTATION Left 1/7/2022    LEFT FOOT TRANSMETATARSAL AMPUTATION performed by Parish Stewart DPM at [facility] [place]       Review of Systems   Constitutional: Negative.    HENT: Negative.     Respiratory: Negative.     Cardiovascular: Negative.    Gastrointestinal: Negative.    Musculoskeletal: Negative.    All other systems reviewed and are negative.         Objective   Physical Exam  Vitals and nursing note reviewed.   Constitutional:       General: He is not in acute distress.     Appearance: Normal appearance. He is well-developed.   HENT:      Head: Normocephalic and atraumatic.      Right Ear: Tympanic membrane normal.      Left Ear: Tympanic membrane normal.

## 2024-11-20 NOTE — PATIENT INSTRUCTIONS
You may receive a survey regarding the care you received during your visit.  Your input is valuable to us.  We encourage you to complete and return your survey.  We hope you will choose us in the future for your healthcare needs.      Tobacco Cessation Programs     Telephonic behavior modification  1-800-QUIT-NOW (871-5761)  Counseling service for those who are ready to quit using tobacco.    Available for uninsured [place] residents, Medicaid recipients, pregnant women, or patients whose health plans or employers are members of the [place] [employer]    Online behavior modification  http://[place].[employer]uit[employer].org  Online support program to help patients through each step of the quitting process.  Available 24 hours a day 7 days a week.  Provides up to date researched based tool, step-by-step guides, and motivational messages.    Online behavior modification  www.lungusa.org/stop-smoking/how-to-quit  HelpLine: 1-800-LUNG-[place] (134-0992)  Email questions to:  questions@[employer].org   Website offers resources to help tobacco users figure out their reasons for quitting and then take the big step of quitting for good.    Hypnosis  Location: [address] [place] [address]  Contact: Carmen Morton, PhD at [phone]  Hypnosis for tobacco cessation  Cost $225 for the initial session and $175 for each session afterwards.  Most patients require 6-8 sessions.  There is the option to submit through the patient’s insurance.    Hypnosis and behavior modification  Location: [address] [address].[place], [place]  Contact: Gucci Garcia, PhD at [phone]  Counseling and hypnosis for nicotine addition  Cost: For uninsured patients:  Please call above phone number  Cost for insured patients depends on patient’s insurance plan.    Behavior modification  Location: [facility], [address], [place]  Contact: [phone]  Services include four one-on-one appointments between the patient and a

## 2024-11-21 ENCOUNTER — HOSPITAL ENCOUNTER (OUTPATIENT)
Dept: PHYSICAL THERAPY | Age: 59
Setting detail: THERAPIES SERIES
Discharge: HOME | End: 2024-11-21
Payer: COMMERCIAL

## 2024-11-21 PROCEDURE — 97035 APP MDLTY 1+ULTRASOUND EA 15: CPT

## 2024-11-21 PROCEDURE — 97110 THERAPEUTIC EXERCISES: CPT

## 2024-11-21 NOTE — DISCHARGE SUMMARY
[facility]  PHYSICAL THERAPY  [] EVALUATION  [] DAILY NOTE (LAND) [] DAILY NOTE (AQUATIC ) [] PROGRESS NOTE [x] DISCHARGE NOTE    [] OUTPATIENT REHABILITATION [facility]   [] [facility]    [x] [facility]   [] [facility]    Date: 2024  Patient Name:  Kannan Fuentes  : 1965  MRN: [id number]  CSN: [id number]    Referring Practitioner Boogie Jordan MD [phone]      Diagnosis Radiculopathy, lumbar region  Radiculopathy, cervical region   Treatment Diagnosis M54.59  Other Low Back Pain  M54.59  Other Low Back Pain  R53.1 Weakness  M62.81 Generalized Muscle Weakness   Date of Evaluation 10/8/24   Additional Pertinent History Kannan Fuentes has a past medical history of Back pain, Carpal tunnel syndrome of right wrist, Hyperlipidemia, Hypertension, Hypogonadism male, Nocturia, Obesity, Osteoarthritis, Prostate cancer (HCC), Restless leg syndrome, Shortness of breath, Sleep apnea, Tobacco abuse, Tobacco user, Type II or unspecified type diabetes mellitus without mention of complication, not stated as uncontrolled, and Urgency of urination.  he has a past surgical history that includes Hand surgery (); Leg Surgery; knee surgery (?); Prostate surgery (2009); Prostate surgery (2010); Leg Surgery (12); cyst removal (T); Skin tag removal (T); cyst removal (12); Carpal tunnel release (10/22/12); Carpal tunnel release (2012); Colonoscopy (2015); Toe amputation (Left, 2022); and Toe amputation (Left, 2022).     L forefoot amputation 2022    Allergies Allergies   Allergen Reactions    Pcn [Penicillins] Hives      Medications   Current Outpatient Medications:     amLODIPine-benazepril (LOTREL) 5-10 MG per capsule, Take 1 capsule by mouth in the morning and at bedtime, Disp: 180 capsule, Rfl: 3    atorvastatin (LIPITOR) 80 MG tablet, Take 1 tablet by mouth nightly, Disp: 90 tablet, Rfl: 3    clopidogrel

## 2024-12-04 DIAGNOSIS — L97.521 NON-HEALING ULCER OF FOOT, LEFT, LIMITED TO BREAKDOWN OF SKIN (HCC): ICD-10-CM

## 2024-12-04 DIAGNOSIS — I10 HYPERTENSION, UNSPECIFIED TYPE: ICD-10-CM

## 2024-12-04 RX ORDER — ALPRAZOLAM 0.5 MG
TABLET ORAL
Qty: 1 TABLET | Refills: 0 | Status: SHIPPED | OUTPATIENT
Start: 2024-12-04 | End: 2024-12-07

## 2024-12-04 RX ORDER — AMLODIPINE AND BENAZEPRIL HYDROCHLORIDE 5; 10 MG/1; MG/1
1 CAPSULE ORAL 2 TIMES DAILY
Qty: 180 CAPSULE | Refills: 3 | Status: SHIPPED | OUTPATIENT
Start: 2024-12-04

## 2025-04-22 ENCOUNTER — COMMUNITY OUTREACH (OUTPATIENT)
Dept: FAMILY MEDICINE CLINIC | Age: 60
End: 2025-04-22

## (undated) DEVICE — PREP SOL PVP IODINE 4%  4 OZ/BTL

## (undated) DEVICE — HYPODERMIC SAFETY NEEDLE: Brand: MAGELLAN

## (undated) DEVICE — BANDAGE,GAUZE,4.5"X4.1YD,STERILE,LF: Brand: MEDLINE

## (undated) DEVICE — GLOVE ORANGE PI 8   MSG9080

## (undated) DEVICE — BANDAGE COMPR E SGL LAYERED CLSR BGE W/ CLP W4INXL15FT

## (undated) DEVICE — Z DISCONTINUED BY MEDLINE USE 2711682 TRAY SKIN PREP DRY W/ PREM GLV

## (undated) DEVICE — SPONGE GZ W4XL4IN COT 12 PLY TYP VII WVN C FLD DSGN

## (undated) DEVICE — 450 ML BOTTLE OF 0.05% CHLORHEXIDINE GLUCONATE IN 99.95% STERILE WATER FOR IRRIGATION, USP AND APPLICATOR.: Brand: IRRISEPT ANTIMICROBIAL WOUND LAVAGE

## (undated) DEVICE — SOLUTION SURG PREP POV IOD 7.5% 4 OZ

## (undated) DEVICE — PACK-MAJOR

## (undated) DEVICE — PENCIL SMK EVAC ALL IN 1 DSGN ENH VISIBILITY IMPROVED AIR

## (undated) DEVICE — SYRINGE MED 10ML LUERLOCK TIP W/O SFTY DISP

## (undated) DEVICE — SOLUTION IV IRRIG POUR BRL 0.9% SODIUM CHL 2F7124

## (undated) DEVICE — PACK PROCEDURE SURG SET UP SRMC

## (undated) DEVICE — BASIC SINGLE BASIN BTC-LF: Brand: MEDLINE INDUSTRIES, INC.

## (undated) DEVICE — IMPREGNATED GAUZE DRESSING: Brand: CUTICERIN 7.5X7.5CM CTN 50

## (undated) DEVICE — GAUZE,SPONGE,8"X4",12PLY,XRAY,STRL,LF: Brand: MEDLINE

## (undated) DEVICE — BANDAGE COMPR W4INXL12FT E DISP ESMARCH EVEN

## (undated) DEVICE — DRAPE,EXTREMITY,89X128,STERILE: Brand: MEDLINE